# Patient Record
Sex: FEMALE | Race: WHITE | NOT HISPANIC OR LATINO | Employment: FULL TIME | URBAN - METROPOLITAN AREA
[De-identification: names, ages, dates, MRNs, and addresses within clinical notes are randomized per-mention and may not be internally consistent; named-entity substitution may affect disease eponyms.]

---

## 2022-01-12 ENCOUNTER — HOSPITAL ENCOUNTER (INPATIENT)
Facility: CLINIC | Age: 32
LOS: 6 days | Discharge: HOME | End: 2022-01-18
Attending: HOSPITALIST | Admitting: INTERNAL MEDICINE
Payer: MEDICAID

## 2022-01-12 ENCOUNTER — APPOINTMENT (OUTPATIENT)
Dept: NUCLEAR MEDICINE | Facility: CLINIC | Age: 32
End: 2022-01-12
Attending: PHYSICIAN ASSISTANT
Payer: MEDICAID

## 2022-01-12 ENCOUNTER — HOSPITAL ENCOUNTER (INPATIENT)
Facility: CLINIC | Age: 32
LOS: 1 days | Discharge: ANOTHER HOSPITAL | End: 2022-01-12
Attending: EMERGENCY MEDICINE | Admitting: HOSPITALIST
Payer: MEDICAID

## 2022-01-12 ENCOUNTER — APPOINTMENT (OUTPATIENT)
Dept: CT IMAGING | Facility: CLINIC | Age: 32
End: 2022-01-12
Attending: STUDENT IN AN ORGANIZED HEALTH CARE EDUCATION/TRAINING PROGRAM
Payer: MEDICAID

## 2022-01-12 ENCOUNTER — APPOINTMENT (OUTPATIENT)
Dept: ULTRASOUND IMAGING | Facility: CLINIC | Age: 32
End: 2022-01-12
Attending: STUDENT IN AN ORGANIZED HEALTH CARE EDUCATION/TRAINING PROGRAM
Payer: MEDICAID

## 2022-01-12 VITALS
RESPIRATION RATE: 16 BRPM | OXYGEN SATURATION: 98 % | WEIGHT: 240 LBS | DIASTOLIC BLOOD PRESSURE: 64 MMHG | SYSTOLIC BLOOD PRESSURE: 118 MMHG | HEIGHT: 65 IN | TEMPERATURE: 98.4 F | BODY MASS INDEX: 39.99 KG/M2 | HEART RATE: 85 BPM

## 2022-01-12 DIAGNOSIS — M31.19 TTP (THROMBOTIC THROMBOCYTOPENIC PURPURA) (H): ICD-10-CM

## 2022-01-12 DIAGNOSIS — R74.02 ELEVATED SERUM LACTATE DEHYDROGENASE: ICD-10-CM

## 2022-01-12 DIAGNOSIS — R10.84 ABDOMINAL PAIN, GENERALIZED: ICD-10-CM

## 2022-01-12 DIAGNOSIS — R31.9 HEMATURIA, UNSPECIFIED TYPE: ICD-10-CM

## 2022-01-12 DIAGNOSIS — D64.9 NORMOCYTIC ANEMIA: ICD-10-CM

## 2022-01-12 DIAGNOSIS — E80.6 HYPERBILIRUBINEMIA: ICD-10-CM

## 2022-01-12 DIAGNOSIS — K04.7 DENTAL ABSCESS: Primary | ICD-10-CM

## 2022-01-12 DIAGNOSIS — K59.01 SLOW TRANSIT CONSTIPATION: ICD-10-CM

## 2022-01-12 DIAGNOSIS — D69.6 THROMBOCYTOPENIA (H): Primary | ICD-10-CM

## 2022-01-12 PROBLEM — D69.3 ACUTE IDIOPATHIC THROMBOCYTOPENIC PURPURA (H): Status: ACTIVE | Noted: 2022-01-12

## 2022-01-12 LAB
ALBUMIN SERPL-MCNC: 3 G/DL (ref 3.4–5)
ALBUMIN SERPL-MCNC: 3.3 G/DL (ref 3.4–5)
ALBUMIN UR-MCNC: 200 MG/DL
ALP SERPL-CCNC: 76 U/L (ref 40–150)
ALP SERPL-CCNC: 80 U/L (ref 40–150)
ALT SERPL W P-5'-P-CCNC: 41 U/L (ref 0–50)
ALT SERPL W P-5'-P-CCNC: 42 U/L (ref 0–50)
ANION GAP SERPL CALCULATED.3IONS-SCNC: 5 MMOL/L (ref 3–14)
ANION GAP SERPL CALCULATED.3IONS-SCNC: 6 MMOL/L (ref 3–14)
APPEARANCE UR: ABNORMAL
APTT PPP: 34 SECONDS (ref 22–38)
APTT PPP: 34 SECONDS (ref 22–38)
AST SERPL W P-5'-P-CCNC: 36 U/L (ref 0–45)
AST SERPL W P-5'-P-CCNC: 37 U/L (ref 0–45)
BACTERIA #/AREA URNS HPF: ABNORMAL /HPF
BASOPHILS # BLD AUTO: 0 10E3/UL (ref 0–0.2)
BASOPHILS # BLD AUTO: 0.1 10E3/UL (ref 0–0.2)
BASOPHILS NFR BLD AUTO: 0 %
BASOPHILS NFR BLD AUTO: 1 %
BILIRUB DIRECT SERPL-MCNC: 0.3 MG/DL (ref 0–0.2)
BILIRUB SERPL-MCNC: 1.4 MG/DL (ref 0.2–1.3)
BILIRUB SERPL-MCNC: 1.4 MG/DL (ref 0.2–1.3)
BILIRUB UR QL STRIP: NEGATIVE
BLD PROD TYP BPU: NORMAL
BLOOD COMPONENT TYPE: NORMAL
BUN SERPL-MCNC: 18 MG/DL (ref 7–30)
BUN SERPL-MCNC: 29 MG/DL (ref 7–30)
CALCIUM SERPL-MCNC: 8.4 MG/DL (ref 8.5–10.1)
CALCIUM SERPL-MCNC: 8.8 MG/DL (ref 8.5–10.1)
CHLORIDE BLD-SCNC: 106 MMOL/L (ref 94–109)
CHLORIDE BLD-SCNC: 109 MMOL/L (ref 94–109)
CO2 SERPL-SCNC: 24 MMOL/L (ref 20–32)
CO2 SERPL-SCNC: 26 MMOL/L (ref 20–32)
CODING SYSTEM: NORMAL
COLOR UR AUTO: ABNORMAL
CREAT SERPL-MCNC: 0.84 MG/DL (ref 0.52–1.04)
CREAT SERPL-MCNC: 1.09 MG/DL (ref 0.52–1.04)
EOSINOPHIL # BLD AUTO: 0.1 10E3/UL (ref 0–0.7)
EOSINOPHIL # BLD AUTO: 0.2 10E3/UL (ref 0–0.7)
EOSINOPHIL NFR BLD AUTO: 1 %
EOSINOPHIL NFR BLD AUTO: 2 %
ERYTHROCYTE [DISTWIDTH] IN BLOOD BY AUTOMATED COUNT: 13.6 % (ref 10–15)
ERYTHROCYTE [DISTWIDTH] IN BLOOD BY AUTOMATED COUNT: 13.7 % (ref 10–15)
ERYTHROCYTE [DISTWIDTH] IN BLOOD BY AUTOMATED COUNT: 13.8 % (ref 10–15)
ERYTHROCYTE [DISTWIDTH] IN BLOOD BY AUTOMATED COUNT: 13.8 % (ref 10–15)
ERYTHROCYTE [DISTWIDTH] IN BLOOD BY AUTOMATED COUNT: 13.9 % (ref 10–15)
FERRITIN SERPL-MCNC: 590 NG/ML (ref 12–150)
FIBRINOGEN PPP-MCNC: 410 MG/DL (ref 170–490)
FLUAV RNA SPEC QL NAA+PROBE: NEGATIVE
FLUBV RNA RESP QL NAA+PROBE: NEGATIVE
FOLATE SERPL-MCNC: 11.4 NG/ML
FOLATE SERPL-MCNC: 12.7 NG/ML
GFR SERPL CREATININE-BSD FRML MDRD: 69 ML/MIN/1.73M2
GFR SERPL CREATININE-BSD FRML MDRD: >90 ML/MIN/1.73M2
GLUCOSE BLD-MCNC: 117 MG/DL (ref 70–99)
GLUCOSE BLD-MCNC: 80 MG/DL (ref 70–99)
GLUCOSE UR STRIP-MCNC: NEGATIVE MG/DL
HAPTOGLOB SERPL-MCNC: <3 MG/DL (ref 32–197)
HCG SERPL QL: NEGATIVE
HCT VFR BLD AUTO: 27.9 % (ref 35–47)
HCT VFR BLD AUTO: 28.3 % (ref 35–47)
HCT VFR BLD AUTO: 30.5 % (ref 35–47)
HCT VFR BLD AUTO: 32.3 % (ref 35–47)
HCT VFR BLD AUTO: 32.9 % (ref 35–47)
HCV AB SERPL QL IA: NONREACTIVE
HGB BLD-MCNC: 10.1 G/DL (ref 11.7–15.7)
HGB BLD-MCNC: 10.7 G/DL (ref 11.7–15.7)
HGB BLD-MCNC: 10.8 G/DL (ref 11.7–15.7)
HGB BLD-MCNC: 9.4 G/DL (ref 11.7–15.7)
HGB BLD-MCNC: 9.6 G/DL (ref 11.7–15.7)
HGB UR QL STRIP: ABNORMAL
HIV 1+2 AB+HIV1 P24 AG SERPL QL IA: NONREACTIVE
HIV 1+2 AB+HIV1P24 AG SERPLBLD IA.RAPID: NON REACTIVE
HIV 1+2 AB+HIV1P24 AG SERPLBLD IA.RAPID: NON REACTIVE
HIV INTERPRETATION: NORMAL
HOLD SPECIMEN: NORMAL
IMM GRANULOCYTES # BLD: 0 10E3/UL
IMM GRANULOCYTES # BLD: 0.1 10E3/UL
IMM GRANULOCYTES NFR BLD: 0 %
IMM GRANULOCYTES NFR BLD: 0 %
IMM GRANULOCYTES NFR BLD: 1 %
IMM GRANULOCYTES NFR BLD: 1 %
INR PPP: 1.02 (ref 0.85–1.15)
INR PPP: 1.08 (ref 0.85–1.15)
INR PPP: 1.08 (ref 0.85–1.15)
IRON SATN MFR SERPL: 51 % (ref 15–46)
IRON SATN MFR SERPL: 53 % (ref 15–46)
IRON SERPL-MCNC: 125 UG/DL (ref 35–180)
IRON SERPL-MCNC: 151 UG/DL (ref 35–180)
ISSUE DATE AND TIME: NORMAL
KETONES UR STRIP-MCNC: ABNORMAL MG/DL
LACTATE SERPL-SCNC: 0.6 MMOL/L (ref 0.7–2)
LDH SERPL L TO P-CCNC: 591 U/L (ref 81–234)
LDH SERPL L TO P-CCNC: 608 U/L (ref 81–234)
LEUKOCYTE ESTERASE UR QL STRIP: NEGATIVE
LIPASE SERPL-CCNC: 165 U/L (ref 73–393)
LYMPHOCYTES # BLD AUTO: 0.8 10E3/UL (ref 0.8–5.3)
LYMPHOCYTES # BLD AUTO: 1.5 10E3/UL (ref 0.8–5.3)
LYMPHOCYTES # BLD AUTO: 1.8 10E3/UL (ref 0.8–5.3)
LYMPHOCYTES # BLD AUTO: 1.9 10E3/UL (ref 0.8–5.3)
LYMPHOCYTES NFR BLD AUTO: 20 %
LYMPHOCYTES NFR BLD AUTO: 22 %
LYMPHOCYTES NFR BLD AUTO: 22 %
LYMPHOCYTES NFR BLD AUTO: 9 %
MCH RBC QN AUTO: 31.5 PG (ref 26.5–33)
MCH RBC QN AUTO: 31.8 PG (ref 26.5–33)
MCH RBC QN AUTO: 31.8 PG (ref 26.5–33)
MCH RBC QN AUTO: 32 PG (ref 26.5–33)
MCH RBC QN AUTO: 32.3 PG (ref 26.5–33)
MCHC RBC AUTO-ENTMCNC: 32.8 G/DL (ref 31.5–36.5)
MCHC RBC AUTO-ENTMCNC: 33.1 G/DL (ref 31.5–36.5)
MCHC RBC AUTO-ENTMCNC: 33.1 G/DL (ref 31.5–36.5)
MCHC RBC AUTO-ENTMCNC: 33.7 G/DL (ref 31.5–36.5)
MCHC RBC AUTO-ENTMCNC: 33.9 G/DL (ref 31.5–36.5)
MCV RBC AUTO: 95 FL (ref 78–100)
MCV RBC AUTO: 95 FL (ref 78–100)
MCV RBC AUTO: 96 FL (ref 78–100)
MONOCYTES # BLD AUTO: 0.3 10E3/UL (ref 0–1.3)
MONOCYTES # BLD AUTO: 0.6 10E3/UL (ref 0–1.3)
MONOCYTES # BLD AUTO: 0.7 10E3/UL (ref 0–1.3)
MONOCYTES # BLD AUTO: 0.7 10E3/UL (ref 0–1.3)
MONOCYTES NFR BLD AUTO: 3 %
MONOCYTES NFR BLD AUTO: 8 %
MUCOUS THREADS #/AREA URNS LPF: PRESENT /LPF
NEUTROPHILS # BLD AUTO: 4.6 10E3/UL (ref 1.6–8.3)
NEUTROPHILS # BLD AUTO: 5.6 10E3/UL (ref 1.6–8.3)
NEUTROPHILS # BLD AUTO: 6.1 10E3/UL (ref 1.6–8.3)
NEUTROPHILS # BLD AUTO: 7.8 10E3/UL (ref 1.6–8.3)
NEUTROPHILS NFR BLD AUTO: 67 %
NEUTROPHILS NFR BLD AUTO: 67 %
NEUTROPHILS NFR BLD AUTO: 68 %
NEUTROPHILS NFR BLD AUTO: 86 %
NITRATE UR QL: NEGATIVE
NRBC # BLD AUTO: 0 10E3/UL
NRBC BLD AUTO-RTO: 0 /100
PATH REPORT.COMMENTS IMP SPEC: NORMAL
PATH REPORT.FINAL DX SPEC: NORMAL
PATH REPORT.MICROSCOPIC SPEC OTHER STN: NORMAL
PATH REPORT.MICROSCOPIC SPEC OTHER STN: NORMAL
PH UR STRIP: 5.5 [PH] (ref 5–7)
PLAT MORPH BLD: NORMAL
PLATELET # BLD AUTO: 11 10E3/UL (ref 150–450)
POTASSIUM BLD-SCNC: 3.5 MMOL/L (ref 3.4–5.3)
POTASSIUM BLD-SCNC: 3.5 MMOL/L (ref 3.4–5.3)
PROCALCITONIN SERPL-MCNC: <0.05 NG/ML
PROT SERPL-MCNC: 6.4 G/DL (ref 6.8–8.8)
PROT SERPL-MCNC: 6.9 G/DL (ref 6.8–8.8)
RBC # BLD AUTO: 2.94 10E6/UL (ref 3.8–5.2)
RBC # BLD AUTO: 2.97 10E6/UL (ref 3.8–5.2)
RBC # BLD AUTO: 3.18 10E6/UL (ref 3.8–5.2)
RBC # BLD AUTO: 3.37 10E6/UL (ref 3.8–5.2)
RBC # BLD AUTO: 3.43 10E6/UL (ref 3.8–5.2)
RBC MORPH BLD: NORMAL
RBC URINE: 22 /HPF
RETICS # AUTO: 0.13 10E6/UL (ref 0.03–0.1)
RETICS/RBC NFR AUTO: 4.5 % (ref 0.5–2)
SARS-COV-2 RNA RESP QL NAA+PROBE: NEGATIVE
SODIUM SERPL-SCNC: 137 MMOL/L (ref 133–144)
SODIUM SERPL-SCNC: 139 MMOL/L (ref 133–144)
SP GR UR STRIP: 1.03 (ref 1–1.03)
SQUAMOUS EPITHELIAL: 21 /HPF
TIBC SERPL-MCNC: 244 UG/DL (ref 240–430)
TIBC SERPL-MCNC: 285 UG/DL (ref 240–430)
UNIT ABO/RH: NORMAL
UNIT NUMBER: NORMAL
UNIT STATUS: NORMAL
UNIT TYPE ISBT: 6200
UROBILINOGEN UR STRIP-MCNC: NORMAL MG/DL
VIT B12 SERPL-MCNC: 507 PG/ML (ref 193–986)
VIT B12 SERPL-MCNC: 539 PG/ML (ref 193–986)
WBC # BLD AUTO: 6.9 10E3/UL (ref 4–11)
WBC # BLD AUTO: 8.4 10E3/UL (ref 4–11)
WBC # BLD AUTO: 8.8 10E3/UL (ref 4–11)
WBC # BLD AUTO: 9.1 10E3/UL (ref 4–11)
WBC # BLD AUTO: 9.6 10E3/UL (ref 4–11)
WBC URINE: 7 /HPF

## 2022-01-12 PROCEDURE — 85610 PROTHROMBIN TIME: CPT | Performed by: EMERGENCY MEDICINE

## 2022-01-12 PROCEDURE — 84703 CHORIONIC GONADOTROPIN ASSAY: CPT | Performed by: PHYSICIAN ASSISTANT

## 2022-01-12 PROCEDURE — 83550 IRON BINDING TEST: CPT | Performed by: INTERNAL MEDICINE

## 2022-01-12 PROCEDURE — 81001 URINALYSIS AUTO W/SCOPE: CPT | Performed by: EMERGENCY MEDICINE

## 2022-01-12 PROCEDURE — 250N000011 HC RX IP 250 OP 636: Performed by: EMERGENCY MEDICINE

## 2022-01-12 PROCEDURE — 84450 TRANSFERASE (AST) (SGOT): CPT | Performed by: INTERNAL MEDICINE

## 2022-01-12 PROCEDURE — 36415 COLL VENOUS BLD VENIPUNCTURE: CPT | Performed by: PHYSICIAN ASSISTANT

## 2022-01-12 PROCEDURE — 120N000001 HC R&B MED SURG/OB

## 2022-01-12 PROCEDURE — 96361 HYDRATE IV INFUSION ADD-ON: CPT

## 2022-01-12 PROCEDURE — 86803 HEPATITIS C AB TEST: CPT | Performed by: PHYSICIAN ASSISTANT

## 2022-01-12 PROCEDURE — 343N000001 HC RX 343: Performed by: HOSPITALIST

## 2022-01-12 PROCEDURE — 83010 ASSAY OF HAPTOGLOBIN QUANT: CPT | Performed by: INTERNAL MEDICINE

## 2022-01-12 PROCEDURE — 96360 HYDRATION IV INFUSION INIT: CPT | Mod: 59

## 2022-01-12 PROCEDURE — 85730 THROMBOPLASTIN TIME PARTIAL: CPT | Performed by: INTERNAL MEDICINE

## 2022-01-12 PROCEDURE — 85060 BLOOD SMEAR INTERPRETATION: CPT | Performed by: PATHOLOGY

## 2022-01-12 PROCEDURE — 85025 COMPLETE CBC W/AUTO DIFF WBC: CPT | Performed by: EMERGENCY MEDICINE

## 2022-01-12 PROCEDURE — 86706 HEP B SURFACE ANTIBODY: CPT | Performed by: INTERNAL MEDICINE

## 2022-01-12 PROCEDURE — 78226 HEPATOBILIARY SYSTEM IMAGING: CPT

## 2022-01-12 PROCEDURE — 83690 ASSAY OF LIPASE: CPT | Performed by: EMERGENCY MEDICINE

## 2022-01-12 PROCEDURE — 36415 COLL VENOUS BLD VENIPUNCTURE: CPT | Performed by: INTERNAL MEDICINE

## 2022-01-12 PROCEDURE — 87806 HIV AG W/HIV1&2 ANTB W/OPTIC: CPT | Performed by: PHYSICIAN ASSISTANT

## 2022-01-12 PROCEDURE — 85610 PROTHROMBIN TIME: CPT | Performed by: INTERNAL MEDICINE

## 2022-01-12 PROCEDURE — 258N000003 HC RX IP 258 OP 636: Performed by: EMERGENCY MEDICINE

## 2022-01-12 PROCEDURE — 83010 ASSAY OF HAPTOGLOBIN QUANT: CPT | Performed by: EMERGENCY MEDICINE

## 2022-01-12 PROCEDURE — A9537 TC99M MEBROFENIN: HCPCS | Performed by: HOSPITALIST

## 2022-01-12 PROCEDURE — 83550 IRON BINDING TEST: CPT | Performed by: PHYSICIAN ASSISTANT

## 2022-01-12 PROCEDURE — 250N000011 HC RX IP 250 OP 636: Performed by: HOSPITALIST

## 2022-01-12 PROCEDURE — 87086 URINE CULTURE/COLONY COUNT: CPT | Performed by: PHYSICIAN ASSISTANT

## 2022-01-12 PROCEDURE — 87340 HEPATITIS B SURFACE AG IA: CPT | Performed by: INTERNAL MEDICINE

## 2022-01-12 PROCEDURE — 36415 COLL VENOUS BLD VENIPUNCTURE: CPT | Performed by: EMERGENCY MEDICINE

## 2022-01-12 PROCEDURE — 99223 1ST HOSP IP/OBS HIGH 75: CPT | Mod: AI | Performed by: HOSPITALIST

## 2022-01-12 PROCEDURE — 250N000013 HC RX MED GY IP 250 OP 250 PS 637: Performed by: EMERGENCY MEDICINE

## 2022-01-12 PROCEDURE — 99285 EMERGENCY DEPT VISIT HI MDM: CPT | Mod: 25

## 2022-01-12 PROCEDURE — 258N000003 HC RX IP 258 OP 636: Performed by: PHYSICIAN ASSISTANT

## 2022-01-12 PROCEDURE — 85384 FIBRINOGEN ACTIVITY: CPT | Performed by: HOSPITALIST

## 2022-01-12 PROCEDURE — 83615 LACTATE (LD) (LDH) ENZYME: CPT | Performed by: EMERGENCY MEDICINE

## 2022-01-12 PROCEDURE — 87389 HIV-1 AG W/HIV-1&-2 AB AG IA: CPT | Performed by: EMERGENCY MEDICINE

## 2022-01-12 PROCEDURE — 74177 CT ABD & PELVIS W/CONTRAST: CPT

## 2022-01-12 PROCEDURE — 84155 ASSAY OF PROTEIN SERUM: CPT | Performed by: INTERNAL MEDICINE

## 2022-01-12 PROCEDURE — 80053 COMPREHEN METABOLIC PANEL: CPT | Performed by: EMERGENCY MEDICINE

## 2022-01-12 PROCEDURE — C9803 HOPD COVID-19 SPEC COLLECT: HCPCS

## 2022-01-12 PROCEDURE — 82728 ASSAY OF FERRITIN: CPT | Performed by: PHYSICIAN ASSISTANT

## 2022-01-12 PROCEDURE — 85045 AUTOMATED RETICULOCYTE COUNT: CPT | Performed by: EMERGENCY MEDICINE

## 2022-01-12 PROCEDURE — 82248 BILIRUBIN DIRECT: CPT | Performed by: EMERGENCY MEDICINE

## 2022-01-12 PROCEDURE — 85027 COMPLETE CBC AUTOMATED: CPT | Performed by: EMERGENCY MEDICINE

## 2022-01-12 PROCEDURE — 250N000012 HC RX MED GY IP 250 OP 636 PS 637: Performed by: INTERNAL MEDICINE

## 2022-01-12 PROCEDURE — 83605 ASSAY OF LACTIC ACID: CPT | Performed by: EMERGENCY MEDICINE

## 2022-01-12 PROCEDURE — 86803 HEPATITIS C AB TEST: CPT | Performed by: INTERNAL MEDICINE

## 2022-01-12 PROCEDURE — 82746 ASSAY OF FOLIC ACID SERUM: CPT | Performed by: PHYSICIAN ASSISTANT

## 2022-01-12 PROCEDURE — 85730 THROMBOPLASTIN TIME PARTIAL: CPT | Performed by: EMERGENCY MEDICINE

## 2022-01-12 PROCEDURE — 99207 PR APP CREDIT; MD BILLING SHARED VISIT: CPT | Performed by: PHYSICIAN ASSISTANT

## 2022-01-12 PROCEDURE — 87636 SARSCOV2 & INF A&B AMP PRB: CPT | Performed by: EMERGENCY MEDICINE

## 2022-01-12 PROCEDURE — 82746 ASSAY OF FOLIC ACID SERUM: CPT | Performed by: INTERNAL MEDICINE

## 2022-01-12 PROCEDURE — 80048 BASIC METABOLIC PNL TOTAL CA: CPT | Performed by: EMERGENCY MEDICINE

## 2022-01-12 PROCEDURE — 86709 HEPATITIS A IGM ANTIBODY: CPT | Performed by: INTERNAL MEDICINE

## 2022-01-12 PROCEDURE — 76705 ECHO EXAM OF ABDOMEN: CPT

## 2022-01-12 PROCEDURE — 99223 1ST HOSP IP/OBS HIGH 75: CPT | Performed by: INTERNAL MEDICINE

## 2022-01-12 PROCEDURE — 82607 VITAMIN B-12: CPT | Performed by: INTERNAL MEDICINE

## 2022-01-12 PROCEDURE — 83615 LACTATE (LD) (LDH) ENZYME: CPT | Performed by: INTERNAL MEDICINE

## 2022-01-12 PROCEDURE — 85027 COMPLETE CBC AUTOMATED: CPT | Performed by: INTERNAL MEDICINE

## 2022-01-12 PROCEDURE — 86708 HEPATITIS A ANTIBODY: CPT | Performed by: INTERNAL MEDICINE

## 2022-01-12 PROCEDURE — 85397 CLOTTING FUNCT ACTIVITY: CPT | Performed by: INTERNAL MEDICINE

## 2022-01-12 PROCEDURE — 84145 PROCALCITONIN (PCT): CPT | Performed by: PHYSICIAN ASSISTANT

## 2022-01-12 PROCEDURE — 82607 VITAMIN B-12: CPT | Performed by: PHYSICIAN ASSISTANT

## 2022-01-12 RX ORDER — PROCHLORPERAZINE 25 MG
25 SUPPOSITORY, RECTAL RECTAL EVERY 12 HOURS PRN
Status: DISCONTINUED | OUTPATIENT
Start: 2022-01-12 | End: 2022-01-12 | Stop reason: HOSPADM

## 2022-01-12 RX ORDER — PROCHLORPERAZINE MALEATE 5 MG
10 TABLET ORAL EVERY 6 HOURS PRN
Status: DISCONTINUED | OUTPATIENT
Start: 2022-01-12 | End: 2022-01-12 | Stop reason: HOSPADM

## 2022-01-12 RX ORDER — CEFTRIAXONE 1 G/1
1 INJECTION, POWDER, FOR SOLUTION INTRAMUSCULAR; INTRAVENOUS EVERY 24 HOURS
Status: ON HOLD | DISCHARGE
Start: 2022-01-13 | End: 2022-01-12

## 2022-01-12 RX ORDER — SODIUM CHLORIDE, SODIUM LACTATE, POTASSIUM CHLORIDE, CALCIUM CHLORIDE 600; 310; 30; 20 MG/100ML; MG/100ML; MG/100ML; MG/100ML
INJECTION, SOLUTION INTRAVENOUS CONTINUOUS
Status: DISCONTINUED | OUTPATIENT
Start: 2022-01-12 | End: 2022-01-12 | Stop reason: HOSPADM

## 2022-01-12 RX ORDER — IBUPROFEN 200 MG
600 TABLET ORAL EVERY 4 HOURS PRN
Status: ON HOLD | COMMUNITY
End: 2022-01-12

## 2022-01-12 RX ORDER — ACETAMINOPHEN 500 MG
500-1000 TABLET ORAL EVERY 6 HOURS PRN
COMMUNITY

## 2022-01-12 RX ORDER — ACETAMINOPHEN 325 MG/1
650 TABLET ORAL EVERY 6 HOURS PRN
Status: DISCONTINUED | OUTPATIENT
Start: 2022-01-12 | End: 2022-01-18 | Stop reason: HOSPADM

## 2022-01-12 RX ORDER — PANTOPRAZOLE SODIUM 40 MG/1
40 TABLET, DELAYED RELEASE ORAL
Status: DISCONTINUED | OUTPATIENT
Start: 2022-01-13 | End: 2022-01-12 | Stop reason: HOSPADM

## 2022-01-12 RX ORDER — PREDNISONE 50 MG/1
100 TABLET ORAL DAILY
Status: DISCONTINUED | OUTPATIENT
Start: 2022-01-12 | End: 2022-01-12 | Stop reason: HOSPADM

## 2022-01-12 RX ORDER — ONDANSETRON 4 MG/1
4 TABLET, ORALLY DISINTEGRATING ORAL EVERY 6 HOURS PRN
Status: DISCONTINUED | OUTPATIENT
Start: 2022-01-12 | End: 2022-01-18 | Stop reason: HOSPADM

## 2022-01-12 RX ORDER — CALCIUM GLUCONATE 94 MG/ML
1 INJECTION, SOLUTION INTRAVENOUS
Status: DISCONTINUED | OUTPATIENT
Start: 2022-01-13 | End: 2022-01-14

## 2022-01-12 RX ORDER — SODIUM CHLORIDE, SODIUM LACTATE, POTASSIUM CHLORIDE, CALCIUM CHLORIDE 600; 310; 30; 20 MG/100ML; MG/100ML; MG/100ML; MG/100ML
100 INJECTION, SOLUTION INTRAVENOUS CONTINUOUS
Status: ON HOLD | DISCHARGE
Start: 2022-01-12 | End: 2022-01-12

## 2022-01-12 RX ORDER — DIPHENHYDRAMINE HYDROCHLORIDE 50 MG/ML
50 INJECTION INTRAMUSCULAR; INTRAVENOUS ONCE
Status: CANCELLED | OUTPATIENT
Start: 2022-01-13

## 2022-01-12 RX ORDER — SODIUM CHLORIDE 9 MG/ML
INJECTION, SOLUTION INTRAVENOUS CONTINUOUS
Status: DISCONTINUED | OUTPATIENT
Start: 2022-01-12 | End: 2022-01-13

## 2022-01-12 RX ORDER — LIDOCAINE 40 MG/G
CREAM TOPICAL
Status: DISCONTINUED | OUTPATIENT
Start: 2022-01-12 | End: 2022-01-14

## 2022-01-12 RX ORDER — CALCIUM GLUCONATE 94 MG/ML
1 INJECTION, SOLUTION INTRAVENOUS
Status: CANCELLED | OUTPATIENT
Start: 2022-01-13

## 2022-01-12 RX ORDER — ACETAMINOPHEN 325 MG/1
325 TABLET ORAL ONCE
Status: CANCELLED | OUTPATIENT
Start: 2022-01-13

## 2022-01-12 RX ORDER — IBUPROFEN 200 MG
600 TABLET ORAL EVERY 4 HOURS PRN
Status: ON HOLD | COMMUNITY
End: 2022-01-18

## 2022-01-12 RX ORDER — OXYCODONE HYDROCHLORIDE 5 MG/1
5 TABLET ORAL EVERY 4 HOURS PRN
Status: DISCONTINUED | OUTPATIENT
Start: 2022-01-12 | End: 2022-01-12 | Stop reason: HOSPADM

## 2022-01-12 RX ORDER — IOPAMIDOL 755 MG/ML
500 INJECTION, SOLUTION INTRAVASCULAR ONCE
Status: COMPLETED | OUTPATIENT
Start: 2022-01-12 | End: 2022-01-12

## 2022-01-12 RX ORDER — HYDROXYZINE HYDROCHLORIDE 25 MG/1
25 TABLET, FILM COATED ORAL ONCE
Status: COMPLETED | OUTPATIENT
Start: 2022-01-12 | End: 2022-01-12

## 2022-01-12 RX ORDER — PANTOPRAZOLE SODIUM 40 MG/1
40 TABLET, DELAYED RELEASE ORAL
Status: ON HOLD | DISCHARGE
Start: 2022-01-13 | End: 2022-01-18

## 2022-01-12 RX ORDER — CEFTRIAXONE 1 G/1
1 INJECTION, POWDER, FOR SOLUTION INTRAMUSCULAR; INTRAVENOUS EVERY 24 HOURS
Status: DISCONTINUED | OUTPATIENT
Start: 2022-01-12 | End: 2022-01-12 | Stop reason: HOSPADM

## 2022-01-12 RX ORDER — PROCHLORPERAZINE MALEATE 5 MG
10 TABLET ORAL EVERY 6 HOURS PRN
Status: DISCONTINUED | OUTPATIENT
Start: 2022-01-12 | End: 2022-01-18 | Stop reason: HOSPADM

## 2022-01-12 RX ORDER — KIT FOR THE PREPARATION OF TECHNETIUM TC 99M MEBROFENIN 45 MG/10ML
6 INJECTION, POWDER, LYOPHILIZED, FOR SOLUTION INTRAVENOUS ONCE
Status: COMPLETED | OUTPATIENT
Start: 2022-01-12 | End: 2022-01-12

## 2022-01-12 RX ORDER — CEFTRIAXONE 2 G/1
2 INJECTION, POWDER, FOR SOLUTION INTRAMUSCULAR; INTRAVENOUS EVERY 24 HOURS
Status: DISCONTINUED | OUTPATIENT
Start: 2022-01-13 | End: 2022-01-18

## 2022-01-12 RX ORDER — ONDANSETRON 2 MG/ML
4 INJECTION INTRAMUSCULAR; INTRAVENOUS EVERY 6 HOURS PRN
Status: DISCONTINUED | OUTPATIENT
Start: 2022-01-12 | End: 2022-01-12 | Stop reason: HOSPADM

## 2022-01-12 RX ORDER — DIPHENHYDRAMINE HYDROCHLORIDE 50 MG/ML
50 INJECTION INTRAMUSCULAR; INTRAVENOUS
Status: DISCONTINUED | OUTPATIENT
Start: 2022-01-13 | End: 2022-01-14

## 2022-01-12 RX ORDER — ACETAMINOPHEN 325 MG/1
325-650 TABLET ORAL EVERY 6 HOURS PRN
Status: ON HOLD | COMMUNITY
End: 2022-01-12

## 2022-01-12 RX ORDER — DIPHENHYDRAMINE HYDROCHLORIDE 50 MG/ML
50 INJECTION INTRAMUSCULAR; INTRAVENOUS
Status: CANCELLED | OUTPATIENT
Start: 2022-01-13

## 2022-01-12 RX ORDER — ACETAMINOPHEN 650 MG/1
650 SUPPOSITORY RECTAL EVERY 6 HOURS PRN
Status: DISCONTINUED | OUTPATIENT
Start: 2022-01-12 | End: 2022-01-18 | Stop reason: HOSPADM

## 2022-01-12 RX ORDER — LANOLIN ALCOHOL/MO/W.PET/CERES
3 CREAM (GRAM) TOPICAL
Status: DISCONTINUED | OUTPATIENT
Start: 2022-01-12 | End: 2022-01-15

## 2022-01-12 RX ORDER — ONDANSETRON 2 MG/ML
4 INJECTION INTRAMUSCULAR; INTRAVENOUS EVERY 6 HOURS PRN
Status: DISCONTINUED | OUTPATIENT
Start: 2022-01-12 | End: 2022-01-18 | Stop reason: HOSPADM

## 2022-01-12 RX ORDER — IBUPROFEN 600 MG/1
600 TABLET, FILM COATED ORAL ONCE
Status: COMPLETED | OUTPATIENT
Start: 2022-01-12 | End: 2022-01-12

## 2022-01-12 RX ORDER — HYDROMORPHONE HCL IN WATER/PF 6 MG/30 ML
0.2 PATIENT CONTROLLED ANALGESIA SYRINGE INTRAVENOUS
Status: DISCONTINUED | OUTPATIENT
Start: 2022-01-12 | End: 2022-01-12 | Stop reason: HOSPADM

## 2022-01-12 RX ORDER — ONDANSETRON 4 MG/1
4 TABLET, ORALLY DISINTEGRATING ORAL EVERY 6 HOURS PRN
Status: DISCONTINUED | OUTPATIENT
Start: 2022-01-12 | End: 2022-01-12 | Stop reason: HOSPADM

## 2022-01-12 RX ORDER — ACETAMINOPHEN 325 MG/1
325 TABLET ORAL ONCE
Status: COMPLETED | OUTPATIENT
Start: 2022-01-13 | End: 2022-01-13

## 2022-01-12 RX ORDER — AMOXICILLIN 250 MG
2 CAPSULE ORAL 2 TIMES DAILY
Status: DISCONTINUED | OUTPATIENT
Start: 2022-01-12 | End: 2022-01-12 | Stop reason: HOSPADM

## 2022-01-12 RX ORDER — DIPHENHYDRAMINE HYDROCHLORIDE 50 MG/ML
50 INJECTION INTRAMUSCULAR; INTRAVENOUS ONCE
Status: COMPLETED | OUTPATIENT
Start: 2022-01-13 | End: 2022-01-13

## 2022-01-12 RX ORDER — PROCHLORPERAZINE 25 MG
25 SUPPOSITORY, RECTAL RECTAL EVERY 12 HOURS PRN
Status: DISCONTINUED | OUTPATIENT
Start: 2022-01-12 | End: 2022-01-18 | Stop reason: HOSPADM

## 2022-01-12 RX ORDER — ACETAMINOPHEN 500 MG
500-1000 TABLET ORAL EVERY 6 HOURS PRN
Status: ON HOLD | COMMUNITY
End: 2022-01-12

## 2022-01-12 RX ORDER — AMOXICILLIN 250 MG
1 CAPSULE ORAL 2 TIMES DAILY
Status: DISCONTINUED | OUTPATIENT
Start: 2022-01-12 | End: 2022-01-12 | Stop reason: HOSPADM

## 2022-01-12 RX ORDER — PREDNISONE 50 MG/1
100 TABLET ORAL DAILY
Status: ON HOLD | DISCHARGE
Start: 2022-01-13 | End: 2022-01-18

## 2022-01-12 RX ADMIN — HYDROXYZINE HYDROCHLORIDE 25 MG: 25 TABLET, FILM COATED ORAL at 10:39

## 2022-01-12 RX ADMIN — IBUPROFEN 600 MG: 600 TABLET, FILM COATED ORAL at 01:02

## 2022-01-12 RX ADMIN — IOPAMIDOL 87 ML: 755 INJECTION, SOLUTION INTRAVENOUS at 04:33

## 2022-01-12 RX ADMIN — SODIUM CHLORIDE: 9 INJECTION, SOLUTION INTRAVENOUS at 21:10

## 2022-01-12 RX ADMIN — SODIUM CHLORIDE 65 ML: 9 INJECTION, SOLUTION INTRAVENOUS at 04:33

## 2022-01-12 RX ADMIN — MEBROFENIN 6 MCI.: 45 INJECTION, POWDER, LYOPHILIZED, FOR SOLUTION INTRAVENOUS at 11:45

## 2022-01-12 RX ADMIN — SODIUM CHLORIDE, POTASSIUM CHLORIDE, SODIUM LACTATE AND CALCIUM CHLORIDE: 600; 310; 30; 20 INJECTION, SOLUTION INTRAVENOUS at 17:36

## 2022-01-12 RX ADMIN — PREDNISONE 100 MG: 50 TABLET ORAL at 15:16

## 2022-01-12 RX ADMIN — CEFTRIAXONE 1 G: 1 INJECTION, POWDER, FOR SOLUTION INTRAMUSCULAR; INTRAVENOUS at 16:49

## 2022-01-12 RX ADMIN — SODIUM CHLORIDE, POTASSIUM CHLORIDE, SODIUM LACTATE AND CALCIUM CHLORIDE: 600; 310; 30; 20 INJECTION, SOLUTION INTRAVENOUS at 11:03

## 2022-01-12 RX ADMIN — METRONIDAZOLE 500 MG: 500 INJECTION, SOLUTION INTRAVENOUS at 17:35

## 2022-01-12 ASSESSMENT — ACTIVITIES OF DAILY LIVING (ADL)
ADLS_ACUITY_SCORE: 12
ADLS_ACUITY_SCORE: 3
ADLS_ACUITY_SCORE: 12
ADLS_ACUITY_SCORE: 3
ADLS_ACUITY_SCORE: 12
ADLS_ACUITY_SCORE: 3
ADLS_ACUITY_SCORE: 12
ADLS_ACUITY_SCORE: 12

## 2022-01-12 ASSESSMENT — MIFFLIN-ST. JEOR: SCORE: 1804.51

## 2022-01-12 NOTE — PHARMACY-ADMISSION MEDICATION HISTORY
Admission medication history interview status for this patient is complete. See [facility] admission navigator for allergy information, prior to admission medications and immunization status.     Medication history interview done, indicate source(s): Patient  Medication history resources (including written lists, pill bottles, clinic record):None  Pharmacy: N/A        Prior to Admission medications    Medication Sig Last Dose Taking? Auth Provider   acetaminophen (TYLENOL) 500 MG tablet Take 500-1,000 mg by mouth every 6 hours as needed for mild pain 1/11/2022 at Unknown time Yes Unknown, Entered By History   ibuprofen (ADVIL/MOTRIN) 200 MG tablet Take 600 mg by mouth every 4 hours as needed for mild pain 1/11/2022 at Unknown time Yes Unknown, Entered By History

## 2022-01-12 NOTE — PROGRESS NOTES
Arrived to unit @1330 from ED. Alert and oriented x4. Up SBA. Platelets 11, Dr. Alvarez aware. Petechiae noted on bilateral LE. Started on prednisone. Plan to tx to [facility] or the [facility] for pheresis, see Hem/Onc note.

## 2022-01-12 NOTE — H&P
[facility]  Hospitalist Admission Note  Name: Ivon Gamble    MRN: [id number]  YOB: 1990    Age: 31 year old  Date of admission: 1/12/2022  Primary care provider: No Ref-Primary, Physician    Chief Complaint:  Epigastric pain and bruising    Assessment and Plan: Ivon Gamble is a 31 y.o. female with a PMH of Asthma who presents with 6 days of epigastric and right upper abdominal pain, bruising and was discovered to have thrombocytopenia with Plt count of 11.     1. Epigastric Pain: Patient reports 6 days of worsening epigastric and right upper abdominal pain. CT findings have evidence of gallbladder wall thickening though no evidence of stones or biliary dilation. LFTs and Lipase WNL, elevated LDH. Differential includes acute cholecystitis vs. Gastroenteritis. HIDA scan ordered for further evaluation.   - Pain management   - HIDA Scan   - General Surgery consult appreciated   - NPO   - IVF    2.   Thombocytopenia: Labs show thrombocytopenia with Plt count of 11. PTT normal. LDH elevated. LA wnl. No evidence of active bleed at this time. Differential includes ITP  vs. Autoimmune process.   - Repeat BMP, CBC   - Peripheral smear pending   - PTT, Fibrinogen, Haptoglobin pending   - Hematology consult appreciated    DVT Prophylaxis: Pneumatic Compression Devices  Code Status: Full Code  Dispo: Admit to inpatient      History of Present Illness:  Ivon Gamble is a 31 year old female with PMH including asthma who presents with 6 days of epigastric and right sided abdominal pain and bruising. The pain worsens during eating, sitting, standing and while walking and improves when laying flat. She had one similar episode of abdominal pain previously with bloating. She had episodes of constipation, nausea and vomiting, and noticed a small amount of blood-tinged urine. Patient stated vomit looked like bile and her stools have been green in color. Her abdominal pain has since radiated across her  "abdomen. She recently moved to [place] from [place] three months ago and received her COVID-19 and Influenza vaccines. She works as a CNA. In addition to the abdominal pain, she noticed bruising along her legs, chest and shoulders without associated injury or trauma in the past day. She has no personal history of autoimmune disease. Tested for HIV and Hepatitis previously with negative results. In a new relationship with her boyfriend, uses condoms for contraception and reports partner was also tested for HIV and Hepatitis in the last 6 months. Patient had recent negative pregnancy test two days ago. Reports sporadic, \"red dots\" on her shoulders and chest that self resolve. No history of unexplained bruising or prolonged bleeding prior to current episode. She has no known history of low platelets. No recent immunizations, travel or new medications.     Past Medical History:  Asthma    Past Surgical History:  No known surgical history    Social History:  Social History     Tobacco Use     Smoking status: Not on file     Smokeless tobacco: Not on file   Substance Use Topics     Alcohol use: Not on file     Social History     Social History Narrative    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     Family History:  Family History   Problem Relation Age of Onset     Diabetes Mother      Liver Disease Father      Allergies:  No Known Allergies     Medications:  Albuterol inhaler per pt    Review of Systems:  A Comprehensive greater than 10 system review of systems was carried out.  Pertinent positives and negatives are noted above.  Otherwise negative for contributory information.     Physical Exam:  Blood pressure 111/69, pulse 75, temperature 98.4  F (36.9  C), temperature source Oral, resp. rate 18, height 1.651 m (5' 5\"), weight 108.9 kg (240 lb), last menstrual period 12/22/2021, SpO2 99 %.  Wt Readings from Last 1 Encounters:   01/12/22 108.9 kg (240 lb)     Exam:  General: Alert, awake, no " acute distress.  HEENT: NC/AT, eyes anicteric, external occular movements intact, face symmetric.   Cardiac: RRR, S1, S2.  No murmurs appreciated.  Pulmonary: Normal chest rise, normal work of breathing.  Lungs CTA BL  Abdomen: soft, symmetric, pain to palpation in epigastric and right upper quadrant. Equivocal Rm's sign.  Bowel Sounds Present.  No guarding or rebound.  Extremities: no deformities.  Warm, well perfused.  Skin: Warm and Dry. Petechial rash on dorsum of hand. Sporadic ecchymoses on right thigh and left calf.   Neuro: No focal deficits noted.  Speech clear.  Coordination and strength grossly normal.  Psych: Appropriate affect.    Data:  Imaging:  Results for orders placed or performed during the hospital encounter of 01/12/22   CT Abdomen Pelvis w Contrast    Addendum: 1/12/2022    EXAM: CT ABDOMEN AND PELVIS WITH CONTRAST  LOCATION: [facility]  DATE/TIME: 1/12/2022 4:32 AM    INDICATION: Abdominal pain, fever, epigastric pain  COMPARISON: None.  TECHNIQUE: CT scan of the abdomen and pelvis was performed following injection of IV contrast. Multiplanar reformats were obtained. Dose reduction techniques were used.  CONTRAST: 87 mL Isovue-370    FINDINGS:   LOWER CHEST: Normal.    HEPATOBILIARY: Slight stranding adjacent to the gallbladder. Subsequent ultrasound is negative for cholecystitis. Findings on CT may reflect inflammation secondary to possible enteritis.    PANCREAS: Normal.    SPLEEN: Normal.    ADRENAL GLANDS: Normal.    KIDNEYS/BLADDER: Normal.    BOWEL: Mild mural thickening of the duodenum and proximal jejunum, query enteritis, correlate for infectious or inflammatory etiologies. Normal appendix. No obstruction, colitis, or diverticulitis. No free air or fluid.    LYMPH NODES: Normal.    VASCULATURE: Aorta and branch vessels widely patent. Widely patent celiac, superior mesenteric, renal, and inferior mesenteric arteries. Patent hepatic and portal veins.    PELVIC  ORGANS: Normal.    MUSCULOSKELETAL: Normal.    IMPRESSION:   1.  Mild mural thickening of the duodenum and proximal jejunum, correlate for infectious or inflammatory enteritis.  2.  Normal appendix. No obstruction, colitis, or diverticulitis.        Narrative    EXAM: CT ABDOMEN PELVIS W CONTRAST  LOCATION: [facility]  DATE/TIME: 1/12/2022 4:32 AM    INDICATION: Abdominal pain, fever, epigastric pain  COMPARISON: None.  TECHNIQUE: CT scan of the abdomen and pelvis was performed following injection of IV contrast. Multiplanar reformats were obtained. Dose reduction techniques were used.  CONTRAST: 87mL Isovue-370    FINDINGS:   LOWER CHEST: Normal.    HEPATOBILIARY: Slight stranding adjacent to the gallbladder, consider evaluation with ultrasound to exclude early cholecystitis.    PANCREAS: Normal.    SPLEEN: Normal.    ADRENAL GLANDS: Normal.    KIDNEYS/BLADDER: Normal.    BOWEL: Normal appendix. No obstruction, colitis, or diverticulitis. No free air or fluid.    LYMPH NODES: Normal.    VASCULATURE: Unremarkable.    PELVIC ORGANS: Normal.    MUSCULOSKELETAL: Normal.      Impression    IMPRESSION:   1.  Mild inflammatory fat stranding about the gallbladder, recommend correlation with ultrasound to exclude early cholecystitis.  2.  Normal appendix. No obstruction, colitis, or diverticulitis.   Abdomen US, limited (RUQ only)    Narrative    EXAM: US ABDOMEN LIMITED  LOCATION: [facility]  DATE/TIME: 1/12/2022 5:27 AM    INDICATION: w/u cholecystitis.  CT equivocal, fat stranding.  Has hyperbilirubinemia, though may be hematologic with thrombocytopenia and hemolytic anemia.  COMPARISON: 01/12/2022  TECHNIQUE: Limited abdominal ultrasound.    FINDINGS:    GALLBLADDER: No visible cholelithiasis. Mild wall thickening. Sonographic Rm's sign negative.    BILE DUCTS: No biliary dilatation. The common duct measures 4 mm.    LIVER: Grossly within normal limits where  seen.    RIGHT KIDNEY: No hydronephrosis.    PANCREAS: Obscured by bowel gas.    No visible ascites.      Impression    IMPRESSION:  1.  No visible cholelithiasis or biliary dilatation. Gallbladder wall is mildly thickened. Although sonographic Rm's sign is negative, the appearance on CT remains concerning for acute cholecystitis. Consider correlation with HIDA scan.           Labs:  Recent Labs   Lab 01/12/22  0357 01/12/22  0140 01/12/22  0107   WBC 8.4 9.6 8.8   HGB 9.6* 10.7* 10.8*   HCT 28.3* 32.3* 32.9*   MCV 95 96 96   PLT 11* 11* 11*     Recent Labs   Lab 01/12/22  0107      POTASSIUM 3.5   CHLORIDE 106   CO2 26   ANIONGAP 5   *   BUN 29   CR 1.09*   GFRESTIMATED 69   JOSELUIS 8.8   PROTTOTAL 6.9   ALBUMIN 3.3*   BILITOTAL 1.4*   ALKPHOS 80   AST 37   ALT 42     Recent Labs   Lab 01/12/22  0107   INR 1.02     Recent Labs   Lab 01/12/22  0107   LIPASE 165     Recent Labs   Lab 01/12/22  0216   COLOR Orange*   APPEARANCE Slightly Cloudy*   URINEGLC Negative   URINEBILI Negative   URINEKETONE Trace*   SG 1.032   UBLD Large*   URINEPH 5.5   PROTEIN 200 *   NITRITE Negative   LEUKEST Negative   RBCU 22*   WBCU 7*           Mable Story, MS3  [employer]    DEEPA MCKEON KRYSTINA R am acting as scribe for Dr. Jeremy Alvarez    Physician Attestation   IJeremy, was present with the medical/ROBERTO student who participated in the service and in the documentation of the note.  I have verified the history and personally performed the physical exam and medical decision making.  I agree with the assessment and plan of care as documented in the note.      I personally reviewed vital signs, medications, labs and imaging.    31-year-old female with a history of asthma but none of any prescription medications who presents with about 1 week of epigastric and upper abdominal pain.  She has also noticed fine red spots and bruising over the last few days.  She describes the pain as worse  with eating and with movement.  She has some associated bloating.  Did have nausea and nonbloody emesis.  She has had both watery stool and constipation.  She denies any fevers or chills.  No chest pain.  No known sick contacts.      She denies any drug use.  She does note that her sister has low platelet count but does not know the details and was being worked up for autoimmune issues.    Vitals:    01/12/22 0728 01/12/22 0730 01/12/22 1300 01/12/22 1338   BP: 111/69 124/87 106/75 122/75   Pulse: 75 101 82 86   Resp: 18   18   Temp: 98.4  F (36.9  C)   98.2  F (36.8  C)   TempSrc: Oral   Oral   SpO2: 99% 100% 100% 99%   Weight:       Height:         On exam, patient is obese.  Answers appropriately.  Heart is regular without murmur.  Lungs are clear.  Abdomen is obese.  Tenderness diffusely but more in the epigastric region without rebound or guarding.  Bowel sounds are present.  Extremities warm well perfused.  Fine petechial rash noted on extremities.  Scattered bruising.  Neuro exam is nonfocal.  She moves all extremities.  No tremor.    Labs and imaging reviewed.    CBC with normal WBC.  Hemoglobin low at 9.6.  Platelets very low at 11.  Reticulocyte count is elevated.  PTT within normal limits.  INR within normal limits.  CMP with creatinine of 1.09.  T bill mildly elevated at 1.4.  Direct bilirubin 0.3.  Ferritin elevated at 590.  LDH elevated at 608.  Lactic acid within normal limits.  Pro-Dick is negative.    COVID, influenza and HIV negative.  Peripheral smear in process.    CT abdomen pelvis shows mild inflammation of the gallbladder with normal appendix.  Abdominal ultrasound with mild gallbladder wall thickening with negative Rm sign.  HIDA scan is in process.    Assessment/Plan:   31-year-old female with no significant past medical history who comes in with epigastric and right upper quadrant abdominal pain, nausea, vomiting found to have platelet count of 11.    #Epigastric abdominal pain: Ruling  out cholecystitis versus gastroenteritis.  LFTs not very elevated.  Lipase within normal limits.  Abdominal pain seems more diffuse so more consistent with a viral gastroenteritis.  Question whether this could provoke ITP?  She does note watery diarrhea over last couple days mixed with constipation.   -As needed pain meds, n.p.o., IV fluids.  -General surgery consulted.  -HIDA scan in process.  Awaiting results.  -Will check enteric panel.     #Thrombocytopenia: Platelet count at 11.  Principal concern for ITP versus TTP.  She has some petechiae noted in extremities bilaterally.  She has mild elevation in creatinine.  No new medications.  -Await peripheral smear.  Also follow-up on haptoglobin to rule out hemolysis.  -Hematology is consulted.  Noted she has been started on prednisone and PPI concurrently.  Will follow up on formal recs.   -Recheck CBC this afternoon and in the AM.     Jeremy Alvarez MD  Date of Service (when I saw the patient): 01/12/22

## 2022-01-12 NOTE — DISCHARGE INSTRUCTIONS
As we discussed, your platelet count is very low at 11 (for reference, the normal range is 150 - 450)    Please return to the emergency department if you develop any of the following:  confusion  worsening headache  difficulty speaking or finding words  slurred speech  double vision  weakness  clumsiness  numbness of an arm or hand  dizziness or lightheadedness  imbalance  any other changes in your neurologic status  any bleeding (including in your stool, urine, vomit)

## 2022-01-12 NOTE — ED PROVIDER NOTES
History     Chief Complaint:  Abdominal Pain       HPI   Ivon Gamble is a 31 year old female who presents with abdominal pain and bruising associated with abdominal pain.  The patient reports that 6 days ago she first developed epigastric abdominal pressure the pain got worse when she sat upright and better when she laid flat it was fairly constant but would go if she was laying flat.  The patient said she was recently constipated had some nausea and then vomited.  The vomit looked like bile and then her stools loosened and were green as well.  The abdominal pain has since generalized to her entire abdomen except for mainly the right lower quadrant.  It is worse when she stands but also bad when she sits it is a 7 out of 10.  There is been no trauma.  She did notice some bruising on her left shoulder and her legs which is new.  She is also complained some left facial swelling no trauma however has not noted any blood in her vomit or stools.  She did report that she had blood in her urine.  The patient did not know that platelets have been low and her she is never had this issue before.  Due to the abdominal pain she taken to regular strength aspirin yesterday and the day before and a dose of ibuprofen today.  There is been no recent immunizations no travel no new medications    ROS:  Review of Systems  10 point review of systems all negative except HPI    Allergies:  No Known Allergies     Medications:    No current outpatient medications on file.      Past Medical History:    No past medical history on file.  There is no problem list on file for this patient.       Past Surgical History:    No past surgical history on file.     Family History:    family history is not on file.    Social History:     PCP: No Ref-Primary, Physician     Physical Exam     Patient Vitals for the past 24 hrs:   BP Temp Temp src Pulse Resp SpO2 Height Weight   01/12/22 0630 114/78 -- -- 76 -- 100 % -- --   01/12/22 0615 -- -- -- --  "-- 99 % -- --   01/12/22 0600 117/77 -- -- 73 -- 100 % -- --   01/12/22 0415 127/79 -- -- 83 -- 99 % -- --   01/12/22 0345 (!) 129/96 -- -- 84 -- 98 % -- --   01/12/22 0330 (!) 104/91 -- -- 79 -- 99 % -- --   01/12/22 0315 125/87 -- -- 71 -- 100 % -- --   01/12/22 0300 139/87 -- -- 107 -- 99 % -- --   01/12/22 0245 (!) 155/89 -- -- 80 -- 100 % -- --   01/12/22 0058 (!) 140/91 98.8  F (37.1  C) Oral 86 18 100 % 1.651 m (5' 5\") 108.9 kg (240 lb)        Physical Exam  General: The patient is alert, in no respiratory distress.    HENT: Mucous membranes moist.    Cardiovascular: Regular rate and rhythm. Good pulses in all four extremities. Normal capillary refill and skin turgor.     Respiratory: Lungs are clear. No nasal flaring. No retractions. No wheezing, no crackles.    Gastrointestinal: Abdomen soft. No guarding, no rebound.  Mild diffuse abdominal tenderness    Musculoskeletal: No gross deformity.     Skin: Petechiae over the left shoulder and lower extremities.  Mild swelling of the left face    Neurologic: The patient is alert and oriented x3. GCS 15. No testable cranial nerve deficit. Follows commands with clear and appropriate speech. Gives appropriate answers. Good strength in all extremities. No gross neurologic deficit. Gross sensation intact. Pupils are round and reactive. No meningismus.     Lymphatic: No cervical adenopathy. No lower extremity swelling.    Psychiatric: The patient is non-tearful.    Emergency Department Course         Imaging:  Abdomen US, limited (RUQ only)   Final Result   IMPRESSION:   1.  No visible cholelithiasis or biliary dilatation. Gallbladder wall is mildly thickened. Although sonographic Rm's sign is negative, the appearance on CT remains concerning for acute cholecystitis. Consider correlation with HIDA scan.            CT Abdomen Pelvis w Contrast   Final Result   Addendum 1 of 1   EXAM: CT ABDOMEN AND PELVIS WITH CONTRAST   LOCATION: [facility] "   DATE/TIME: 1/12/2022 4:32 AM      INDICATION: Abdominal pain, fever, epigastric pain   COMPARISON: None.   TECHNIQUE: CT scan of the abdomen and pelvis was performed following    injection of IV contrast. Multiplanar reformats were obtained. Dose    reduction techniques were used.   CONTRAST: 87 mL Isovue-370      FINDINGS:    LOWER CHEST: Normal.      HEPATOBILIARY: Slight stranding adjacent to the gallbladder. Subsequent    ultrasound is negative for cholecystitis. Findings on CT may reflect    inflammation secondary to possible enteritis.      PANCREAS: Normal.      SPLEEN: Normal.      ADRENAL GLANDS: Normal.      KIDNEYS/BLADDER: Normal.      BOWEL: Mild mural thickening of the duodenum and proximal jejunum, query    enteritis, correlate for infectious or inflammatory etiologies. Normal    appendix. No obstruction, colitis, or diverticulitis. No free air or    fluid.      LYMPH NODES: Normal.      VASCULATURE: Aorta and branch vessels widely patent. Widely patent celiac,    superior mesenteric, renal, and inferior mesenteric arteries. Patent    hepatic and portal veins.      PELVIC ORGANS: Normal.      MUSCULOSKELETAL: Normal.      IMPRESSION:    1.  Mild mural thickening of the duodenum and proximal jejunum, correlate    for infectious or inflammatory enteritis.   2.  Normal appendix. No obstruction, colitis, or diverticulitis.         Final   IMPRESSION:    1.  Mild inflammatory fat stranding about the gallbladder, recommend correlation with ultrasound to exclude early cholecystitis.   2.  Normal appendix. No obstruction, colitis, or diverticulitis.         Report per radiology    Laboratory:  Labs Ordered and Resulted from Time of ED Arrival to Time of ED Departure   ROUTINE UA WITH MICROSCOPIC REFLEX TO CULTURE - Abnormal       Result Value    Color Urine Orange (*)     Appearance Urine Slightly Cloudy (*)     Glucose Urine Negative      Bilirubin Urine Negative      Ketones Urine Trace (*)     Specific  Gravity Urine 1.032      Blood Urine Large (*)     pH Urine 5.5      Protein Albumin Urine 200  (*)     Urobilinogen Urine Normal      Nitrite Urine Negative      Leukocyte Esterase Urine Negative      Bacteria Urine Few (*)     Mucus Urine Present (*)     RBC Urine 22 (*)     WBC Urine 7 (*)     Squamous Epithelials Urine 21 (*)    BASIC METABOLIC PANEL - Abnormal    Sodium 137      Potassium 3.5      Chloride 106      Carbon Dioxide (CO2) 26      Anion Gap 5      Urea Nitrogen 29      Creatinine 1.09 (*)     Calcium 8.8      Glucose 117 (*)     GFR Estimate 69     CBC WITH PLATELETS AND DIFFERENTIAL - Abnormal    WBC Count 8.8      RBC Count 3.43 (*)     Hemoglobin 10.8 (*)     Hematocrit 32.9 (*)     MCV 96      MCH 31.5      MCHC 32.8      RDW 13.6      Platelet Count 11 (*)     % Neutrophils 68      % Lymphocytes 20      % Monocytes 8      % Eosinophils 2      % Basophils 1      % Immature Granulocytes 1      NRBCs per 100 WBC 0      Absolute Neutrophils 6.1      Absolute Lymphocytes 1.8      Absolute Monocytes 0.7      Absolute Eosinophils 0.1      Absolute Basophils 0.0      Absolute Immature Granulocytes 0.0      Absolute NRBCs 0.0     CBC WITH PLATELETS - Abnormal    WBC Count 9.6      RBC Count 3.37 (*)     Hemoglobin 10.7 (*)     Hematocrit 32.3 (*)     MCV 96      MCH 31.8      MCHC 33.1      RDW 13.7      Platelet Count 11 (*)    HEPATIC FUNCTION PANEL - Abnormal    Bilirubin Total 1.4 (*)     Bilirubin Direct 0.3 (*)     Protein Total 6.9      Albumin 3.3 (*)     Alkaline Phosphatase 80      AST 37      ALT 42     LACTATE DEHYDROGENASE - Abnormal    Lactate Dehydrogenase 608 (*)    CBC WITH PLATELETS AND DIFFERENTIAL - Abnormal    WBC Count 8.4      RBC Count 2.97 (*)     Hemoglobin 9.6 (*)     Hematocrit 28.3 (*)     MCV 95      MCH 32.3      MCHC 33.9      RDW 13.8      Platelet Count 11 (*)     % Neutrophils 67      % Lymphocytes 22      % Monocytes 8      % Eosinophils 2      % Basophils 0      %  Immature Granulocytes 1      NRBCs per 100 WBC 0      Absolute Neutrophils 5.6      Absolute Lymphocytes 1.9      Absolute Monocytes 0.7      Absolute Eosinophils 0.2      Absolute Basophils 0.0      Absolute Immature Granulocytes 0.1      Absolute NRBCs 0.0     RETICULOCYTE COUNT - Abnormal    % Reticulocyte 4.5 (*)     Absolute Reticulocyte 0.134 (*)    LIPASE - Normal    Lipase 165     INR - Normal    INR 1.02     PARTIAL THROMBOPLASTIN TIME - Normal    aPTT 34     RBC AND PLATELET MORPHOLOGY    Platelet Assessment        Value: Automated Count Confirmed. Platelet morphology is normal.    RBC Morphology Confirmed RBC Indices     MORPHOLOGY TRACKING   HAPTOGLOBIN   HIV ANTIGEN ANTIBODY COMBO   INFLUENZA A/B & SARS-COV2 PCR MULTIPLEX   BLOOD MORPHOLOGY PATHOLOGIST REVIEW   LAB BLOOD MORPHOLOGY PATHOLOGIST REVIEW        Procedures       Emergency Department Course:             Reviewed:  I reviewed nursing notes, vitals and past medical history    Assessments:   I obtained history and examined the patient as noted above.    I rechecked the patient and explained findings.   After discussion of the plan to admit the patient was refusing admission and wanted to leave AGAINST MEDICAL ADVICE.  I have again talked to the patient after talking to heme-onc we discussed the likely 5 fatality if this is TTP and the patient did agree to admission.    Consults:   Dr Sims -hematology oncology.  Feels this is likely ITP but cannot exclude TTP.  Discussed the possibility of the abdominal pain as an infection which led to the ITP.    Interventions:  Medications   ibuprofen (ADVIL/MOTRIN) tablet 600 mg (600 mg Oral Given 1/12/22 0102)   0.9% sodium chloride BOLUS (65 mLs Intravenous New Bag 1/12/22 0433)   iopamidol (ISOVUE-370) solution 500 mL (87 mLs Intravenous Given 1/12/22 0433)        Disposition:  The patient was admitted to the hospital under the care of Dr. Eloy Crowe.     Impression & Plan      Covid-19  Ivon Gamble was  evaluated during a global COVID-19 pandemic, which necessitated consideration that the patient might be at risk for infection with the SARS-CoV-2 virus that causes COVID-19.   Applicable protocols for evaluation were followed during the patient's care.   COVID-19 was considered as part of the patient's evaluation. The plan for testing is:  a test was obtained during this visit.    Medical Decision Making:  The patient presented complaining of abdominal pain without signs of peritonitis.  I am concerned with the patient having platelets of 11 especially with the petechiae.  I considered this could be TTP with abdominal pain caused by thrombosis however I think this is less likely and that the pain is worse once with standing.  There is been no blood in the vomit or stool.  The pain does not appear out of proportion to the exam.  There is been no recent vaccinations or medicines that I would expect causing the thrombocytopenia.  There also is no pancytopenia.  More like this is ITP secondary to infection involving the GI tract.  I discussed this with hematology who agrees but we cannot completely exclude TTP at this point.  I recommended labs but ordered a CT scan looking for intra-abdominal causes.  The CT scan was performed to look for clotting or mesenteric ischemia.  There was a question of whether there was cholecystitis so this was therefore followed by an ultrasound.  I discussed the read with the radiologist who felt that there is no cholecystitis but likely inflammation of the bowel.  When I discussed these results and my intention to admit the patient per the recommendations of hematology oncology the patient refused due to the cost.  I discussed the risk to her health safety loss of livelihood and complications.  She still would not agree to be admitted.  I therefore called back heme-onc.  With the labs including the elevated LDH and bilirubin they were more concerned about TTP though ITP is still highly  likely.  I discussed the fatality of TTP with the patient and she did agree to admission.  I think that her abdominal pain is likely secondary to inflammation and not mesenteric ischemia.  A lactic acid level was ordered at the behest of the hospitalist.    Critical Care time:  was 35 minutes for this patient excluding procedures.    Diagnosis:    ICD-10-CM    1. Thrombocytopenia (H)  D69.6    2. Abdominal pain, generalized  R10.84    3. Hematuria, unspecified type  R31.9    4. Normocytic anemia  D64.9    5. Hyperbilirubinemia  E80.6    6. Elevated serum lactate dehydrogenase  R74.02             1/12/2022   Jl Aquino MD Farnan, Christopher M, MD  01/12/22 0749

## 2022-01-12 NOTE — ED NOTES
DATE:  1/12/2022   TIME OF RECEIPT FROM LAB:  0214  LAB TEST:  platelets  LAB VALUE:  11  RESULTS GIVEN WITH READ-BACK TO (PROVIDER):  Kenia  TIME LAB VALUE REPORTED TO PROVIDER:   0220

## 2022-01-12 NOTE — PROGRESS NOTES
Here for consult.  Patient is transferring ASAP to [facility] or [facility] for pheresis for TTP and platelet count of 11,000.  Not a surgical candidate now.  Surgery consult cancelled by myself.  Isamar Horne MD

## 2022-01-12 NOTE — ED NOTES
[facility]  ED Nurse Handoff Report    Ivon Gamble is a 31 year old female   ED Chief complaint: Abdominal Pain  . ED Diagnosis:   Final diagnoses:   Thrombocytopenia (H)   Abdominal pain, generalized   Hematuria, unspecified type   Normocytic anemia   Hyperbilirubinemia   Elevated serum lactate dehydrogenase     Allergies: No Known Allergies    Code Status: Full Code  Activity level - Baseline/Home:  Independent. Activity Level - Current:   Stand by Assist. Lift room needed: No. Bariatric: No   Needed: No   Isolation: No. Infection: Not Applicable.     Vital Signs:   Vitals:    01/12/22 0615 01/12/22 0630 01/12/22 0728 01/12/22 0730   BP:  114/78 111/69 124/87   Pulse:  76 75 101   Resp:   18    Temp:   98.4  F (36.9  C)    TempSrc:   Oral    SpO2: 99% 100% 99% 100%   Weight:       Height:           Cardiac Rhythm:  ,      Pain level:    Patient confused: No. Patient Falls Risk: No.   Elimination Status: Has voided, pt able to ambulate to the bathroom.   Patient Report - Initial Complaint: abdominal pain.   Focused Assessment:  Ivon Gamble is a 31 year old female who presents with abdominal pain and bruising associated with abdominal pain.  The patient reports that 6 days ago she first developed epigastric abdominal pressure the pain got worse when she sat upright and better when she laid flat it was fairly constant but would go if she was laying flat.  The patient said she was recently constipated had some nausea and then vomited.  The vomit looked like bile and then her stools loosened and were green as well.  The abdominal pain has since generalized to her entire abdomen except for mainly the right lower quadrant.  It is worse when she stands but also bad when she sits it is a 7 out of 10.  There is been no trauma.  She did notice some bruising on her left shoulder and her legs which is new.  She is also complained some left facial swelling no trauma however has not noted any blood  in her vomit or stools.  She did report that she had blood in her urine.  The patient did not know that platelets have been low and her she is never had this issue before.  Due to the abdominal pain she taken to regular strength aspirin yesterday and the day before and a dose of ibuprofen today.  There is been no recent immunizations no travel no new medications  Tests Performed: labs, UA, CT, US, NM HepatOBiliary Scan . Abnormal Results:   Abdomen US, limited (RUQ only)   Final Result   IMPRESSION:   1.  No visible cholelithiasis or biliary dilatation. Gallbladder wall is mildly thickened. Although sonographic Rm's sign is negative, the appearance on CT remains concerning for acute cholecystitis. Consider correlation with HIDA scan.            CT Abdomen Pelvis w Contrast   Final Result   Addendum 1 of 1   EXAM: CT ABDOMEN AND PELVIS WITH CONTRAST   LOCATION: [facility]   DATE/TIME: 1/12/2022 4:32 AM      INDICATION: Abdominal pain, fever, epigastric pain   COMPARISON: None.   TECHNIQUE: CT scan of the abdomen and pelvis was performed following    injection of IV contrast. Multiplanar reformats were obtained. Dose    reduction techniques were used.   CONTRAST: 87 mL Isovue-370      FINDINGS:    LOWER CHEST: Normal.      HEPATOBILIARY: Slight stranding adjacent to the gallbladder. Subsequent    ultrasound is negative for cholecystitis. Findings on CT may reflect    inflammation secondary to possible enteritis.      PANCREAS: Normal.      SPLEEN: Normal.      ADRENAL GLANDS: Normal.      KIDNEYS/BLADDER: Normal.      BOWEL: Mild mural thickening of the duodenum and proximal jejunum, query    enteritis, correlate for infectious or inflammatory etiologies. Normal    appendix. No obstruction, colitis, or diverticulitis. No free air or    fluid.      LYMPH NODES: Normal.      VASCULATURE: Aorta and branch vessels widely patent. Widely patent celiac,    superior mesenteric, renal, and inferior  mesenteric arteries. Patent    hepatic and portal veins.      PELVIC ORGANS: Normal.      MUSCULOSKELETAL: Normal.      IMPRESSION:    1.  Mild mural thickening of the duodenum and proximal jejunum, correlate    for infectious or inflammatory enteritis.   2.  Normal appendix. No obstruction, colitis, or diverticulitis.         Final   IMPRESSION:    1.  Mild inflammatory fat stranding about the gallbladder, recommend correlation with ultrasound to exclude early cholecystitis.   2.  Normal appendix. No obstruction, colitis, or diverticulitis.      NM HepatOBiliary Scan    (Results Pending)     Labs Ordered and Resulted from Time of ED Arrival to Time of ED Departure   ROUTINE UA WITH MICROSCOPIC REFLEX TO CULTURE - Abnormal       Result Value    Color Urine Orange (*)     Appearance Urine Slightly Cloudy (*)     Glucose Urine Negative      Bilirubin Urine Negative      Ketones Urine Trace (*)     Specific Gravity Urine 1.032      Blood Urine Large (*)     pH Urine 5.5      Protein Albumin Urine 200  (*)     Urobilinogen Urine Normal      Nitrite Urine Negative      Leukocyte Esterase Urine Negative      Bacteria Urine Few (*)     Mucus Urine Present (*)     RBC Urine 22 (*)     WBC Urine 7 (*)     Squamous Epithelials Urine 21 (*)    BASIC METABOLIC PANEL - Abnormal    Sodium 137      Potassium 3.5      Chloride 106      Carbon Dioxide (CO2) 26      Anion Gap 5      Urea Nitrogen 29      Creatinine 1.09 (*)     Calcium 8.8      Glucose 117 (*)     GFR Estimate 69     CBC WITH PLATELETS AND DIFFERENTIAL - Abnormal    WBC Count 8.8      RBC Count 3.43 (*)     Hemoglobin 10.8 (*)     Hematocrit 32.9 (*)     MCV 96      MCH 31.5      MCHC 32.8      RDW 13.6      Platelet Count 11 (*)     % Neutrophils 68      % Lymphocytes 20      % Monocytes 8      % Eosinophils 2      % Basophils 1      % Immature Granulocytes 1      NRBCs per 100 WBC 0      Absolute Neutrophils 6.1      Absolute Lymphocytes 1.8      Absolute Monocytes  0.7      Absolute Eosinophils 0.1      Absolute Basophils 0.0      Absolute Immature Granulocytes 0.0      Absolute NRBCs 0.0     CBC WITH PLATELETS - Abnormal    WBC Count 9.6      RBC Count 3.37 (*)     Hemoglobin 10.7 (*)     Hematocrit 32.3 (*)     MCV 96      MCH 31.8      MCHC 33.1      RDW 13.7      Platelet Count 11 (*)    HEPATIC FUNCTION PANEL - Abnormal    Bilirubin Total 1.4 (*)     Bilirubin Direct 0.3 (*)     Protein Total 6.9      Albumin 3.3 (*)     Alkaline Phosphatase 80      AST 37      ALT 42     LACTATE DEHYDROGENASE - Abnormal    Lactate Dehydrogenase 608 (*)    CBC WITH PLATELETS AND DIFFERENTIAL - Abnormal    WBC Count 8.4      RBC Count 2.97 (*)     Hemoglobin 9.6 (*)     Hematocrit 28.3 (*)     MCV 95      MCH 32.3      MCHC 33.9      RDW 13.8      Platelet Count 11 (*)     % Neutrophils 67      % Lymphocytes 22      % Monocytes 8      % Eosinophils 2      % Basophils 0      % Immature Granulocytes 1      NRBCs per 100 WBC 0      Absolute Neutrophils 5.6      Absolute Lymphocytes 1.9      Absolute Monocytes 0.7      Absolute Eosinophils 0.2      Absolute Basophils 0.0      Absolute Immature Granulocytes 0.1      Absolute NRBCs 0.0     RETICULOCYTE COUNT - Abnormal    % Reticulocyte 4.5 (*)     Absolute Reticulocyte 0.134 (*)    LACTIC ACID WHOLE BLOOD - Abnormal    Lactic Acid 0.6 (*)    FERRITIN - Abnormal    Ferritin 590 (*)    IRON AND IRON BINDING CAPACITY - Abnormal    Iron 151      Iron Binding Capacity 285      Iron Sat Index 53 (*)    LIPASE - Normal    Lipase 165     HIV ANTIGEN ANTIBODY COMBO - Normal    HIV Antigen Antibody Combo Nonreactive     INR - Normal    INR 1.02     PARTIAL THROMBOPLASTIN TIME - Normal    aPTT 34     INFLUENZA A/B & SARS-COV2 PCR MULTIPLEX - Normal    Influenza A PCR Negative      Influenza B PCR Negative      SARS CoV2 PCR Negative     HCG QUALITATIVE PREGNANCY - Normal    hCG Serum Qualitative Negative     RBC AND PLATELET MORPHOLOGY    Platelet  Assessment        Value: Automated Count Confirmed. Platelet morphology is normal.    RBC Morphology Confirmed RBC Indices     MORPHOLOGY TRACKING   HIV RAPID ANTIBODY SCREEN    HIV-1/HIV-2 Antibody Non Reactive      HIV1 P24 Antigen Non Reactive      HIV Interpretation       HAPTOGLOBIN   HEPATITIS C ANTIBODY   VITAMIN B12   FOLATE   PROCALCITONIN   FIBRINOGEN ACTIVITY   BLOOD MORPHOLOGY PATHOLOGIST REVIEW   URINE CULTURE   LAB BLOOD MORPHOLOGY PATHOLOGIST REVIEW      Treatments provided: medications (see MAR)  Family Comments:   OBS brochure/video discussed/provided to patient:  N/A  ED Medications:   Medications   melatonin tablet 1 mg (has no administration in time range)   lactated ringers infusion ( Intravenous New Bag 1/12/22 1103)   oxyCODONE (ROXICODONE) tablet 5 mg (has no administration in time range)   HYDROmorphone (DILAUDID) injection 0.2 mg (has no administration in time range)   senna-docusate (SENOKOT-S/PERICOLACE) 8.6-50 MG per tablet 1 tablet (has no administration in time range)     Or   senna-docusate (SENOKOT-S/PERICOLACE) 8.6-50 MG per tablet 2 tablet (has no administration in time range)   ondansetron (ZOFRAN-ODT) ODT tab 4 mg (has no administration in time range)     Or   ondansetron (ZOFRAN) injection 4 mg (has no administration in time range)   prochlorperazine (COMPAZINE) injection 10 mg (has no administration in time range)     Or   prochlorperazine (COMPAZINE) tablet 10 mg (has no administration in time range)     Or   prochlorperazine (COMPAZINE) suppository 25 mg (has no administration in time range)   ibuprofen (ADVIL/MOTRIN) tablet 600 mg (600 mg Oral Given 1/12/22 0102)   0.9% sodium chloride BOLUS (0 mLs Intravenous Stopped 1/12/22 0720)   iopamidol (ISOVUE-370) solution 500 mL (87 mLs Intravenous Given 1/12/22 0433)   hydrOXYzine (ATARAX) tablet 25 mg (25 mg Oral Given 1/12/22 1039)   technetium Tc 99m mebrofenin (CHOLETEC) radioisotope injection 6 mCi (6 mCi Intravenous Given  1/12/22 1145)     Drips infusing:  Yes, LR infusing, see MAR  For the majority of the shift, the patient's behavior Green. Interventions performed were n/a.    Sepsis treatment initiated: No     Patient tested for COVID 19 prior to admission: YES    ED Nurse Name/Phone Number: Sarah Casanova RN,   12:57 PM  RECEIVING UNIT ED HANDOFF REVIEW    Above ED Nurse Handoff Report was reviewed: Yes  Reviewed by: Carolyn Colon RN on January 12, 2022 at 1:07 PM

## 2022-01-12 NOTE — CONSULTS
Consult Date: 01/12/2022    We have been asked to see Ivon Gamble for a hematology consultation by Dr. Alvarez.    CHIEF COMPLAINT:  Thrombocytopenia, severe.    HISTORY OF PRESENTING COMPLAINT:  Ivon Gamble is a 31-year-old patient who has a past medical history which is significant for asthma as well as polycystic ovarian syndrome.  She was in her normal state of health until 4 or 5 days ago, when she started developing low-grade fevers and epigastric pain radiating over to the left quadrant.  She has also had episodes of constipation over the last 4 to 5 days, as well as some intermittent diarrhea.  The degree of her pain prompted her to come into the Emergency Room.  Of note, she has no problems with shortness of breath or chest pain.  She has had no bleeding but has noted petechiae on both lower legs, which started about 2 days ago.  When she came in, she had lab work checked, which showed white cell count of 8.4, hemoglobin of 10.7, and platelets were 11,000.  Her most recent check has been at 2:00 p.m. today, which shows white cell count of 6.9, hemoglobin 9.4, and platelet count still 11,000.  She had a peripheral smear done when she came into the hospital, and the peripheral smear shows marked thrombocytopenia, moderate anemia and red blood cell fragments, including schistocytes.  Findings are suspicious for a TTP.  Of note, the patient also has some other lab work which would go along with TTP, to include an elevated LDH level.  She initially had an elevate creatinine, which has now normalized.  Her bilirubin is slightly elevated.  She has normal coagulation studies, fibrinogen of 410.  She has had hepatitis and HIV, which are both negative.  She also has a COVID negative as well as influenza negative.  A CT scan of her chest and abdomen shows inflammation secondary to possible enteritis, which would fit with her physical exam.  The patient also had a urinalysis done, which showed some bacteria as well as  some mild white cells in the urine, and the culture is pending.     I discussed the case with the patient.  Today I have also discussed her case with the hospitalist.  It looks like she has TTP, and because of that, she will most likely at some point potentially need pheresis, in which case she will need to be transferred either to [facility] or the [facility], as we do not do pheresis here at [facility].  The patient's main complaint today is that of abdominal discomfort radiating into the left upper quadrant.     FAMILY HISTORY:  No history of bleeding issues.  The patient has not been aware of any platelet troubles before.    PAST MEDICAL HISTORY:  History of polycystic ovarian syndrome, history of asthma.    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  She is a nonsmoker.    MEDICATIONS AND ALLERGIES:  Outlined in the nursing records.    REVIEW OF SYSTEMS:  A 12-point comprehensive review of systems has been done with her, and overall there are no other pertinent positives or negatives apart from what is outlined above.    PHYSICAL EXAMINATION:  GENERAL:  She is in some mild distress.  She is alert and awake.   VITAL SIGNS:  Stable.  The patient's temperature is 98.4.  SKIN:  She has no jaundice.  HEAD AND NECK:   Normal.  HEART:  Sounds 1 and 2 normal, with no added sounds or murmurs.  CHEST:  Clear to auscultation and percussion bilaterally.  ABDOMEN:  Soft and nontender.  She has got epigastric pain and also pain in the left upper quadrant.  She does not have any clinical evidence of cholecystitis.  EXTREMITIES:  Without tenderness or edema.  She does have petechiae over the legs.  NEUROLOGIC:  Peripheral neurological exam grossly intact.  The patient is not confused.    IMAGING:  CT scan of the abdomen and pelvis showed thickening of the small bowel with a questionable etiology of enteritis.  Her biliary system was not well seen.  She had a HIDA scan done,  and the gallbladder was not well visualized on her HIDA scan.     LABORATORY DATA:  I have reviewed all of her labs and ordered a number of other labs on her.  Most recent labs show creatinine has now normalized, and bilirubin is slightly elevated at 1.4.  LDH is elevated at 591.  Hemoglobin 9.4, platelets 11, reticulocyte count 4.5%.  Coagulation normal, fibrinogen 410.  Peripheral smear is as outlined above.    IMPRESSION AND PLAN:  This is a 31-year-old patient who has a 4-5 day history of worsening epigastric and left upper quadrant pain.  She came in with severe thrombocytopenia and now has anemia.  The etiology of the thrombocytopenia and anemia appears to be a TTP.  She has got petechiae on both legs, no other evidence of active bleeding.  Her peripheral smear shows findings consistent with TTP, and she has schistocytes on her peripheral smear.  I have discussed her case today with Dr. Alvarez.  We have started her on some steroids.  I have not recommended that she have any platelet transfusion.  If she does have active bleeding, then we would give FFP.  She will need to be transferred, because she may need pheresis, and we do not do pheresis at [facility].  She will need to be transferred either to [facility] or to the [facility].     Thank you for allowing me to participate in her care.    COMPLEXITY OF CASE TODAY:  High.    Suzanna Rm MD        D: 2022   T: 2022   MT: JANETMT    Name:     CONOR FELIZ  MRN:      [id number]        Account:      [id number]   :      1990           Consult Date: 2022     Document: [id number]

## 2022-01-12 NOTE — PROGRESS NOTES
Heme       I have reviewed the P smear and this patient has TTP     She will need transferred ASAP to [place] or [facility] for pheresis     Discussed with Dr Alvarez , hospitalist       Suzanna Rm MD

## 2022-01-12 NOTE — DISCHARGE SUMMARY
"[facility]    Discharge Summary  Hospitalist    Date of Admission:  1/12/2022  Date of Discharge:  1/12/2022  Discharging Provider: Jeremy Alvarez MD  Date of Service (when I saw the patient): 01/12/22    Discharge Diagnoses   #Severe thrombocytopenia due to TTP  #Abdominal pain  #Possible cholecystitis vs. Enteritis     Hospital Course   31-year-old female with a history of asthma but none of any prescription medications who presents with about 1 week of epigastric and upper abdominal pain.  She has also noticed fine red spots and bruising over the last few days.  She describes the pain as worse with eating and with movement.  She has some associated bloating.  Did have nausea and nonbloody emesis.  She has had both watery stool and constipation.  She denies any fevers or chills.  No chest pain.  No known sick contacts.       She denies any drug use.  She does note that her sister has low platelet count but does not know the details and was being worked up for autoimmune issues.    #Severe thrombocytopenia, concern for TTP: Pt describes bruising and \"red spots\" over last 2-3 days.  Platelet count found to be very low at 11.  Remained at 11 on subsequent draws.  CBC with normal WBC.  Hemoglobin low at 9.6.  Reticulocyte count is elevated.  PTT within normal limits.  INR within normal limits.  CMP with creatinine of 1.09.  T bill mildly elevated at 1.4.  Direct bilirubin 0.3.  LDH elevated at 608 and haptoglobin undetectable.  Ferritin elevated at 590.   Lactic acid within normal limits.  Pro-Dick is negative.  -She has petechiae on exam on bilateral extremities. She does not have blurry vision or focal neurologic deficits.  She denies any bloody or black stools.    -Peripheral smear was read by hematology and concerning for TTP. Hematology recommending urgent transfer to [facility] for pheresis.  I did talk to patient placement immediately.  She was graciously accepted by Dr. Campbell from hospital " medicine team.  I also talked to Dr. Simental from nephrology who will arrange for pheresis at [facility].  She will need IR guided central line placed for pheresis. I talked to IR at [facility] who did not have staff available to perform this prior to transfer.  He recommended paging IR immediately upon arrival at [facility], placement of line at that point.    -She was started on prednisone therapy and PPI therapy prior to transfer to [facility] by hematology service.    -I offered to update family by phone at 1700, patient declined and stated she was texting them to let them know herself.     #Abdominal pain, possible cholecystitis vs. Enteritis: CT abdomen pelvis shows mild thickening of duodenum and jejunum concerning for inflammatory enteritis.  There was slight stranding to gallbladder.   Abdominal ultrasound with mild gallbladder wall thickening with negative Rm sign.  HIDA scan was equivocal.   -General surgery was consulted.  She would not be surgical candidate.  I did start her on ceftriaxone and flagyl for now.    -Enteric panel ordered.   -IVF, maintained NPO    Jeremy Alvarez MD    Pending Results   Unresulted Labs Ordered in the Past 30 Days of this Admission     Date and Time Order Name Status Description    1/12/2022  1:54 PM Hepatitis A Antibody IgG In process     1/12/2022  1:54 PM Hepatitis A antibody IgM In process     1/12/2022  1:53 PM Hepatitis C antibody In process     1/12/2022  1:53 PM Hepatitis B Surface Antibody In process     1/12/2022  1:52 PM Hepatitis B surface antigen In process     1/12/2022  1:51 PM Vitamin B12 In process     1/12/2022  1:51 PM Folate In process     1/12/2022  1:51 PM [id number] Activity In process     1/12/2022  1:51 PM Haptoglobin In process     1/12/2022  8:38 AM Urine Culture In process     1/12/2022  3:42 AM Haptoglobin In process           Code Status   Full Code       Primary Care Physician   Physician No Ref-Primary    Physical Exam   Temp: 98.4  F (36.9  C) Temp  src: Oral BP: 118/64 Pulse: 85   Resp: 16 SpO2: 98 % O2 Device: None (Room air)    Vitals:    01/12/22 0058   Weight: 108.9 kg (240 lb)     Vital Signs with Ranges  Temp:  [98.2  F (36.8  C)-98.8  F (37.1  C)] 98.4  F (36.9  C)  Pulse:  [] 85  Resp:  [16-18] 16  BP: (102-155)/(60-96) 118/64  Cuff Mean (mmHg):  [79] 79  SpO2:  [98 %-100 %] 98 %  No intake/output data recorded.    On exam, patient is obese.  Answers appropriately.  Heart is regular without murmur.  Lungs are clear.  Abdomen is obese.  Tenderness diffusely but more in the epigastric region without rebound or guarding.  Bowel sounds are present.  Extremities warm well perfused.  Fine petechial rash noted on extremities.  Scattered bruising.  Neuro exam is nonfocal.  She moves all extremities.  No tremor.    Discharge Disposition   Transferred to [facility]  Condition at discharge: Stable    Consultations This Hospital Stay   SURGERY GENERAL IP CONSULT  HEMATOLOGY & ONCOLOGY IP CONSULT    Time Spent on this Encounter   I, Jeremy Alvarez MD, personally saw the patient today and spent greater than 30 minutes discharging this patient.    Discharge Orders   No discharge procedures on file.  Discharge Medications   Current Discharge Medication List      CONTINUE these medications which have NOT CHANGED    Details   acetaminophen (TYLENOL) 500 MG tablet Take 500-1,000 mg by mouth every 6 hours as needed for mild pain      ibuprofen (ADVIL/MOTRIN) 200 MG tablet Take 600 mg by mouth every 4 hours as needed for mild pain           Allergies   No Known Allergies  Data   Most Recent 3 CBC's:Recent Labs   Lab Test 01/12/22  1413 01/12/22  0357 01/12/22  0140   WBC 6.9 8.4 9.6   HGB 9.4* 9.6* 10.7*   MCV 95 95 96   PLT 11* 11* 11*      Most Recent 3 BMP's:  Recent Labs   Lab Test 01/12/22  1413 01/12/22  0107    137   POTASSIUM 3.5 3.5   CHLORIDE 109 106   CO2 24 26   BUN 18 29   CR 0.84 1.09*   ANIONGAP 6 5   JOSELUIS 8.4* 8.8   GLC 80 117*     Most Recent 2  LFT's:  Recent Labs   Lab Test 01/12/22  1413 01/12/22  0107   AST 36 37   ALT 41 42   ALKPHOS 76 80   BILITOTAL 1.4* 1.4*     Most Recent INR's and Anticoagulation Dosing History:  Anticoagulation Dose History     Recent Dosing and Labs Latest Ref Rng & Units 1/12/2022 1/12/2022 1/12/2022    INR 0.85 - 1.15 1.02 1.08 1.08        Most Recent 3 Troponin's:No lab results found.  Most Recent Cholesterol Panel:No lab results found.  Most Recent 6 Bacteria Isolates From Any Culture (See EPIC Reports for Culture Details):No lab results found.  Most Recent TSH, T4 and A1c Labs:No lab results found.

## 2022-01-12 NOTE — ED TRIAGE NOTES
"Epigastric pain started approx 6 days ago. Vomited last 2 days ago. Currently experiencing diarrhea and constipation. Pt states emesis and stool both \"yellow bile\". Lower Left sided facial swelling started approx 5 days ago. Pt states she has a wisdom tooth coming in, has not seen dentist for issue. Pt states feverish at home up to 99.8. Pt took tylenol last at approx 21:00.  "

## 2022-01-12 NOTE — PROGRESS NOTES
Heme       Full consult to follow       Pt examined and seen   Severe thrombocytopenia , petechiae on legs , low grade fever , LDH elevated   Anemia , elevated creat   CTs ? Enteritis    Await HIDA   Tender in epigastric area and LUQ       Looking at everything I suspect TTP     Stat labs ordered   NO plt transfusions   Will need stat read on P smear   Give FFP in times of bleed if necessary   Started steriods     Will most likely need transferred if TTP and if needs pheresis as we don't do here       Cell #   [id number]        Suzanna Rm MD

## 2022-01-13 ENCOUNTER — APPOINTMENT (OUTPATIENT)
Dept: CT IMAGING | Facility: CLINIC | Age: 32
End: 2022-01-13
Attending: INTERNAL MEDICINE
Payer: MEDICAID

## 2022-01-13 ENCOUNTER — APPOINTMENT (OUTPATIENT)
Dept: INTERVENTIONAL RADIOLOGY/VASCULAR | Facility: CLINIC | Age: 32
End: 2022-01-13
Attending: INTERNAL MEDICINE
Payer: MEDICAID

## 2022-01-13 LAB
ABO/RH(D): NORMAL
ALBUMIN SERPL-MCNC: 3.1 G/DL (ref 3.4–5)
ALP SERPL-CCNC: 77 U/L (ref 40–150)
ALT SERPL W P-5'-P-CCNC: 38 U/L (ref 0–50)
ANION GAP SERPL CALCULATED.3IONS-SCNC: 5 MMOL/L (ref 3–14)
ANTIBODY SCREEN: NEGATIVE
AST SERPL W P-5'-P-CCNC: 33 U/L (ref 0–45)
BACTERIA UR CULT: NO GROWTH
BILIRUB SERPL-MCNC: 1.3 MG/DL (ref 0.2–1.3)
BLD PROD TYP BPU: NORMAL
BLOOD COMPONENT TYPE: NORMAL
BUN SERPL-MCNC: 22 MG/DL (ref 7–30)
C COLI+JEJUNI+LARI FUSA STL QL NAA+PROBE: NOT DETECTED
C DIFF TOX B STL QL: NEGATIVE
CALCIUM SERPL-MCNC: 8 MG/DL (ref 8.5–10.1)
CHLORIDE BLD-SCNC: 110 MMOL/L (ref 94–109)
CO2 SERPL-SCNC: 23 MMOL/L (ref 20–32)
CODING SYSTEM: NORMAL
CREAT SERPL-MCNC: 0.82 MG/DL (ref 0.52–1.04)
DAT, ANTI-IGG, C3: NORMAL
EC STX1 GENE STL QL NAA+PROBE: NOT DETECTED
EC STX2 GENE STL QL NAA+PROBE: NOT DETECTED
ERYTHROCYTE [DISTWIDTH] IN BLOOD BY AUTOMATED COUNT: 13.9 % (ref 10–15)
GFR SERPL CREATININE-BSD FRML MDRD: >90 ML/MIN/1.73M2
GLUCOSE BLD-MCNC: 125 MG/DL (ref 70–99)
HAPTOGLOB SERPL-MCNC: <3 MG/DL (ref 32–197)
HAV IGG SER QL IA: NONREACTIVE
HAV IGM SERPL QL IA: NONREACTIVE
HBV CORE AB SERPL QL IA: NONREACTIVE
HBV SURFACE AB SERPL IA-ACNC: 810.24 M[IU]/ML
HBV SURFACE AG SERPL QL IA: NONREACTIVE
HCT VFR BLD AUTO: 26.2 % (ref 35–47)
HCV AB SERPL QL IA: NONREACTIVE
HGB BLD-MCNC: 9 G/DL (ref 11.7–15.7)
ISSUE DATE AND TIME: NORMAL
LDH SERPL L TO P-CCNC: 648 U/L (ref 81–234)
MAGNESIUM SERPL-MCNC: 2.1 MG/DL (ref 1.6–2.3)
MCH RBC QN AUTO: 32 PG (ref 26.5–33)
MCHC RBC AUTO-ENTMCNC: 34.4 G/DL (ref 31.5–36.5)
MCV RBC AUTO: 93 FL (ref 78–100)
NOROV GI+II ORF1-ORF2 JNC STL QL NAA+PR: NOT DETECTED
PHOSPHATE SERPL-MCNC: 2.3 MG/DL (ref 2.5–4.5)
PLATELET # BLD AUTO: 12 10E3/UL (ref 150–450)
POTASSIUM BLD-SCNC: 3.6 MMOL/L (ref 3.4–5.3)
PROT SERPL-MCNC: 6.5 G/DL (ref 6.8–8.8)
RBC # BLD AUTO: 2.81 10E6/UL (ref 3.8–5.2)
RVA NSP5 STL QL NAA+PROBE: NOT DETECTED
SALMONELLA SP RPOD STL QL NAA+PROBE: NOT DETECTED
SHIGELLA SP+EIEC IPAH STL QL NAA+PROBE: NOT DETECTED
SODIUM SERPL-SCNC: 138 MMOL/L (ref 133–144)
SPECIMEN EXPIRATION DATE: NORMAL
SPECIMEN EXPIRATION DATE: NORMAL
UNIT ABO/RH: NORMAL
UNIT NUMBER: NORMAL
UNIT STATUS: NORMAL
UNIT TYPE ISBT: 600
UNIT TYPE ISBT: 6200
UNIT TYPE ISBT: 8400
V CHOL+PARA RFBL+TRKH+TNAA STL QL NAA+PR: NOT DETECTED
WBC # BLD AUTO: 8.9 10E3/UL (ref 4–11)
Y ENTERO RECN STL QL NAA+PROBE: NOT DETECTED

## 2022-01-13 PROCEDURE — 250N000011 HC RX IP 250 OP 636: Performed by: RADIOLOGY

## 2022-01-13 PROCEDURE — 99152 MOD SED SAME PHYS/QHP 5/>YRS: CPT

## 2022-01-13 PROCEDURE — 86880 COOMBS TEST DIRECT: CPT | Performed by: INTERNAL MEDICINE

## 2022-01-13 PROCEDURE — C1752 CATH,HEMODIALYSIS,SHORT-TERM: HCPCS

## 2022-01-13 PROCEDURE — 250N000013 HC RX MED GY IP 250 OP 250 PS 637: Performed by: INTERNAL MEDICINE

## 2022-01-13 PROCEDURE — 87493 C DIFF AMPLIFIED PROBE: CPT | Performed by: NURSE PRACTITIONER

## 2022-01-13 PROCEDURE — 250N000013 HC RX MED GY IP 250 OP 250 PS 637: Performed by: PHYSICIAN ASSISTANT

## 2022-01-13 PROCEDURE — 250N000009 HC RX 250: Performed by: HOSPITALIST

## 2022-01-13 PROCEDURE — 272N000196 HC ACCESSORY CR5

## 2022-01-13 PROCEDURE — 86704 HEP B CORE ANTIBODY TOTAL: CPT | Performed by: INTERNAL MEDICINE

## 2022-01-13 PROCEDURE — C9113 INJ PANTOPRAZOLE SODIUM, VIA: HCPCS | Performed by: INTERNAL MEDICINE

## 2022-01-13 PROCEDURE — 36514 APHERESIS PLASMA: CPT | Performed by: INTERNAL MEDICINE

## 2022-01-13 PROCEDURE — 258N000003 HC RX IP 258 OP 636: Performed by: INTERNAL MEDICINE

## 2022-01-13 PROCEDURE — 84100 ASSAY OF PHOSPHORUS: CPT | Performed by: INTERNAL MEDICINE

## 2022-01-13 PROCEDURE — 250N000013 HC RX MED GY IP 250 OP 250 PS 637: Performed by: DENTIST

## 2022-01-13 PROCEDURE — 250N000012 HC RX MED GY IP 250 OP 636 PS 637: Performed by: INTERNAL MEDICINE

## 2022-01-13 PROCEDURE — 83735 ASSAY OF MAGNESIUM: CPT | Performed by: INTERNAL MEDICINE

## 2022-01-13 PROCEDURE — 86901 BLOOD TYPING SEROLOGIC RH(D): CPT | Performed by: INTERNAL MEDICINE

## 2022-01-13 PROCEDURE — 250N000011 HC RX IP 250 OP 636: Performed by: HOSPITALIST

## 2022-01-13 PROCEDURE — 250N000009 HC RX 250: Performed by: PHYSICIAN ASSISTANT

## 2022-01-13 PROCEDURE — 250N000011 HC RX IP 250 OP 636: Performed by: PHYSICIAN ASSISTANT

## 2022-01-13 PROCEDURE — P9059 PLASMA, FRZ BETWEEN 8-24HOUR: HCPCS | Performed by: INTERNAL MEDICINE

## 2022-01-13 PROCEDURE — P9059 PLASMA, FRZ BETWEEN 8-24HOUR: HCPCS | Performed by: HOSPITALIST

## 2022-01-13 PROCEDURE — 250N000009 HC RX 250: Performed by: INTERNAL MEDICINE

## 2022-01-13 PROCEDURE — 36415 COLL VENOUS BLD VENIPUNCTURE: CPT | Performed by: PHYSICIAN ASSISTANT

## 2022-01-13 PROCEDURE — 250N000011 HC RX IP 250 OP 636: Performed by: INTERNAL MEDICINE

## 2022-01-13 PROCEDURE — C1769 GUIDE WIRE: HCPCS

## 2022-01-13 PROCEDURE — 120N000013 HC R&B IMCU

## 2022-01-13 PROCEDURE — 36556 INSERT NON-TUNNEL CV CATH: CPT

## 2022-01-13 PROCEDURE — 258N000003 HC RX IP 258 OP 636: Performed by: PHYSICIAN ASSISTANT

## 2022-01-13 PROCEDURE — 85027 COMPLETE CBC AUTOMATED: CPT | Performed by: INTERNAL MEDICINE

## 2022-01-13 PROCEDURE — 99255 IP/OBS CONSLTJ NEW/EST HI 80: CPT | Mod: 25 | Performed by: INTERNAL MEDICINE

## 2022-01-13 PROCEDURE — 83615 LACTATE (LD) (LDH) ENZYME: CPT | Performed by: INTERNAL MEDICINE

## 2022-01-13 PROCEDURE — 120N000001 HC R&B MED SURG/OB

## 2022-01-13 PROCEDURE — 02H633Z INSERTION OF INFUSION DEVICE INTO RIGHT ATRIUM, PERCUTANEOUS APPROACH: ICD-10-PCS | Performed by: RADIOLOGY

## 2022-01-13 PROCEDURE — 82040 ASSAY OF SERUM ALBUMIN: CPT | Performed by: PHYSICIAN ASSISTANT

## 2022-01-13 PROCEDURE — 99233 SBSQ HOSP IP/OBS HIGH 50: CPT | Performed by: INTERNAL MEDICINE

## 2022-01-13 PROCEDURE — 70450 CT HEAD/BRAIN W/O DYE: CPT

## 2022-01-13 PROCEDURE — 80053 COMPREHEN METABOLIC PANEL: CPT | Performed by: PHYSICIAN ASSISTANT

## 2022-01-13 PROCEDURE — 87506 IADNA-DNA/RNA PROBE TQ 6-11: CPT | Performed by: INTERNAL MEDICINE

## 2022-01-13 PROCEDURE — 70487 CT MAXILLOFACIAL W/DYE: CPT

## 2022-01-13 RX ORDER — NALOXONE HYDROCHLORIDE 0.4 MG/ML
0.4 INJECTION, SOLUTION INTRAMUSCULAR; INTRAVENOUS; SUBCUTANEOUS
Status: DISCONTINUED | OUTPATIENT
Start: 2022-01-13 | End: 2022-01-18 | Stop reason: HOSPADM

## 2022-01-13 RX ORDER — SUCRALFATE ORAL 1 G/10ML
1 SUSPENSION ORAL 4 TIMES DAILY
Status: DISCONTINUED | OUTPATIENT
Start: 2022-01-13 | End: 2022-01-18

## 2022-01-13 RX ORDER — HEPARIN SODIUM 1000 [USP'U]/ML
1-10 INJECTION, SOLUTION INTRAVENOUS; SUBCUTANEOUS ONCE
Status: COMPLETED | OUTPATIENT
Start: 2022-01-13 | End: 2022-01-13

## 2022-01-13 RX ORDER — SODIUM CHLORIDE 9 MG/ML
INJECTION, SOLUTION INTRAVENOUS CONTINUOUS
Status: DISCONTINUED | OUTPATIENT
Start: 2022-01-13 | End: 2022-01-14

## 2022-01-13 RX ORDER — CHLORHEXIDINE GLUCONATE ORAL RINSE 1.2 MG/ML
15 SOLUTION DENTAL 2 TIMES DAILY
Status: DISCONTINUED | OUTPATIENT
Start: 2022-01-13 | End: 2022-01-18 | Stop reason: HOSPADM

## 2022-01-13 RX ORDER — HEPARIN SODIUM 1000 [USP'U]/ML
2.3 INJECTION, SOLUTION INTRAVENOUS; SUBCUTANEOUS
Status: DISCONTINUED | OUTPATIENT
Start: 2022-01-14 | End: 2022-01-14

## 2022-01-13 RX ORDER — LIDOCAINE 40 MG/G
CREAM TOPICAL
Status: DISCONTINUED | OUTPATIENT
Start: 2022-01-13 | End: 2022-01-18 | Stop reason: HOSPADM

## 2022-01-13 RX ORDER — PREDNISONE 50 MG/1
100 TABLET ORAL DAILY
Status: DISCONTINUED | OUTPATIENT
Start: 2022-01-13 | End: 2022-01-18 | Stop reason: HOSPADM

## 2022-01-13 RX ORDER — DIPHENHYDRAMINE HYDROCHLORIDE 50 MG/ML
50 INJECTION INTRAMUSCULAR; INTRAVENOUS ONCE
Status: COMPLETED | OUTPATIENT
Start: 2022-01-14 | End: 2022-01-14

## 2022-01-13 RX ORDER — NALOXONE HYDROCHLORIDE 0.4 MG/ML
0.2 INJECTION, SOLUTION INTRAMUSCULAR; INTRAVENOUS; SUBCUTANEOUS
Status: DISCONTINUED | OUTPATIENT
Start: 2022-01-13 | End: 2022-01-18 | Stop reason: HOSPADM

## 2022-01-13 RX ORDER — NITROGLYCERIN 0.4 MG/1
0.4 TABLET SUBLINGUAL EVERY 5 MIN PRN
Status: DISCONTINUED | OUTPATIENT
Start: 2022-01-13 | End: 2022-01-18 | Stop reason: HOSPADM

## 2022-01-13 RX ORDER — HYDROMORPHONE HCL IN WATER/PF 6 MG/30 ML
0.2 PATIENT CONTROLLED ANALGESIA SYRINGE INTRAVENOUS EVERY 4 HOURS PRN
Status: DISCONTINUED | OUTPATIENT
Start: 2022-01-13 | End: 2022-01-15

## 2022-01-13 RX ORDER — IOPAMIDOL 755 MG/ML
80 INJECTION, SOLUTION INTRAVASCULAR ONCE
Status: COMPLETED | OUTPATIENT
Start: 2022-01-13 | End: 2022-01-13

## 2022-01-13 RX ORDER — ACETAMINOPHEN 325 MG/1
325 TABLET ORAL ONCE
Status: COMPLETED | OUTPATIENT
Start: 2022-01-14 | End: 2022-01-14

## 2022-01-13 RX ORDER — DIPHENHYDRAMINE HYDROCHLORIDE 50 MG/ML
50 INJECTION INTRAMUSCULAR; INTRAVENOUS
Status: DISCONTINUED | OUTPATIENT
Start: 2022-01-14 | End: 2022-01-14

## 2022-01-13 RX ADMIN — SUCRALFATE ORAL 1 G: 1 SUSPENSION ORAL at 20:03

## 2022-01-13 RX ADMIN — DIPHENHYDRAMINE HYDROCHLORIDE 50 MG: 50 INJECTION INTRAMUSCULAR; INTRAVENOUS at 15:01

## 2022-01-13 RX ADMIN — SODIUM CHLORIDE 60 ML: 900 INJECTION INTRAVENOUS at 10:02

## 2022-01-13 RX ADMIN — SUCRALFATE ORAL 1 G: 1 SUSPENSION ORAL at 11:17

## 2022-01-13 RX ADMIN — METRONIDAZOLE 500 MG: 500 INJECTION, SOLUTION INTRAVENOUS at 09:17

## 2022-01-13 RX ADMIN — CEFTRIAXONE SODIUM 2 G: 2 INJECTION, POWDER, FOR SOLUTION INTRAMUSCULAR; INTRAVENOUS at 20:03

## 2022-01-13 RX ADMIN — PREDNISONE 100 MG: 50 TABLET ORAL at 11:17

## 2022-01-13 RX ADMIN — SODIUM CHLORIDE 1 BAG: 9 INJECTION, SOLUTION INTRAVENOUS at 08:32

## 2022-01-13 RX ADMIN — CALCIUM GLUCONATE 6 G: 98 INJECTION, SOLUTION INTRAVENOUS at 15:00

## 2022-01-13 RX ADMIN — METRONIDAZOLE 500 MG: 500 INJECTION, SOLUTION INTRAVENOUS at 20:56

## 2022-01-13 RX ADMIN — CHLORHEXIDINE GLUCONATE 15 ML: 1.2 SOLUTION ORAL at 23:15

## 2022-01-13 RX ADMIN — HYDROMORPHONE HYDROCHLORIDE 0.2 MG: 0.2 INJECTION, SOLUTION INTRAMUSCULAR; INTRAVENOUS; SUBCUTANEOUS at 14:03

## 2022-01-13 RX ADMIN — ONDANSETRON 4 MG: 2 INJECTION INTRAMUSCULAR; INTRAVENOUS at 20:57

## 2022-01-13 RX ADMIN — SODIUM CHLORIDE: 9 INJECTION, SOLUTION INTRAVENOUS at 20:03

## 2022-01-13 RX ADMIN — SODIUM CHLORIDE: 9 INJECTION, SOLUTION INTRAVENOUS at 06:46

## 2022-01-13 RX ADMIN — METRONIDAZOLE 500 MG: 500 INJECTION, SOLUTION INTRAVENOUS at 01:28

## 2022-01-13 RX ADMIN — ACETAMINOPHEN 650 MG: 325 TABLET, FILM COATED ORAL at 21:13

## 2022-01-13 RX ADMIN — IOPAMIDOL 80 ML: 755 INJECTION, SOLUTION INTRAVENOUS at 10:02

## 2022-01-13 RX ADMIN — LIDOCAINE HYDROCHLORIDE 14 ML: 10 INJECTION, SOLUTION INFILTRATION; PERINEURAL at 08:29

## 2022-01-13 RX ADMIN — HYDROMORPHONE HYDROCHLORIDE 0.2 MG: 0.2 INJECTION, SOLUTION INTRAMUSCULAR; INTRAVENOUS; SUBCUTANEOUS at 09:27

## 2022-01-13 RX ADMIN — ANTICOAGULANT CITRATE DEXTROSE SOLUTION FORMULA A 1000 ML: 12.25; 11; 3.65 SOLUTION INTRAVENOUS at 15:00

## 2022-01-13 RX ADMIN — PANTOPRAZOLE SODIUM 40 MG: 40 INJECTION, POWDER, FOR SOLUTION INTRAVENOUS at 20:57

## 2022-01-13 RX ADMIN — ACETAMINOPHEN 325 MG: 325 TABLET, FILM COATED ORAL at 16:46

## 2022-01-13 RX ADMIN — PANTOPRAZOLE SODIUM 40 MG: 40 INJECTION, POWDER, FOR SOLUTION INTRAVENOUS at 11:17

## 2022-01-13 RX ADMIN — HEPARIN SODIUM 2600 UNITS: 1000 INJECTION INTRAVENOUS; SUBCUTANEOUS at 08:32

## 2022-01-13 ASSESSMENT — ACTIVITIES OF DAILY LIVING (ADL)
ADLS_ACUITY_SCORE: 3

## 2022-01-13 NOTE — PROGRESS NOTES
"[facility]    Hospitalist Progress Note    Assessment & Plan   Date of Admission:  1/12/2022        Assessment & Plan     Ivon Gamble is a 31 year old female with a history of asthma but not on any prescription medications who presents with about 1 week of epigastric and upper abdominal pain.  She was found to be severely thrombocytopenic and with concern for TTP.  Hematology/oncology, nephrology, and IR were consulted, and recommended patient transferred to [facility] for urgent central line placement and initiation of pheresis.     #Severe thrombocytopenia, concern for TTP:   #Anemia  -Pt describes bruising and \"red spots\" over last 2-3 days.   -platelet count found to be very low at 11.  Remained at 11 on subsequent draws.  CBC with normal WBC.  Hemoglobin low at 9.6.  Reticulocyte count is elevated.  PTT within normal limits.  INR within normal limits.  CMP with creatinine of 1.09.  T bilirubin mildly elevated at 1.4.  Direct bilirubin 0.3.  LDH elevated at 608 and haptoglobin undetectable.  Ferritin elevated at 590.   Lactic acid within normal limits.  Pro-Dick is negative.  --She has petechiae on exam on bilateral extremities. She does not have blurry vision or focal neurologic deficits.  She denies any bloody or black stools.    --Peripheral smear was read by  hematology and concerning for TTP. Hematology recommending urgent transfer to [facility] for pheresis.  Previous hospitalist at [facility] spoke with Dr. Simental from nephrology who has arranged for pheresis here at [facility].  She will need IR guided central line placed for pheresis.  The interventional radiologist at [facility] recommended getting urgent IR consult upon arrival at [facility], and placement of line as soon as able.  --Patient hemodynamically stable.  No evidence of current bleeding.  -started on steroids per hematology     Today;. Nl neuro status, no bleeding  Going down now urgently for " catheter placement by IR  Afebrile. Nl vitals.   Nl renal function  Hb stable 9 range, platelet ct stable 12 range. Nl wbc  -viral panel negative.   -CT abd, abd us and hida scan reviewed.     Etiology. ? GI infection, B12 nl. UPT negative. Nl sbp, nl vitals    Plan;   - renal consult, pharesis per renal  - [facility] hematology following  - daily TTP labs  --Frequent monitoring of vital signs,   --Reported that central line will be placed 1/13 per IR  --Nephrology consulted to continue for pheresis  --IR has been consulted for access, and has been contacted by staff  --She was started on prednisone therapy and PPI therapy prior to transfer to [facility] by hematology service.    Will continue.  -- Pain medications available as needed  -- N.p.o. after midnight  -treat possible cholecystitis.         #Abdominal pain, possible cholecystitis vs. Enteritis:   -CT abdomen pelvis shows mild thickening of duodenum and jejunum concerning for inflammatory enteritis.  There was slight stranding to gallbladder.   Abdominal ultrasound with mild gallbladder wall thickening with negative Rm sign.  HIDA scan was equivocal.   --General surgery was consulted at [facility].  She would not be surgical candidate at this time.  --She was started on IV ceftriaxone 1 g every 24 hour and flagyl 500 mg every 8 hours, continue for now.     Today; 2 loose stools since admission. No n/v. No bleeding  Tenderness epigastrium and LLQ    Plan;   --Enteric panel ordered at outside hospital, pending.  --IVF, able to take diet tonight, but n.p.o. after midnight  --Repeat labs in a.m.  --Reconsult surgery when necessary  -[facility] consult regarding enteritis and potential need for cholecystostomy tube      Possible dental abscess  Tenderness on exam this am L jaw line. No swelling. Has had dental pain for several days.   On rocephin and flagyl for possible cholecystitis  Plan;   Reexamine after line placement and pharesis  Dental CT  Cont  current abx. ? Change to zosyn or unasyn.     Diet:  NPO per Anesthesia Guidelines for Procedure/Surgery Except for: Meds, Ice Chips  NPO per Anesthesia Guidelines for Procedure/Surgery Except for: Meds, Ice Chips    -regular diet once done with procedure.    DVT Prophylaxis: Contraindicated due to low platelets.  Encourage ambulation.  Siddiqui Catheter: Not present  Central Lines: None  Code Status: Full Code        Clinically Significant Risk Factors Present on Admission                  # Thrombocytopenia: Plts = 11 10e3/uL (Ref range: 150 - 450 10e3/uL) on admission, will monitor for bleeding   # Obesity: last There is no height or weight on file to calculate BMI.            Disposition Plan     Inpatient status, anticipate at least 2+ days of hospitalization.             Zaki Damon MD  Text Page  (7am to 6pm)  Interval History   2 loose stools  No bleeding  No HA or Neck pain   epigastric abd pain      -Data reviewed today: I reviewed all new labs and imaging results over the last 24 hours. I personally reviewed labs and imaging since admission.     Physical Exam   Temp: 97  F (36.1  C) Temp src: Oral BP: 106/65 Pulse: 74   Resp: 16 SpO2: 100 % O2 Device: None (Room air)    There were no vitals filed for this visit.  Vital Signs with Ranges  Temp:  [97  F (36.1  C)-98.5  F (36.9  C)] 97  F (36.1  C)  Pulse:  [74-89] 74  Resp:  [16-18] 16  BP: (102-164)/(60-85) 106/65  SpO2:  [96 %-100 %] 100 %  No intake/output data recorded.    Constitutional: Up in bed, nad, nontoxic  Heent: nl sclera, tenderness left jaw line. No swelling or redness.   No time to do oral exam as pt going down urgently for catheter placement  Respiratory: CTAB, breathing easily   Cardiovascular: rRR no r/g/m. Not tachy  GI: soft, nt, tenderness epigastrium and LLQ  Skin/Integumen: + petechaie bLE. No edema or other rash  Neuro: cN 2-12 grossly intact, strength 5/5 extrem x 4. Nl speech and mentation,   Psych: nl affect       Medications      sodium chloride 0.9%       sodium chloride 100 mL/hr at 01/13/22 0646       acetaminophen  325 mg Oral Once    Or     acetaminophen  325 mg Rectal Once     anticoagulant citrate  500-2,000 mL Apheresis Once     calcium gluconate intermittent infusion with additives - doses under 5g  6 g Intravenous Once     cefTRIAXone  2 g Intravenous Q24H     - MEDICATION INSTRUCTIONS -   Does not apply See Admin Instructions     diphenhydrAMINE  50 mg Intravenous Once     hydrocortisone sodium succinate PF  100 mg Intravenous Once     metroNIDAZOLE  500 mg Intravenous Q8H     pantoprazole (PROTONIX) IV  40 mg Intravenous Daily with breakfast     sodium chloride (PF)  3 mL Intracatheter Q8H       Data   Recent Labs   Lab 01/13/22  0702 01/12/22  1801 01/12/22  1413 01/12/22  0140 01/12/22  0107   WBC 8.9 9.1 6.9   < > 8.8   HGB 9.0* 10.1* 9.4*   < > 10.8*   MCV 93 96 95   < > 96   PLT 12* 11* 11*   < > 11*   INR  --   --  1.08  1.08  --  1.02     --  139  --  137   POTASSIUM 3.6  --  3.5  --  3.5   CHLORIDE 110*  --  109  --  106   CO2 23  --  24  --  26   BUN 22  --  18  --  29   CR 0.82  --  0.84  --  1.09*   ANIONGAP 5  --  6  --  5   JOSELUIS 8.0*  --  8.4*  --  8.8   *  --  80  --  117*   ALBUMIN 3.1*  --  3.0*  --  3.3*   PROTTOTAL 6.5*  --  6.4*  --  6.9   BILITOTAL 1.3  --  1.4*  --  1.4*   ALKPHOS 77  --  76  --  80   ALT 38  --  41  --  42   AST 33  --  36  --  37   LIPASE  --   --   --   --  165    < > = values in this interval not displayed.       Imaging:   Recent Results (from the past 24 hour(s))   NM HepatOBiliary Scan    [facility]    NUCLEAR MEDICINE HEPATOBILIARY SCAN  January 12, 2022 2:59 PM.    INDICATION: Abdominal pain, upper chronic, assess gallbladder  mobility.     COMPARISON: Abdominal ultrasound on CT on same day earlier.    TECHNIQUE: The patient received 6 mCi of Tc-99m mebrofenin  intravenously. Images were obtained out through 60 minutes. Two hours  delayed images were  obtained.    FINDINGS: There is prompt clearance of the radionuclide from the blood  pool into the liver. By 10 minutes there is clear visualization of the  intrahepatic ducts as well as the upper common bile duct. By 15  minutes there is visualization of the extrahepatic bile duct. At 35-40  minutes there is emptying from the common bile duct into the small  bowel. After one hour gallbladder is not well seen. Two hours delayed  images were obtained, and the gallbladder is not well seen/obscured by  radiotracer within the proximal small bowel.    Enterogastric reflux was not present.      Impression    IMPRESSION:  1. The gallbladder is not seen on the initial 60 minute scan, a  finding which can be seen with acute cholecystitis. Therefore, two  hour delayed images were obtained. In the 2 hour delayed images  significant amount of radiotracer within the small bowel in the right  upper quadrant, obscures the gallbladder/gallbladder location, which  was not definitely visualized.    NUVIA GREEN MD         SYSTEM ID:  [id number]

## 2022-01-13 NOTE — CONSULTS
Consult Date: 01/13/2022    RENAL CONSULTATION    REQUESTING PHYSICIAN:  Dr. Lora    CONSULTANT:  Kuldip Charles MD    REASON FOR CONSULTATION:  Plasma exchange for TTP diagnosis.    HISTORY OF PRESENT ILLNESS:  This is a 31-year-old female admitted to [facility] yesterday.  She had a 5-day history of epigastric and abdominal pain associated with nausea, vomiting and some diarrhea.  She also noted petechiae and bruising.  She was noted to have a decreased platelet count that was 11,000.  A peripheral smear showed decreased platelets, red blood cell fragments and rare schistocytes.  She was seen by Hematology/Oncology, who diagnosed her as TTP.  They put her on high-dose prednisone and transferred her to [facility] for plasma exchange.  A CT scan of the abdomen showed thickening of the duodenum and jejunum, consistent with inflammatory disease.  There was a question of cholecystitis, but this was not confirmed by ultrasound or Nuclear Medicine scanning.  She was placed on Rocephin and Flagyl.  This morning, a right central venous catheter was placed, a Rakan non-tunneled catheter.  She will be going to her first plasma exchange treatment this morning.    PAST MEDICAL HISTORY:  Asthma.    PAST SURGICAL HISTORY:  None.    CURRENT MEDICATIONS:  Currently prednisone 100 mg per day, Rocephin, Flagyl, normal saline at 100 mL per hour.    SOCIAL HISTORY:  She is a smoker.  She has an occasional drink.    FAMILY HISTORY:  The father had liver disease.  Mother had diabetes.    PHYSICAL EXAMINATION:    GENERAL:  She is alert.  She is sitting up in a chair, getting ready to transport to CT scan.  She is alert and oriented.  She has a Rakan catheter in the right internal jugular vein.  There is no redness, oozing or hematoma.  LUNGS:  Clear breath sounds and no apparent distress.  HEART:  Regular rhythm.  No murmur, rub, or gallop.  Normal S1, S2.  ABDOMEN:  Soft.  There is some tenderness in the  epigastrium.  SKIN:  Bilateral lower extremity petechiae.  There is no edema or other rash.  NEUROLOGIC:  She is alert and oriented.  Symmetric cranial nerves.    LABORATORY DATA:  Urinalysis done yesterday shows 200 of protein, large blood, 7 white cells, 22 red cells, but there were many squamous epithelial cells and it looks like a contaminated specimen.  Today, her creatinine is 0.82.  Her electrolytes are unremarkable.  Calcium is 8.0, magnesium 2.1, albumin 3.1, phosphorus 2.3.  She has had an elevated bilirubin, but otherwise normal liver function tests.  Her haptoglobin was less than 3.  Her LDH is 648, which is elevated.  Her hemoglobin is 9.0 and platelet count 12.  There is an increased reticulocyte count.  Her white count is 8.9.  In terms of her anemia, she has normal iron, elevated ferritin, normal folate, normal B12.    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  She has a petechial rash and abdominal pain.  Enteric pathogen panel has been sent apparently at an outside hospital.  Her smear is consistent with TTP and the Hematology Service has asked us to do plasma exchange.  Interventional Radiology placed a right internal jugular Rakan type catheter.  We will do plasma exchange this morning.  We will use ACD anticoagulant and replace her with fresh frozen plasma and 6 g of calcium gluconate.  She will get IV Benadryl and Solu-Cortef before the run.  We will do 1.5 plasma volume exchange.  We will plan to run her daily for several days and then scale back to every other day and then intermittently.  Her other treatment of TTP will be managed by the Hematology/Oncology Service.    Kuldip Charles MD        D: 2022   T: 2022   MT: [id number]    Name:     CONOR FELIZ  MRN:      [id number]        Account:      [id number]   :      1990           Consult Date: 2022     Document: [id number]

## 2022-01-13 NOTE — PROGRESS NOTES
Planning non tunneled apheresis line in IR in AM followed by plasmapheresis with FFP replacement for TTP.    NPO p MN    Negrito Perales MD

## 2022-01-13 NOTE — PROGRESS NOTES
MD Notification    Notified Person: MD    Notified Person Name: Devin    Notification Date/Time: 1/12/2022 @ 2042    Notification Interaction:  Amcom    Purpose of Notification:     Pt recently admitted to unit. Has not eaten all day - procedure for am. Can we switch NPO order starting at midnight instead of 2045? Please advise, thanks.     Orders Received: Pt changed to regular diet, NPO @ midnight    Comments:

## 2022-01-13 NOTE — PLAN OF CARE
[id number]  Admit from [facility]. A/Ox4. VSS on RA. Pt initially c/o abd pain - resolved after she had a meal. NPO @ 0000, otherwise regular diet. Ind. R PIV infusing NS @ 100ml/hr w/ int flagyl. Petechiae scattered across BLE and body. Nephrology, Hem/Onc, IR following. Plan for plasmapheresis w/ FFP replacement in am. Discharge pending.

## 2022-01-13 NOTE — PROVIDER NOTIFICATION
MD Notification    Notified Person: MD    Notified Person Name: Dr Charles    Notification Date/Time: 1-13-22 1208    Notification Interaction: Spoke over phone    Purpose of Notification: Phos 2.3.    Orders Received: No replacement today, plan to recheck labs (renal panel already ordered for tomorrow AM)    Comments:

## 2022-01-13 NOTE — IR NOTE
Interventional Radiology Intra-procedural Nursing Note    Patient Name: Ivon Gamble  Medical Record Number: [id number]  Today's Date: January 13, 2022    Start Time: 0837  End of procedure time: 0838  Procedure: Non-tunneled dual lumen CVC placement with local anesthesia ONLY   Report given to RN    Other Notes: Pt into IR suite 1 via cart. Pt awake and alert. To table in supine position prepped and drapped with 2%. Dr. Martinez in room. Time out and procedure started. Pt tolerated procedure well. Debrief with Dr. Martinez. CVC placed. Dressing CDI. No complications. Pt transferred back to station 88. Report given over the phone.    Medications:   Lidocaine 1% 14ml    Orly Hedrick RN

## 2022-01-13 NOTE — H&P
"[facility]    History and Physical - Hospitalist Service       Date of Admission:  1/12/2022    Assessment & Plan      Ivon Gamble is a 31 year old female with a history of asthma but not on any prescription medications who presents with about 1 week of epigastric and upper abdominal pain.  She was found to be severely thrombocytopenic and with concern for TTP.  Hematology/oncology, nephrology, and IR were consulted, and recommended patient transferred to [facility] for urgent central line placement and initiation of pheresis.     #Severe thrombocytopenia, concern for TTP:   #Anemia  Pt describes bruising and \"red spots\" over last 2-3 days.  Platelet count found to be very low at 11.  Remained at 11 on subsequent draws.  CBC with normal WBC.  Hemoglobin low at 9.6.  Reticulocyte count is elevated.  PTT within normal limits.  INR within normal limits.  CMP with creatinine of 1.09.  T bilirubin mildly elevated at 1.4.  Direct bilirubin 0.3.  LDH elevated at 608 and haptoglobin undetectable.  Ferritin elevated at 590.   Lactic acid within normal limits.  Pro-Dick is negative.  --She has petechiae on exam on bilateral extremities. She does not have blurry vision or focal neurologic deficits.  She denies any bloody or black stools.    --Peripheral smear was read by hematology and concerning for TTP. Hematology recommending urgent transfer to [facility] for pheresis.  Previous hospitalist at [facility] spoke with Dr. Simental from nephrology who has arranged for pheresis here at [facility].  She will need IR guided central line placed for pheresis.  The interventional radiologist at [facility] recommended getting urgent IR consult upon arrival at [facility], and placement of line as soon as able.  --Patient hemodynamically stable.  No evidence of current bleeding.  --Frequent monitoring of vital signs, CBC in AM.  --Reported that central line will be placed 1/13 per " IR  --Nephrology consulted to continue for pheresis  --IR has been consulted for access, and has been contacted by staff  --She was started on prednisone therapy and PPI therapy prior to transfer to [facility] by hematology service.    Will continue.  --Hematology/oncology consulted at [facility]  -- Pain medications available as needed  -- N.p.o. after midnight       #Abdominal pain, possible cholecystitis vs. Enteritis: CT abdomen pelvis shows mild thickening of duodenum and jejunum concerning for inflammatory enteritis.  There was slight stranding to gallbladder.   Abdominal ultrasound with mild gallbladder wall thickening with negative Rm sign.  HIDA scan was equivocal.   --General surgery was consulted at [facility].  She would not be surgical candidate at this time.  --She was started on IV ceftriaxone 1 g every 24 hour and flagyl 500 mg every 8 hours, continue for now.   --Enteric panel ordered at outside hospital, pending.  --IVF, able to take diet tonight, but n.p.o. after midnight  --Repeat labs in a.m.  --Reconsult surgery when necessary     Diet: Regular Diet Adult  NPO per Anesthesia Guidelines for Procedure/Surgery Except for: Meds, Ice Chips  NPO per Anesthesia Guidelines for Procedure/Surgery Except for: Meds, Ice Chips    DVT Prophylaxis: Contraindicated due to low platelets.  Encourage ambulation.  Siddiqui Catheter: Not present  Central Lines: None  Code Status: Full Code    Clinically Significant Risk Factors Present on Admission              # Thrombocytopenia: Plts = 11 10e3/uL (Ref range: 150 - 450 10e3/uL) on admission, will monitor for bleeding   # Obesity: last There is no height or weight on file to calculate BMI.      Disposition Plan   Inpatient status, anticipate at least 2+ days of hospitalization.       The patient's care was discussed with the patient, bedside nurse, and attending physician Dr. Tejada.    Lexx Lora, PA  [facility]  Hospital  Securely message with the [employer] Web Console (learn more here)  Text page via [facility] Paging/Directory        ______________________________________________________________________    Chief Complaint   Abdominal pain and bruising    History is obtained from the patient and previous outside hospital discharge summary.    History of Present Illness   Ivon Gamble is a pleasant 31 year old female with a history of asthma, otherwise healthy, and not on any prescription medications who presents with approximately 1 week of epigastric and upper abdominal pain.  She has also noticed fine red spots and bruising over the last few days.  She describes the pain as worse with eating and with movement.  She has some associated bloating.  She did have nausea and episodes of nonbloody emesis.  She has had both watery stool and constipation, but denies any blood in her stool.  She denies any fevers or chills. No known sick contacts.  She denies lightheadedness, focal weakness, syncope, chest pain, or shortness of breath.  She denies any drug use.  She does note that her sister has low platelet count but does not know the details and was being worked up for autoimmune issues.    Patient was admitted to [facility] and was evaluated by multiple specialties including hematology/oncology, general surgery, and nephrology.  Platelet count found to be very low at 11.  Remained at 11 on subsequent draws.  CBC with normal WBC.  Hemoglobin low at 9.6.  Reticulocyte count is elevated.  PTT within normal limits.  INR within normal limits.  CMP with creatinine of 1.09.  T bill mildly elevated at 1.4.  Direct bilirubin 0.3.  LDH elevated at 608 and haptoglobin undetectable.  Ferritin elevated at 590.   Lactic acid within normal limits.  Pro-Dick is negative.  She has petechiae on exam on bilateral extremities. She does not have blurry vision or focal neurologic deficits.  She denies any bloody or black stools.       Regarding her abdominal pain, CT abdomen pelvis shows mild thickening of duodenum and jejunum concerning for inflammatory enteritis.  There was slight stranding to gallbladder.   Abdominal ultrasound with mild gallbladder wall thickening with negative Rm sign.  HIDA scan was equivocal. General surgery was consulted.  She was deemed to not be a surgical candidate at this time.  She was started on empiric IV ceftriaxone and Flagyl.    Her peripheral smear was read by hematology and was concerning for TTP.  Hematology [facility].  Nephrology was contacted who is coordinating with IR to get a central line placed here to begin pheresis.  [facility]/2022.  IR has been contacted.  Pheresis orders have been entered per nephrology, and formal consult has been placed to both heme and nephro.  Patient currently reports pain in her abdomen as previous, no changes since transfer to this facility.        Review of Systems    The 10 point Review of Systems is negative other than noted in the HPI.    Past Medical History    I have reviewed this patient's medical history and updated it with pertinent information if needed.   --Asthma    Past Surgical History   I have reviewed this patient's surgical history and updated it with pertinent information if needed.  --No past surgery     Social History   I have reviewed this patient's social history and updated it with pertinent information if needed.  Patient reports smoking half pack of cigarettes per day.  She has smoked for approximately 5+ years.  She drinks alcohol occasionally.  No illicit drug use reported.    Family History   I have reviewed this patient's family history and updated it with pertinent information if needed.  Family History   Problem Relation Age of Onset     Diabetes Mother      Liver Disease Father        Prior to Admission Medications   Prior to Admission Medications   Prescriptions Last  Dose Informant Patient Reported? Taking?   cefTRIAXone (ROCEPHIN) 1 GM vial   No No   Sig: Inject 1 g into the vein every 24 hours   lactated ringers infusion   No No   Sig: Inject 100 mL/hr into the vein continuous   metroNIDAZOLE (FLAGYL) 5 mg/mL infusion   No No   Sig: Inject 100 mLs (500 mg) into the vein every 8 hours   pantoprazole (PROTONIX) 40 MG EC tablet   No No   Sig: Take 1 tablet (40 mg) by mouth every morning (before breakfast)   predniSONE (DELTASONE) 50 MG tablet   No No   Sig: Take 2 tablets (100 mg) by mouth daily      Facility-Administered Medications: None     Allergies   No Known Allergies    Physical Exam   Vital Signs: Temp: 98.5  F (36.9  C) Temp src: Oral BP: 116/75 Pulse: 89   Resp: 18 SpO2: 96 % O2 Device: None (Room air)    Weight: 0 lbs 0 oz    Physical Exam   Temp: 98.5  F (36.9  C) Temp src: Oral BP: 116/75 Pulse: 89   Resp: 18 SpO2: 96 % O2 Device: None (Room air)    Vital Signs with Ranges  Temp:  [98.2  F (36.8  C)-98.8  F (37.1  C)] 98.5  F (36.9  C)  Pulse:  [] 89  Resp:  [16-18] 18  BP: (102-155)/(60-96) 116/75  Cuff Mean (mmHg):  [79] 79  SpO2:  [96 %-100 %] 96 %  0 lbs 0 oz    Constitutional: Awake, alert, cooperative, no apparent distress.  Sitting up in bed.  ENT: Normocephalic, without obvious abnormality, atraumatic, oropharynx with moist mucus membranes.  Eyes pupils are equal, round and reactive to light; extra occular movements intact.  Normal sclera.    Neck: Supple, symmetrical, trachea midline.  Pulmonary: No increased work of breathing, good air exchange, clear to auscultation bilaterally, no crackles or wheezing.  Cardiovascular: Regular rate and rhythm, normal S1 and S2, and no murmur noted.  GI: Abdomen is obese, diffusely tender, mainly in epigastric region.  No rebound or guarding.  Bowel sounds present.  Skin/Integumen: Fine petechial rash noted on extremities.  Scattered bruising.  Neuro: CN II-XII grossly intact.  Moves all 4 extremities equally.   Speech fluent and normal.  Nonfocal neuro exam.  Psych:  Alert and oriented to self, place, date, situation. Normal affect.  Extremities: No lower extremity edema noted, and calves are non-tender to palpation bilaterally. Dorsal pedal pulses and posterior tibial pulses palpable.        Data   Data reviewed today: I reviewed all medications, new labs and imaging results over the last 24 hours.    Recent Labs   Lab 01/12/22  1801 01/12/22  1413 01/12/22  0357 01/12/22  0140 01/12/22  0107   WBC 9.1 6.9 8.4   < > 8.8   HGB 10.1* 9.4* 9.6*   < > 10.8*   MCV 96 95 95   < > 96   PLT 11* 11* 11*   < > 11*   INR  --  1.08  1.08  --   --  1.02   NA  --  139  --   --  137   POTASSIUM  --  3.5  --   --  3.5   CHLORIDE  --  109  --   --  106   CO2  --  24  --   --  26   BUN  --  18  --   --  29   CR  --  0.84  --   --  1.09*   ANIONGAP  --  6  --   --  5   JOSELUIS  --  8.4*  --   --  8.8   GLC  --  80  --   --  117*   ALBUMIN  --  3.0*  --   --  3.3*   PROTTOTAL  --  6.4*  --   --  6.9   BILITOTAL  --  1.4*  --   --  1.4*   ALKPHOS  --  76  --   --  80   ALT  --  41  --   --  42   AST  --  36  --   --  37   LIPASE  --   --   --   --  165    < > = values in this interval not displayed.     Recent Results (from the past 24 hour(s))   CT Abdomen Pelvis w Contrast    Addendum: 1/12/2022    EXAM: CT ABDOMEN AND PELVIS WITH CONTRAST  LOCATION: [facility]  DATE/TIME: 1/12/2022 4:32 AM    INDICATION: Abdominal pain, fever, epigastric pain  COMPARISON: None.  TECHNIQUE: CT scan of the abdomen and pelvis was performed following injection of IV contrast. Multiplanar reformats were obtained. Dose reduction techniques were used.  CONTRAST: 87 mL Isovue-370    FINDINGS:   LOWER CHEST: Normal.    HEPATOBILIARY: Slight stranding adjacent to the gallbladder. Subsequent ultrasound is negative for cholecystitis. Findings on CT may reflect inflammation secondary to possible enteritis.    PANCREAS: Normal.    SPLEEN:  Normal.    ADRENAL GLANDS: Normal.    KIDNEYS/BLADDER: Normal.    BOWEL: Mild mural thickening of the duodenum and proximal jejunum, query enteritis, correlate for infectious or inflammatory etiologies. Normal appendix. No obstruction, colitis, or diverticulitis. No free air or fluid.    LYMPH NODES: Normal.    VASCULATURE: Aorta and branch vessels widely patent. Widely patent celiac, superior mesenteric, renal, and inferior mesenteric arteries. Patent hepatic and portal veins.    PELVIC ORGANS: Normal.    MUSCULOSKELETAL: Normal.    IMPRESSION:   1.  Mild mural thickening of the duodenum and proximal jejunum, correlate for infectious or inflammatory enteritis.  2.  Normal appendix. No obstruction, colitis, or diverticulitis.        Narrative    EXAM: CT ABDOMEN PELVIS W CONTRAST  LOCATION: [facility]  DATE/TIME: 1/12/2022 4:32 AM    INDICATION: Abdominal pain, fever, epigastric pain  COMPARISON: None.  TECHNIQUE: CT scan of the abdomen and pelvis was performed following injection of IV contrast. Multiplanar reformats were obtained. Dose reduction techniques were used.  CONTRAST: 87mL Isovue-370    FINDINGS:   LOWER CHEST: Normal.    HEPATOBILIARY: Slight stranding adjacent to the gallbladder, consider evaluation with ultrasound to exclude early cholecystitis.    PANCREAS: Normal.    SPLEEN: Normal.    ADRENAL GLANDS: Normal.    KIDNEYS/BLADDER: Normal.    BOWEL: Normal appendix. No obstruction, colitis, or diverticulitis. No free air or fluid.    LYMPH NODES: Normal.    VASCULATURE: Unremarkable.    PELVIC ORGANS: Normal.    MUSCULOSKELETAL: Normal.      Impression    IMPRESSION:   1.  Mild inflammatory fat stranding about the gallbladder, recommend correlation with ultrasound to exclude early cholecystitis.  2.  Normal appendix. No obstruction, colitis, or diverticulitis.   Abdomen US, limited (RUQ only)    Narrative    EXAM: US ABDOMEN LIMITED  LOCATION: [facility]  DATE/TIME: 1/12/2022 5:27 AM    INDICATION: w/u cholecystitis.  CT equivocal, fat stranding.  Has hyperbilirubinemia, though may be hematologic with thrombocytopenia and hemolytic anemia.  COMPARISON: 01/12/2022  TECHNIQUE: Limited abdominal ultrasound.    FINDINGS:    GALLBLADDER: No visible cholelithiasis. Mild wall thickening. Sonographic Rm's sign negative.    BILE DUCTS: No biliary dilatation. The common duct measures 4 mm.    LIVER: Grossly within normal limits where seen.    RIGHT KIDNEY: No hydronephrosis.    PANCREAS: Obscured by bowel gas.    No visible ascites.      Impression    IMPRESSION:  1.  No visible cholelithiasis or biliary dilatation. Gallbladder wall is mildly thickened. Although sonographic Rm's sign is negative, the appearance on CT remains concerning for acute cholecystitis. Consider correlation with HIDA scan.       NM HepatOBiliary Scan    Narrative    [facility] HEPATOBILIARY SCAN  January 12, 2022 2:59 PM.    INDICATION: Abdominal pain, upper chronic, assess gallbladder  mobility.     COMPARISON: Abdominal ultrasound on CT on same day earlier.    TECHNIQUE: The patient received 6 mCi of Tc-99m mebrofenin  intravenously. Images were obtained out through 60 minutes. Two hours  delayed images were obtained.    FINDINGS: There is prompt clearance of the radionuclide from the blood  pool into the liver. By 10 minutes there is clear visualization of the  intrahepatic ducts as well as the upper common bile duct. By 15  minutes there is visualization of the extrahepatic bile duct. At 35-40  minutes there is emptying from the common bile duct into the small  bowel. After one hour gallbladder is not well seen. Two hours delayed  images were obtained, and the gallbladder is not well seen/obscured by  radiotracer within the proximal small bowel.    Enterogastric reflux was not present.      Impression    IMPRESSION:  1. The gallbladder is not seen on the initial 60 minute  scan, a  finding which can be seen with acute cholecystitis. Therefore, two  hour delayed images were obtained. In the 2 hour delayed images  significant amount of radiotracer within the small bowel in the right  upper quadrant, obscures the gallbladder/gallbladder location, which  was not definitely visualized.    NUVIA GREEN MD         SYSTEM ID:  [id number]

## 2022-01-13 NOTE — PLAN OF CARE
VSS. CVC line placed this AM for apheresis. CVC site with scant blood, no new bleeding throughout shift. Complained of generalized pain and headache upon returning from CVC placement. Head CT and facial CT done, oral surgery consult placed. Neuro checks WDL. Voiding adequately. Stool sample sent; enteric precautions while awaiting results. No specific complaints of nausea or abdominal pain but reports 2 very small stools today (no blood). Petechiae to BLE. Up independently in room. Down having apheresis now. GI and surgery following. Plan for abdominal xray in AM. Placed on full liquids this afternoon by GI, has not trialed yet.

## 2022-01-13 NOTE — PRE-PROCEDURE
GENERAL PRE-PROCEDURE:   Procedure:  Non-tunneled dual lumen CVC placement with local anesthesia ONLY   Date/Time:  1/13/2022 7:31 AM    Written consent obtained?: Yes    Risks and benefits: Risks, benefits and alternatives were discussed    Consent given by:  Patient  Patient states understanding of procedure being performed: Yes    Patient's understanding of procedure matches consent: Yes    Procedure consent matches procedure scheduled: Yes    Expected level of sedation:  Moderate  Appropriately NPO:  Yes (Does not need to be NPO)  ASA Class:  3  Mallampati  :  Grade 2- soft palate, base of uvula, tonsillar pillars, and portion of posterior pharyngeal wall visible  Lungs:  Lungs clear with good breath sounds bilaterally  Heart:  Normal heart sounds and rate  History & Physical reviewed:  History and physical reviewed and no updates needed  Statement of review:  I have reviewed the lab findings, diagnostic data, medications, and the plan for sedation

## 2022-01-13 NOTE — CONSULTS
GASTROENTEROLOGY CONSULTATION    Ivon Gamble  [address]  [facility] [address]  31 year old female    Admission Date/Time: 1/12/2022  Primary Care Provider: No Ref-Primary, Physician    We were asked to see the patient in consultation by Dr. Damon for evaluation of TTP, cholecystitis vs enteritis, not surgical candidate currently.       HPI: Ivon Gamble is a 31 year old female with PMH of asthma who presented to [facility] 1/12/22 with a 5-day history of bloating and diarrhea and severe epigastric and abdominal pain for 2 days.  She also noted petechiae and bruising on her legs.     Labs at [facility]: platelets 11, Hgb 10.8, WBC 8.8, total bili 1.4 with normal LFTs, Creat 1.09, lipase 165.   Peripheral smear showed marked thrombocytopenia, moderate anemia and red blood cell fragments, including schistocytes,  concerning for TTP. She was transferred to [facility] for pheresis. She was started on prednisone, PPI, ceftriaxone and Flagyl for possible cholangitis vs enteritis.     Multiple imaging studies have been obtained:   CT Abdomen/pelvis with contrast showed mild mural thickening of duodenum and proximal jejunum, normal bowel without evidence of diverticulitis, obstruction, colitis. Normal appendix. Slight stranding adjacent to gallbladder.     Abdominal ultrasound showed no cholelithiasis or biliary dilation, gallbladder wall mildly thickened, sonographic Rm's sign negative.     HIDA: Gallbladder not seen in initial 60 minutes of scan. In 2 hour delayed images, significant amount of radiotracer seen in small bowel in RUQ obscuring gallbladder location, which was not definitely visualized.     CT facial bones showed periapical abscess of third left mandibular molar with associated subperiosteal abscess and left masseter muscle myositis and overlying left facial cellulitis. CT head (headache) without acute findings.     She had discomfort and bloating for about 5 days PTA, which progressed into severe pain  2 days PTA. She had nausea/vomiting initially. She describes 1/10 pain in the epigastric area to the left lower quadrant, which increases to a 7/10 on palpation. She also has RUQ tenderness on palpation, but not at rest. She has had two soft, loose yellow stools since midnight.      No history of biliary colic before. Was on doxycycline the end of November for wisdom tooth removal and cough. Ate sushi in November, no uncooked meats or any unusual foods since. No travel or ill contacts. 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     No family history of any GI related illness.  Sister was recently diagnosed with thrombocytopenia.     ROS: A comprehensive ten point review of systems was negative aside from those in mentioned in the HPI.      MEDICATIONS: [COMPLETED] technetium Tc 99m mebrofenin (CHOLETEC) radioisotope injection 6 mCi    acetaminophen (TYLENOL) 500 MG tablet, Take 500-1,000 mg by mouth every 6 hours as needed for mild pain  ibuprofen (ADVIL/MOTRIN) 200 MG tablet, Take 600 mg by mouth every 4 hours as needed for mild pain  pantoprazole (PROTONIX) 40 MG EC tablet, Take 1 tablet (40 mg) by mouth every morning (before breakfast)  predniSONE (DELTASONE) 50 MG tablet, Take 2 tablets (100 mg) by mouth daily        ALLERGIES: No Known Allergies    No past medical history on file.    Past Surgical History:   Procedure Laterality Date     IR CVC NON TUNNEL PLACEMENT  1/13/2022       SOCIAL HISTORY:  Social History     Tobacco Use     Smoking status: Not on file     Smokeless tobacco: Not on file   Substance Use Topics     Alcohol use: Not on file     Drug use: Not on file       FAMILY HISTORY:  Family History   Problem Relation Age of Onset     Diabetes Mother      Liver Disease Father        PHYSICAL EXAM:   /75 (BP Location: Left arm)   Pulse 99   Temp 97.3  F (36.3  C) (Oral)   Resp 18   LMP 12/22/2021   SpO2 99%    Constitutional: alert, no acute distress  Cardiovascular: regular rate and  rhythm  Respiratory: clear to auscultation bilaterally  Head: atraumatic, normocephalic  Neck: supple, no thyromegaly  ENT: mucous membranes are moist, no oral lesions are noted, no scleral icterus   Abdomen: soft, tender RUQ with neg Rm's sign, also tender in epigastric area and LLQ, normally active bowel sounds. No rebound tenderness or guarding  Neuro: Neurologically intact grossl  Skin: petechiae noted on lower extremities       LABORATORY WORK  Recent Labs   Lab Test 01/13/22  0702 01/12/22  1801 01/12/22  1413 01/12/22  0140 01/12/22  0107   WBC 8.9 9.1 6.9   < > 8.8   HGB 9.0* 10.1* 9.4*   < > 10.8*   MCV 93 96 95   < > 96   PLT 12* 11* 11*   < > 11*   INR  --   --  1.08  1.08  --  1.02    < > = values in this interval not displayed.     Recent Labs   Lab Test 01/13/22  0702 01/12/22  1413 01/12/22  0107    139 137   POTASSIUM 3.6 3.5 3.5   CHLORIDE 110* 109 106   CO2 23 24 26   BUN 22 18 29   CR 0.82 0.84 1.09*   ANIONGAP 5 6 5   JOSELUIS 8.0* 8.4* 8.8     Recent Labs   Lab Test 01/13/22  0702 01/12/22  1413 01/12/22  0216 01/12/22  0107   ALBUMIN 3.1* 3.0*  --  3.3*   BILITOTAL 1.3 1.4*  --  1.4*   DBIL  --   --   --  0.3*   ALT 38 41  --  42   AST 33 36  --  37   ALKPHOS 77 76  --  80   PROTEIN  --   --  200 *  --    LIPASE  --   --   --  165       IMAGING   EXAM: CT ABDOMEN AND PELVIS WITH CONTRAST  LOCATION: [facility]  DATE/TIME: 1/12/2022 4:32 AM  IMPRESSION:   1.  Mild mural thickening of the duodenum and proximal jejunum, correlate for infectious or inflammatory enteritis.  2.  Normal appendix. No obstruction, colitis, or diverticulitis.    EXAM: US ABDOMEN LIMITED  LOCATION: [facility]  DATE/TIME: 1/12/2022 5:27 AM   IMPRESSION:  1.  No visible cholelithiasis or biliary dilatation. Gallbladder wall is mildly thickened. Although sonographic Rm's sign is negative, the appearance on CT remains concerning for acute cholecystitis. Consider  correlation with HIDA scan.    NUCLEAR MEDICINE HEPATOBILIARY SCAN  January 12, 2022 2:59 PM.IMPRESSION:  1. The gallbladder is not seen on the initial 60 minute scan, a  finding which can be seen with acute cholecystitis. Therefore, two  hour delayed images were obtained. In the 2 hour delayed images  significant amount of radiotracer within the small bowel in the right  upper quadrant, obscures the gallbladder/gallbladder location, which  was not definitely visualized.    CONSULTATION ASSESSMENT AND PLAN  31 year old female with PMH of asthma who presented to [facility] with a 5-day history of bloating and diarrhea and severe epigastric and abdominal pain for 2 days.  She also noted petechiae and bruising on her legs. She was found to have severe thrombocytopenia and concern for TTP, to start pheresis. Imaging concerning for enteritis with mild thickening of the duodenum and proximal jejunum vs cholecystitis with mild stranding seen around the gallbladder. Infectious vs inflammatory enteritis possible. Gallbladder was not seen in initial 60 minutes on HIDA. Imaging studies reviewed with Dr. Arellano, low suspicion for gallbladder pathology, would not recommend cholecystectomy tube at this time.     Plan  -PPI, Carafate, GI cocktail PRN  -Stool studies to rule out infection  -Monitor LFTs  -OK for diet from GI perspective, would start with fulls and ADAT     I will discuss the patient plan with Dr. Arellano. Thank you for asking us to participate in the care of this patient.    Ca Sharma CNP  [facility]  Cell: [phone]   Office: [phone]      Staff addendum: 31 yof admitted with TTP. Plans for pheresis today. Having upper abd pain for the past few days with bloating, constipation. CT and ultrasound findings as above.     /61 (BP Location: Left arm)   Pulse 83   Temp 97.5  F (36.4  C) (Oral)   Resp 16   LMP 12/22/2021   SpO2 100%   Gen: awake alert NAD  Abd; S obese tender  diffusely    A/P: 31 yof with TTP epigastric pain with signs of enteritis. Only mild GB wall thickening with mild stranding around the GB so doubt significant cholecystitis. Not a good candidate for any type of endoscopic procedure with low platelets    -BID PP, QID carafate, PRN GI cocktail  -Stool studies  -Consider repeat HIDA if becomes more suspicious for cholecystitis  -PRN laxatives  -AXR tomorrow am to evaluate stool martinez Arellano MD  [facility]  Phone [phone] until 5 pm  Office [phone] for after hours on call provider

## 2022-01-13 NOTE — PROGRESS NOTES
[facility]    Hematology/Oncology Follow-up Note      Today's Date: 01/13/22  Date of Admission:  1/12/2022  Reason for Consultation: Thrombocytopenia, MAHA/TTP in need of plasma exchange      ASSESSMENT/PLAN:  Ivon Gamble is a 31 year old female with minimal medical history who is transferred to [facility] for severe thrombocytopenia and clinical presentation concerning for acute MAHA/TTP. She is in need of plasma exchange.    1) Acute thrombocytopenia, concerning for TTP/MAHA (PLASMIC score 6)  -Appreciate Medicine and Nephrology coordination.  -Iron studies, B12/folate WNL.  -s/p catheter placement 1/13/22 AM.  -HIV and Hepatitis studies negative.  -Awaiting [id number].   -Plan on once daily plasmapheresis for a minimum of the next 5-7 days. Will monitor for platelet recovery and improvement of LDH. Will plan on tapering pheresis schedule based on course. Discussed with Dr. Simental of Nephrology.  -Continue prednisone 1 mg/kg/day dosing. If no improvement with steroids and plasma exchange, then will change to IV methylprednisolone 1000 mg daily x 3 days. Continue protonix daily.  -Hold on rituximab for now until [id number] quant returns and if no improvement with steroids and plasma exchange.   -I discussed with Dr. Damon and requested stool studies earlier: C. Diff, enteric, shiga- pending.  -Please check daily CBC, CMP, LDH.  -Please check daily PT/PTT/INR and fibrinogen following pheresis from peripheral source (not catheter). Transfuse cryoprecipitate for fibrinogen <100.   -Transfuse PRBC for Hb<7.  -Do NOT transfuse platelets.   -Transfuse FFP if evidence of bleed.    2) Acute third left mandibular molar/subperiosteal abscess, left masseter muscle myositis, and facial cellulitis  -Patient currently on rocephin/flagyl.   -Discussed with Dr. Damon- ENT following and agree with rocephin/flagyl. Patient not currently a surgical candidate. ENT to evaluate patient this PM.     3) Acute  thickening of the duodenum and proximal jejunum, possible inflammatory enteritis  -See above for antibiotics.  -GI following.  -Stool studies pending as stated above in #1.    4) Acute headache, resolved at the time of my evaluation  -CT brain negative for acute bleed/pathology.    Discussed with Dr. Damon who will move the patient to [facility] [place] monitoring. Discussed with bedside RN.    Thank you for the consultation. We will continue to follow. Please contact me with any further questions or concerns.     Ashley Nguyen,   Hematology/Oncology  [facility]        INTERIM HISTORY:  Patient evaluated in care suites. She is to begin pheresis. Patient does not have headache or pain. She is able to move all extremities. She has depressed affect, but speech is coherent. She has been afebrile and HD stable. Piccman catheter in place without gross evidence of bleed. She has petechiae of the hands and B/L LE.      MEDICATIONS:  Current Facility-Administered Medications   Medication     0.9% sodium chloride BOLUS     acetaminophen (TYLENOL) tablet 325 mg    Or     acetaminophen (TYLENOL) Suppository 325 mg     acetaminophen (TYLENOL) tablet 650 mg    Or     acetaminophen (TYLENOL) Suppository 650 mg     Anticoagulant Citrate Dextrose Formula A      anticoagulant citrate flush 3 mL     calcium gluconate 10 % injection 1 g     calcium gluconate 10 % injection 1 g     calcium gluconate 6 g in sodium chloride 0.9 % 50 mL     cefTRIAXone (ROCEPHIN) 2 g vial to attach to  ml bag for ADULTS or NS 50 ml bag for PEDS     Contraindications to both pharmacological and mechanical prophylaxis (must document contraindications for both in this order)     diphenhydrAMINE (BENADRYL) injection 50 mg     diphenhydrAMINE (BENADRYL) injection 50 mg     hydrocortisone sodium succinate PF (solu-CORTEF) injection 100 mg     lidocaine (LMX4) cream     lidocaine 1 % 0.1-1 mL     lidocaine 1 % 1-30 mL     melatonin  "tablet 3 mg     metroNIDAZOLE (FLAGYL) infusion 500 mg     ondansetron (ZOFRAN-ODT) ODT tab 4 mg    Or     ondansetron (ZOFRAN) injection 4 mg     pantoprazole (PROTONIX) IV push injection 40 mg     predniSONE (DELTASONE) tablet 100 mg     prochlorperazine (COMPAZINE) injection 10 mg    Or     prochlorperazine (COMPAZINE) tablet 10 mg    Or     prochlorperazine (COMPAZINE) suppository 25 mg     sodium chloride (PF) 0.9% PF flush 3 mL     sodium chloride (PF) 0.9% PF flush 3 mL     sodium chloride 0.9% 1000 mL TABLE SOLN     sodium chloride 0.9% infusion           ALLERGIES:  No Known Allergies      PHYSICAL EXAM:  Vital signs:  Temp: 97  F (36.1  C) Temp src: Oral BP: 106/65 Pulse: 74   Resp: 16 SpO2: 100 % O2 Device: None (Room air)        Estimated body mass index is 39.94 kg/m  as calculated from the following:    Height as of an earlier encounter on 1/12/22: 1.651 m (5' 5\").    Weight as of an earlier encounter on 1/12/22: 108.9 kg (240 lb).    GENERAL/CONSTITUTIONAL: Fatigued, depressed affect. Speech is slow, but coherent and fluid.  EYES: Pupils are equal, round, and react to light and accommodation. Extraocular movements intact.  No scleral icterus.  ENT/MOUTH: Neck supple. Oropharynx clear, no mucositis.  LYMPH: No anterior cervical, posterior cervical, supraclavicular, axillary or inguinal adenopathy.   RESPIRATORY: Clear to auscultation bilaterally. No crackles or wheezing.   CARDIOVASCULAR: Regular rate and rhythm without murmurs, gallops, or rubs.  GASTROINTESTINAL: No hepatosplenomegaly, masses, or tenderness. The patient has normal bowel sounds. No guarding.  No distention.  MUSCULOSKELETAL: Warm and well-perfused, no cyanosis, clubbing, or edema.  NEUROLOGIC: Cranial nerves II-XII are intact. Alert, oriented, answers questions appropriately.  INTEGUMENTARY: Patient has petechiae of the B/L hands and lower extremities.      LABS:  CBC RESULTS:   Recent Labs   Lab Test 01/13/22  0702   WBC 8.9   RBC " 2.81*   HGB 9.0*   HCT 26.2*   MCV 93   MCH 32.0   MCHC 34.4   RDW 13.9   PLT 12*       Recent Labs   Lab Test 01/13/22  0702 01/12/22  1413    139   POTASSIUM 3.6 3.5   CHLORIDE 110* 109   CO2 23 24   ANIONGAP 5 6   * 80   BUN 22 18   CR 0.82 0.84   JOSELUIS 8.0* 8.4*      Ref. Range 1/13/2022 07:02   Anion Gap Latest Ref Range: 3 - 14 mmol/L 5   Magnesium Latest Ref Range: 1.6 - 2.3 mg/dL 2.1   Phosphorus Latest Ref Range: 2.5 - 4.5 mg/dL 2.3 (L)   Albumin Latest Ref Range: 3.4 - 5.0 g/dL 3.1 (L)   Protein Total Latest Ref Range: 6.8 - 8.8 g/dL 6.5 (L)   Bilirubin Total Latest Ref Range: 0.2 - 1.3 mg/dL 1.3   Alkaline Phosphatase Latest Ref Range: 40 - 150 U/L 77   ALT Latest Ref Range: 0 - 50 U/L 38   AST Latest Ref Range: 0 - 45 U/L 33   Lactate Dehydrogenase Latest Ref Range: 81 - 234 U/L 648 (H)      Ref. Range 1/12/2022 01:07   Bilirubin Direct Latest Ref Range: 0.0 - 0.2 mg/dL 0.3 (H)      Ref. Range 1/12/2022 14:13   INR Latest Ref Range: 0.85 - 1.15  1.08   PTT Latest Ref Range: 22 - 38 Seconds 34   Fibrinogen Latest Ref Range: 170 - 490 mg/dL 410      Ref. Range 1/12/2022 01:07   Haptoglobin Latest Ref Range: 32 - 197 mg/dL <3 (L)      Ref. Range 1/12/2022 01:07 1/12/2022 01:40   Hepatitis C Antibody Latest Ref Range: Nonreactive  Nonreactive    HIV Antigen Antibody Combo Latest Ref Range: Nonreactive   Nonreactive   HIV-1/HIV-2 Antibody Latest Ref Range: Non Reactive   Non Reactive        Ref. Range 1/12/2022 01:07   Ferritin Latest Ref Range: 12 - 150 ng/mL 590 (H)      Ref. Range 1/12/2022 14:13   Folate Latest Ref Range: >=5.4 ng/mL 12.7   Iron Latest Ref Range: 35 - 180 ug/dL 125   Iron Binding Cap Latest Ref Range: 240 - 430 ug/dL 244   Iron Saturation Index Latest Ref Range: 15 - 46 % 51 (H)   Lactate Dehydrogenase Latest Ref Range: 81 - 234 U/L 591 (H)   Vitamin B12 Latest Ref Range: 193 - 986 pg/mL 539       PATHOLOGY:  Final Diagnosis 1/13/21:   Peripheral blood, morphology:  - Marked  thrombocytopenia.  - Moderate anemia, normocytic and normochromic, with RBC fragments (including rare schistocytes).  - WBC subsets quantitatively within normal limits.         IMAGING:  EXAM: CT ABDOMEN AND PELVIS WITH CONTRAST  LOCATION: [facility]  DATE/TIME: 1/12/2022 4:32 AM     INDICATION: Abdominal pain, fever, epigastric pain  COMPARISON: None.  TECHNIQUE: CT scan of the abdomen and pelvis was performed following injection of IV contrast. Multiplanar reformats were obtained. Dose reduction techniques were used.  CONTRAST: 87 mL Isovue-370     FINDINGS:   LOWER CHEST: Normal.     HEPATOBILIARY: Slight stranding adjacent to the gallbladder. Subsequent ultrasound is negative for cholecystitis. Findings on CT may reflect inflammation secondary to possible enteritis.     PANCREAS: Normal.     SPLEEN: Normal.     ADRENAL GLANDS: Normal.     KIDNEYS/BLADDER: Normal.     BOWEL: Mild mural thickening of the duodenum and proximal jejunum, query enteritis, correlate for infectious or inflammatory etiologies. Normal appendix. No obstruction, colitis, or diverticulitis. No free air or fluid.     LYMPH NODES: Normal.     VASCULATURE: Aorta and branch vessels widely patent. Widely patent celiac, superior mesenteric, renal, and inferior mesenteric arteries. Patent hepatic and portal veins.     PELVIC ORGANS: Normal.     MUSCULOSKELETAL: Normal.     IMPRESSION:   1.  Mild mural thickening of the duodenum and proximal jejunum, correlate for infectious or inflammatory enteritis.  2.  Normal appendix. No obstruction, colitis, or diverticulitis.    CT SCAN OF THE FACE WITH CONTRAST  1/13/2022 10:15 AM      HISTORY: Left eye pain. Rule out fracture or abscess.     TECHNIQUE:  Axial scans of the face following intravenous contrast  with sagittal and coronal reformations. Radiation dose for this scan  was reduced using automated exposure control, adjustment of the mA  and/or kV according to patient size, or  iterative reconstruction  technique. 80 mL Isovue-370     COMPARISON: None.     FINDINGS: No fracture. There is a periapical lucency involving the  third left mandibular molar consistent with a periapical abscess.  There is associated erosion of the overlying lingual and buccal  aspects of the mandibular alveolus (series 2 image 36). There appears  to be a small subperiosteal fluid collection along the buccal aspect  of the root of that tooth where there has been erosion through the  mandibular alveolus with that fluid collection measuring approximately  12 mm craniocaudal x 5-6 mm transverse x 8-9 mm AP. This is concerning  for a small subperiosteal abscess. This also appears to partially  extend into or displace the mesial aspect of the left masseter muscle,  and there our mild inflammatory changes concerning for reactive  myositis involving the left masseter muscle. There is mild asymmetric  left-sided facial subcutaneous fat stranding, consistent with  cellulitis.     There is impaction of the right third maxillary molar. The left third  maxillary molar is also partially impacted. The bilateral orbits  appear normal. The paranasal sinuses are free of significant disease.  Visualized aspects of the mucosal spaces of the oral cavity, pharynx,  and supraglottic larynx otherwise appear unremarkable. Small calcified  tonsillolith in the left palatine tonsil is noted. Visualized  intracranial contents are unremarkable.                                                                      IMPRESSION:  1. Periapical abscess of the third left mandibular molar with  associated developing subperiosteal abscess and left masseter muscle  myositis and overlying left facial cellulitis, as described. Recommend  dental consultation.  2. No facial bone fracture.    CT SCAN OF THE HEAD WITHOUT CONTRAST   1/13/2022 10:56 AM      HISTORY: Headache, intracranial hemorrhage suspected     TECHNIQUE:  Axial images of the head and coronal  reformations without  IV contrast material. Dual-energy technique was utilized. Radiation  dose for this scan was reduced using automated exposure control,  adjustment of the mA and/or kV according to patient size, or iterative  reconstruction technique.     COMPARISON: None.     FINDINGS: There is no evidence of intracranial hemorrhage, mass, acute  infarct or anomaly. The ventricles are normal in size, shape and  configuration. The brain parenchyma and subarachnoid spaces are  normal.      The visualized portions of the sinuses and mastoids appear normal. The  bony calvarium and bones of the skull base appear intact.                                                                       IMPRESSION: No CT findings of acute intracranial process.        Ashley Nguyen DO  Hematology/Oncology  [facility]

## 2022-01-13 NOTE — PLAN OF CARE
Pt transferring to [facility] via EMS per MD recommendation. VS stable. Report given to nurse taking over at [facility]. All belongings sent with patient.

## 2022-01-13 NOTE — PHARMACY-ADMISSION MEDICATION HISTORY
Admission medication history completed at [facility]. Please see Pharmacy - Admission Medication History note from 1/12/2021.    Protonix and Prednisone were ordered as discharge meds on transfer to [facility] - These were not medications that pt was taking prior to admission

## 2022-01-14 ENCOUNTER — APPOINTMENT (OUTPATIENT)
Dept: GENERAL RADIOLOGY | Facility: CLINIC | Age: 32
End: 2022-01-14
Attending: INTERNAL MEDICINE
Payer: MEDICAID

## 2022-01-14 LAB
ALBUMIN SERPL-MCNC: 2.9 G/DL (ref 3.4–5)
ALP SERPL-CCNC: 55 U/L (ref 40–150)
ALT SERPL W P-5'-P-CCNC: 26 U/L (ref 0–50)
ANION GAP SERPL CALCULATED.3IONS-SCNC: 4 MMOL/L (ref 3–14)
APTT PPP: 26 SECONDS (ref 22–38)
APTT PPP: 26 SECONDS (ref 22–38)
AST SERPL W P-5'-P-CCNC: 18 U/L (ref 0–45)
BASOPHILS # BLD AUTO: 0 10E3/UL (ref 0–0.2)
BASOPHILS NFR BLD AUTO: 0 %
BILIRUB SERPL-MCNC: 0.4 MG/DL (ref 0.2–1.3)
BLD PROD TYP BPU: NORMAL
BLOOD COMPONENT TYPE: NORMAL
BUN SERPL-MCNC: 18 MG/DL (ref 7–30)
CALCIUM SERPL-MCNC: 8 MG/DL (ref 8.5–10.1)
CHLORIDE BLD-SCNC: 109 MMOL/L (ref 94–109)
CO2 SERPL-SCNC: 28 MMOL/L (ref 20–32)
CODING SYSTEM: NORMAL
CREAT SERPL-MCNC: 0.8 MG/DL (ref 0.52–1.04)
EOSINOPHIL # BLD AUTO: 0 10E3/UL (ref 0–0.7)
EOSINOPHIL NFR BLD AUTO: 0 %
ERYTHROCYTE [DISTWIDTH] IN BLOOD BY AUTOMATED COUNT: 14.6 % (ref 10–15)
FIBRINOGEN PPP-MCNC: 257 MG/DL (ref 170–490)
GFR SERPL CREATININE-BSD FRML MDRD: >90 ML/MIN/1.73M2
GLUCOSE BLD-MCNC: 99 MG/DL (ref 70–99)
HCT VFR BLD AUTO: 24.7 % (ref 35–47)
HGB BLD-MCNC: 8.6 G/DL (ref 11.7–15.7)
IMM GRANULOCYTES # BLD: 0.1 10E3/UL
IMM GRANULOCYTES NFR BLD: 1 %
INR PPP: 1.06 (ref 0.85–1.15)
INR PPP: 1.1 (ref 0.85–1.15)
ISSUE DATE AND TIME: NORMAL
LDH SERPL L TO P-CCNC: 213 U/L (ref 81–234)
LYMPHOCYTES # BLD AUTO: 1.9 10E3/UL (ref 0.8–5.3)
LYMPHOCYTES NFR BLD AUTO: 18 %
MAGNESIUM SERPL-MCNC: 2 MG/DL (ref 1.6–2.3)
MCH RBC QN AUTO: 32 PG (ref 26.5–33)
MCHC RBC AUTO-ENTMCNC: 34.8 G/DL (ref 31.5–36.5)
MCV RBC AUTO: 92 FL (ref 78–100)
MONOCYTES # BLD AUTO: 0.8 10E3/UL (ref 0–1.3)
MONOCYTES NFR BLD AUTO: 8 %
NEUTROPHILS # BLD AUTO: 7.9 10E3/UL (ref 1.6–8.3)
NEUTROPHILS NFR BLD AUTO: 73 %
NRBC # BLD AUTO: 0 10E3/UL
NRBC BLD AUTO-RTO: 0 /100
PHOSPHATE SERPL-MCNC: 2.4 MG/DL (ref 2.5–4.5)
PLATELET # BLD AUTO: 39 10E3/UL (ref 150–450)
POTASSIUM BLD-SCNC: 3.5 MMOL/L (ref 3.4–5.3)
PROT SERPL-MCNC: 5.5 G/DL (ref 6.8–8.8)
RBC # BLD AUTO: 2.69 10E6/UL (ref 3.8–5.2)
SODIUM SERPL-SCNC: 141 MMOL/L (ref 133–144)
UNIT ABO/RH: NORMAL
UNIT NUMBER: NORMAL
UNIT STATUS: NORMAL
UNIT TYPE ISBT: 6200
WBC # BLD AUTO: 10.8 10E3/UL (ref 4–11)

## 2022-01-14 PROCEDURE — 85610 PROTHROMBIN TIME: CPT | Performed by: INTERNAL MEDICINE

## 2022-01-14 PROCEDURE — 250N000013 HC RX MED GY IP 250 OP 250 PS 637: Performed by: INTERNAL MEDICINE

## 2022-01-14 PROCEDURE — 250N000012 HC RX MED GY IP 250 OP 636 PS 637: Performed by: INTERNAL MEDICINE

## 2022-01-14 PROCEDURE — 250N000013 HC RX MED GY IP 250 OP 250 PS 637: Performed by: DENTIST

## 2022-01-14 PROCEDURE — 36514 APHERESIS PLASMA: CPT

## 2022-01-14 PROCEDURE — 85730 THROMBOPLASTIN TIME PARTIAL: CPT | Performed by: INTERNAL MEDICINE

## 2022-01-14 PROCEDURE — 120N000013 HC R&B IMCU

## 2022-01-14 PROCEDURE — 99233 SBSQ HOSP IP/OBS HIGH 50: CPT | Performed by: INTERNAL MEDICINE

## 2022-01-14 PROCEDURE — 250N000013 HC RX MED GY IP 250 OP 250 PS 637: Performed by: PHYSICIAN ASSISTANT

## 2022-01-14 PROCEDURE — 84100 ASSAY OF PHOSPHORUS: CPT | Performed by: INTERNAL MEDICINE

## 2022-01-14 PROCEDURE — P9059 PLASMA, FRZ BETWEEN 8-24HOUR: HCPCS | Performed by: INTERNAL MEDICINE

## 2022-01-14 PROCEDURE — 85384 FIBRINOGEN ACTIVITY: CPT | Performed by: INTERNAL MEDICINE

## 2022-01-14 PROCEDURE — 250N000011 HC RX IP 250 OP 636: Performed by: PHYSICIAN ASSISTANT

## 2022-01-14 PROCEDURE — 85004 AUTOMATED DIFF WBC COUNT: CPT | Performed by: INTERNAL MEDICINE

## 2022-01-14 PROCEDURE — 82040 ASSAY OF SERUM ALBUMIN: CPT | Performed by: INTERNAL MEDICINE

## 2022-01-14 PROCEDURE — 250N000011 HC RX IP 250 OP 636: Performed by: INTERNAL MEDICINE

## 2022-01-14 PROCEDURE — 74018 RADEX ABDOMEN 1 VIEW: CPT

## 2022-01-14 PROCEDURE — 83735 ASSAY OF MAGNESIUM: CPT | Performed by: INTERNAL MEDICINE

## 2022-01-14 PROCEDURE — 83615 LACTATE (LD) (LDH) ENZYME: CPT | Performed by: INTERNAL MEDICINE

## 2022-01-14 PROCEDURE — 80053 COMPREHEN METABOLIC PANEL: CPT | Performed by: INTERNAL MEDICINE

## 2022-01-14 PROCEDURE — C9113 INJ PANTOPRAZOLE SODIUM, VIA: HCPCS | Performed by: INTERNAL MEDICINE

## 2022-01-14 PROCEDURE — 258N000003 HC RX IP 258 OP 636: Performed by: INTERNAL MEDICINE

## 2022-01-14 PROCEDURE — 250N000009 HC RX 250: Performed by: INTERNAL MEDICINE

## 2022-01-14 PROCEDURE — 36415 COLL VENOUS BLD VENIPUNCTURE: CPT | Performed by: INTERNAL MEDICINE

## 2022-01-14 RX ORDER — HEPARIN SODIUM 1000 [USP'U]/ML
1.3 INJECTION, SOLUTION INTRAVENOUS; SUBCUTANEOUS
Status: COMPLETED | OUTPATIENT
Start: 2022-01-15 | End: 2022-01-16

## 2022-01-14 RX ORDER — CALCIUM GLUCONATE 94 MG/ML
1 INJECTION, SOLUTION INTRAVENOUS
Status: DISCONTINUED | OUTPATIENT
Start: 2022-01-15 | End: 2022-01-16

## 2022-01-14 RX ORDER — DIPHENHYDRAMINE HYDROCHLORIDE 50 MG/ML
50 INJECTION INTRAMUSCULAR; INTRAVENOUS ONCE
Status: COMPLETED | OUTPATIENT
Start: 2022-01-15 | End: 2022-01-15

## 2022-01-14 RX ORDER — ACETAMINOPHEN 325 MG/1
325 TABLET ORAL ONCE
Status: COMPLETED | OUTPATIENT
Start: 2022-01-15 | End: 2022-01-15

## 2022-01-14 RX ORDER — CALCIUM GLUCONATE 94 MG/ML
1 INJECTION, SOLUTION INTRAVENOUS
Status: DISCONTINUED | OUTPATIENT
Start: 2022-01-14 | End: 2022-01-14

## 2022-01-14 RX ORDER — DIPHENHYDRAMINE HYDROCHLORIDE 50 MG/ML
50 INJECTION INTRAMUSCULAR; INTRAVENOUS
Status: COMPLETED | OUTPATIENT
Start: 2022-01-15 | End: 2022-01-15

## 2022-01-14 RX ADMIN — CEFTRIAXONE SODIUM 2 G: 2 INJECTION, POWDER, FOR SOLUTION INTRAMUSCULAR; INTRAVENOUS at 16:40

## 2022-01-14 RX ADMIN — CALCIUM GLUCONATE 4 G: 98 INJECTION, SOLUTION INTRAVENOUS at 11:20

## 2022-01-14 RX ADMIN — SUCRALFATE ORAL 1 G: 1 SUSPENSION ORAL at 21:22

## 2022-01-14 RX ADMIN — HYDROMORPHONE HYDROCHLORIDE 0.2 MG: 0.2 INJECTION, SOLUTION INTRAMUSCULAR; INTRAVENOUS; SUBCUTANEOUS at 01:45

## 2022-01-14 RX ADMIN — SUCRALFATE ORAL 1 G: 1 SUSPENSION ORAL at 13:52

## 2022-01-14 RX ADMIN — METRONIDAZOLE 500 MG: 500 INJECTION, SOLUTION INTRAVENOUS at 04:08

## 2022-01-14 RX ADMIN — ACETAMINOPHEN 650 MG: 325 TABLET, FILM COATED ORAL at 08:10

## 2022-01-14 RX ADMIN — METRONIDAZOLE 500 MG: 500 INJECTION, SOLUTION INTRAVENOUS at 20:15

## 2022-01-14 RX ADMIN — HYDROCORTISONE SODIUM SUCCINATE 100 MG: 100 INJECTION, POWDER, FOR SOLUTION INTRAMUSCULAR; INTRAVENOUS at 11:19

## 2022-01-14 RX ADMIN — PREDNISONE 100 MG: 50 TABLET ORAL at 08:10

## 2022-01-14 RX ADMIN — HYDROMORPHONE HYDROCHLORIDE 0.2 MG: 0.2 INJECTION, SOLUTION INTRAMUSCULAR; INTRAVENOUS; SUBCUTANEOUS at 23:07

## 2022-01-14 RX ADMIN — SUCRALFATE ORAL 1 G: 1 SUSPENSION ORAL at 16:40

## 2022-01-14 RX ADMIN — METRONIDAZOLE 500 MG: 500 INJECTION, SOLUTION INTRAVENOUS at 13:52

## 2022-01-14 RX ADMIN — DIPHENHYDRAMINE HYDROCHLORIDE 50 MG: 50 INJECTION, SOLUTION INTRAMUSCULAR; INTRAVENOUS at 11:18

## 2022-01-14 RX ADMIN — SUCRALFATE ORAL 1 G: 1 SUSPENSION ORAL at 08:23

## 2022-01-14 RX ADMIN — ACETAMINOPHEN 325 MG: 325 TABLET, FILM COATED ORAL at 11:20

## 2022-01-14 RX ADMIN — CHLORHEXIDINE GLUCONATE 15 ML: 1.2 SOLUTION ORAL at 20:15

## 2022-01-14 RX ADMIN — ANTICOAGULANT CITRATE DEXTROSE SOLUTION FORMULA A 1000 ML: 12.25; 11; 3.65 SOLUTION INTRAVENOUS at 11:20

## 2022-01-14 RX ADMIN — CHLORHEXIDINE GLUCONATE 15 ML: 1.2 SOLUTION ORAL at 08:10

## 2022-01-14 RX ADMIN — HYDROMORPHONE HYDROCHLORIDE 0.2 MG: 0.2 INJECTION, SOLUTION INTRAMUSCULAR; INTRAVENOUS; SUBCUTANEOUS at 09:19

## 2022-01-14 RX ADMIN — ACETAMINOPHEN 650 MG: 325 TABLET, FILM COATED ORAL at 20:13

## 2022-01-14 RX ADMIN — PANTOPRAZOLE SODIUM 40 MG: 40 INJECTION, POWDER, FOR SOLUTION INTRAVENOUS at 08:23

## 2022-01-14 RX ADMIN — PANTOPRAZOLE SODIUM 40 MG: 40 INJECTION, POWDER, FOR SOLUTION INTRAVENOUS at 20:15

## 2022-01-14 ASSESSMENT — ACTIVITIES OF DAILY LIVING (ADL)
ADLS_ACUITY_SCORE: 3

## 2022-01-14 ASSESSMENT — MIFFLIN-ST. JEOR: SCORE: 1804.88

## 2022-01-14 NOTE — PROGRESS NOTES
[facility]    Nephrology Progress Note     Assessment & Plan     1) TTP:  MAHA on smear, thrombocytopenia, anemia, elevated LD. Preserved renal function.  PLEX #1 yesterday.  Platelets 12 to 39.  Excellent response.  PLEX #2 today. [id number] in process.       2) Dental Abscess: L mandible.  Tooth #17.  This will need I & D and tooth extraction once her thrombocytopenia is improved.    3) Enteritis ?  Relation to TTP ?  Enteric stool panel all neg.      Plan:    Daily PLEX with FFP replacement for now.  Eventual taper based on her course.      Negrito Perales MD  [facility] - Nephrology  [phone]    Interval History     Feels bloated.  Still L jaw pain.  Wonders how long she needs to stay in hospital.      Physical Exam   Temp: 98.3  F (36.8  C) Temp src: Oral BP: 102/57 Pulse: 95   Resp: 16 SpO2: 98 % O2 Device: None (Room air)    Vitals:    01/14/22 0810   Weight: 108.9 kg (240 lb 1.3 oz)     Vital Signs with Ranges  Temp:  [96.7  F (35.9  C)-99.4  F (37.4  C)] 98.3  F (36.8  C)  Pulse:  [56-98] 95  Resp:  [14-19] 16  BP: ()/(57-78) 102/57  SpO2:  [96 %-100 %] 98 %  I/O last 3 completed shifts:  In: 2105 [I.V.:2105]  Out: 275 [Urine:275]    GENERAL APPEARANCE: pleasant, NAD, a & o  HEENT:  L  Cheek  swollen   RESP: lungs cta b c good efforts, no crackles, rhonchi or wheezes  CV: RRR, nl S1/S2, no m/r/g   ABDOMEN: o/s/nt/nd, bs present  EXTREMITIES/SKIN: BLE petichiae; no edema    Medications     sodium chloride 100 mL/hr at 01/14/22 0819       acetaminophen  325 mg Oral Once    Or     acetaminophen  325 mg Rectal Once     anticoagulant citrate  500-2,000 mL Apheresis Once     calcium gluconate intermittent infusion with additives - doses under 5g  4 g Intravenous Once     cefTRIAXone  2 g Intravenous Q24H     chlorhexidine  15 mL Swish & Spit BID     - MEDICATION INSTRUCTIONS -   Does not apply See Admin Instructions     diphenhydrAMINE  50 mg Intravenous Once      hydrocortisone sodium succinate PF  100 mg Intravenous Once     metroNIDAZOLE  500 mg Intravenous Q8H     pantoprazole (PROTONIX) IV  40 mg Intravenous BID     predniSONE  100 mg Oral Daily     sodium chloride (PF)  3 mL Intracatheter Q8H     sucralfate  1 g Oral 4x Daily       Data   BMP  Recent Labs   Lab 01/14/22  0613 01/13/22  0702 01/12/22  1413 01/12/22  0107    138 139 137   POTASSIUM 3.5 3.6 3.5 3.5   CHLORIDE 109 110* 109 106   JOSELUIS 8.0* 8.0* 8.4* 8.8   CO2 28 23 24 26   BUN 18 22 18 29   CR 0.80 0.82 0.84 1.09*   GLC 99 125* 80 117*     [email or web address](ph[facility]  CBC)  Recent Labs   Lab 01/14/22  0613 01/13/22  0702 01/12/22  1801 01/12/22  1413   WBC 10.8 8.9 9.1 6.9   HGB 8.6* 9.0* 10.1* 9.4*   HCT 24.7* 26.2* 30.5* 27.9*   MCV 92 93 96 95   PLT 39* 12* 11* 11*     Recent Labs   Lab 01/14/22 0613   AST 18   ALT 26   ALKPHOS 55   BILITOTAL 0.4     Recent Labs   Lab 01/14/22 0613   INR 1.10     Recent Labs   Lab 01/14/22  0613 01/12/22  1801 01/12/22  1413 01/12/22  0950 01/12/22  0357 01/12/22  0140 01/12/22  0107   HGB 8.6*   < > 9.4*  --  9.6*   < > 10.8*   HCT 24.7*   < > 27.9*  --  28.3*   < > 32.9*   MCV 92   < > 95  --  95   < > 96   IRON  --   --  125  --   --   --  151   IRONSAT  --   --  51*  --   --   --  53*   RETICABSCT  --   --   --   --  0.134*  --   --    RETP  --   --   --   --  4.5*  --   --    FEB  --   --  244  --   --   --  285   TRIPP  --   --   --   --   --   --  590*   B12  --   --  539   < >  --   --   --    FOLIC  --   --  12.7   < >  --   --   --     < > = values in this interval not displayed.     Attestation:   I have reviewed today's relevant vital signs, notes, medications, labs and imaging.

## 2022-01-14 NOTE — CONSULTS
ORAL & MAXILLOFACIAL SURGERY CONSULTATION  Name: Ivon Gamble  MRN: [id number]  : 1990  Date of Service: 2022    ASSESSMENT / PLAN:  31 year old female with a history of asthma who presents with thrombocytopenia as well as dental abscess of the left mandible. No acute airway concern.     - Continue medical management for thrombocytopenia, intervention with I&D of abscess and extraction of tooth #17 indicated once medically stabilized, may consider inpatient or outpatient management pending progress.  Platelets >50K required for any surgical intervention from OMS perspective.   - Pain control PRN for odontogenic pain  - Continue IV antibiotics, coverage adequate with ceftriaxone / flagyl from OMS perspective  - Peridex rinse BID (ordered)  - Steroids OK from OMS perspective  - If worsening of abscess despite medical management, may require airway stabilization (I.e. intubation); however, no acute airway concern as inferior mandibular border palpable, mandibular opening is within normal limits (no trismus), and patient tolerating oral secretions.     OMS will continue to follow peripherally, please reach out when medically stable for further evaluation of timing / setting of surgical intervention. Please call with any questions or concerns. Thank you for the opportunity to assist in the care of this pleasant patient.     Sreedhar Burden DDS  [facility]  [address], [address]  [place] [address]  Clinic/On-call Phone: [phone]  Clinic Fax: [phone]      CHIEF COMPLAINT:    Left mandibular pain    HISTORY OF PRESENT ILLNESS:      31 year old female with history of asthma who presented to [facility] with nausea and epigastric pain. She was found to have severe thrombocytopenia and transferred to [facility] for apheresis. On exam this AM, patient guarding and tender on left mandible. Patient has reported 3-5 days of left mandibular pain. She does not  have a regular dentist here in town as she recently moved from [place] to assist with family. She denies limited opening, dysphagia or odynophagia. No prior dental restorations or extractions. Facial swelling improved over last 12 hours per patient with steroid and IV antibiotics.        PAST MEDICAL HISTORY:  Asthma    PAST SURGICAL HISTORY:  Past Surgical History:   Procedure Laterality Date     IR CVC NON TUNNEL PLACEMENT  1/13/2022       PTA MEDICATIONS:  Current Facility-Administered Medications   Medication     [START ON 1/14/2022] 0.9% sodium chloride BOLUS     0.9% sodium chloride BOLUS     [START ON 1/14/2022] acetaminophen (TYLENOL) tablet 325 mg    Or     [START ON 1/14/2022] acetaminophen (TYLENOL) Suppository 325 mg     acetaminophen (TYLENOL) tablet 650 mg    Or     acetaminophen (TYLENOL) Suppository 650 mg     [START ON 1/14/2022] Anticoagulant Citrate Dextrose Formula A      anticoagulant citrate flush 3 mL     calcium gluconate 10 % injection 1 g     calcium gluconate 10 % injection 1 g     [START ON 1/14/2022] calcium gluconate 4 g in sodium chloride 0.9 % 50 mL     cefTRIAXone (ROCEPHIN) 2 g vial to attach to  ml bag for ADULTS or NS 50 ml bag for PEDS     Contraindications to both pharmacological and mechanical prophylaxis (must document contraindications for both in this order)     [START ON 1/14/2022] diphenhydrAMINE (BENADRYL) injection 50 mg     [START ON 1/14/2022] diphenhydrAMINE (BENADRYL) injection 50 mg     diphenhydrAMINE (BENADRYL) injection 50 mg     [START ON 1/14/2022] heparin Lock (1000 units/mL High concentration) 2,300 Units     [START ON 1/14/2022] hydrocortisone sodium succinate PF (solu-CORTEF) injection 100 mg     hydrocortisone sodium succinate PF (solu-CORTEF) injection 100 mg     HYDROmorphone (DILAUDID) injection 0.2 mg     lidocaine (LMX4) cream     lidocaine (LMX4) cream     lidocaine (viscous) (XYLOCAINE) 2 % 15 mL, alum & mag hydroxide-simethicone (MAALOX) 15 mL GI  Cocktail     lidocaine 1 % 0.1-1 mL     lidocaine 1 % 0.1-1 mL     magnesium hydroxide (MILK OF MAGNESIA) suspension 30 mL     melatonin tablet 3 mg     metroNIDAZOLE (FLAGYL) infusion 500 mg     naloxone (NARCAN) injection 0.2 mg    Or     naloxone (NARCAN) injection 0.4 mg    Or     naloxone (NARCAN) injection 0.2 mg    Or     naloxone (NARCAN) injection 0.4 mg     nitroGLYcerin (NITROSTAT) sublingual tablet 0.4 mg     ondansetron (ZOFRAN-ODT) ODT tab 4 mg    Or     ondansetron (ZOFRAN) injection 4 mg     pantoprazole (PROTONIX) IV push injection 40 mg     predniSONE (DELTASONE) tablet 100 mg     prochlorperazine (COMPAZINE) injection 10 mg    Or     prochlorperazine (COMPAZINE) tablet 10 mg    Or     prochlorperazine (COMPAZINE) suppository 25 mg     sodium chloride (PF) 0.9% PF flush 3 mL     sodium chloride (PF) 0.9% PF flush 3 mL     sodium chloride (PF) 0.9% PF flush 3 mL     sodium chloride (PF) 0.9% PF flush 3 mL     sodium chloride 0.9% infusion     sucralfate (CARAFATE) suspension 1 g                  ALLERGIES:   No Known Allergies     FAMILY HISTORY:  Family History   Problem Relation Age of Onset     Diabetes Mother      Liver Disease Father        SOCIAL HISTORY    Social History     Socioeconomic History     Marital status: Single     Spouse name: Not on file     Number of children: Not on file     Years of education: Not on file     Highest education level: Not on file   Occupational History     Not on file   Tobacco Use     Smoking status: Not on file     Smokeless tobacco: Not on file   Substance and Sexual Activity     Alcohol use: Not on file     Drug use: Not on file     Sexual activity: Not on file   Other Topics Concern     Not on file   Social History Narrative    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     Social Determinants of Health     Financial Resource Strain: Not on file   Food Insecurity: Not on file   Transportation Needs: Not on file   Physical Activity: Not on  file   Stress: Not on file   Social Connections: Not on file   Intimate Partner Violence: Not on file   Housing Stability: Not on file       REVIEW OF SYSTEMS:  As noted in the HPI      OBJECTIVE/PHYSICAL EXAMINATION:  Vitals: Blood pressure 113/66, pulse 73, temperature 98.4  F (36.9  C), temperature source Oral, resp. rate 18, last menstrual period 12/22/2021, SpO2 96 %.  Constitutional: Lying in bed, on apheresis, no distress  HEENT: Left buccal swelling. Left mandibular inferior border palpable. No submandibular swelling. No neck swelling. No lymphadenopathy. Left mandibular vestibular erythema and tenderness adjacent to #17. No drainage. No intraoral bleeding. No lateral pharyngeal swelling. Floor of mouth soft, non-tender, non-distended. Mandibular opening ~40mm, slight pain limitation.  Neck: Soft, supple, no lymphadenopathy.   Cardiovascular: Regular rate  Pulmonary: Non-labored on room air  Neurologic: AOx3, no V3 paresthesia  Skin: No overlying erythema of the mandible.     LABORATORY, PATHOLOGY, AND RADIOLOGY DATA:  Lab results:   CBC RESULTS:   Recent Labs   Lab Test 01/13/22  0702   WBC 8.9   RBC 2.81*   HGB 9.0*   HCT 26.2*   MCV 93   MCH 32.0   MCHC 34.4   RDW 13.9   PLT 12*       Last Basic Metabolic Panel:  Lab Results   Component Value Date     01/13/2022      Lab Results   Component Value Date    POTASSIUM 3.6 01/13/2022     Lab Results   Component Value Date    CHLORIDE 110 01/13/2022     Lab Results   Component Value Date    JOSELUIS 8.0 01/13/2022     Lab Results   Component Value Date    CO2 23 01/13/2022     Lab Results   Component Value Date    BUN 22 01/13/2022     Lab Results   Component Value Date    CR 0.82 01/13/2022     Lab Results   Component Value Date     01/13/2022            Imaging:  CT Facial bones reviewed. Apical abscess tooth #17 (left mandibular third molar), abscess and fat stranding noted.     IMPRESSION:  1. Periapical abscess of the third left mandibular molar  with  associated developing subperiosteal abscess and left masseter muscle  myositis and overlying left facial cellulitis, as described. Recommend  dental consultation.  2. No facial bone fracture.        SIGNATURE:  Sreedhar Burden DDS  [facility]  [address], [address]  [place] [address]  Clinic/On-call Phone: [phone]  Clinic Fax: [phone]

## 2022-01-14 NOTE — PROGRESS NOTES
"[facility]  [facility]  Gastroenterology Progress note    Interval History:      Small, loose stool overnight.  Pain, nausea managed with meds.  Tolerating full liquid diet.  Apheresis planned for today.  AXR shows small to moderate amount of stool.  Nonobstructed bowel gas pattern.        Vital Signs:      /57 (BP Location: Left arm)   Pulse 95   Temp 98.3  F (36.8  C) (Oral)   Resp 16   Ht 1.651 m (5' 5\")   Wt 108.9 kg (240 lb 1.3 oz)   LMP 2021   SpO2 98%   BMI 39.95 kg/m    Temp (24hrs), Av.3  F (36.8  C), Min:96.7  F (35.9  C), Max:99.4  F (37.4  C)    Patient Vitals for the past 72 hrs:   Weight   22 0810 108.9 kg (240 lb 1.3 oz)       Intake/Output Summary (Last 24 hours) at 2022 1028  Last data filed at 2022 0819  Gross per 24 hour   Intake 2345 ml   Output 275 ml   Net 2070 ml       Additional Comments:  ROS, FH, SH: See initial GI consult for details.    Laboratory Data:  Recent Labs   Lab Test 22  0622  0123 22  0702 22  1801 22  1413   WBC 10.8  --  8.9 9.1 6.9   HGB 8.6*  --  9.0* 10.1* 9.4*   MCV 92  --  93 96 95   PLT 39*  --  12* 11* 11*   INR 1.10 1.06  --   --  1.08  1.08     Recent Labs   Lab Test 22  0613 22  0702 22  1413    138 139   POTASSIUM 3.5 3.6 3.5   CHLORIDE 109 110* 109   CO2 28 23 24   BUN 18 22 18   CR 0.80 0.82 0.84   ANIONGAP 4 5 6   JOSELUIS 8.0* 8.0* 8.4*     Recent Labs   Lab Test 22  0613 22  0702 22  1413 22  0216 22  0107   ALBUMIN 2.9* 3.1* 3.0*  --  3.3*   BILITOTAL 0.4 1.3 1.4*  --  1.4*   DBIL  --   --   --   --  0.3*   ALT 26 38 41  --  42   AST 18 33 36  --  37   ALKPHOS 55 77 76  --  80   PROTEIN  --   --   --  200 *  --    LIPASE  --   --   --   --  165         Assessment:  30 yo female admitted with TTP.  Plans for apheresis today.  Upper abdominal pain with bloating, constipation x several days.  CT with " mild mural thickening of the duodenum and proximal jejunum.  US with no visible cholelithiasis or biliary dilatation.  Gallbladder wall mildly thickened.  Concerning for acute cholecystitis.  HIDA showed no gallbladder on initial 60 minute scan, possible cholecystitis.  Patient not a good candidate for procedures given low platelet count. Symptoms most consistent with enteritis. Stool studies negative.    AXR today to assess for stool burden shows only mild to moderate stool.   Plan:  -  PPI BID, Carafate QID, GI cocktail prn.  -  Monitor LFTs.  -  Consider repeat HIDA if concerned for cholecystitis.  -  Diet as tolerated.    DOYLE Mejia  [facility]  Office:  [phone] call if needed after 5PM  Cell:  [phone], not available after 5PM at this number

## 2022-01-14 NOTE — PROGRESS NOTES
"Apheresis Treatment- Plasma exchange    Height: 5'5\"  Weight : 240lbs  Hematocrit: 26%    Inlet Speed: automated by apheresis machine- 45ml/min.  ACDA ratio : 10:1  Fluid Balance: 100%  Access: Right temporary CVC.    Replacement Product: 5500ml FFP for a 1.5 volume exchange.  Electrolyte replacement: 4g calcium gluconate in 50 ml NS infused over duration of treatment.  Premedication: 325 PO tylenol, 50mg IVP benadryl (pre-meds given by another nurse, this RN resumed care for the last hour of the treatment.)  Treatment Notes: Pt tolerated treatment well, no S/S of transfusion reaction.  Treatment completed as ordered, VSS, see Epic flowsheet for run detail.   Next Treatment: Friday, January 14th for a 1 volume exchange.         "

## 2022-01-14 NOTE — PROGRESS NOTES
Treatment in room CS-OF using [employer]ia apheresis machine. Consent verified.     Height: 5 ft 5in  Weight: 108.9kg  HCT: 25%  Inlet speed: 44.5  ACDA ratio: 10:1  Fluid balance: 100  Access: RIJ     Replacement product: 3813ml FFP  Electrolyte replacement: Ca Gluconate 4 grams in NS - total 90 mls, piggybacked into return lines, infused over time of treatment.    Premedications: Benadry 50mg IVP, Solu-Cortef 100mg IVP, 325mg Tylenol PO     Treatment Notes: tolerated treatment well. No complaints.      Treatment completed as ordered, VSS, see EPIC flowsheet for run data.     Next treatment: Saturday 1/15/2022    Lalitha Narvaez RN

## 2022-01-14 NOTE — PROGRESS NOTES
Consult not completed.  SW sent email to [facility] financial counselors to see if they would speak to pt about applying for MA.  Pt told bedside nurse that financial counselor indicated she made too much money and would not qualify.  Pt told nurse that she was very concerned about the costs of hospitalization.  SW will request that SW/CC speak to pt this weekend about applying for medical insurance through [facility].

## 2022-01-14 NOTE — PROGRESS NOTES
"[facility]    Hematology/Oncology Follow-up Note      Today's Date: 01/14/22  Date of Admission:  1/12/2022  Reason for Consult: TTP      ASSESSMENT/PLAN:  Ivon Gamble is a 31 year old female with elevated LDH, thrombocytopenia and anemia Platelets increased to 39 K from 12 K today    1 ) TTP: continue pheresis. Despite not clear randomized data for steroids, would recommend continuing prednisone 1 mg /kg for now, await QUIROZ TS13 testing. Tolerating steroids fine   LDH normalized. H/h stable. coags normal. Check daily LDH and plts/hgb   2) dental abcess: per oral maxofacial surgery   3) on PPI for GI protection and since she will be long term taper with steroids , will add bactrim MWF    4 )  consult for issues with no insurance and medical care, patient very stressed and will not follow up at [facility] , may be a good candidate for [facility]     Addy Austin MD          INTERIM HISTORY:  Patient on high doses of steroids. Feels bloated . Has a dental abcess . She is angry that she has to pay all this money in medical bills as she has no insurance, she states' I just want to get done with this so I can bail\" she will not follow up     MEDICATIONS:  Current Facility-Administered Medications   Medication     0.9% sodium chloride BOLUS     acetaminophen (TYLENOL) tablet 650 mg    Or     acetaminophen (TYLENOL) Suppository 650 mg     anticoagulant citrate flush 3 mL     calcium gluconate 10 % injection 1 g     calcium gluconate 10 % injection 1 g     cefTRIAXone (ROCEPHIN) 2 g vial to attach to  ml bag for ADULTS or NS 50 ml bag for PEDS     chlorhexidine (PERIDEX) 0.12 % solution 15 mL     Contraindications to both pharmacological and mechanical prophylaxis (must document contraindications for both in this order)     diphenhydrAMINE (BENADRYL) injection 50 mg     heparin Lock (1000 units/mL High concentration) 2,300 Units     HYDROmorphone (DILAUDID) injection 0.2 mg     " "lidocaine (LMX4) cream     lidocaine (viscous) (XYLOCAINE) 2 % 15 mL, alum & mag hydroxide-simethicone (MAALOX) 15 mL GI Cocktail     lidocaine 1 % 0.1-1 mL     magnesium hydroxide (MILK OF MAGNESIA) suspension 30 mL     melatonin tablet 3 mg     metroNIDAZOLE (FLAGYL) infusion 500 mg     naloxone (NARCAN) injection 0.2 mg    Or     naloxone (NARCAN) injection 0.4 mg    Or     naloxone (NARCAN) injection 0.2 mg    Or     naloxone (NARCAN) injection 0.4 mg     nitroGLYcerin (NITROSTAT) sublingual tablet 0.4 mg     ondansetron (ZOFRAN-ODT) ODT tab 4 mg    Or     ondansetron (ZOFRAN) injection 4 mg     pantoprazole (PROTONIX) IV push injection 40 mg     predniSONE (DELTASONE) tablet 100 mg     prochlorperazine (COMPAZINE) injection 10 mg    Or     prochlorperazine (COMPAZINE) tablet 10 mg    Or     prochlorperazine (COMPAZINE) suppository 25 mg     sodium chloride (PF) 0.9% PF flush 3 mL     sodium chloride (PF) 0.9% PF flush 3 mL     sodium chloride 0.9% infusion     sucralfate (CARAFATE) suspension 1 g           ALLERGIES:  No Known Allergies      PHYSICAL EXAM:  Vital signs:  Temp: 98.2  F (36.8  C) Temp src: Axillary BP: 114/70 Pulse: 68   Resp: 16 SpO2: 98 % O2 Device: None (Room air)   Height: 165.1 cm (5' 5\") Weight: 108.9 kg (240 lb 1.3 oz)  Estimated body mass index is 39.95 kg/m  as calculated from the following:    Height as of this encounter: 1.651 m (5' 5\").    Weight as of this encounter: 108.9 kg (240 lb 1.3 oz).    ECO morbidly obese   GENERAL/CONSTITUTIONAL: No acute distress.  EYES: Pupils are equal, round, and react to light and accommodation. Extraocular movements intact.  No scleral icterus.  ENT/MOUTH: Neck supple. Oropharynx clear, no mucositis.  LYMPH: No anterior cervical, posterior cervical, supraclavicular, axillary or inguinal adenopathy.   RESPIRATORY: Clear to auscultation bilaterally. No crackles or wheezing.   CARDIOVASCULAR: Regular rate and rhythm without murmurs, gallops, or " rubs.  GASTROINTESTINAL: No hepatosplenomegaly, masses, or tenderness. The patient has normal bowel sounds. No guarding.  No distention.  MUSCULOSKELETAL: Warm and well-perfused, no cyanosis, clubbing, or edema.  NEUROLOGIC: Cranial nerves II-XII are intact. Alert, oriented, answers questions appropriately.  INTEGUMENTARY: No rashes or jaundice.        LABS:  CBC RESULTS:   Recent Labs   Lab Test 01/14/22  0613   WBC 10.8   RBC 2.69*   HGB 8.6*   HCT 24.7*   MCV 92   MCH 32.0   MCHC 34.8   RDW 14.6   PLT 39*       Recent Labs   Lab Test 01/14/22  0613 01/13/22  0702    138   POTASSIUM 3.5 3.6   CHLORIDE 109 110*   CO2 28 23   ANIONGAP 4 5   GLC 99 125*   BUN 18 22   CR 0.80 0.82   JOSELUIS 8.0* 8.0*         PATHOLOGY:    na  IMAGING:      nina Fernández MD  Hematology/Oncology  [facility]

## 2022-01-14 NOTE — PROGRESS NOTES
"[facility]    Hospitalist Progress Note    Assessment & Plan   Date of Admission:  1/12/2022        Assessment & Plan     Ivon Gamble is a 31 year old female with a history of asthma but not on any prescription medications who presents with about 1 week of epigastric and upper abdominal pain.  She was found to be severely thrombocytopenic and with concern for TTP.  Hematology/oncology, nephrology, and IR were consulted, and recommended patient transferred to formerly [facility] for urgent central line placement and initiation of pheresis.     #Severe thrombocytopenia, concern for TTP:   #Anemia  -Pt describes bruising and \"red spots\" over last 2-3 days.   -platelet count found to be very low at 11.  Remained at 11 on subsequent draws.  CBC with normal WBC.  Hemoglobin low at 9.6.  Reticulocyte count is elevated.  PTT within normal limits.  INR within normal limits.  CMP with creatinine of 1.09.  T bilirubin mildly elevated at 1.4.  Direct bilirubin 0.3.  LDH elevated at 608 and haptoglobin undetectable.  Ferritin elevated at 590.   Lactic acid within normal limits.  Pro-Dick is negative.  --She has petechiae on exam on bilateral extremities. She does not have blurry vision or focal neurologic deficits.  She denies any bloody or black stools.    --Peripheral smear was read by  hematology and concerning for TTP. Hematology recommended urgent transfer to [facility] for pheresis.  IR and nephrology formerly [facility] contacted, pt transferred or Ir line placement and pharesis.  --Patient hemodynamically stable.  No evidence of current bleeding.  -started on steroids per hematology   -1/13:  Nl neuro status, no bleeding  Urgent catheter placement by IR 1/13  HA, negative CT head. Resolved HA  Afebrile. Nl vitals.   Nl renal function  Hb stable 9 range, platelet ct stable 12 range. Nl wbc  -viral panel negative.   -CT abd, abd us and hida scan reviewed.   -started on prednisone 100mg every day.   -nephrology and hematology " following  -plan for pharesis daily for about at least 7 days then reeval frequency  -Etiology. ? GI infection, B12 nl. UPT negative. Nl sbp, nl vitals    Today; pharesis yesterday, nl vitals. Nl neuro status. Cr nl. Nl bmp, LFTs, LDH normalized, procal negative.   Hb down to 8 range. Platelet count up to 39 from 12.   No bleeding  Needed prn iv dilaudid overnight.   Negative stool panel. Neg shiga toxins  Feeling better. Less HA/abd pain and L tooth abscess pain  Still needing some prn dilaudid.         Plan;   - renal consult, pharesis per renal, daily pheresis for now per hematology  - [facility] hematology following  -Please check daily CBC, CMP, LDH.  -Please check daily PT/PTT/INR and fibrinogen following pheresis from peripheral source (not catheter). Transfuse cryoprecipitate for fibrinogen <100.   -Transfuse PRBC for Hb<7.  -Do NOT transfuse platelets.   -Transfuse FFP if evidence of bleed.  - daily TTP labs with renal panel, cbc, ldh  Daily post pharesis labs; INR, PTT, and fibrinogen to determine if need cryoprecipitate  --Frequent monitoring of vital signs, IMC for now.   -prednisone 100mg every day per hematology  - PPI and carafate  -- Pain medications available as needed  -treat possible cholecystitis.   -tx dental abscess  -prn iv dilaudid        #Abdominal pain, possible cholecystitis vs. Enteritis:   -CT abdomen pelvis shows mild thickening of duodenum and jejunum concerning for inflammatory enteritis.  There was slight stranding to gallbladder.   Abdominal ultrasound with mild gallbladder wall thickening with negative Rm sign.  HIDA scan was equivocal.   --General surgery was consulted at [facility].  She would not be surgical candidate at this time.  --She was started on IV ceftriaxone 1 g every 24 hour and flagyl 500 mg every 8 hours, continue for now.     -2 loose stools since admission. No n/v. No bleeding  Tenderness epigastrium and LLQ  -stool panel negative. Neg shiga species, neg  Cdiff  -abd pain common TTP.   - [facility] following  - on emperic Rocephin and Flagyl for possible cholecystitis  -feeling better today. abd epigastric tenderness much improved but did have dilaudid earlier.   2 stools last night  - stool panel and cdiff negative.     Plan;   -diet per GI.   -PPI  -Carafate.   --IVF, able to take diet tonight, but n.p.o. after midnight  --Repeat labs in a.m.  --Reconsult surgery when necessary  -[facility] consult regarding enteritis and potential need for cholecystostomy tube     Dental abscess  Tenderness on exam this am L jaw line. No swelling. Has had dental pain for several days.   On rocephin and flagyl for possible cholecystitis  -CT facial bones 1/13:   1. Periapical abscess of the third left mandibular molar with  associated developing subperiosteal abscess and left masseter muscle  myositis and overlying left facial cellulitis, as described. Recommend  dental consultation.  2. No facial bone fracture    -seen by oral surgeon 1/13. Agree with rocephin and flagyl  -no longer tender along L jaw line today. Had am dilaudid    Plan;   -follow exam closely  - cont Rocephin and Flagyl, surgery agree with coverage for dental abscessess  -oral surgery will be following along peripherally for now see note 1/13.   -per surgery,  intervention with I&D of abscess and extraction of tooth #17 indicated once medically stabilized, may consider inpatient or outpatient management pending progress.  Platelets >50K required for any surgical intervention from [facility] perspective.   - Peridex rinse BID (ordered)  - Steroids OK from [facility] perspective  - per oral surgery, If worsening of abscess despite medical management, may require airway stabilization (I.e. intubation); however, no acute airway concern as inferior mandibular border palpable, mandibular opening is within normal limits (no trismus), and patient tolerating oral secretions.      -[facility] will continue to follow peripherally, please reach out when  medically stable for further evaluation of timing / setting of surgical intervention. Please call with any questions or concerns. Thank you for the opportunity to assist in the care of this pleasant patient.     Diet:  Full liquid diet for now per GI. ADAT per GI    DVT Prophylaxis: Contraindicated due to low platelets.  Encourage ambulation.  Siddiqui Catheter: Not present  Central Lines: None  Code Status: Full Code        Clinically Significant Risk Factors Present on Admission                  # Thrombocytopenia: Plts = 11 10e3/uL (Ref range: 150 - 450 10e3/uL) on admission, will monitor for bleeding   # Obesity: last There is no height or weight on file to calculate BMI.            Disposition Plan     Inpatient status, anticipate at least 2+ days of hospitalization.           Discussed care plan with gi, renal, pt, rn    Zaki Damon MD  Text Page  (7am to 6pm)  Interval History   Stable night.   pharesis began yestderday, currently on plasmapharesis   Stool studies negative.   Platelet count up.   abd pain, HA and L dental pain improved      Prior to admission had upper abd pain, bloating, and several nonbloody loose stools daily for several days.   HA is better  abd pain is better today.   No n/v/diarrhea.       -Data reviewed today: I reviewed all new labs and imaging results over the last 24 hours. I personally reviewed labs and imaging since admission.     Physical Exam   Temp: 98.3  F (36.8  C) Temp src: Oral BP: 102/57 Pulse: 95   Resp: 16 SpO2: 98 % O2 Device: None (Room air)    Vitals:    01/14/22 0810   Weight: 108.9 kg (240 lb 1.3 oz)     Vital Signs with Ranges  Temp:  [96.7  F (35.9  C)-99.4  F (37.4  C)] 98.3  F (36.8  C)  Pulse:  [56-98] 95  Resp:  [14-19] 16  BP: ()/(57-78) 102/57  SpO2:  [96 %-100 %] 98 %  I/O last 3 completed shifts:  In: 2105 [I.V.:2105]  Out: 275 [Urine:275]    Constitutional: in bed receiving plasmapharesis, nad, nontoxic  Heent: nl sclera, much tenderness left  jaw line today. No swelling or redness. Nl op  Respiratory: CTAB, breathing easily   Cardiovascular: RRR no r/g/m. Not tachy  GI: soft, nt, only mild tenderness epigastrium today. Much improved.   Skin/Integumen: + petechaie bLE. No edema or other rash  Neuro: cN 2-12 grossly intact, strength 5/5 extrem x 4. Nl speech and mentation,   Psych: nl affect       Medications     sodium chloride 100 mL/hr at 01/14/22 0819       acetaminophen  325 mg Oral Once    Or     acetaminophen  325 mg Rectal Once     anticoagulant citrate  500-2,000 mL Apheresis Once     calcium gluconate intermittent infusion with additives - doses under 5g  4 g Intravenous Once     cefTRIAXone  2 g Intravenous Q24H     chlorhexidine  15 mL Swish & Spit BID     - MEDICATION INSTRUCTIONS -   Does not apply See Admin Instructions     diphenhydrAMINE  50 mg Intravenous Once     hydrocortisone sodium succinate PF  100 mg Intravenous Once     metroNIDAZOLE  500 mg Intravenous Q8H     pantoprazole (PROTONIX) IV  40 mg Intravenous BID     predniSONE  100 mg Oral Daily     sodium chloride (PF)  3 mL Intracatheter Q8H     sucralfate  1 g Oral 4x Daily       Data   Recent Labs   Lab 01/14/22  0613 01/14/22  0123 01/13/22  0702 01/12/22  1801 01/12/22  1413 01/12/22  0140 01/12/22  0107   WBC 10.8  --  8.9 9.1 6.9   < > 8.8   HGB 8.6*  --  9.0* 10.1* 9.4*   < > 10.8*   MCV 92  --  93 96 95   < > 96   PLT 39*  --  12* 11* 11*   < > 11*   INR 1.10 1.06  --   --  1.08  1.08  --  1.02     --  138  --  139  --  137   POTASSIUM 3.5  --  3.6  --  3.5  --  3.5   CHLORIDE 109  --  110*  --  109  --  106   CO2 28  --  23  --  24  --  26   BUN 18  --  22  --  18  --  29   CR 0.80  --  0.82  --  0.84  --  1.09*   ANIONGAP 4  --  5  --  6  --  5   JOSELUIS 8.0*  --  8.0*  --  8.4*  --  8.8   GLC 99  --  125*  --  80  --  117*   ALBUMIN 2.9*  --  3.1*  --  3.0*  --  3.3*   PROTTOTAL 5.5*  --  6.5*  --  6.4*  --  6.9   BILITOTAL 0.4  --  1.3  --  1.4*  --  1.4*   ALKPHOS  55  --  77  --  76  --  80   ALT 26  --  38  --  41  --  42   AST 18  --  33  --  36  --  37   LIPASE  --   --   --   --   --   --  165    < > = values in this interval not displayed.       Imaging:   Recent Results (from the past 24 hour(s))   CT Head w/o Contrast    Narrative    CT SCAN OF THE HEAD WITHOUT CONTRAST   1/13/2022 10:56 AM     HISTORY: Headache, intracranial hemorrhage suspected    TECHNIQUE:  Axial images of the head and coronal reformations without  IV contrast material. Dual-energy technique was utilized. Radiation  dose for this scan was reduced using automated exposure control,  adjustment of the mA and/or kV according to patient size, or iterative  reconstruction technique.    COMPARISON: None.    FINDINGS: There is no evidence of intracranial hemorrhage, mass, acute  infarct or anomaly. The ventricles are normal in size, shape and  configuration. The brain parenchyma and subarachnoid spaces are  normal.     The visualized portions of the sinuses and mastoids appear normal. The  bony calvarium and bones of the skull base appear intact.       Impression    IMPRESSION: No CT findings of acute intracranial process.    YAYO MORALES MD         SYSTEM ID:  [id number]   XR Abdomen 1 View    Narrative    ABDOMEN ONE VIEW  January 14, 2022 8:48 AM     HISTORY: Evaluate for constipation.    COMPARISON: None.       Impression    IMPRESSION: Small to moderate amount of stool. Nonobstructed bowel gas  pattern.    YAYO NAVA MD         SYSTEM ID:  [id number]

## 2022-01-14 NOTE — PLAN OF CARE
Pt arrived on the unit from care suites after 1st run of apheresis; afebrile, stable vitals on arrival. Complaining on stomach bloating, otherwise denies pain.

## 2022-01-15 LAB
ALBUMIN SERPL-MCNC: 2.9 G/DL (ref 3.4–5)
ALP SERPL-CCNC: 57 U/L (ref 40–150)
ALT SERPL W P-5'-P-CCNC: 26 U/L (ref 0–50)
ANION GAP SERPL CALCULATED.3IONS-SCNC: 4 MMOL/L (ref 3–14)
APTT PPP: 27 SECONDS (ref 22–38)
AST SERPL W P-5'-P-CCNC: 14 U/L (ref 0–45)
BASOPHILS # BLD AUTO: 0 10E3/UL (ref 0–0.2)
BASOPHILS NFR BLD AUTO: 0 %
BILIRUB SERPL-MCNC: 0.4 MG/DL (ref 0.2–1.3)
BLD PROD TYP BPU: NORMAL
BLOOD COMPONENT TYPE: NORMAL
BUN SERPL-MCNC: 14 MG/DL (ref 7–30)
CALCIUM SERPL-MCNC: 8.1 MG/DL (ref 8.5–10.1)
CHLORIDE BLD-SCNC: 106 MMOL/L (ref 94–109)
CO2 SERPL-SCNC: 30 MMOL/L (ref 20–32)
CODING SYSTEM: NORMAL
CREAT SERPL-MCNC: 0.89 MG/DL (ref 0.52–1.04)
EOSINOPHIL # BLD AUTO: 0 10E3/UL (ref 0–0.7)
EOSINOPHIL NFR BLD AUTO: 0 %
ERYTHROCYTE [DISTWIDTH] IN BLOOD BY AUTOMATED COUNT: 15 % (ref 10–15)
FIBRINOGEN PPP-MCNC: 266 MG/DL (ref 170–490)
GFR SERPL CREATININE-BSD FRML MDRD: 88 ML/MIN/1.73M2
GLUCOSE BLD-MCNC: 96 MG/DL (ref 70–99)
HCT VFR BLD AUTO: 26 % (ref 35–47)
HGB BLD-MCNC: 8.8 G/DL (ref 11.7–15.7)
IMM GRANULOCYTES # BLD: 0.2 10E3/UL
IMM GRANULOCYTES NFR BLD: 1 %
INR PPP: 1.1 (ref 0.85–1.15)
ISSUE DATE AND TIME: NORMAL
LDH SERPL L TO P-CCNC: 166 U/L (ref 81–234)
LYMPHOCYTES # BLD AUTO: 2.9 10E3/UL (ref 0.8–5.3)
LYMPHOCYTES NFR BLD AUTO: 26 %
MAGNESIUM SERPL-MCNC: 1.8 MG/DL (ref 1.6–2.3)
MCH RBC QN AUTO: 31.8 PG (ref 26.5–33)
MCHC RBC AUTO-ENTMCNC: 33.8 G/DL (ref 31.5–36.5)
MCV RBC AUTO: 94 FL (ref 78–100)
MONOCYTES # BLD AUTO: 0.7 10E3/UL (ref 0–1.3)
MONOCYTES NFR BLD AUTO: 7 %
NEUTROPHILS # BLD AUTO: 7.3 10E3/UL (ref 1.6–8.3)
NEUTROPHILS NFR BLD AUTO: 66 %
NRBC # BLD AUTO: 0 10E3/UL
NRBC BLD AUTO-RTO: 0 /100
PHOSPHATE SERPL-MCNC: 2.5 MG/DL (ref 2.5–4.5)
PLATELET # BLD AUTO: 85 10E3/UL (ref 150–450)
POTASSIUM BLD-SCNC: 3.2 MMOL/L (ref 3.4–5.3)
POTASSIUM BLD-SCNC: 3.7 MMOL/L (ref 3.4–5.3)
PROT SERPL-MCNC: 5.6 G/DL (ref 6.8–8.8)
RBC # BLD AUTO: 2.77 10E6/UL (ref 3.8–5.2)
SODIUM SERPL-SCNC: 140 MMOL/L (ref 133–144)
UNIT ABO/RH: NORMAL
UNIT NUMBER: NORMAL
UNIT STATUS: NORMAL
UNIT TYPE ISBT: 600
UNIT TYPE ISBT: 6200
WBC # BLD AUTO: 11.1 10E3/UL (ref 4–11)

## 2022-01-15 PROCEDURE — 80053 COMPREHEN METABOLIC PANEL: CPT | Performed by: INTERNAL MEDICINE

## 2022-01-15 PROCEDURE — 36514 APHERESIS PLASMA: CPT | Performed by: INTERNAL MEDICINE

## 2022-01-15 PROCEDURE — 99232 SBSQ HOSP IP/OBS MODERATE 35: CPT | Performed by: INTERNAL MEDICINE

## 2022-01-15 PROCEDURE — 36415 COLL VENOUS BLD VENIPUNCTURE: CPT | Performed by: INTERNAL MEDICINE

## 2022-01-15 PROCEDURE — 258N000003 HC RX IP 258 OP 636: Performed by: INTERNAL MEDICINE

## 2022-01-15 PROCEDURE — 85730 THROMBOPLASTIN TIME PARTIAL: CPT | Performed by: INTERNAL MEDICINE

## 2022-01-15 PROCEDURE — 99233 SBSQ HOSP IP/OBS HIGH 50: CPT | Performed by: INTERNAL MEDICINE

## 2022-01-15 PROCEDURE — 84100 ASSAY OF PHOSPHORUS: CPT | Performed by: INTERNAL MEDICINE

## 2022-01-15 PROCEDURE — 250N000011 HC RX IP 250 OP 636: Performed by: INTERNAL MEDICINE

## 2022-01-15 PROCEDURE — 84132 ASSAY OF SERUM POTASSIUM: CPT | Performed by: INTERNAL MEDICINE

## 2022-01-15 PROCEDURE — 250N000013 HC RX MED GY IP 250 OP 250 PS 637: Performed by: INTERNAL MEDICINE

## 2022-01-15 PROCEDURE — 83615 LACTATE (LD) (LDH) ENZYME: CPT | Performed by: INTERNAL MEDICINE

## 2022-01-15 PROCEDURE — 120N000013 HC R&B IMCU

## 2022-01-15 PROCEDURE — 250N000009 HC RX 250: Performed by: INTERNAL MEDICINE

## 2022-01-15 PROCEDURE — 85384 FIBRINOGEN ACTIVITY: CPT | Performed by: INTERNAL MEDICINE

## 2022-01-15 PROCEDURE — 99207 PR CDG-MDM COMPONENT: MEETS MODERATE - DOWN CODED: CPT | Performed by: INTERNAL MEDICINE

## 2022-01-15 PROCEDURE — 36514 APHERESIS PLASMA: CPT

## 2022-01-15 PROCEDURE — 250N000012 HC RX MED GY IP 250 OP 636 PS 637: Performed by: INTERNAL MEDICINE

## 2022-01-15 PROCEDURE — 85610 PROTHROMBIN TIME: CPT | Performed by: INTERNAL MEDICINE

## 2022-01-15 PROCEDURE — 250N000013 HC RX MED GY IP 250 OP 250 PS 637: Performed by: PHYSICIAN ASSISTANT

## 2022-01-15 PROCEDURE — P9059 PLASMA, FRZ BETWEEN 8-24HOUR: HCPCS | Performed by: INTERNAL MEDICINE

## 2022-01-15 PROCEDURE — C9113 INJ PANTOPRAZOLE SODIUM, VIA: HCPCS | Performed by: INTERNAL MEDICINE

## 2022-01-15 PROCEDURE — 85025 COMPLETE CBC W/AUTO DIFF WBC: CPT | Performed by: INTERNAL MEDICINE

## 2022-01-15 PROCEDURE — 250N000013 HC RX MED GY IP 250 OP 250 PS 637: Performed by: DENTIST

## 2022-01-15 PROCEDURE — 250N000011 HC RX IP 250 OP 636: Performed by: PHYSICIAN ASSISTANT

## 2022-01-15 PROCEDURE — 83735 ASSAY OF MAGNESIUM: CPT | Performed by: INTERNAL MEDICINE

## 2022-01-15 RX ORDER — POTASSIUM CHLORIDE 1500 MG/1
40 TABLET, EXTENDED RELEASE ORAL ONCE
Status: COMPLETED | OUTPATIENT
Start: 2022-01-15 | End: 2022-01-15

## 2022-01-15 RX ORDER — TRAMADOL HYDROCHLORIDE 50 MG/1
50 TABLET ORAL EVERY 6 HOURS PRN
Status: DISCONTINUED | OUTPATIENT
Start: 2022-01-15 | End: 2022-01-18 | Stop reason: HOSPADM

## 2022-01-15 RX ADMIN — MAGNESIUM HYDROXIDE 30 ML: 400 SUSPENSION ORAL at 17:33

## 2022-01-15 RX ADMIN — ANTICOAGULANT CITRATE DEXTROSE SOLUTION FORMULA A 650 ML: 12.25; 11; 3.65 SOLUTION INTRAVENOUS at 09:18

## 2022-01-15 RX ADMIN — ACETAMINOPHEN 650 MG: 325 TABLET, FILM COATED ORAL at 04:34

## 2022-01-15 RX ADMIN — MELATONIN TAB 3 MG 5 MG: 3 TAB at 20:54

## 2022-01-15 RX ADMIN — CEFTRIAXONE SODIUM 2 G: 2 INJECTION, POWDER, FOR SOLUTION INTRAMUSCULAR; INTRAVENOUS at 17:27

## 2022-01-15 RX ADMIN — SUCRALFATE ORAL 1 G: 1 SUSPENSION ORAL at 12:28

## 2022-01-15 RX ADMIN — CHLORHEXIDINE GLUCONATE 15 ML: 1.2 SOLUTION ORAL at 08:10

## 2022-01-15 RX ADMIN — SUCRALFATE ORAL 1 G: 1 SUSPENSION ORAL at 17:26

## 2022-01-15 RX ADMIN — CALCIUM GLUCONATE 4 G: 98 INJECTION, SOLUTION INTRAVENOUS at 09:19

## 2022-01-15 RX ADMIN — METRONIDAZOLE 500 MG: 500 INJECTION, SOLUTION INTRAVENOUS at 12:28

## 2022-01-15 RX ADMIN — SUCRALFATE ORAL 1 G: 1 SUSPENSION ORAL at 20:57

## 2022-01-15 RX ADMIN — DIPHENHYDRAMINE HYDROCHLORIDE 50 MG: 50 INJECTION, SOLUTION INTRAMUSCULAR; INTRAVENOUS at 11:59

## 2022-01-15 RX ADMIN — METRONIDAZOLE 500 MG: 500 INJECTION, SOLUTION INTRAVENOUS at 04:28

## 2022-01-15 RX ADMIN — HYDROMORPHONE HYDROCHLORIDE 0.2 MG: 0.2 INJECTION, SOLUTION INTRAMUSCULAR; INTRAVENOUS; SUBCUTANEOUS at 06:25

## 2022-01-15 RX ADMIN — PANTOPRAZOLE SODIUM 40 MG: 40 INJECTION, POWDER, FOR SOLUTION INTRAVENOUS at 08:11

## 2022-01-15 RX ADMIN — PANTOPRAZOLE SODIUM 40 MG: 40 INJECTION, POWDER, FOR SOLUTION INTRAVENOUS at 20:38

## 2022-01-15 RX ADMIN — SUCRALFATE ORAL 1 G: 1 SUSPENSION ORAL at 08:11

## 2022-01-15 RX ADMIN — METRONIDAZOLE 500 MG: 500 INJECTION, SOLUTION INTRAVENOUS at 20:38

## 2022-01-15 RX ADMIN — DIPHENHYDRAMINE HYDROCHLORIDE 50 MG: 50 INJECTION INTRAMUSCULAR; INTRAVENOUS at 08:55

## 2022-01-15 RX ADMIN — ACETAMINOPHEN 325 MG: 325 TABLET, FILM COATED ORAL at 08:47

## 2022-01-15 RX ADMIN — POTASSIUM CHLORIDE 40 MEQ: 1500 TABLET, EXTENDED RELEASE ORAL at 08:11

## 2022-01-15 RX ADMIN — CHLORHEXIDINE GLUCONATE 15 ML: 1.2 SOLUTION ORAL at 20:38

## 2022-01-15 RX ADMIN — PREDNISONE 100 MG: 50 TABLET ORAL at 08:11

## 2022-01-15 ASSESSMENT — ACTIVITIES OF DAILY LIVING (ADL)
ADLS_ACUITY_SCORE: 3

## 2022-01-15 ASSESSMENT — MIFFLIN-ST. JEOR: SCORE: 1804.88

## 2022-01-15 NOTE — PROGRESS NOTES
"         Assessment and Plan:   Plasma exchange: undergoing daily plasma exchange with MIGUEL ANGEL Bledsoe. Replacing with FFP. Replacement product: 3813ml FFP, Electrolyte replacement: Ca Gluconate 4 grams in NS - total 90 mls    Labs show nl Cr, Na 140, K low at 3.2 and HCO3 30. Alb 2.9 and Ca 8.1.   LDH now 166, nl.   Hgb 8.8, plt now 85. WBC 11.1. Enteric pathogens neg.     Daily pharesis per Heme. On prednisone.                 Interval History:   TTP: MAHA on smear, thrombocytopenia, anemia, elevated LD. Preserved renal function.  PLEX # 3 today. [id number] in process. Heme following.     Dental abscess: on rocephin, flagyl.   ? Enteritis:                    Review of Systems:   Feels improved with more energy. Eager to go home.           Medications:       acetaminophen  325 mg Oral Once    Or     acetaminophen  325 mg Rectal Once     anticoagulant citrate  500-2,000 mL Apheresis Once     calcium gluconate intermittent infusion with additives - doses under 5g  4 g Intravenous Once     cefTRIAXone  2 g Intravenous Q24H     chlorhexidine  15 mL Swish & Spit BID     - MEDICATION INSTRUCTIONS -   Does not apply See Admin Instructions     diphenhydrAMINE  50 mg Intravenous Once     metroNIDAZOLE  500 mg Intravenous Q8H     pantoprazole (PROTONIX) IV  40 mg Intravenous BID     predniSONE  100 mg Oral Daily     sodium chloride (PF)  3 mL Intracatheter Q8H     sucralfate  1 g Oral 4x Daily         Current active medications and PTA medications reviewed, see medication list for details.            Physical Exam:   Vitals were reviewed  Patient Vitals for the past 24 hrs:   BP Temp Temp src Pulse Resp SpO2 Height Weight   01/15/22 0835 110/67 98.2  F (36.8  C) Oral 69 16 -- -- --   01/15/22 0800 -- -- -- -- -- -- 1.651 m (5' 5\") 108.9 kg (240 lb 1.3 oz)   01/15/22 0737 -- 98.1  F (36.7  C) Oral -- -- -- -- --   01/15/22 0600 123/71 -- -- 92 16 98 % -- --   01/15/22 0426 -- 97.9  F (36.6  C) -- -- -- -- -- --   01/15/22 0400 " 115/65 -- -- 61 9 97 % -- --   01/15/22 0200 108/57 -- -- 70 17 98 % -- --   01/15/22 0018 -- 98.1  F (36.7  C) -- -- -- -- -- --   01/15/22 0000 103/67 -- -- 76 15 100 % -- --   22 2200 92/60 -- -- 66 19 98 % -- --   22 2000 106/63 -- -- 78 14 99 % -- --   22 1937 -- 98.1  F (36.7  C) Oral -- -- -- -- --   22 1600 119/75 -- -- 86 23 98 % -- --   22 1515 -- 98.3  F (36.8  C) Oral -- -- -- -- --   22 1400 112/68 -- -- 87 16 99 % -- --   22 1334 114/70 98.2  F (36.8  C) Axillary 68 16 98 % -- --   22 1325 116/76 98.3  F (36.8  C) Axillary 70 14 -- -- --   22 1312 115/78 98.2  F (36.8  C) Axillary 73 16 -- -- --   22 1300 113/76 98.8  F (37.1  C) Axillary 73 17 -- -- --   22 1248 109/78 98.6  F (37  C) Axillary 81 12 -- -- --   22 1236 103/71 98.5  F (36.9  C) Axillary 68 18 -- -- --   22 1225 111/67 98.6  F (37  C) Axillary 72 16 -- -- --   22 1212 113/70 98.1  F (36.7  C) Axillary 70 16 -- -- --   22 1202 116/74 98.7  F (37.1  C) Axillary 67 18 -- -- --   22 1148 113/70 98.8  F (37.1  C) Axillary 71 12 -- -- --   22 1135 110/73 98.3  F (36.8  C) Axillary 72 17 -- -- --   22 1122 114/75 98.7  F (37.1  C) Axillary 71 16 -- -- --   22 1110 110/73 98.4  F (36.9  C) Axillary 66 17 -- -- --   22 1056 115/65 98.8  F (37.1  C) Axillary 58 16 -- -- --   22 1040 113/69 98.7  F (37.1  C) Axillary 64 16 -- -- --   22 0919 -- -- -- -- 16 -- -- --       Temp:  [97.9  F (36.6  C)-98.8  F (37.1  C)] 98.2  F (36.8  C)  Pulse:  [58-92] 69  Resp:  [9-23] 16  BP: ()/(57-78) 110/67  SpO2:  [97 %-100 %] 98 %    Temperatures:  Current - Temp: 98.2  F (36.8  C); Max - Temp  Av.4  F (36.9  C)  Min: 97.9  F (36.6  C)  Max: 98.8  F (37.1  C)  Respiration range: Resp  Avg: 15.9  Min: 9  Max: 23  Pulse range: Pulse  Av  Min: 58  Max: 92  Blood pressure range: Systolic (24hrs), Av , Min:92 ,  Max:123   ; Diastolic (24hrs), Av, Min:57, Max:78    Pulse oximetry range: SpO2  Av.3 %  Min: 97 %  Max: 100 %    I/O last 3 completed shifts:  In: 1059 [P.O.:840; I.V.:219]  Out: 0       Intake/Output Summary (Last 24 hours) at 1/15/2022 0847  Last data filed at 1/15/2022 0816  Gross per 24 hour   Intake 1302 ml   Output 0 ml   Net 1302 ml       Alert and responsive  Skin faint petechiae both LE  Lungs with clear ant BS  Cor RRR nl S1 S2 no M  R CVC [place] with no redness or tenderness       Wt Readings from Last 4 Encounters:   01/15/22 108.9 kg (240 lb 1.3 oz)   22 108.9 kg (240 lb)          Data:          Lab Results   Component Value Date     01/15/2022     2022     2022    Lab Results   Component Value Date    CHLORIDE 106 01/15/2022    CHLORIDE 109 2022    CHLORIDE 110 2022    Lab Results   Component Value Date    BUN 14 01/15/2022    BUN 18 2022    BUN 22 2022      Lab Results   Component Value Date    POTASSIUM 3.2 01/15/2022    POTASSIUM 3.5 2022    POTASSIUM 3.6 2022    Lab Results   Component Value Date    CO2 30 01/15/2022    CO2 28 2022    CO2 23 2022    Lab Results   Component Value Date    CR 0.89 01/15/2022    CR 0.80 2022    CR 0.82 2022        Recent Labs   Lab Test 01/15/22  0607 22  0613 22  0702   WBC 11.1* 10.8 8.9   HGB 8.8* 8.6* 9.0*   HCT 26.0* 24.7* 26.2*   MCV 94 92 93   PLT 85* 39* 12*     Recent Labs   Lab Test 01/15/22  0607 22  0613 22  0702   AST 14 18 33   ALT 26 26 38   ALKPHOS 57 55 77   BILITOTAL 0.4 0.4 1.3       Recent Labs   Lab Test 01/15/22  0607 22  0613 22  0702   MAG 1.8 2.0 2.1     Recent Labs   Lab Test 01/15/22  0607 22  0613 22  0702   PHOS 2.5 2.4* 2.3*     Recent Labs   Lab Test 01/15/22  0607 22  0613 22  0702   JOSELUIS 8.1* 8.0* 8.0*       Lab Results   Component Value Date    JOSELUIS 8.1 (L) 01/15/2022      Lab Results   Component Value Date    WBC 11.1 (H) 01/15/2022    HGB 8.8 (L) 01/15/2022    HCT 26.0 (L) 01/15/2022    MCV 94 01/15/2022    PLT 85 (L) 01/15/2022     Lab Results   Component Value Date     01/15/2022    POTASSIUM 3.2 (L) 01/15/2022    CHLORIDE 106 01/15/2022    CO2 30 01/15/2022    GLC 96 01/15/2022     Lab Results   Component Value Date    BUN 14 01/15/2022    CR 0.89 01/15/2022     Lab Results   Component Value Date    MAG 1.8 01/15/2022     Lab Results   Component Value Date    PHOS 2.5 01/15/2022       Creatinine   Date Value Ref Range Status   01/15/2022 0.89 0.52 - 1.04 mg/dL Final   01/14/2022 0.80 0.52 - 1.04 mg/dL Final   01/13/2022 0.82 0.52 - 1.04 mg/dL Final   01/12/2022 0.84 0.52 - 1.04 mg/dL Final   01/12/2022 1.09 (H) 0.52 - 1.04 mg/dL Final       Attestation:  I have reviewed today's vital signs, notes, medications, labs and imaging.  Seen during pharesis.      Kuldip Charles MD

## 2022-01-15 NOTE — PROGRESS NOTES
Apheresis Treatment    Treatment in room 377 using Spectra Optia [id number] apheresis machine. Consent verified.  Treatment number: 3    Height: 165cm  Weight: 109kg  HCT: 26%  Inlet speed: 43.5ml/min  ACDA ratio: 10:1  Fluid balance: 100%  Access: tunneled RIJ  Post treatment line dwell: Citrate 1.4 ml red/blue limb  Replacement product: 3800ml FFP  Electrolyte replacement: Calcium gluconate 4 grams in NS - total 90mls, piggybacked into return lines, infused over time of treatment.  Premedications: bneadryl 50mg IV, 325 mg PO tylenol    Treatment Notes: unremarkable during tx. Pt noted to have coughing and c/o tightness in throat after rinse back had been completed. Additional dose of 50mg IV benadryl given. MD notified and at bedside, no further orders given. VSS at this time, no fever with 3 separate checks. Handoff given to floor RN about situation.     Treatment completed as ordered, VSS, see EPIC flowsheet for run data.    Next treatment: Sunday 1/16/2021

## 2022-01-15 NOTE — PLAN OF CARE
DATE & TIME: 1/15/2022 [id number]   Cognitive Concerns/ Orientation : A&O x4, neuro intact.   BEHAVIOR & AGGRESSION TOOL COLOR: green   CIWA SCORE: na    ABNL VS/O2: vss, on RA, lungs clear. Wheezy/RICHARDSON after plasma pheresis. PRN benadryl IV given by HD RN. Pt felt better after coming back to room and up walking.   MOBILITY: IND  PAIN MANAGMENT: denied pain.   DIET: low fat, tolerating well.   BOWEL/BLADDER: abd distended, +BS, + flatus. Prune juice given. Pt felt constipated. PRN MOM available if no result from prune juice.   ABNL LAB/BG: plt 85, K+ 3.2, replaced.   DRAIN/DEVICES: PIV on right arm, SL.   TELEMETRY RHYTHM: NSR/SD.   SKIN: pale, petechiae.   TESTS/PROCEDURES: plasma pheresis.   D/C DATE: possible on Monday.   Discharge Barriers: low plt, needs to be >150 K.   OTHER IMPORTANT INFO: continue pheresis till platelets >150K for 2 days per hematology.  following for financial concerns.   MD/RN ROUNDING SIGNED OFF D/E SHIFT:   COMMIT TO SIT DONE AND SIGNED OFF   IS THE PATIENT ON REMDESIVIR? DATE OF LAST SCHEDULED DOSE

## 2022-01-15 NOTE — PROGRESS NOTES
"[facility]    Hospitalist Progress Note    Assessment & Plan   Date of Admission:  1/12/2022     Assessment & Plan  Ivon Gamble is a 31 year old female with a history of asthma but not on any prescription medications who presents with about 1 week of epigastric and upper abdominal pain.  She was found to be severely thrombocytopenic and with concern for TTP.  Hematology/oncology, nephrology, and IR were consulted, and recommended patient transferred to [facility] for urgent central line placement and initiation of pheresis.     #Severe thrombocytopenia, concern for TTP:   #Anemia  -Pt describes bruising and \"red spots\" over last 2-3 days. Platelet count found to be very low at 11.  Remained at 11 on subsequent draws.  CBC with normal WBC.  Hemoglobin low at 9.6.  Reticulocyte count is elevated. T bilirubin mildly elevated at 1.4.  Direct bilirubin 0.3.  LDH elevated at 608 and haptoglobin undetectable.  Ferritin elevated at 590.  Lactic acid within normal limits. Peripheral smear was read by  hematology and concerning for TTP. Hematology recommended urgent transfer to [facility] for pheresis. IR and nephrology [facility] contacted, pt transferred or Ir line placement and pharesis.  -started on steroids per hematology (prednisone 100mg daily)  -1/13:  Nl neuro status, no bleeding  Urgent catheter placement by IR 1/13. HA, negative CT head. Resolved HA. Viral panel negative.   -plan for pharesis daily for about at least 7 days then reeval frequency  - hematology following  -Please check daily CBC, CMP, LDH.  -Please check daily PT/PTT/INR and fibrinogen following pheresis from peripheral source (not catheter). Transfuse cryoprecipitate for fibrinogen <100.   -Transfuse PRBC for Hb<7.  -Do NOT transfuse platelets.   -Transfuse FFP if evidence of bleed.  --Frequent monitoring of vital signs, IMC for now.   - PPI and carafate  -- Pain medications available as needed  -treat possible cholecystitis.   -tx " dental abscess  -prn iv dilaudid    #Abdominal pain, possible cholecystitis vs. Enteritis:   -CT abdomen pelvis shows mild thickening of duodenum and jejunum concerning for inflammatory enteritis.  There was slight stranding to gallbladder.   Abdominal ultrasound with mild gallbladder wall thickening with negative Rm sign.  HIDA scan was equivocal.   --General surgery was consulted at [facility]. She would not be surgical candidate at this time.  --She was started on IV ceftriaxone 1 g every 24 hour and flagyl 500 mg every 8 hours, continue for now.  -stool panel negative. Neg shiga species, neg Cdiff  -abd pain common TTP.   - [facility] following  -diet per GI.   -PPI  -Carafate.     Dental abscess  Tenderness on exam. CT facial bones 1/13:  Periapical abscess of the third left mandibular molar with associated developing subperiosteal abscess and left masseter muscle  myositis and overlying left facial cellulitis, as described. Seen by oral surgeon 1/13. Agree with rocephin and flagyl  -follow exam closely  -cont Rocephin and Flagyl, surgery agree with coverage for dental abscesses  -oral surgery will be following along peripherally for now see note 1/13.   -per surgery,  intervention with I&D of abscess and extraction of tooth #17 indicated once medically stabilized, may consider inpatient or outpatient management pending progress.  Platelets >50K required for any surgical intervention from OMS perspective.   - Peridex rinse BID (ordered)  - Steroids OK from OMS perspective  - per oral surgery, If worsening of abscess despite medical management, may require airway stabilization (I.e. intubation); however, no acute airway concern as inferior mandibular border palpable, mandibular opening is within normal limits (no trismus), and patient tolerating oral secretions.     Diet:  Low fat. ADAT per GI  DVT Prophylaxis: Contraindicated due to low platelets.  Encourage ambulation.  Siddiqui Catheter: Not  present  Central Lines: None  Code Status: Full Code     Clinically Significant Risk Factors Present on Admission          # Thrombocytopenia: Plts = 11 10e3/uL (Ref range: 150 - 450 10e3/uL) on admission, will monitor for bleeding   # Obesity: last There is no height or weight on file to calculate BMI.         Disposition Plan Inpatient status, anticipate at least 2+ days of hospitalization.     Discussed care plan with patient    Jan Arora MD  Text Page  (7am to 6pm)  Interval History   - I assumed care this AM  - Tolerated pharesis this AM, had cough during it  - Denies CP, blurry vision, mouth pain, no cough at present    -Data reviewed today: I reviewed all new labs and imaging results over the last 24 hours. I personally reviewed labs and imaging since admission.     Physical Exam   Temp: 98.3  F (36.8  C) Temp src: Oral BP: 103/68 Pulse: 81   Resp: 16 SpO2: 100 % O2 Device: None (Room air)    Vitals:    01/14/22 0810 01/15/22 0800   Weight: 108.9 kg (240 lb 1.3 oz) 108.9 kg (240 lb 1.3 oz)     Vital Signs with Ranges  Temp:  [97.9  F (36.6  C)-98.8  F (37.1  C)] 98.3  F (36.8  C)  Pulse:  [61-92] 81  Resp:  [9-23] 16  BP: ()/(57-78) 103/68  SpO2:  [97 %-100 %] 100 %  I/O last 3 completed shifts:  In: 1059 [P.O.:840; I.V.:219]  Out: 0     GENERAL: alert and in no distress.  EYES: conjunctivae/corneas clear. EOMs grossly intact  HENT: Facies symmetric.  RESP: CTAB, no w/r/r.  CV: RRR, no m/r/g.  GI: NT, ND, without rebound tenderness or guarding  MSK: No edema. Moves all four extremities freely.  SKIN: Petechiae present on BLE  NEURO: Alert. Oriented.     Medications       cefTRIAXone  2 g Intravenous Q24H     chlorhexidine  15 mL Swish & Spit BID     - MEDICATION INSTRUCTIONS -   Does not apply See Admin Instructions     melatonin  5 mg Oral At Bedtime     metroNIDAZOLE  500 mg Intravenous Q8H     pantoprazole (PROTONIX) IV  40 mg Intravenous BID     predniSONE  100 mg Oral Daily     sodium chloride  (PF)  3 mL Intracatheter Q8H     sucralfate  1 g Oral 4x Daily       Data   Recent Labs   Lab 01/15/22  0607 01/14/22  0613 01/14/22  0123 01/13/22  0702 01/12/22  1801 01/12/22  1413 01/12/22  0140 01/12/22  0107   WBC 11.1* 10.8  --  8.9   < > 6.9   < > 8.8   HGB 8.8* 8.6*  --  9.0*   < > 9.4*   < > 10.8*   MCV 94 92  --  93   < > 95   < > 96   PLT 85* 39*  --  12*   < > 11*   < > 11*   INR  --  1.10 1.06  --   --  1.08  1.08  --  1.02    141  --  138  --  139  --  137   POTASSIUM 3.2* 3.5  --  3.6  --  3.5  --  3.5   CHLORIDE 106 109  --  110*  --  109  --  106   CO2 30 28  --  23  --  24  --  26   BUN 14 18  --  22  --  18  --  29   CR 0.89 0.80  --  0.82  --  0.84  --  1.09*   ANIONGAP 4 4  --  5  --  6  --  5   JOSELUIS 8.1* 8.0*  --  8.0*  --  8.4*  --  8.8   GLC 96 99  --  125*  --  80  --  117*   ALBUMIN 2.9* 2.9*  --  3.1*  --  3.0*  --  3.3*   PROTTOTAL 5.6* 5.5*  --  6.5*  --  6.4*  --  6.9   BILITOTAL 0.4 0.4  --  1.3  --  1.4*  --  1.4*   ALKPHOS 57 55  --  77  --  76  --  80   ALT 26 26  --  38  --  41  --  42   AST 14 18  --  33  --  36  --  37   LIPASE  --   --   --   --   --   --   --  165    < > = values in this interval not displayed.       Imaging:   No results found for this or any previous visit (from the past 24 hour(s)).

## 2022-01-15 NOTE — PROGRESS NOTES
"GASTROENTEROLOGY PROGRESS NOTE     SUBJECTIVE:  She is doing well, denies abdominal pain.  Tolerated diet this morning, feels bloated now.  No BM today, passing flatus.    GI ROS: Denies nausea, vomiting, abdominal pain, diarrhea, melena, or rectal bleeding.     OBJECTIVE:  /68   Pulse 81   Temp 98.3  F (36.8  C) (Oral)   Resp 16   Ht 1.651 m (5' 5\")   Wt 108.9 kg (240 lb 1.3 oz)   LMP 2021   SpO2 100%   BMI 39.95 kg/m    Temp (24hrs), Av.2  F (36.8  C), Min:97.9  F (36.6  C), Max:98.6  F (37  C)    Patient Vitals for the past 72 hrs:   Weight   01/15/22 0800 108.9 kg (240 lb 1.3 oz)   22 0810 108.9 kg (240 lb 1.3 oz)       Intake/Output Summary (Last 24 hours) at 1/15/2022 1421  Last data filed at 1/15/2022 1151  Gross per 24 hour   Intake 1067 ml   Output 0 ml   Net 1067 ml        General Appearance: alert, oriented x3, no acute distress.  Eyes: PERRL, sclera anicteric.  GI: Soft, NABS, NT/ND.      Labs:     Recent Labs   Lab Test 01/15/22  0607 22  0613 22  0123 22  0702 22  1801 22  1413   WBC 11.1* 10.8  --  8.9   < > 6.9   HGB 8.8* 8.6*  --  9.0*   < > 9.4*   MCV 94 92  --  93   < > 95   PLT 85* 39*  --  12*   < > 11*   INR  --  1.10 1.06  --   --  1.08  1.08    < > = values in this interval not displayed.     Recent Labs   Lab Test 01/15/22  0607 22  0613 22  0702   POTASSIUM 3.2* 3.5 3.6   CHLORIDE 106 109 110*   CO2 30 28 23   BUN 14 18 22   ANIONGAP 4 4 5     Recent Labs   Lab Test 01/15/22  0607 22  0613 22  0702 22  1413 22  0216 22  0107   ALBUMIN 2.9* 2.9* 3.1*   < >  --  3.3*   BILITOTAL 0.4 0.4 1.3   < >  --  1.4*   ALT 26 26 38   < >  --  42   AST 14 18 33   < >  --  37   PROTEIN  --   --   --   --  200 *  --    LIPASE  --   --   --   --   --  165    < > = values in this interval not displayed.           Assessment and Plan: 31 year old female admitted with TTP, abdominal pain, constipation.  " Abdominal pain may be due to TTP, enteritis, or cholecystitis.  Improved today, tolerating low fat diet.    Continue PPI, carafate, low fat diet.  Consider suppository if constipation continues.    Kyle Negron MD

## 2022-01-15 NOTE — PROGRESS NOTES
"IV team paged: \"Pt has R CVC, is c/o discomfort/tightness in neck. Can you come look at it and adjust it to feel less tight?\"    Spoke with Dr. Perales and ok for IV team to look/adjust  R CVC to make less tight feeling for pt.   "

## 2022-01-15 NOTE — PROGRESS NOTES
[facility]    Hematology/Oncology Follow-up Note      Today's Date: 01/15/22  Date of Admission:  1/12/2022  Reason for Consult: TTP  Primary Hematologist: Ashley Nguyen DO      ASSESSMENT/PLAN:  Ivon Gamble is a 31 year old female with elevated LDH, thrombocytopenia and anemia Platelets increased to  85 K from 39K     1 ) TTP: continue pheresis. Despite not clear randomized data for steroids, would recommend continuing prednisone 1 mg /kg for now, await QUIROZ TS13 testing. Tolerating steroids fine . Will continue steroids at the same dose for now no taper till stable platelets for 2 weeks .   LDH normalized. H/h stable. coags normal. Check daily LDH and plts/hgb .. continue pheresis till platelets >150K for 2 days.     2) dental abcess: per oral maxofacial surgery . Her platelets are improved.     3) on PPI for GI protection and since she will be long term taper with steroids , will add bactrim MWF      4 ) SW consult for issues with no insurance and medical care, patient very stressed and will not follow up at [facility] . SUSANNA is working with her in getting MA  , noted their note,  Told her likely they will stop over Monday    5. Insomnia: chronic. Restart melatonin 5 mg bid.     Addy Austin MD          INTERIM HISTORY:  Platelets improving saw her in dialysis suite. , she feels well. Ate yesterday. States SW has still not stopped over, and she is stressed about that. She uses melatonin at home and is wondering if she can get that to help her sleep     MEDICATIONS:  Current Facility-Administered Medications   Medication     0.9% sodium chloride BOLUS     acetaminophen (TYLENOL) tablet 325 mg    Or     acetaminophen (TYLENOL) Suppository 325 mg     acetaminophen (TYLENOL) tablet 650 mg    Or     acetaminophen (TYLENOL) Suppository 650 mg     Anticoagulant Citrate Dextrose Formula A      anticoagulant citrate flush 3 mL     calcium gluconate 10 % injection 1 g     calcium gluconate 4 g in  "sodium chloride 0.9 % 50 mL     cefTRIAXone (ROCEPHIN) 2 g vial to attach to  ml bag for ADULTS or NS 50 ml bag for PEDS     chlorhexidine (PERIDEX) 0.12 % solution 15 mL     Contraindications to both pharmacological and mechanical prophylaxis (must document contraindications for both in this order)     diphenhydrAMINE (BENADRYL) injection 50 mg     diphenhydrAMINE (BENADRYL) injection 50 mg     heparin Lock (1000 units/mL High concentration) 1,300 Units     HYDROmorphone (DILAUDID) injection 0.2 mg     lidocaine (LMX4) cream     lidocaine (viscous) (XYLOCAINE) 2 % 15 mL, alum & mag hydroxide-simethicone (MAALOX) 15 mL GI Cocktail     lidocaine 1 % 0.1-1 mL     magnesium hydroxide (MILK OF MAGNESIA) suspension 30 mL     melatonin tablet 3 mg     metroNIDAZOLE (FLAGYL) infusion 500 mg     naloxone (NARCAN) injection 0.2 mg    Or     naloxone (NARCAN) injection 0.4 mg    Or     naloxone (NARCAN) injection 0.2 mg    Or     naloxone (NARCAN) injection 0.4 mg     nitroGLYcerin (NITROSTAT) sublingual tablet 0.4 mg     ondansetron (ZOFRAN-ODT) ODT tab 4 mg    Or     ondansetron (ZOFRAN) injection 4 mg     pantoprazole (PROTONIX) IV push injection 40 mg     potassium chloride ER (KLOR-CON M) CR tablet 40 mEq     predniSONE (DELTASONE) tablet 100 mg     prochlorperazine (COMPAZINE) injection 10 mg    Or     prochlorperazine (COMPAZINE) tablet 10 mg    Or     prochlorperazine (COMPAZINE) suppository 25 mg     sodium chloride (PF) 0.9% PF flush 3 mL     sodium chloride (PF) 0.9% PF flush 3 mL     sucralfate (CARAFATE) suspension 1 g           ALLERGIES:  No Known Allergies      PHYSICAL EXAM:  Vital signs:  Temp: 98.1  F (36.7  C) Temp src: Oral BP: 123/71 Pulse: 92   Resp: 16 SpO2: 98 % O2 Device: None (Room air)   Height: 165.1 cm (5' 5\") Weight: 108.9 kg (240 lb 1.3 oz)  Estimated body mass index is 39.95 kg/m  as calculated from the following:    Height as of this encounter: 1.651 m (5' 5\").    Weight as of this " encounter: 108.9 kg (240 lb 1.3 oz).    ECO morbidly obese   GENERAL/CONSTITUTIONAL: No acute distress.  EYES: Pupils are equal, round, and react to light and accommodation. Extraocular movements intact.  No scleral icterus.  ENT/MOUTH: Neck supple. Oropharynx clear, no mucositis.  LYMPH: No anterior cervical, posterior cervical, supraclavicular, axillary or inguinal adenopathy.   RESPIRATORY: Clear to auscultation bilaterally. No crackles or wheezing.   CARDIOVASCULAR: Regular rate and rhythm without murmurs, gallops, or rubs.  GASTROINTESTINAL: No hepatosplenomegaly, masses, or tenderness. The patient has normal bowel sounds. No guarding.  No distention.  MUSCULOSKELETAL: Warm and well-perfused, no cyanosis, clubbing, or edema.  NEUROLOGIC: Cranial nerves II-XII are intact. Alert, oriented, answers questions appropriately.  INTEGUMENTARY: No rashes or jaundice.        LABS:  CBC RESULTS:   Recent Labs   Lab Test 22  0613   WBC 10.8   RBC 2.69*   HGB 8.6*   HCT 24.7*   MCV 92   MCH 32.0   MCHC 34.8   RDW 14.6   PLT 39*       Recent Labs   Lab Test 22  0613 22  0702    138   POTASSIUM 3.5 3.6   CHLORIDE 109 110*   CO2 28 23   ANIONGAP 4 5   GLC 99 125*   BUN 18 22   CR 0.80 0.82   JOSELUIS 8.0* 8.0*         PATHOLOGY:    na  IMAGING:      na

## 2022-01-16 LAB
ALBUMIN SERPL-MCNC: 2.9 G/DL (ref 3.4–5)
ALP SERPL-CCNC: 47 U/L (ref 40–150)
ALT SERPL W P-5'-P-CCNC: 39 U/L (ref 0–50)
ANION GAP SERPL CALCULATED.3IONS-SCNC: 2 MMOL/L (ref 3–14)
APTT PPP: 26 SECONDS (ref 22–38)
AST SERPL W P-5'-P-CCNC: 27 U/L (ref 0–45)
BASOPHILS # BLD AUTO: 0 10E3/UL (ref 0–0.2)
BASOPHILS NFR BLD AUTO: 0 %
BILIRUB SERPL-MCNC: 0.3 MG/DL (ref 0.2–1.3)
BUN SERPL-MCNC: 15 MG/DL (ref 7–30)
CALCIUM SERPL-MCNC: 8.1 MG/DL (ref 8.5–10.1)
CHLORIDE BLD-SCNC: 106 MMOL/L (ref 94–109)
CO2 SERPL-SCNC: 30 MMOL/L (ref 20–32)
CREAT SERPL-MCNC: 0.88 MG/DL (ref 0.52–1.04)
EOSINOPHIL # BLD AUTO: 0 10E3/UL (ref 0–0.7)
EOSINOPHIL NFR BLD AUTO: 0 %
ERYTHROCYTE [DISTWIDTH] IN BLOOD BY AUTOMATED COUNT: 15.2 % (ref 10–15)
GFR SERPL CREATININE-BSD FRML MDRD: 90 ML/MIN/1.73M2
GLUCOSE BLD-MCNC: 98 MG/DL (ref 70–99)
HCT VFR BLD AUTO: 25.1 % (ref 35–47)
HGB BLD-MCNC: 8.4 G/DL (ref 11.7–15.7)
IMM GRANULOCYTES # BLD: 0.2 10E3/UL
IMM GRANULOCYTES NFR BLD: 2 %
INR PPP: 1.1 (ref 0.85–1.15)
LDH SERPL L TO P-CCNC: 156 U/L (ref 81–234)
LYMPHOCYTES # BLD AUTO: 2.2 10E3/UL (ref 0.8–5.3)
LYMPHOCYTES NFR BLD AUTO: 22 %
MAGNESIUM SERPL-MCNC: 2 MG/DL (ref 1.6–2.3)
MCH RBC QN AUTO: 31.9 PG (ref 26.5–33)
MCHC RBC AUTO-ENTMCNC: 33.5 G/DL (ref 31.5–36.5)
MCV RBC AUTO: 95 FL (ref 78–100)
MONOCYTES # BLD AUTO: 0.7 10E3/UL (ref 0–1.3)
MONOCYTES NFR BLD AUTO: 7 %
NEUTROPHILS # BLD AUTO: 7.2 10E3/UL (ref 1.6–8.3)
NEUTROPHILS NFR BLD AUTO: 69 %
NRBC # BLD AUTO: 0 10E3/UL
NRBC BLD AUTO-RTO: 0 /100
PHOSPHATE SERPL-MCNC: 2.9 MG/DL (ref 2.5–4.5)
PLATELET # BLD AUTO: 131 10E3/UL (ref 150–450)
POTASSIUM BLD-SCNC: 3.5 MMOL/L (ref 3.4–5.3)
PROT SERPL-MCNC: 5.4 G/DL (ref 6.8–8.8)
RBC # BLD AUTO: 2.63 10E6/UL (ref 3.8–5.2)
SODIUM SERPL-SCNC: 138 MMOL/L (ref 133–144)
WBC # BLD AUTO: 10.4 10E3/UL (ref 4–11)

## 2022-01-16 PROCEDURE — 85610 PROTHROMBIN TIME: CPT | Performed by: INTERNAL MEDICINE

## 2022-01-16 PROCEDURE — 80053 COMPREHEN METABOLIC PANEL: CPT | Performed by: INTERNAL MEDICINE

## 2022-01-16 PROCEDURE — 250N000011 HC RX IP 250 OP 636: Performed by: INTERNAL MEDICINE

## 2022-01-16 PROCEDURE — P9059 PLASMA, FRZ BETWEEN 8-24HOUR: HCPCS | Performed by: HOSPITALIST

## 2022-01-16 PROCEDURE — 36514 APHERESIS PLASMA: CPT

## 2022-01-16 PROCEDURE — 85730 THROMBOPLASTIN TIME PARTIAL: CPT | Performed by: INTERNAL MEDICINE

## 2022-01-16 PROCEDURE — 99233 SBSQ HOSP IP/OBS HIGH 50: CPT | Performed by: INTERNAL MEDICINE

## 2022-01-16 PROCEDURE — 250N000013 HC RX MED GY IP 250 OP 250 PS 637: Performed by: PHYSICIAN ASSISTANT

## 2022-01-16 PROCEDURE — 84100 ASSAY OF PHOSPHORUS: CPT | Performed by: INTERNAL MEDICINE

## 2022-01-16 PROCEDURE — 83615 LACTATE (LD) (LDH) ENZYME: CPT | Performed by: INTERNAL MEDICINE

## 2022-01-16 PROCEDURE — 120N000013 HC R&B IMCU

## 2022-01-16 PROCEDURE — 36415 COLL VENOUS BLD VENIPUNCTURE: CPT | Performed by: INTERNAL MEDICINE

## 2022-01-16 PROCEDURE — 250N000013 HC RX MED GY IP 250 OP 250 PS 637: Performed by: INTERNAL MEDICINE

## 2022-01-16 PROCEDURE — 83735 ASSAY OF MAGNESIUM: CPT | Performed by: INTERNAL MEDICINE

## 2022-01-16 PROCEDURE — C9113 INJ PANTOPRAZOLE SODIUM, VIA: HCPCS | Performed by: INTERNAL MEDICINE

## 2022-01-16 PROCEDURE — 36514 APHERESIS PLASMA: CPT | Performed by: INTERNAL MEDICINE

## 2022-01-16 PROCEDURE — 99233 SBSQ HOSP IP/OBS HIGH 50: CPT | Performed by: HOSPITALIST

## 2022-01-16 PROCEDURE — 258N000003 HC RX IP 258 OP 636: Performed by: INTERNAL MEDICINE

## 2022-01-16 PROCEDURE — 250N000013 HC RX MED GY IP 250 OP 250 PS 637: Performed by: HOSPITALIST

## 2022-01-16 PROCEDURE — 85004 AUTOMATED DIFF WBC COUNT: CPT | Performed by: INTERNAL MEDICINE

## 2022-01-16 PROCEDURE — 250N000012 HC RX MED GY IP 250 OP 636 PS 637: Performed by: INTERNAL MEDICINE

## 2022-01-16 PROCEDURE — 250N000011 HC RX IP 250 OP 636: Performed by: PHYSICIAN ASSISTANT

## 2022-01-16 PROCEDURE — 250N000013 HC RX MED GY IP 250 OP 250 PS 637: Performed by: DENTIST

## 2022-01-16 RX ORDER — BISACODYL 10 MG
10 SUPPOSITORY, RECTAL RECTAL DAILY PRN
Status: DISCONTINUED | OUTPATIENT
Start: 2022-01-16 | End: 2022-01-18 | Stop reason: HOSPADM

## 2022-01-16 RX ORDER — CALCIUM GLUCONATE 94 MG/ML
1 INJECTION, SOLUTION INTRAVENOUS
Status: DISCONTINUED | OUTPATIENT
Start: 2022-01-16 | End: 2022-01-18 | Stop reason: HOSPADM

## 2022-01-16 RX ORDER — DIPHENHYDRAMINE HYDROCHLORIDE 50 MG/ML
50 INJECTION INTRAMUSCULAR; INTRAVENOUS
Status: DISCONTINUED | OUTPATIENT
Start: 2022-01-16 | End: 2022-01-18 | Stop reason: HOSPADM

## 2022-01-16 RX ORDER — ACETAMINOPHEN 325 MG/1
325 TABLET ORAL ONCE
Status: COMPLETED | OUTPATIENT
Start: 2022-01-16 | End: 2022-01-16

## 2022-01-16 RX ORDER — DIPHENHYDRAMINE HYDROCHLORIDE 50 MG/ML
50 INJECTION INTRAMUSCULAR; INTRAVENOUS ONCE
Status: COMPLETED | OUTPATIENT
Start: 2022-01-16 | End: 2022-01-16

## 2022-01-16 RX ORDER — POLYETHYLENE GLYCOL 3350 17 G/17G
17 POWDER, FOR SOLUTION ORAL 2 TIMES DAILY PRN
Status: DISCONTINUED | OUTPATIENT
Start: 2022-01-16 | End: 2022-01-18 | Stop reason: HOSPADM

## 2022-01-16 RX ORDER — AMOXICILLIN 250 MG
2 CAPSULE ORAL 2 TIMES DAILY
Status: DISCONTINUED | OUTPATIENT
Start: 2022-01-16 | End: 2022-01-18 | Stop reason: HOSPADM

## 2022-01-16 RX ADMIN — POLYETHYLENE GLYCOL 3350 17 G: 17 POWDER, FOR SOLUTION ORAL at 17:37

## 2022-01-16 RX ADMIN — MAGNESIUM HYDROXIDE 30 ML: 400 SUSPENSION ORAL at 11:48

## 2022-01-16 RX ADMIN — CHLORHEXIDINE GLUCONATE 15 ML: 1.2 SOLUTION ORAL at 21:53

## 2022-01-16 RX ADMIN — HEPARIN SODIUM 1300 UNITS: 1000 INJECTION INTRAVENOUS; SUBCUTANEOUS at 11:14

## 2022-01-16 RX ADMIN — CEFTRIAXONE SODIUM 2 G: 2 INJECTION, POWDER, FOR SOLUTION INTRAMUSCULAR; INTRAVENOUS at 16:53

## 2022-01-16 RX ADMIN — MELATONIN TAB 3 MG 5 MG: 3 TAB at 21:53

## 2022-01-16 RX ADMIN — SUCRALFATE ORAL 1 G: 1 SUSPENSION ORAL at 17:33

## 2022-01-16 RX ADMIN — PANTOPRAZOLE SODIUM 40 MG: 40 INJECTION, POWDER, FOR SOLUTION INTRAVENOUS at 21:53

## 2022-01-16 RX ADMIN — METRONIDAZOLE 500 MG: 500 INJECTION, SOLUTION INTRAVENOUS at 21:53

## 2022-01-16 RX ADMIN — DIPHENHYDRAMINE HYDROCHLORIDE 50 MG: 50 INJECTION, SOLUTION INTRAMUSCULAR; INTRAVENOUS at 08:31

## 2022-01-16 RX ADMIN — ACETAMINOPHEN 650 MG: 325 TABLET, FILM COATED ORAL at 08:26

## 2022-01-16 RX ADMIN — CALCIUM GLUCONATE 4 G: 98 INJECTION, SOLUTION INTRAVENOUS at 08:32

## 2022-01-16 RX ADMIN — SENNOSIDES AND DOCUSATE SODIUM 2 TABLET: 50; 8.6 TABLET ORAL at 11:48

## 2022-01-16 RX ADMIN — METRONIDAZOLE 500 MG: 500 INJECTION, SOLUTION INTRAVENOUS at 13:39

## 2022-01-16 RX ADMIN — METRONIDAZOLE 500 MG: 500 INJECTION, SOLUTION INTRAVENOUS at 04:40

## 2022-01-16 RX ADMIN — PREDNISONE 100 MG: 50 TABLET ORAL at 08:07

## 2022-01-16 RX ADMIN — PANTOPRAZOLE SODIUM 40 MG: 40 INJECTION, POWDER, FOR SOLUTION INTRAVENOUS at 08:06

## 2022-01-16 RX ADMIN — ACETAMINOPHEN 325 MG: 325 TABLET, FILM COATED ORAL at 08:07

## 2022-01-16 RX ADMIN — CHLORHEXIDINE GLUCONATE 15 ML: 1.2 SOLUTION ORAL at 08:07

## 2022-01-16 RX ADMIN — SUCRALFATE ORAL 1 G: 1 SUSPENSION ORAL at 21:53

## 2022-01-16 RX ADMIN — SUCRALFATE ORAL 1 G: 1 SUSPENSION ORAL at 08:07

## 2022-01-16 RX ADMIN — SUCRALFATE ORAL 1 G: 1 SUSPENSION ORAL at 13:39

## 2022-01-16 RX ADMIN — BISACODYL 10 MG: 10 SUPPOSITORY RECTAL at 19:45

## 2022-01-16 ASSESSMENT — MIFFLIN-ST. JEOR
SCORE: 1895.26
SCORE: 1896.88

## 2022-01-16 ASSESSMENT — ACTIVITIES OF DAILY LIVING (ADL)
ADLS_ACUITY_SCORE: 3

## 2022-01-16 NOTE — PROGRESS NOTES
"[facility]  Hospitalist Progress Note   01/16/2022          Assessment and Plan:       Ivon Gamble is a 31 year old female with a history of asthma admitted on 1/12/2020 with abdominal pain.     #Severe thrombocytopenia, TTP: on pheresis  #Anemia  -Pt describes bruising and \"red spots\" over 2-3 days PTA.   Platelet count 11, hemoglobin 9.6.  Bilirubin 1.4.  , haptoglobin undetectable.  Reticulocyte count elevated.  Ferritin 590.  Lactic acid normal limits.  Peripheral smear concerning for TTP.  --Hematology recommended urgent transfer to [facility] for pheresis.  Nephrology, hematology following.  Urgent catheter placement by IR 1/13, continue pheresis till platelets >150K for 2 days.   Appreciate input.   -Continue steroids 1 mg/kg prednisone per hematology.  -Follow CBC, CMP, LDH.  -Transfuse PRBC for Hb<7. Transfuse cryoprecipitate for fibrinogen <100. Do NOT transfuse platelets. Transfuse FFP if evidence of bleed    #Abdominal pain, possible cholecystitis vs. Enteritis: Improved.  -CT abdomen pelvis showed mild thickening of duodenum and jejunum concerning for inflammatory enteritis.  There was slight stranding to gallbladder.     -Abdominal ultrasound with mild gallbladder wall thickening with negative Rm sign.   - HIDA scan was equivocal.   -Stool negative for enteric pathogens, C. difficile, norovirus  --General surgery was consulted at [facility], impression not be surgical candidate at this time.  -Continue IV ceftriaxone, IV Flagyl.  -[facility] 1/13 recommended oral PPI, Carafate, GI cocktail as needed.    Dental abscess  CT facial bones 1/13:  Periapical abscess of the third left mandibular molar with associated developing subperiosteal abscess and left masseter muscle myositis and overlying left facial cellulitis, as described.  Followed by oral maxillofacial surgery 1/13, intervention with I&D of abscess and extraction of tooth #17 indicated " once medically stabilized, may consider inpatient or outpatient management pending progress.  Platelets >50K required for any surgical intervention from OMS perspective.   -Continue IV Rocephin, IV Flagyl. (1/12)  - Peridex rinse BID (ordered)  - Steroids OK from OMS perspective  -- Trend WBC count, fever curve.  Follow CRP.    Constipation.  Last bowel movement 4 days prior to admission.  X-ray 1/14 with Small to moderate amount of stool.   Scheduled, as needed bowel meds ordered    Obesity with a BMI of 43.34.  Consider lifestyle modification with diet and exercise.  Consider sleep studies as outpatient.    Headache improved.  Complaint of headache, CT head 1/13 negative.   Monitor.    Chronic insomnia.  Continue melatonin 5 mg twice daily.    Social issues.  Patient currently with no insurance, no plans to follow-up at [facility] at this time.  Appreciate social work assistance with transition.  Primary care as outpatient, needs age-appropriate health maintenance.     Diet: Low fat. ADAT per GI  DVT Prophylaxis: Contraindicated due to low platelets.  Encourage ambulation.  Code Status: Full Code   Disposition: Expected discharge in 1-2 days pending clinical improvement.  Discussed with patient, bedside RN  Total time greater than 35 minutes.  More than 65% of time spent in direct patient care, care coordination, patient counseling, and formalizing plan of care.     Domonique Watts MD        Interval History:      .  Denies any chest pain or palpitations at this time.  No headache or dizziness.  No complaints of abdominal pain.  Tolerating oral diet.  Constipated for the last 4 days.  Undergoing pheresis daily   No bleeding at this time.    Afebrile.        Physical Exam:        Physical Exam   Temp:  [97.2  F (36.2  C)-98.8  F (37.1  C)] 97.2  F (36.2  C)  Pulse:  [62-86] 70  Resp:  [1-18] 1  BP: ()/(49-77) 115/70  SpO2:  [94 %-99 %] 98 %    Intake/Output Summary (Last 24 hours) at 1/16/2022 1003  Last  data filed at 1/15/2022 1730  Gross per 24 hour   Intake 444 ml   Output 0 ml   Net 444 ml       Admission Weight: 108.9 kg (240 lb 1.3 oz)  Current Weight: 118.1 kg (260 lb 5.8 oz)    PHYSICAL EXAM  GENERAL: Patient is in no distress. Alert and oriented.  HEENT no mandibular swelling noted  LUNGS: Respirations unlabored  ABDOMEN: Soft, no abdominal tenderness, bowel sounds heard   NEURO: Moving all extremities.  EXTREMITIES: No pedal edema.   SKIN: Warm, dry. No rash   PSYCHIATRY Cooperative       Medications:          cefTRIAXone  2 g Intravenous Q24H     chlorhexidine  15 mL Swish & Spit BID     - MEDICATION INSTRUCTIONS -   Does not apply See Admin Instructions     melatonin  5 mg Oral At Bedtime     metroNIDAZOLE  500 mg Intravenous Q8H     pantoprazole (PROTONIX) IV  40 mg Intravenous BID     predniSONE  100 mg Oral Daily     sodium chloride (PF)  3 mL Intracatheter Q8H     sucralfate  1 g Oral 4x Daily     sodium chloride 0.9%, sodium chloride 0.9%, acetaminophen **OR** acetaminophen, anticoagulant citrate, anticoagulant citrate, calcium gluconate, calcium gluconate, diphenhydrAMINE, heparin Lock (1000 units/mL High concentration), lidocaine 4%, lidocaine viscous 2% 15 mL and maalox/mylanta w/ simethicone 15 mL (GI COCKTAIL), lidocaine (buffered or not buffered), magnesium hydroxide, naloxone **OR** naloxone **OR** naloxone **OR** naloxone, nitroGLYcerin, ondansetron **OR** ondansetron, prochlorperazine **OR** prochlorperazine **OR** prochlorperazine, sodium chloride (PF), traMADol         Data:      All new lab and imaging data was reviewed.

## 2022-01-16 NOTE — DISCHARGE INSTRUCTIONS
Health Care:   [facility] [phone]  [email or web address]  Free non-emergency health care for uninsured, appt. required    [facility] [phone]  [email or web address]    Pharmacy   Some pharmacies participate in drug discount programs and  may offer generic drugs for a low fee. Contact your pharmacy  to find out.      Dental Services:   [facility] [phone]  www.[email or web address] (search  Dental Care )  Fees based on family size and income    [facility] [phone]  [email or web address]/    [facility] [phone]  [facility] [phone]  [email or web address]    [facility] [facility] [phone]   [email or web address]  Search option for Find-a-Dentist by area    [facility] [phone]  [email or web address]    [facility] [phone]  www.[facility].[employer]    [facility] [phone]  [email or web address]

## 2022-01-16 NOTE — PROGRESS NOTES
A&O x4. VSS. Mild abdominal discomfort, medications declined. IV flagyl administered as ordered. LS: clear. BS: audible, + flatus. +void, -bm. Up ind.

## 2022-01-16 NOTE — CONSULTS
"Care Management Initial Consult    General Information  Assessment completed with: Patient, Ivon  Type of CM/SW Visit: Initial Assessment    Primary Care Provider verified and updated as needed: Yes (no PCP, needs insurance first or sliding scale clinics )   Readmission within the last 30 days: no previous admission in last 30 days      Reason for Consult: insurance concerns,financial concerns  Advance Care Planning:    no ACP documents on file in EPIC   General Information Comments: [employer] information sent to pt's phone and email; pt able to successfully create a [employer]     Communication Assessment  Patient's communication style: spoken language (English or Bilingual)    Hearing Difficulty or Deaf: no   Wear Glasses or Blind: no    Cognitive  Cognitive/Neuro/Behavioral: WDL                      Living Environment:   People in home: alone     Current living Arrangements: apartment      Able to return to prior arrangements: yes  Living Arrangement Comments: Sister (who pt moved here to help) was recently diagnosed with thrombocytopenia.     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    Functional Status:  Prior to admission patient needed assistance: none     Mental Health Status:  Mental Health Status: No Current Concerns       Chemical Dependency Status:  Chemical Dependency Status: No Current Concerns           Values/Beliefs:  Spiritual, Cultural Beliefs, Gnosticism Practices, Values that affect care: no         Additional Information:  Patient has no insurance.  Anticipated exceptionally high hospital bills.      Per Account notes, \"DOS 1/12/21: Spoke to pt regarding ins. Pt stated no ins. Screened pt for [facility] and pt is over income for all programs including CC. \"  (DOS is date of service; [facility] is [place] [employer], CC is [employer])    Pt's financial counselor in the [facility] Financial Office is:     Daniel Delgado, Direct Number [phone]  Main # [phone] #3, Hours: 7 AM-3:30 PM     Will provide patient with Financial Counselor information for payment plans, etc.  Also will ask Financial Counselor if pt would meet income requirements if we take into consideration the amount of hospital bills and a payment plan.  (Email sent, pt informed.)     If still does not qualify for insurance, providers should anticipate pt will need to pay for all in- and out- patient care out of pocket.  Typically clinic appointments cost >$500 per visit, will collect information provide patient with community clinic information that have sliding scale fees.  Dental programs may be available at lower cost from student programs.   CM-RN reviewed this with pt and provided low-income/sliding scale medical and dental clinic information from [facility] and [facility].      CM team to continue to follow.  May also benefit from a referral to [facility] if pt does not qualify for any medical bill help; [facility] programs may be able to help with food and housing if income requirements are different for these.     Alida Lucio RN, BSN, PHN  [facility]  Inpatient " Care Management - FLOAT  Mobile: [phone] 01/16/22 until 4pm  (after today's date, please call the patient's unit)

## 2022-01-16 NOTE — PROGRESS NOTES
Treatment in room CS-OF using Optia apheresis machine. Consent verified.     Height: 165cm  Weight: 109kg  HCT: 25%  Inlet speed: 45  ACDA ratio: 10:1  Fluid balance: 100  Access: R IJ     Replacement product: [id number]  Electrolyte replacement: Ca Gluconate 4grams in NS - total 90mls, piggybacked into return lines, infused over time of treatment.    Premedications: benadryl, tylenol     Treatment Notes: uneventful     Treatment completed as ordered, VSS, see EPIC flowsheet for run data.     Next treatment: Monday 1/17/22    Timmy Siddiqui RN

## 2022-01-16 NOTE — PROGRESS NOTES
[facility]    Hematology/Oncology Follow-up Note      Today's Date: 01/16/22  Date of Admission:  1/12/2022  Reason for Consult: TTP  Primary Hematologist: Ashley Nguyen DO      ASSESSMENT/PLAN:  Ivon Gamble is a 31 year old female with elevated LDH, thrombocytopenia and anemia Platelets increased to 131 K from   85 K from 39K from 12K    1 ) TTP: continue pheresis. Despite not clear randomized data for steroids, would recommend continuing prednisone 1 mg /kg for now, await QUIROZ TS13 testing. Tolerating steroids fine . Will continue steroids at the same dose for now no taper till stable platelets for 2 weeks .   LDH normalized. H/h stable. coags normal. Check daily LDH and plts/hgb .. continue pheresis till platelets >150K for 2 days.     Anemia: h/h stable . Iron studies nl, b12 folate normal,  ldh has normalized,        2) dental abcess: per oral maxofacial surgery . Her platelets are improved.     3) on PPI for GI protection and since she will be long term taper with steroids , will add bactrim MWF      4 ) HARPAL consult for issues with no insurance and medical care, patient very stressed and will not follow up at [facility] .HARPAL consult pending   Told her likely they will stop over Monday    5. Insomnia: chronic. Restared melatonin 5 mg bid.     She can be set up for outpatient pheresis and seeing Dr Nguyen in the outpatient setting, I will arrange her heme follow up, from our standpoint ok to discharge on prednsione 1 mg/kg (current dose)  And a PPI. She has problems with insurance I do think HARPAL needs to stop and have a plan for her for follow up before she is discharged ,she also needs a plan for Oromaxofacial surgery follow up . Hopefully anticipate Discharge on Monday once harpal arranges a plan for her. I have sent a message to her hematologist Dr Nguyen to set up outpatient follow up from her end.       Addy Austin MD  [phone]        INTERIM HISTORY:  Platelets improving . She feels fine. H/h  "stable over the last few days     MEDICATIONS:  Current Facility-Administered Medications   Medication     0.9% sodium chloride BOLUS     acetaminophen (TYLENOL) tablet 650 mg    Or     acetaminophen (TYLENOL) Suppository 650 mg     anticoagulant citrate flush 3 mL     calcium gluconate 10 % injection 1 g     cefTRIAXone (ROCEPHIN) 2 g vial to attach to  ml bag for ADULTS or NS 50 ml bag for PEDS     chlorhexidine (PERIDEX) 0.12 % solution 15 mL     Contraindications to both pharmacological and mechanical prophylaxis (must document contraindications for both in this order)     heparin Lock (1000 units/mL High concentration) 1,300 Units     lidocaine (LMX4) cream     lidocaine (viscous) (XYLOCAINE) 2 % 15 mL, alum & mag hydroxide-simethicone (MAALOX) 15 mL GI Cocktail     lidocaine 1 % 0.1-1 mL     magnesium hydroxide (MILK OF MAGNESIA) suspension 30 mL     melatonin tablet 5 mg     metroNIDAZOLE (FLAGYL) infusion 500 mg     naloxone (NARCAN) injection 0.2 mg    Or     naloxone (NARCAN) injection 0.4 mg    Or     naloxone (NARCAN) injection 0.2 mg    Or     naloxone (NARCAN) injection 0.4 mg     nitroGLYcerin (NITROSTAT) sublingual tablet 0.4 mg     ondansetron (ZOFRAN-ODT) ODT tab 4 mg    Or     ondansetron (ZOFRAN) injection 4 mg     pantoprazole (PROTONIX) IV push injection 40 mg     predniSONE (DELTASONE) tablet 100 mg     prochlorperazine (COMPAZINE) injection 10 mg    Or     prochlorperazine (COMPAZINE) tablet 10 mg    Or     prochlorperazine (COMPAZINE) suppository 25 mg     sodium chloride (PF) 0.9% PF flush 3 mL     sodium chloride (PF) 0.9% PF flush 3 mL     sucralfate (CARAFATE) suspension 1 g     traMADol (ULTRAM) tablet 50 mg           ALLERGIES:  No Known Allergies      PHYSICAL EXAM:  Vital signs:  Temp: 98.8  F (37.1  C) Temp src: Oral BP: 98/49 Pulse: 79   Resp: 17 SpO2: 99 % O2 Device: None (Room air)   Height: 165.1 cm (5' 5\") Weight: 108.9 kg (240 lb 1.3 oz)  Estimated body mass index is " "39.95 kg/m  as calculated from the following:    Height as of this encounter: 1.651 m (5' 5\").    Weight as of this encounter: 108.9 kg (240 lb 1.3 oz).    ECO morbidly obese   GENERAL/CONSTITUTIONAL: No acute distress.  EYES: Pupils are equal, round, and react to light and accommodation. Extraocular movements intact.  No scleral icterus.  ENT/MOUTH: Neck supple. Oropharynx clear, no mucositis.  LYMPH: No anterior cervical, posterior cervical, supraclavicular, axillary or inguinal adenopathy.   RESPIRATORY: Clear to auscultation bilaterally. No crackles or wheezing.   CARDIOVASCULAR: Regular rate and rhythm without murmurs, gallops, or rubs.  GASTROINTESTINAL: No hepatosplenomegaly, masses, or tenderness. The patient has normal bowel sounds. No guarding.  No distention.  MUSCULOSKELETAL: Warm and well-perfused, no cyanosis, clubbing, or edema.  NEUROLOGIC: Cranial nerves II-XII are intact. Alert, oriented, answers questions appropriately.  INTEGUMENTARY: No rashes or jaundice.        LABS:  CBC RESULTS:   Recent Labs   Lab Test 22  0613   WBC 10.8   RBC 2.69*   HGB 8.6*   HCT 24.7*   MCV 92   MCH 32.0   MCHC 34.8   RDW 14.6   PLT 39*       Recent Labs   Lab Test 22  0613 22  0702    138   POTASSIUM 3.5 3.6   CHLORIDE 109 110*   CO2 28 23   ANIONGAP 4 5   GLC 99 125*   BUN 18 22   CR 0.80 0.82   JOSELUIS 8.0* 8.0*         PATHOLOGY:    na  IMAGING:      na           "

## 2022-01-16 NOTE — PROGRESS NOTES
GI chart check    She is doing well, abdominal pain improved, plan for discharge tomorrow.    Will sign off, please call with questions.    Kyle Negron MD

## 2022-01-16 NOTE — PLAN OF CARE
1/16 [id number] Pt is A&Ox4, VSS, soft BP, on RA. CVC cath to R neck heparin locked. LS clear, BS+ active, flatus+. Abd rounded/distended, constipated, PRN Milk of Mag, Senna given - had small loose BM. IND. Petechiae to feet and hands. Low fat diet. PRN tylenol for pain management. Plt 131 today. Planned to have another pheresis infusion until Plt >150, potential to discharge tomorrow.

## 2022-01-16 NOTE — PROGRESS NOTES
Assessment and Plan:   PLEX: treatment # 4. Has been running daily. Using RIJV Rakan. Replacing with FFP, CaGluc. Tolerating well.   Lytes nominal, Cr 0.88, Ca 8.1, alb 2.9.   Platelet count now 131, Check with Heme re: schedule going forward. Suggest discharge and outpt pharesis.             Interval History:   TTP: Heme following. Undergoing PLEX daily. Plt count now 131, . On high dose prednisone.   ? Enteritis, ?cholecystitis. GI following.       Dental abscess    Anemia: Hgb 8.4. Per Heme.            Review of Systems:   Mild abd discomfort. C/O bloating. No N, V diarrhea.           Medications:       anticoagulant citrate  500-2,000 mL Apheresis Once     calcium gluconate intermittent infusion with additives - doses under 5g  4 g Intravenous Once     cefTRIAXone  2 g Intravenous Q24H     chlorhexidine  15 mL Swish & Spit BID     - MEDICATION INSTRUCTIONS -   Does not apply See Admin Instructions     melatonin  5 mg Oral At Bedtime     metroNIDAZOLE  500 mg Intravenous Q8H     pantoprazole (PROTONIX) IV  40 mg Intravenous BID     predniSONE  100 mg Oral Daily     sodium chloride (PF)  3 mL Intracatheter Q8H     sucralfate  1 g Oral 4x Daily         Current active medications and PTA medications reviewed, see medication list for details.            Physical Exam:   Vitals were reviewed  Patient Vitals for the past 24 hrs:   BP Temp Temp src Pulse Resp SpO2 Weight   01/16/22 0914 110/73 97.5  F (36.4  C) Axillary 69 -- -- --   01/16/22 0900 111/71 97.7  F (36.5  C) Axillary 69 -- -- --   01/16/22 0853 -- 97.9  F (36.6  C) Axillary -- -- -- --   01/16/22 0845 119/76 -- -- 68 -- -- --   01/16/22 0832 117/77 -- -- 71 -- -- --   01/16/22 0830 112/69 97.9  F (36.6  C) Axillary 68 18 -- --   01/16/22 0747 106/69 98.1  F (36.7  C) Oral 67 18 98 % --   01/16/22 0700 -- -- -- -- -- -- 118.1 kg (260 lb 5.8 oz)   01/16/22 0437 97/57 98.1  F (36.7  C) Oral 63 18 98 % --   01/15/22 2338 104/53 98.2  F (36.8   C) Oral 62 18 95 % --   01/15/22 1947 98/49 98.8  F (37.1  C) Oral 79 -- 99 % --   01/15/22 1537 -- 98.2  F (36.8  C) Oral -- -- -- --   01/15/22 1500 102/71 -- -- 85 17 99 % --   01/15/22 1400 103/62 -- -- 70 16 94 % --   01/15/22 1151 103/68 98.3  F (36.8  C) Oral 81 16 -- --   01/15/22 1132 105/63 -- -- 73 -- -- --   01/15/22 1124 108/74 -- -- 86 -- -- --   01/15/22 1115 106/69 -- -- 79 -- -- --   01/15/22 1103 105/67 -- -- 80 -- -- --   01/15/22 1054 105/64 -- -- 85 -- -- --   01/15/22 1043 110/68 -- -- 79 -- -- --   01/15/22 1030 102/68 -- -- 76 -- -- --   01/15/22 1018 108/70 -- -- 75 -- -- --   01/15/22 1007 92/71 98.6  F (37  C) Axillary 78 -- -- --   01/15/22 0954 99/69 -- -- 78 -- -- --   01/15/22 0942 105/67 -- -- 79 -- -- --   01/15/22 0929 101/68 -- -- 79 -- -- --       Temp:  [97.5  F (36.4  C)-98.8  F (37.1  C)] 97.5  F (36.4  C)  Pulse:  [62-86] 69  Resp:  [16-18] 18  BP: ()/(49-77) 110/73  SpO2:  [94 %-99 %] 98 %    Temperatures:  Current - Temp: 97.5  F (36.4  C); Max - Temp  Av.1  F (36.7  C)  Min: 97.5  F (36.4  C)  Max: 98.8  F (37.1  C)  Respiration range: Resp  Av.3  Min: 16  Max: 18  Pulse range: Pulse  Av  Min: 62  Max: 86  Blood pressure range: Systolic (24hrs), Av , Min:92 , Max:119   ; Diastolic (24hrs), Av, Min:49, Max:77    Pulse oximetry range: SpO2  Av.2 %  Min: 94 %  Max: 99 %    I/O last 3 completed shifts:  In: 927 [P.O.:720; I.V.:207]  Out: 0       Intake/Output Summary (Last 24 hours) at 2022 0921  Last data filed at 1/15/2022 1730  Gross per 24 hour   Intake 444 ml   Output 0 ml   Net 444 ml       Resting in bed.  R IJV Rakan with no redness or tenderness       Wt Readings from Last 4 Encounters:   22 118.1 kg (260 lb 5.8 oz)   22 108.9 kg (240 lb)          Data:          Lab Results   Component Value Date     2022     01/15/2022     2022    Lab Results   Component Value Date    CHLORIDE 106  01/16/2022    CHLORIDE 106 01/15/2022    CHLORIDE 109 01/14/2022    Lab Results   Component Value Date    BUN 15 01/16/2022    BUN 14 01/15/2022    BUN 18 01/14/2022      Lab Results   Component Value Date    POTASSIUM 3.5 01/16/2022    POTASSIUM 3.7 01/15/2022    POTASSIUM 3.2 01/15/2022    Lab Results   Component Value Date    CO2 30 01/16/2022    CO2 30 01/15/2022    CO2 28 01/14/2022    Lab Results   Component Value Date    CR 0.88 01/16/2022    CR 0.89 01/15/2022    CR 0.80 01/14/2022        Recent Labs   Lab Test 01/16/22  0611 01/15/22  0607 01/14/22  0613   WBC 10.4 11.1* 10.8   HGB 8.4* 8.8* 8.6*   HCT 25.1* 26.0* 24.7*   MCV 95 94 92   * 85* 39*     Recent Labs   Lab Test 01/16/22  0611 01/15/22  0607 01/14/22  0613   AST 27 14 18   ALT 39 26 26   ALKPHOS 47 57 55   BILITOTAL 0.3 0.4 0.4       Recent Labs   Lab Test 01/16/22  0611 01/15/22  0607 01/14/22  0613   MAG 2.0 1.8 2.0     Recent Labs   Lab Test 01/16/22  0611 01/15/22  0607 01/14/22  0613   PHOS 2.9 2.5 2.4*     Recent Labs   Lab Test 01/16/22  0611 01/15/22  0607 01/14/22  0613   JOSELUIS 8.1* 8.1* 8.0*       Lab Results   Component Value Date    JOSELUIS 8.1 (L) 01/16/2022     Lab Results   Component Value Date    WBC 10.4 01/16/2022    HGB 8.4 (L) 01/16/2022    HCT 25.1 (L) 01/16/2022    MCV 95 01/16/2022     (L) 01/16/2022     Lab Results   Component Value Date     01/16/2022    POTASSIUM 3.5 01/16/2022    CHLORIDE 106 01/16/2022    CO2 30 01/16/2022    GLC 98 01/16/2022     Lab Results   Component Value Date    BUN 15 01/16/2022    CR 0.88 01/16/2022     Lab Results   Component Value Date    MAG 2.0 01/16/2022     Lab Results   Component Value Date    PHOS 2.9 01/16/2022       Creatinine   Date Value Ref Range Status   01/16/2022 0.88 0.52 - 1.04 mg/dL Final   01/15/2022 0.89 0.52 - 1.04 mg/dL Final   01/14/2022 0.80 0.52 - 1.04 mg/dL Final   01/13/2022 0.82 0.52 - 1.04 mg/dL Final   01/12/2022 0.84 0.52 - 1.04 mg/dL Final    01/12/2022 1.09 (H) 0.52 - 1.04 mg/dL Final       Attestation:  I have reviewed today's vital signs, notes, medications, labs and imaging.  Seen during PLEX.      Kuldip Charles MD

## 2022-01-17 LAB
ALBUMIN SERPL-MCNC: 2.9 G/DL (ref 3.4–5)
ALP SERPL-CCNC: 47 U/L (ref 40–150)
ALT SERPL W P-5'-P-CCNC: 68 U/L (ref 0–50)
ANION GAP SERPL CALCULATED.3IONS-SCNC: 5 MMOL/L (ref 3–14)
AST SERPL W P-5'-P-CCNC: 54 U/L (ref 0–45)
BASOPHILS # BLD AUTO: 0 10E3/UL (ref 0–0.2)
BASOPHILS NFR BLD AUTO: 0 %
BILIRUB SERPL-MCNC: 0.3 MG/DL (ref 0.2–1.3)
BLD PROD TYP BPU: NORMAL
BLOOD COMPONENT TYPE: NORMAL
BUN SERPL-MCNC: 14 MG/DL (ref 7–30)
CALCIUM SERPL-MCNC: 8.1 MG/DL (ref 8.5–10.1)
CHLORIDE BLD-SCNC: 103 MMOL/L (ref 94–109)
CK SERPL-CCNC: 34 U/L (ref 30–225)
CO2 SERPL-SCNC: 30 MMOL/L (ref 20–32)
CODING SYSTEM: NORMAL
CREAT SERPL-MCNC: 0.9 MG/DL (ref 0.52–1.04)
CRP SERPL-MCNC: <2.9 MG/L (ref 0–8)
EOSINOPHIL # BLD AUTO: 0.1 10E3/UL (ref 0–0.7)
EOSINOPHIL NFR BLD AUTO: 1 %
ERYTHROCYTE [DISTWIDTH] IN BLOOD BY AUTOMATED COUNT: 15.7 % (ref 10–15)
GFR SERPL CREATININE-BSD FRML MDRD: 87 ML/MIN/1.73M2
GLUCOSE BLD-MCNC: 96 MG/DL (ref 70–99)
HCT VFR BLD AUTO: 25.8 % (ref 35–47)
HGB BLD-MCNC: 8.6 G/DL (ref 11.7–15.7)
IMM GRANULOCYTES # BLD: 0.2 10E3/UL
IMM GRANULOCYTES NFR BLD: 1 %
ISSUE DATE AND TIME: NORMAL
LDH SERPL L TO P-CCNC: 171 U/L (ref 81–234)
LYMPHOCYTES # BLD AUTO: 3.2 10E3/UL (ref 0.8–5.3)
LYMPHOCYTES NFR BLD AUTO: 30 %
MAGNESIUM SERPL-MCNC: 2 MG/DL (ref 1.6–2.3)
MCH RBC QN AUTO: 32.5 PG (ref 26.5–33)
MCHC RBC AUTO-ENTMCNC: 33.3 G/DL (ref 31.5–36.5)
MCV RBC AUTO: 97 FL (ref 78–100)
MONOCYTES # BLD AUTO: 0.7 10E3/UL (ref 0–1.3)
MONOCYTES NFR BLD AUTO: 7 %
NEUTROPHILS # BLD AUTO: 6.5 10E3/UL (ref 1.6–8.3)
NEUTROPHILS NFR BLD AUTO: 61 %
NRBC # BLD AUTO: 0.1 10E3/UL
NRBC BLD AUTO-RTO: 1 /100
PHOSPHATE SERPL-MCNC: 3.4 MG/DL (ref 2.5–4.5)
PLATELET # BLD AUTO: 154 10E3/UL (ref 150–450)
POTASSIUM BLD-SCNC: 3.3 MMOL/L (ref 3.4–5.3)
POTASSIUM BLD-SCNC: 3.7 MMOL/L (ref 3.4–5.3)
PROT SERPL-MCNC: 5.4 G/DL (ref 6.8–8.8)
RBC # BLD AUTO: 2.65 10E6/UL (ref 3.8–5.2)
SODIUM SERPL-SCNC: 138 MMOL/L (ref 133–144)
UNIT ABO/RH: NORMAL
UNIT NUMBER: NORMAL
UNIT STATUS: NORMAL
UNIT TYPE ISBT: 6200
WBC # BLD AUTO: 10.7 10E3/UL (ref 4–11)

## 2022-01-17 PROCEDURE — 82550 ASSAY OF CK (CPK): CPT | Performed by: HOSPITALIST

## 2022-01-17 PROCEDURE — 36514 APHERESIS PLASMA: CPT | Performed by: INTERNAL MEDICINE

## 2022-01-17 PROCEDURE — 120N000013 HC R&B IMCU

## 2022-01-17 PROCEDURE — 250N000013 HC RX MED GY IP 250 OP 250 PS 637: Performed by: INTERNAL MEDICINE

## 2022-01-17 PROCEDURE — 250N000013 HC RX MED GY IP 250 OP 250 PS 637: Performed by: PHYSICIAN ASSISTANT

## 2022-01-17 PROCEDURE — 250N000013 HC RX MED GY IP 250 OP 250 PS 637: Performed by: DENTIST

## 2022-01-17 PROCEDURE — 258N000003 HC RX IP 258 OP 636: Performed by: INTERNAL MEDICINE

## 2022-01-17 PROCEDURE — 250N000011 HC RX IP 250 OP 636: Performed by: INTERNAL MEDICINE

## 2022-01-17 PROCEDURE — 99222 1ST HOSP IP/OBS MODERATE 55: CPT | Performed by: INTERNAL MEDICINE

## 2022-01-17 PROCEDURE — 250N000012 HC RX MED GY IP 250 OP 636 PS 637: Performed by: INTERNAL MEDICINE

## 2022-01-17 PROCEDURE — 82435 ASSAY OF BLOOD CHLORIDE: CPT | Performed by: INTERNAL MEDICINE

## 2022-01-17 PROCEDURE — 83615 LACTATE (LD) (LDH) ENZYME: CPT | Performed by: INTERNAL MEDICINE

## 2022-01-17 PROCEDURE — 36514 APHERESIS PLASMA: CPT

## 2022-01-17 PROCEDURE — 99233 SBSQ HOSP IP/OBS HIGH 50: CPT | Performed by: HOSPITALIST

## 2022-01-17 PROCEDURE — 250N000011 HC RX IP 250 OP 636: Performed by: PHYSICIAN ASSISTANT

## 2022-01-17 PROCEDURE — 84132 ASSAY OF SERUM POTASSIUM: CPT | Performed by: HOSPITALIST

## 2022-01-17 PROCEDURE — 85025 COMPLETE CBC W/AUTO DIFF WBC: CPT | Performed by: INTERNAL MEDICINE

## 2022-01-17 PROCEDURE — 36415 COLL VENOUS BLD VENIPUNCTURE: CPT | Performed by: HOSPITALIST

## 2022-01-17 PROCEDURE — 86140 C-REACTIVE PROTEIN: CPT | Performed by: HOSPITALIST

## 2022-01-17 PROCEDURE — 84100 ASSAY OF PHOSPHORUS: CPT | Performed by: INTERNAL MEDICINE

## 2022-01-17 PROCEDURE — 250N000009 HC RX 250: Performed by: INTERNAL MEDICINE

## 2022-01-17 PROCEDURE — 83735 ASSAY OF MAGNESIUM: CPT | Performed by: HOSPITALIST

## 2022-01-17 PROCEDURE — C9113 INJ PANTOPRAZOLE SODIUM, VIA: HCPCS | Performed by: INTERNAL MEDICINE

## 2022-01-17 PROCEDURE — P9059 PLASMA, FRZ BETWEEN 8-24HOUR: HCPCS | Performed by: HOSPITALIST

## 2022-01-17 PROCEDURE — 250N000013 HC RX MED GY IP 250 OP 250 PS 637: Performed by: HOSPITALIST

## 2022-01-17 RX ORDER — POTASSIUM CHLORIDE 1500 MG/1
40 TABLET, EXTENDED RELEASE ORAL ONCE
Status: COMPLETED | OUTPATIENT
Start: 2022-01-17 | End: 2022-01-17

## 2022-01-17 RX ORDER — HEPARIN SODIUM 1000 [USP'U]/ML
1300 INJECTION, SOLUTION INTRAVENOUS; SUBCUTANEOUS
Status: DISCONTINUED | OUTPATIENT
Start: 2022-01-17 | End: 2022-01-18 | Stop reason: HOSPADM

## 2022-01-17 RX ORDER — ACETAMINOPHEN 325 MG/1
650 TABLET ORAL ONCE
Status: COMPLETED | OUTPATIENT
Start: 2022-01-17 | End: 2022-01-17

## 2022-01-17 RX ORDER — DIPHENHYDRAMINE HYDROCHLORIDE 50 MG/ML
50 INJECTION INTRAMUSCULAR; INTRAVENOUS ONCE
Status: COMPLETED | OUTPATIENT
Start: 2022-01-17 | End: 2022-01-17

## 2022-01-17 RX ORDER — DIPHENHYDRAMINE HYDROCHLORIDE 50 MG/ML
50 INJECTION INTRAMUSCULAR; INTRAVENOUS
Status: DISCONTINUED | OUTPATIENT
Start: 2022-01-17 | End: 2022-01-18 | Stop reason: HOSPADM

## 2022-01-17 RX ADMIN — PREDNISONE 100 MG: 50 TABLET ORAL at 08:36

## 2022-01-17 RX ADMIN — CEFTRIAXONE SODIUM 2 G: 2 INJECTION, POWDER, FOR SOLUTION INTRAMUSCULAR; INTRAVENOUS at 17:59

## 2022-01-17 RX ADMIN — CALCIUM GLUCONATE 4 G: 98 INJECTION, SOLUTION INTRAVENOUS at 12:52

## 2022-01-17 RX ADMIN — SUCRALFATE ORAL 1 G: 1 SUSPENSION ORAL at 21:27

## 2022-01-17 RX ADMIN — METRONIDAZOLE 500 MG: 500 INJECTION, SOLUTION INTRAVENOUS at 13:57

## 2022-01-17 RX ADMIN — SUCRALFATE ORAL 1 G: 1 SUSPENSION ORAL at 08:36

## 2022-01-17 RX ADMIN — POTASSIUM CHLORIDE 40 MEQ: 1500 TABLET, EXTENDED RELEASE ORAL at 06:57

## 2022-01-17 RX ADMIN — MELATONIN TAB 3 MG 5 MG: 3 TAB at 21:27

## 2022-01-17 RX ADMIN — METRONIDAZOLE 500 MG: 500 INJECTION, SOLUTION INTRAVENOUS at 04:31

## 2022-01-17 RX ADMIN — ACETAMINOPHEN 650 MG: 325 TABLET, FILM COATED ORAL at 12:43

## 2022-01-17 RX ADMIN — SENNOSIDES AND DOCUSATE SODIUM 2 TABLET: 50; 8.6 TABLET ORAL at 20:17

## 2022-01-17 RX ADMIN — PANTOPRAZOLE SODIUM 40 MG: 40 INJECTION, POWDER, FOR SOLUTION INTRAVENOUS at 20:17

## 2022-01-17 RX ADMIN — PANTOPRAZOLE SODIUM 40 MG: 40 INJECTION, POWDER, FOR SOLUTION INTRAVENOUS at 08:36

## 2022-01-17 RX ADMIN — CHLORHEXIDINE GLUCONATE 15 ML: 1.2 SOLUTION ORAL at 20:17

## 2022-01-17 RX ADMIN — CHLORHEXIDINE GLUCONATE 15 ML: 1.2 SOLUTION ORAL at 08:36

## 2022-01-17 RX ADMIN — METRONIDAZOLE 500 MG: 500 INJECTION, SOLUTION INTRAVENOUS at 20:24

## 2022-01-17 RX ADMIN — ACETAMINOPHEN 650 MG: 325 TABLET, FILM COATED ORAL at 08:47

## 2022-01-17 RX ADMIN — ANTICOAGULANT CITRATE DEXTROSE SOLUTION FORMULA A 750 ML: 12.25; 11; 3.65 SOLUTION INTRAVENOUS at 12:53

## 2022-01-17 RX ADMIN — SUCRALFATE ORAL 1 G: 1 SUSPENSION ORAL at 17:59

## 2022-01-17 RX ADMIN — SUCRALFATE ORAL 1 G: 1 SUSPENSION ORAL at 14:00

## 2022-01-17 RX ADMIN — DIPHENHYDRAMINE HYDROCHLORIDE 50 MG: 50 INJECTION INTRAMUSCULAR; INTRAVENOUS at 12:44

## 2022-01-17 ASSESSMENT — ACTIVITIES OF DAILY LIVING (ADL)
ADLS_ACUITY_SCORE: 3

## 2022-01-17 ASSESSMENT — MIFFLIN-ST. JEOR
SCORE: 1898.26
SCORE: 1898.26

## 2022-01-17 NOTE — PROGRESS NOTES
A&O x4. VSS.  Abdominal fullness/discomfort, suppository given with large bowel movement results. IV flagyl infused as ordered. LS: clear. BS: audible/normoactive. +void. +flatus. Up IND. K+ 3.3 this morning, replaced per protocol.

## 2022-01-17 NOTE — PROVIDER NOTIFICATION
"Text page to Dr. Watts via [facility]:    \"RE: IJ- Per Dr. Milligan's nurse they need to wait for labs in the morning before removal of I J\"    -call taken from Dr. Srini Dixon's nurse who was returning call from charge nurse as requested by Dr. Watts:    Need CBC tomorrow and depending on results if not needing pheresis again I J access would be removed and no tunneled cath would be needed but need to await results.    This appears to be in agreement with nephrology per Dr. Raymond's note today.  "

## 2022-01-17 NOTE — PROGRESS NOTES
Renal Medicine Chart Check      PLEX note:    Indication: TTP    Treatment #5  1 volume  3800 ml  FFP replacement    Premeds: acetaminophen/diphenhydramine       Patients has right IJ Rakan in place  This will need to be replaced with tunneled line   Prior to discharge if additional PLEX is anticipated    I recommend discussion with hematology           Recent Labs   Lab 01/17/22  0552      POTASSIUM 3.3*   CHLORIDE 103   CO2 30   ANIONGAP 5   GLC 96   BUN 14   CR 0.90   GFRESTIMATED 87   JOSELUIS 8.1*     Recent Labs   Lab Test 01/17/22  0552 01/16/22  0611 01/15/22  0607 01/14/22  0613 01/13/22  0702 01/12/22  1413 01/12/22  0107   CR 0.90 0.88 0.89 0.80 0.82 0.84 1.09*     Recent Labs   Lab 01/17/22  0552 01/16/22  0611 01/15/22  0607   WBC 10.7 10.4 11.1*   HGB 8.6* 8.4* 8.8*   HCT 25.8* 25.1* 26.0*   MCV 97 95 94    131* 85*           MICHAEL Raymond    [facility]  [phone]

## 2022-01-17 NOTE — PROGRESS NOTES
Apheresis Treatment    Treatment in room 326 using Spectra Optia [id number] apheresis machine. Consent verified.  Treatment number: 5    Height: 165cm  Weight: 118kg  HCT: 26%  Inlet speed: 48ml/min  ACDA ratio: 10:1  Fluid balance: 100%  Access: non-tunneled RIJ  Post treatment line dwell: Citrate 1.5ml blue/red lumen  Replacement product: 3750ml FFP  Electrolyte replacement: Calcium gluconate 4 grams in NS - total 90mls, piggybacked into return lines, infused over time of treatment.  Premedications: benadryl 50mg IV, tylenol 650mg PO    Treatment Notes: unremarkable    Treatment completed as ordered, VSS, see EPIC flowsheet for run data.    Next treatment: TBD

## 2022-01-17 NOTE — PROGRESS NOTES
"[facility]  Hospitalist Progress Note   01/17/2022          Assessment and Plan:       Ivon Gamble is a 31 year old female with a history of asthma admitted on 1/12/2020 with abdominal pain.     #Severe thrombocytopenia, TTP: on pheresis  #Anemia  Presented with bruising and \"red spots\" over 2-3 days PTA.   Platelet count 11, hemoglobin 9.6.  Bilirubin 1.4.  , haptoglobin undetectable.  Reticulocyte count elevated.  Ferritin 590.  Lactic acid normal limits.  Peripheral smear concerning for TTP.  --Hematology recommended urgent transfer to [facility] for pheresis.  Nephrology following, underwent urgent catheter placement by IR on 1/13, has been receiving daily pheresis, due for pheresis today.  Hematology following, continue pheresis till platelets greater than 150 K for 2 days.   Appreciate input.   -Continue steroids 1 mg/kg prednisone per hematology till platelets are stable for 2 weeks.    -Follow CBC, CMP, LDH.  -Transfuse PRBC for Hb<7. Transfuse cryoprecipitate for fibrinogen <100. Do NOT transfuse platelets. Transfuse FFP if evidence of bleed  -.  Plan for discharge  tomorrow.  I have discussed with nephrology today, right IJ Rakan catheter to be removed prior to discharge.  Awaiting hematology input on need for possible tunnel catheter if needed prior to discharge.  ( Paged)     #Abdominal pain, possible cholecystitis vs. Enteritis: Improved.  -CT abdomen pelvis showed mild thickening of duodenum and jejunum concerning for inflammatory enteritis.  There was slight stranding to gallbladder.     -Abdominal ultrasound with mild gallbladder wall thickening with negative Rm sign.   - HIDA scan was equivocal.   -Stool negative for enteric pathogens, C. difficile, norovirus  -General surgery at [facility], impression not be surgical candidate at this time.  -[facility] 1/13 recommended oral PPI, Carafate, GI cocktail as needed.  Continue oral PPI while " on steroids on discharge.  Consider follow-up with general surgery, GI as outpatient if symptoms ongoing.    -Has been on IV ceftriaxone, IV Flagyl [1/12] for dental abscesses below.    Dental abscess  CT facial bones 1/13:  Periapical abscess of the third left mandibular molar with associated developing subperiosteal abscess and left masseter muscle myositis and overlying left facial cellulitis, as described.  Followed by oral maxillofacial surgery 1/13, intervention with I&D of abscess and extraction of tooth #17 indicated once medically stabilized, may consider inpatient or outpatient management pending progress.  Platelets >50K required for any surgical intervention from [facility] perspective.   -Continue IV Rocephin, IV Flagyl. (1/12), switch to oral antibiotics on discharge.  -Have discussed with OMFS   today, unable to have extraction while inpatient.  Recommend that patient be followed in dental clinic on Wednesday or Thursday and would be able to plan for the procedure.  [Patient would like procedure to be done inpatient].  Care coordinator consult requested with setting up for the appointment  - Peridex rinse BID     Acute anemia acute illness, dilutional, possible chronic component to  Presented with hemoglobin of 10.8 dropped to 8.6.  Chart reviewed, no prior hemoglobin available [moved from [place] per discussion with patient.]    Iron studies, vitamin B12 within normal limits.  Monitor CBC in 1 week of discharge.  No blood loss  Needs to establish primary care, age-appropriate health maintenance outpatient    Constipation.  X-ray 1/14 with Small to moderate amount of stool.   Scheduled, as needed bowel meds ordered    Obesity with a BMI of 43.34.  Consider lifestyle modification with diet and exercise.  Consider sleep studies as outpatient.    Headache improved.  Complaint of headache, CT head 1/13 negative.   Monitor.    Chronic insomnia.  Continue melatonin 5 mg twice daily.    Social  issues.  Patient currently with no insurance, requested social work assistance with transition  Primary care as outpatient, needs age-appropriate health maintenance.     Diet: Low fat.   DVT Prophylaxis: SCDs, ambulated.  Code Status: Full Code   Disposition: Expected discharge likely tomorrow pending safe discharge plan in place    Discussed with patient, bedside RN, nephrologist, oromaxillofacial surgeon.  Total time greater than 35 minutes.  More than 65% of time spent in direct patient care, care coordination, patient counseling, and formalizing plan of care.     Domonique Watts MD        Interval History:        Denies any chest pain or palpitations at this time.  No headache or dizziness.  No complaints of abdominal pain.  Complaining of pain in her mouth, would like her teeth to be extracted prior to discharge  Tolerating oral diet.  Undergoing pheresis daily, due for pheresis today.  No bleeding at this time.    Afebrile.        Physical Exam:        Physical Exam   Temp:  [98  F (36.7  C)-99.1  F (37.3  C)] 98.2  F (36.8  C)  Pulse:  [63-87] 82  Resp:  [16-20] 16  BP: ()/(57-71) 117/69  SpO2:  [97 %-99 %] 99 %    Intake/Output Summary (Last 24 hours) at 1/16/2022 1003  Last data filed at 1/15/2022 1730  Gross per 24 hour   Intake 444 ml   Output 0 ml   Net 444 ml       Admission Weight: 108.9 kg (240 lb 1.3 oz)  Current Weight: 118.1 kg (260 lb 5.8 oz)    PHYSICAL EXAM  GENERAL: Patient is in no distress. Alert and oriented.  HEENT no mandibular swelling noted.  Caries.  No erythema in oral cavity.  LUNGS: Respirations unlabored  ABDOMEN: Soft, no abdominal tenderness, bowel sounds heard   NEURO: Moving all extremities.  EXTREMITIES: No pedal edema.   SKIN: Warm, dry. No rash   PSYCHIATRY Cooperative       Medications:          acetaminophen  650 mg Oral Once     anticoagulant citrate  500-2,000 mL Apheresis Once     calcium gluconate intermittent infusion with additives - doses under 5g  4 g  Intravenous Once     cefTRIAXone  2 g Intravenous Q24H     chlorhexidine  15 mL Swish & Spit BID     - MEDICATION INSTRUCTIONS -   Does not apply See Admin Instructions     diphenhydrAMINE  50 mg Intravenous Once     melatonin  5 mg Oral At Bedtime     metroNIDAZOLE  500 mg Intravenous Q8H     pantoprazole (PROTONIX) IV  40 mg Intravenous BID     predniSONE  100 mg Oral Daily     senna-docusate  2 tablet Oral BID     sodium chloride (PF)  3 mL Intracatheter Q8H     sucralfate  1 g Oral 4x Daily     sodium chloride 0.9%, sodium chloride 0.9%, sodium chloride 0.9%, acetaminophen **OR** acetaminophen, anticoagulant citrate, anticoagulant citrate, bisacodyl, calcium gluconate, calcium gluconate, diphenhydrAMINE, diphenhydrAMINE, heparin Lock (1000 units/mL High concentration), lidocaine 4%, lidocaine viscous 2% 15 mL and maalox/mylanta w/ simethicone 15 mL (GI COCKTAIL), lidocaine (buffered or not buffered), magnesium hydroxide, naloxone **OR** naloxone **OR** naloxone **OR** naloxone, nitroGLYcerin, ondansetron **OR** ondansetron, polyethylene glycol, prochlorperazine **OR** prochlorperazine **OR** prochlorperazine, sodium chloride (PF), traMADol         Data:      All new lab and imaging data was reviewed.

## 2022-01-18 VITALS
HEART RATE: 75 BPM | BODY MASS INDEX: 43.45 KG/M2 | RESPIRATION RATE: 16 BRPM | OXYGEN SATURATION: 98 % | DIASTOLIC BLOOD PRESSURE: 69 MMHG | TEMPERATURE: 98.5 F | HEIGHT: 65 IN | SYSTOLIC BLOOD PRESSURE: 108 MMHG | WEIGHT: 260.8 LBS

## 2022-01-18 LAB
ALBUMIN SERPL-MCNC: 3 G/DL (ref 3.4–5)
ALP SERPL-CCNC: 50 U/L (ref 40–150)
ALT SERPL W P-5'-P-CCNC: 59 U/L (ref 0–50)
ANION GAP SERPL CALCULATED.3IONS-SCNC: 3 MMOL/L (ref 3–14)
AST SERPL W P-5'-P-CCNC: 30 U/L (ref 0–45)
BASOPHILS # BLD AUTO: 0 10E3/UL (ref 0–0.2)
BASOPHILS NFR BLD AUTO: 0 %
BILIRUB SERPL-MCNC: 0.5 MG/DL (ref 0.2–1.3)
BUN SERPL-MCNC: 15 MG/DL (ref 7–30)
CALCIUM SERPL-MCNC: 8.3 MG/DL (ref 8.5–10.1)
CHLORIDE BLD-SCNC: 103 MMOL/L (ref 94–109)
CO2 SERPL-SCNC: 32 MMOL/L (ref 20–32)
CREAT SERPL-MCNC: 0.85 MG/DL (ref 0.52–1.04)
EOSINOPHIL # BLD AUTO: 0.1 10E3/UL (ref 0–0.7)
EOSINOPHIL NFR BLD AUTO: 1 %
ERYTHROCYTE [DISTWIDTH] IN BLOOD BY AUTOMATED COUNT: 16.1 % (ref 10–15)
GFR SERPL CREATININE-BSD FRML MDRD: >90 ML/MIN/1.73M2
GLUCOSE BLD-MCNC: 97 MG/DL (ref 70–99)
HCT VFR BLD AUTO: 28.3 % (ref 35–47)
HGB BLD-MCNC: 9.2 G/DL (ref 11.7–15.7)
IMM GRANULOCYTES # BLD: 0.2 10E3/UL
IMM GRANULOCYTES NFR BLD: 2 %
LDH SERPL L TO P-CCNC: 177 U/L (ref 81–234)
LYMPHOCYTES # BLD AUTO: 2.7 10E3/UL (ref 0.8–5.3)
LYMPHOCYTES NFR BLD AUTO: 22 %
MAGNESIUM SERPL-MCNC: 2.2 MG/DL (ref 1.6–2.3)
MCH RBC QN AUTO: 32.7 PG (ref 26.5–33)
MCHC RBC AUTO-ENTMCNC: 32.5 G/DL (ref 31.5–36.5)
MCV RBC AUTO: 101 FL (ref 78–100)
MONOCYTES # BLD AUTO: 0.7 10E3/UL (ref 0–1.3)
MONOCYTES NFR BLD AUTO: 6 %
NEUTROPHILS # BLD AUTO: 8.5 10E3/UL (ref 1.6–8.3)
NEUTROPHILS NFR BLD AUTO: 69 %
NRBC # BLD AUTO: 0 10E3/UL
NRBC BLD AUTO-RTO: 0 /100
PATH REPORT.COMMENTS IMP SPEC: NORMAL
PATH REPORT.FINAL DX SPEC: NORMAL
PATH REPORT.MICROSCOPIC SPEC OTHER STN: NORMAL
PATH REPORT.MICROSCOPIC SPEC OTHER STN: NORMAL
PATH REPORT.RELEVANT HX SPEC: NORMAL
PHOSPHATE SERPL-MCNC: 3.7 MG/DL (ref 2.5–4.5)
PLATELET # BLD AUTO: 208 10E3/UL (ref 150–450)
POTASSIUM BLD-SCNC: 3.7 MMOL/L (ref 3.4–5.3)
PROT SERPL-MCNC: 5.6 G/DL (ref 6.8–8.8)
RBC # BLD AUTO: 2.81 10E6/UL (ref 3.8–5.2)
RETICS # AUTO: 0.21 10E6/UL (ref 0.03–0.1)
RETICS/RBC NFR AUTO: 7.3 % (ref 0.5–2)
SCANNED LAB RESULT: ABNORMAL
SODIUM SERPL-SCNC: 138 MMOL/L (ref 133–144)
WBC # BLD AUTO: 12.2 10E3/UL (ref 4–11)

## 2022-01-18 PROCEDURE — 83735 ASSAY OF MAGNESIUM: CPT | Performed by: HOSPITALIST

## 2022-01-18 PROCEDURE — 250N000011 HC RX IP 250 OP 636: Performed by: PHYSICIAN ASSISTANT

## 2022-01-18 PROCEDURE — 84100 ASSAY OF PHOSPHORUS: CPT | Performed by: INTERNAL MEDICINE

## 2022-01-18 PROCEDURE — 99231 SBSQ HOSP IP/OBS SF/LOW 25: CPT | Performed by: INTERNAL MEDICINE

## 2022-01-18 PROCEDURE — 85045 AUTOMATED RETICULOCYTE COUNT: CPT | Performed by: INTERNAL MEDICINE

## 2022-01-18 PROCEDURE — 85060 BLOOD SMEAR INTERPRETATION: CPT | Performed by: PATHOLOGY

## 2022-01-18 PROCEDURE — 99239 HOSP IP/OBS DSCHRG MGMT >30: CPT | Performed by: INTERNAL MEDICINE

## 2022-01-18 PROCEDURE — 80053 COMPREHEN METABOLIC PANEL: CPT | Performed by: INTERNAL MEDICINE

## 2022-01-18 PROCEDURE — 250N000012 HC RX MED GY IP 250 OP 636 PS 637: Performed by: INTERNAL MEDICINE

## 2022-01-18 PROCEDURE — 36415 COLL VENOUS BLD VENIPUNCTURE: CPT | Performed by: INTERNAL MEDICINE

## 2022-01-18 PROCEDURE — 85025 COMPLETE CBC W/AUTO DIFF WBC: CPT | Performed by: INTERNAL MEDICINE

## 2022-01-18 PROCEDURE — 83615 LACTATE (LD) (LDH) ENZYME: CPT | Performed by: INTERNAL MEDICINE

## 2022-01-18 PROCEDURE — 250N000013 HC RX MED GY IP 250 OP 250 PS 637: Performed by: INTERNAL MEDICINE

## 2022-01-18 PROCEDURE — 250N000013 HC RX MED GY IP 250 OP 250 PS 637: Performed by: HOSPITALIST

## 2022-01-18 PROCEDURE — 250N000013 HC RX MED GY IP 250 OP 250 PS 637: Performed by: DENTIST

## 2022-01-18 RX ORDER — PREDNISONE 50 MG/1
100 TABLET ORAL DAILY
Qty: 28 TABLET | Refills: 0 | Status: SHIPPED | OUTPATIENT
Start: 2022-01-18 | End: 2022-01-25

## 2022-01-18 RX ORDER — SENNOSIDES 8.6 MG
1-2 TABLET ORAL 2 TIMES DAILY PRN
Qty: 60 TABLET | Refills: 0 | Status: SHIPPED | OUTPATIENT
Start: 2022-01-18 | End: 2023-08-03

## 2022-01-18 RX ORDER — CHLORHEXIDINE GLUCONATE ORAL RINSE 1.2 MG/ML
15 SOLUTION DENTAL 2 TIMES DAILY
Qty: 473 ML | Refills: 0 | Status: SHIPPED | OUTPATIENT
Start: 2022-01-18 | End: 2022-02-10

## 2022-01-18 RX ORDER — POLYETHYLENE GLYCOL 3350 17 G/17G
17 POWDER, FOR SOLUTION ORAL 2 TIMES DAILY PRN
Qty: 510 G | Refills: 0 | Status: SHIPPED | OUTPATIENT
Start: 2022-01-18 | End: 2023-08-03

## 2022-01-18 RX ORDER — PANTOPRAZOLE SODIUM 40 MG/1
40 TABLET, DELAYED RELEASE ORAL
Qty: 30 TABLET | Refills: 0 | Status: SHIPPED | OUTPATIENT
Start: 2022-01-18 | End: 2022-01-25

## 2022-01-18 RX ORDER — PANTOPRAZOLE SODIUM 40 MG/1
40 TABLET, DELAYED RELEASE ORAL DAILY
Status: DISCONTINUED | OUTPATIENT
Start: 2022-01-19 | End: 2022-01-18 | Stop reason: HOSPADM

## 2022-01-18 RX ORDER — PANTOPRAZOLE SODIUM 40 MG/1
40 TABLET, DELAYED RELEASE ORAL
Status: DISCONTINUED | OUTPATIENT
Start: 2022-01-18 | End: 2022-01-18

## 2022-01-18 RX ADMIN — PANTOPRAZOLE SODIUM 40 MG: 40 TABLET, DELAYED RELEASE ORAL at 10:47

## 2022-01-18 RX ADMIN — SENNOSIDES AND DOCUSATE SODIUM 2 TABLET: 50; 8.6 TABLET ORAL at 10:47

## 2022-01-18 RX ADMIN — SUCRALFATE ORAL 1 G: 1 SUSPENSION ORAL at 10:47

## 2022-01-18 RX ADMIN — PREDNISONE 100 MG: 50 TABLET ORAL at 10:47

## 2022-01-18 RX ADMIN — CHLORHEXIDINE GLUCONATE 15 ML: 1.2 SOLUTION ORAL at 10:47

## 2022-01-18 RX ADMIN — METRONIDAZOLE 500 MG: 500 INJECTION, SOLUTION INTRAVENOUS at 05:14

## 2022-01-18 ASSESSMENT — ACTIVITIES OF DAILY LIVING (ADL)
ADLS_ACUITY_SCORE: 3

## 2022-01-18 NOTE — PROVIDER NOTIFICATION
"Text page to Dr. Myers    \"Pt says she never picked up the previous prescription for prednisone. Can you send script for it please? Thank you\"  "

## 2022-01-18 NOTE — PROGRESS NOTES
Renal Medicine       #5 PLEX yesterday    Plts > 200 today     No further PLEX at this time    Right IJ Rakan pulled without issue  No bleeding at site     Follow-up with hematology  Call if additional PLEX necessary        Recent Labs   Lab 01/18/22  0539      POTASSIUM 3.7   CHLORIDE 103   CO2 32   ANIONGAP 3   GLC 97   BUN 15   CR 0.85   GFRESTIMATED >90   JOSELUIS 8.3*     Recent Labs   Lab Test 01/18/22  0539 01/17/22  0552 01/16/22  0611 01/15/22  0607 01/14/22  0613 01/13/22  0702 01/12/22  1413 01/12/22  0107   CR 0.85 0.90 0.88 0.89 0.80 0.82 0.84 1.09*     Recent Labs   Lab 01/18/22  0539 01/17/22  0552 01/16/22  0611   WBC 12.2* 10.7 10.4   HGB 9.2* 8.6* 8.4*   HCT 28.3* 25.8* 25.1*   * 97 95    154 131*           MICHAEL Raymond    [facility]  [phone]

## 2022-01-18 NOTE — PLAN OF CARE
A&O x4, VSS on RA. Up ad doug. No bowel movt. +flatus. Denies N/V/pain. Voiced no concern overnight & no change in tooth abscess. Plans for oral surgery consult.

## 2022-01-18 NOTE — PROGRESS NOTES
Care Management Discharge Note    Discharge Date: 01/18/2022       Discharge Disposition: Home    Discharge Services:      Discharge DME: None    Discharge Transportation:      Private pay costs discussed: referreda again to financial counselors as she states she lost her job    PAS Confirmation Code:    Patient/family educated on Medicare website which has current facility and service quality ratings: no    Education Provided on the Discharge Plan:    Persons Notified of Discharge Plans: MD, patient, RN  Patient/Family in Agreement with the Plan: yes    Handoff Referral Completed: No    Additional Information:  Patient states her employer notified her today that she no longer has a job. Sent financial counselors an update to follow up with patient if there are any new steps to take. Called Oral surgeon's office Dr. Burden at [phone]. I spoke to Elisha, reviewed notes, reviwed the financial situation. Per Elisha Burden not in the office today, but messaged for follow up and Elisha will speak to the  regading financial situation. Updated patient to call the office tomorrow. Updated provider here unable to scheduel follow up at this time.    Charlee Castañeda RN   [facility]   Phone [phone]

## 2022-01-18 NOTE — PLAN OF CARE
Shift [id number]  Pt A&Ox4. VSS on Ra. Denies pain. Up Ind. Tolerating low fat diet. Internal jugular cath WDL. Pt had plasmapheresis done today. Abd fullness felt by pt. PIV SL with intermittent abx. L tooth abcess noted and seen by Oral surgery today. Hem Onc/Neph/Hospt following. Discharge pending morning labs. Continue to monitor.

## 2022-01-18 NOTE — PROGRESS NOTES
Service Date: 2022    SUBJECTIVE:  Ms. Feliz is a 31-year-old female with TTP ([id number] pending).  The patient has been getting plasmapheresis.  Her platelets have been normal yesterday and today.  She is also on prednisone.    The patient feels good.  She wants to go home.  The patient says that she has been hyper because of the prednisone.  Her appetite has increased.  Her sleep has decreased.  No headache.  No dizziness.  No chest pain or shortness of breath.  No nausea or vomiting.  No abnormal bleeding.    All other review of systems negative.    PHYSICAL EXAMINATION:    She is alert, oriented x 3.  Not in any distress.  Rest of systems not examined.    LABORATORY:  CBC and CMP reviewed.    ASSESSMENT:    1.  A 31-year-old female with TTP (thrombotic thrombocytopenic purpura).  2.  Dental abscess.    PLAN:     1.  CBC was reviewed with her.  I explained to her that her platelets are normal.  Her anemia is also better.  She was happy to know that.  WBC is mildly elevated because of the prednisone.    As her platelets have been normal for the last 2 days, she does not need any plasmapheresis.  She will continue on prednisone.    SPKTAQ28 is pending.  She will follow up in Hematology Clinic this Friday.  At that time, decision will be made regarding tapering down prednisone.  If LWJUAS44 is low, she may be also given rituximab.  Further decision to be made in the clinic.    2.  The patient advised to see a physician if she has fever, chills, infection, worsening headache or any other concerns.    3.  She had few questions, which were all answered.  Oncology will sign off.    Ness Milligan MD        D: 2022   T: 2022   MT: [id number]    Name:     CONOR FELIZ  MRN:      [id number]        Account:      [id number]   :      1990           Service Date: 2022       Document: [id number]

## 2022-01-18 NOTE — PROGRESS NOTES
Service Date: 2022    SUBJECTIVE:  Ms. Feliz is a 31-year-old female with TTP.  The patient is currently on plasmapheresis.  She is also on prednisone.  The patient is improving.  Her platelet count is normal today at 154.    She has some fatigue.  Intermittent headache.  Intermittent dizziness.  No chest pain. No shortness of breath.  No visual problem.  No neurological symptoms.  No abdominal pain.  Appetite is good.  No urinary or bowel complaints.  No bleeding from any site.    All other review of systems is negative.    PHYSICAL EXAMINATION:    She is alert, oriented x3.  Rest of systems not examined.    LABS:  Reviewed.    ASSESSMENT:    1.  A 31-year-old female with thrombotic thrombocytopenic purpura, which is responding to plasmapheresis and prednisone.  2.  Dental abscess.    PLAN:    1.  The patient is responding to plasmapheresis.  Her platelet is normal today.  Her anemia is remaining stable.    For TTP, she will be continued on plasmapheresis and prednisone.  If her platelet remains normal tomorrow, plasmapheresis can be stopped.  After that, her prednisone will be tapered off.  She is agreeable for this plan.    2.  AXOBOY20 is pending.  I explained to the patient that in TTP, JSOCRY74 level is low.  If her BGOCXM63 comes back normal, it will go against TTP.  Hopefully, result is available in the next few days.    3.  She has a dental abscess.  She has been evaluated by [facility].  She is on antibiotics, which will be continued.    4.  The patient had few questions, which were all answered.  Hematology/Oncology will continue to follow.    TOTAL TIME SPENT:  Twenty-five minutes.  Time spent in today's visit, review of chart/investigations today, and documentation.      Ness Milligan MD        D: 2022   T: 2022   MT: [id number]    Name:     CONOR FELIZ  MRN:      [id number]        Account:      [id number]   :      1990           Service Date: 2022       Document:  [id number]

## 2022-01-18 NOTE — DISCHARGE SUMMARY
"[facility]  Hospitalist Discharge Summary      Date of Admission:  1/12/2022  Date of Discharge:  1/18/2022  Discharging Provider: Zina Myers MD      Discharge Diagnoses   Severe thrombocytopenia, suspect TTP  S/p pheresis  Dental abscess  Abdominal pain, RESOLVED  Acute anemia acute illness, dilutional, possible chronic component to  Constipation.  Obesity with a BMI of 43.34.  Headache, RESOLVED  Chronic insomnia  Uninsured     Follow-ups Needed After Discharge   Follow-up Appointments     Follow-up and recommended labs and tests       Follow up with Dr. Milligan ([phone]), hematologist, later this week as   he recommended to discuss lab results and determine ultimate treatment.   CBC should be obtained prior to that appointment for Dr. Milligan to review.   Per Dr. Toth ([phone]) Oral Maxillofacial Surgery, you should   follow up at dental office on Wed or Thursday for definitive treatment of   your dental abscess. We will give you a weeks worth of antibiotics and   rinse to use. Take as directed by dentist at follow up appointment.  Establish care with primary doctor as soon as you are able.             Unresulted Labs Ordered in the Past 30 Days of this Admission     Date and Time Order Name Status Description    1/18/2022  5:37 AM Bld morphology pathology review In process     1/12/2022  1:51 PM [id number] Activity In process       These results will be followed up by Hematology    Discharge Disposition   Discharged to home  Condition at discharge: Good      Hospital Course    Ivon Gamble is a 31 year old female with a history of asthma admitted on 1/12/2020 with abdominal pain.     Severe thrombocytopenia, suspect TTP  S/p pheresis  Anemia  Presented with bruising and \"red spots\" over 2-3 days PTA.   Platelet count 11, hemoglobin 9.6.  Bilirubin 1.4.  , haptoglobin undetectable.  Reticulocyte count elevated.  Ferritin 590.  Lactic acid normal limits.  Peripheral " smear concerning for TTP.  Tansferred to [facility] for pheresis.   Managed by Hematology and Nephrology  Continue pheresis until platelets greater than 150 K x 2 days.  Catheter removed on 01/18/22    - Continue steroids at current dose per hematology. Dr. Milligan will follow up with her later this week to direct ultimate course of steroids. [id number] still pending.      Recent Labs   Lab 01/18/22  0539 01/17/22  0552 01/16/22  0611 01/15/22  0607 01/14/22  0613 01/13/22  0702   WBC 12.2* 10.7 10.4 11.1* 10.8 8.9   HGB 9.2* 8.6* 8.4* 8.8* 8.6* 9.0*    154 131* 85* 39* 12*      Acute anemia acute illness, dilutional, possible chronic component to  Presented with hemoglobin of 10.8 dropped to 8.6.  Chart reviewed, no prior hemoglobin available [moved from [place] per discussion with patient.]    Iron studies, vitamin B12 within normal limits.  Monitor CBC in 1 week of discharge via Dr. Milligan  Needs to establish primary care once she has insurance, age-appropriate health maintenance outpatient.      Dental abscess  * CT facial bones 1/13:  Periapical abscess of the third left mandibular molar with associated developing subperiosteal abscess and left masseter muscle myositis and overlying left facial cellulitis, as described.  * Followed by oral maxillofacial surgery 1/13, intervention with I&D of abscess and extraction of tooth #17 indicated. This was discussed by my colleague with with OMFS   on 1/17/22 when platelets were > 150: Per Dr. Toth, She can not have extraction while inpatient. He recommended that patient be followed in dental clinic on Wednesday or Thursday and would be able to plan for the procedure.      - I discussed above with Care coordinator on 01/18/22 to assist patient with setting up appointment.   - Received IV Rocephin, IV Flagyl. (1/12), switched to Augmentin BID, 7 day supply given at discharge.   - Continue peridex rinse BID   - On 01/18/22, patient denies any dental pain.      Abdominal pain, RESOLVED  *CT abdomen pelvis showed mild thickening of duodenum and jejunum concerning for inflammatory enteritis.  There was slight stranding to gallbladder.     * Abdominal ultrasound with mild gallbladder wall thickening with negative Rm sign.   * HIDA scan was equivocal.   * Stool negative for enteric pathogens, C. difficile, norovirus    - [facility] saw 1/13 and started on oral PPI, Carafate, GI cocktail as needed.  - Continuing on oral PPI while on steroids on discharge.      Constipation.  X-ray 1/14 with Small to moderate amount of stool.   On day of discharge, she has not yet had a good BM. No significant abdominal discomfort.   Discussed senna and miralax up to BID until having BMs.     Obesity with a BMI of 43.34.  Consider lifestyle modification with diet and exercise.  Consider sleep studies as outpatient.     Headache, RESOLVED  Complaint of headache, CT head 1/13 negative. .     Chronic insomnia.  Melatonin 5 mg given during her stay. I did not order this as patient as no insurance for medication coverage and this can be obtained over the counter.      Social issues  Patient currently with no insurance, requested social work assistance with transition  Primary care as outpatient, needs age-appropriate health maintenance.  Discussed with Care Coordinator on 01/18/22    Communication: Discussed with Nephrology, Hematology, RN, Care Coordinator and patient on 01/18/22      Consultations This Hospital Stay   NEPHROLOGY IP CONSULT  HEMATOLOGY & ONCOLOGY IP CONSULT  GASTROENTEROLOGY IP CONSULT  ORAL SURGERY IP CONSULT  CARE MANAGEMENT / SOCIAL WORK IP CONSULT  ORAL SURGERY IP CONSULT  CARE MANAGEMENT / SOCIAL WORK IP CONSULT  ORAL SURGERY IP CONSULT    Code Status   Full Code    Time Spent on this Encounter   I, Zina Myers MD, personally saw the patient today and spent greater than 30 minutes discharging this patient.       Zina Myers MD  [facility]  [facility] CARE  [address] [place] [address]  Phone: [phone]  ______________________________________________________________________    Physical Exam   Vital Signs: Temp: 97.4  F (36.3  C) Temp src: Oral BP: 115/71 Pulse: 91   Resp: 16 SpO2: 100 % O2 Device: None (Room air)    Weight: 260 lbs 12.87 oz  Constitutional:  NAD,   Neuropsyche:  alert and oriented, answers questions appropriately. Speech normal, face symmetric and moving all 4 extremities without gross focal neurological deficit.   Respiratory:  Breathing comfortably, good air exchange, no wheezes, no crackles.   Cardiovascular:  Regular rate and rhythm, no edema. Dressing over line site is clean and dry no surrounding swelling.   GI:  soft, NT/ND, BS normal  Skin/Integumen:  No acute rash or sign of bleeding.          Primary Care Physician   Physician No Ref-Primary    Discharge Orders      Reason for your hospital stay    Low platelets due to suspected TTP and a dental abscess.     Follow-up and recommended labs and tests     Follow up with Dr. Milligan ([phone]), hematologist, later this week as he recommended to discuss lab results and determine ultimate treatment.  Per Dr. Toth ([phone]) Oral Maxillofacial Surgery, you should follow up at dental office on Wed or Thursday for definitive treatment of your dental abscess. We will give you a weeks worth of antibiotics and rinse to use. Take as directed by dentist at follow up appointment.     Activity    Your activity upon discharge: activity as tolerated     Diet    Follow this diet upon discharge: Regular, dental soft.       Significant Results and Procedures   Most Recent 3 BMP's:Recent Labs   Lab Test 01/18/22  0539 01/17/22  1137 01/17/22  0552 01/16/22  0611     --  138 138   POTASSIUM 3.7 3.7 3.3* 3.5   CHLORIDE 103  --  103 106   CO2 32  --  30 30   BUN 15  --  14 15   CR 0.85  --  0.90 0.88   ANIONGAP 3  --  5 2*   JOSELUIS 8.3*  --  8.1* 8.1*   GLC  97  --  96 98     Most Recent 2 LFT's:Recent Labs   Lab Test 01/18/22  0539 01/17/22  0552   AST 30 54*   ALT 59* 68*   ALKPHOS 50 47   BILITOTAL 0.5 0.3     Most Recent ESR & CRP:Recent Labs   Lab Test 01/17/22  0552   CRP <2.9     Most Recent Anemia Panel:Recent Labs   Lab Test 01/18/22  0539 01/12/22  1801 01/12/22  1413 01/12/22  0140 01/12/22  0107   WBC 12.2*   < > 6.9   < > 8.8   HGB 9.2*   < > 9.4*   < > 10.8*   HCT 28.3*   < > 27.9*   < > 32.9*   *   < > 95   < > 96      < > 11*   < > 11*   IRON  --   --  125  --  151   IRONSAT  --   --  51*  --  53*   RETICABSCT 0.206*  --   --    < >  --    RETP 7.3*  --   --    < >  --    FEB  --   --  244  --  285   TRIPP  --   --   --   --  590*   B12  --   --  539   < >  --    FOLIC  --   --  12.7   < >  --     < > = values in this interval not displayed.   ,   Results for orders placed or performed during the hospital encounter of 01/12/22   IR CVC Non Tunnel Placement    Narrative    [facility]  LOCATION: [facility]  DATE: 1/13/2022    PROCEDURE: NON-TUNNELED DIALYSIS CATHETER PLACEMENT    INTERVENTIONAL RADIOLOGIST: Alex Martinez MD.    INDICATION: 31-year-old female with thrombotic thrombocytopenic  purpura in need of plasmapheresis.    CONSENT: The risks, benefits and alternatives of non-tunneled dialysis  catheter placement were discussed with the patient  in detail. All  questions were answered. Informed consent was given to proceed with  the procedure.    MODERATE SEDATION: None.    CONTRAST: None.  ANTIBIOTICS: None.  ADDITIONAL MEDICATIONS: None.    FLUOROSCOPIC TIME: 0.1 minutes.  RADIATION DOSE: Air Kerma: 2 mGy.    COMPLICATIONS: No immediate complications.    STERILE BARRIER TECHNIQUE: Maximum sterile barrier technique was used.  Cutaneous antisepsis was performed at the operative site with  application of 2% chlorhexidine and large sterile drape. Prior to the  procedure, the  and assistant performed hand hygiene and  wore  hat, mask, sterile gown, and sterile gloves during the entire  procedure.    PROCEDURE:    Using local anesthesia and real-time ultrasound guidance the right  internal jugular vein was accessed. Using this access, a 15 cm  Mahurkar dialysis catheter was advanced using fluoroscopic guidance  until the tip was in the proximal right atrium.    FINDINGS:  Ultrasound shows an anechoic and compressible right internal jugular  vein.  A permanent image was stored.      At the completion of the study, the non-tunneled dialysis catheter tip  lies in the proximal right atrium.      Impression    IMPRESSION:    1.  Non-tunneled dialysis catheter placement, as discussed above.  2.  The catheter position was verified fluoroscopically and this is  ready for use.    DEVANTE BAEZ MD         SYSTEM ID:  [id number]   CT Facial Bones with Contrast    Narrative    CT SCAN OF THE FACE WITH CONTRAST  1/13/2022 10:15 AM     HISTORY: Left eye pain. Rule out fracture or abscess.    TECHNIQUE:  Axial scans of the face following intravenous contrast  with sagittal and coronal reformations. Radiation dose for this scan  was reduced using automated exposure control, adjustment of the mA  and/or kV according to patient size, or iterative reconstruction  technique. 80 mL Isovue-370    COMPARISON: None.    FINDINGS: No fracture. There is a periapical lucency involving the  third left mandibular molar consistent with a periapical abscess.  There is associated erosion of the overlying lingual and buccal  aspects of the mandibular alveolus (series 2 image 36). There appears  to be a small subperiosteal fluid collection along the buccal aspect  of the root of that tooth where there has been erosion through the  mandibular alveolus with that fluid collection measuring approximately  12 mm craniocaudal x 5-6 mm transverse x 8-9 mm AP. This is concerning  for a small subperiosteal abscess. This also appears to partially  extend into or displace the  mesial aspect of the left masseter muscle,  and there our mild inflammatory changes concerning for reactive  myositis involving the left masseter muscle. There is mild asymmetric  left-sided facial subcutaneous fat stranding, consistent with  cellulitis.    There is impaction of the right third maxillary molar. The left third  maxillary molar is also partially impacted. The bilateral orbits  appear normal. The paranasal sinuses are free of significant disease.  Visualized aspects of the mucosal spaces of the oral cavity, pharynx,  and supraglottic larynx otherwise appear unremarkable. Small calcified  tonsillolith in the left palatine tonsil is noted. Visualized  intracranial contents are unremarkable.      Impression    IMPRESSION:  1. Periapical abscess of the third left mandibular molar with  associated developing subperiosteal abscess and left masseter muscle  myositis and overlying left facial cellulitis, as described. Recommend  dental consultation.  2. No facial bone fracture.    YAYO MORALES MD         SYSTEM ID:  [id number]   CT Head w/o Contrast    Narrative    CT SCAN OF THE HEAD WITHOUT CONTRAST   1/13/2022 10:56 AM     HISTORY: Headache, intracranial hemorrhage suspected    TECHNIQUE:  Axial images of the head and coronal reformations without  IV contrast material. Dual-energy technique was utilized. Radiation  dose for this scan was reduced using automated exposure control,  adjustment of the mA and/or kV according to patient size, or iterative  reconstruction technique.    COMPARISON: None.    FINDINGS: There is no evidence of intracranial hemorrhage, mass, acute  infarct or anomaly. The ventricles are normal in size, shape and  configuration. The brain parenchyma and subarachnoid spaces are  normal.     The visualized portions of the sinuses and mastoids appear normal. The  bony calvarium and bones of the skull base appear intact.       Impression    IMPRESSION: No CT findings of acute intracranial  process.    YAYO MORALES MD         SYSTEM ID:  [id number]   XR Abdomen 1 View    Narrative    ABDOMEN ONE VIEW  January 14, 2022 8:48 AM     HISTORY: Evaluate for constipation.    COMPARISON: None.       Impression    IMPRESSION: Small to moderate amount of stool. Nonobstructed bowel gas  pattern.    YAYO NAVA MD         SYSTEM ID:  [id number]       Discharge Medications   Current Discharge Medication List      START taking these medications    Details   amoxicillin-clavulanate (AUGMENTIN) 875-125 MG tablet Take 1 tablet by mouth every 12 hours  Qty: 14 tablet, Refills: 0    Associated Diagnoses: Dental abscess      chlorhexidine (PERIDEX) 0.12 % solution Swish and spit 15 mLs in mouth 2 times daily  Qty: 473 mL, Refills: 0    Comments: Please just give her the rest of the bottle if there is more than 210 ml left, rather than a new Rx. (Patient does not have insurance.)  Associated Diagnoses: Dental abscess      polyethylene glycol (MIRALAX) 17 GM/Dose powder Take 17 g by mouth 2 times daily as needed for constipation  Qty: 510 g, Refills: 0    Associated Diagnoses: Slow transit constipation      sennosides (SENOKOT) 8.6 MG tablet Take 1-2 tablets by mouth 2 times daily as needed for constipation  Qty: 60 tablet, Refills: 0    Associated Diagnoses: Slow transit constipation         CONTINUE these medications which have CHANGED    Details   pantoprazole (PROTONIX) 40 MG EC tablet Take 1 tablet (40 mg) by mouth every morning (before breakfast) While on high dose steroids.  Qty: 30 tablet, Refills: 0    Associated Diagnoses: TTP (thrombotic thrombocytopenic purpura)         CONTINUE these medications which have NOT CHANGED    Details   acetaminophen (TYLENOL) 500 MG tablet Take 500-1,000 mg by mouth every 6 hours as needed for mild pain      predniSONE (DELTASONE) 50 MG tablet Take 2 tablets (100 mg) by mouth daily    Associated Diagnoses: TTP (thrombotic thrombocytopenic purpura)         STOP taking these  medications       ibuprofen (ADVIL/MOTRIN) 200 MG tablet Comments:   Reason for Stopping:             Allergies   No Known Allergies

## 2022-01-18 NOTE — PLAN OF CARE
"A&O x4. VSS on RA. Up ind. LS clear. BS+. BM last night after suppository, feels better now. Voiding.  Minimal bruising and petechiae- pt reports this is much improved.  Denies pain.   Feels \"bloated\" and generalized edema after plasma pheresis  Abscess tooth- appears tissue is growing over the tooth, no black area noted, no facial edema, no pain    Awaiting lab results for tomorrow for disposition     "

## 2022-01-19 ENCOUNTER — PATIENT OUTREACH (OUTPATIENT)
Dept: CARE COORDINATION | Facility: CLINIC | Age: 32
End: 2022-01-19
Payer: COMMERCIAL

## 2022-01-19 DIAGNOSIS — Z71.89 OTHER SPECIFIED COUNSELING: ICD-10-CM

## 2022-01-19 NOTE — PROGRESS NOTES
RECORDS STATUS - ALL OTHER DIAGNOSIS      RECORDS RECEIVED FROM: [facility]   DATE RECEIVED: 1/21/2022   NOTES STATUS DETAILS   OFFICE NOTE from referring provider     OFFICE NOTE from medical oncologist N/A    DISCHARGE SUMMARY from hospital Complete 1/22/2021   Severe thrombocytopenia, suspect TTP   DISCHARGE REPORT from the ER     OPERATIVE REPORT N/A    MEDICATION LIST Complete [facility]   CLINICAL TRIAL TREATMENTS TO DATE     LABS     PATHOLOGY REPORTS     ANYTHING RELATED TO DIAGNOSIS Complete Labs last updated on 1/18/2021   GENONOMIC TESTING     TYPE:     IMAGING (NEED IMAGES & REPORT)     CT SCANS Complete CT Head 1/13/2022    CT Facial 1/13/2022    CT Abdomen Pelvis 1/12/2022   Xray Abdomen  Complete 1/14/2022   MRI     MAMMO     ULTRASOUND Complete US Abdomen Limited 1/12/2022   PET

## 2022-01-19 NOTE — PROGRESS NOTES
"Clinic Care Coordination Contact  [facility]: Post-Discharge Note  SITUATION                                                      Admission:    Admission Date: 01/12/22   Reason for Admission: Low platelets due to suspected TTP and a dental abscess.  Discharge:   Discharge Date: 01/18/22  Discharge Diagnosis: Low platelets due to suspected TTP and a dental abscess.    BACKGROUND                                                      Ivon Gamble is a pleasant 31 year old female with a history of asthma, otherwise healthy, and not on any prescription medications who presents with approximately 1 week of epigastric and upper abdominal pain.  She has also noticed fine red spots and bruising over the last few days.  She describes the pain as worse with eating and with movement.  She has some associated bloating.  She did have nausea and episodes of nonbloody emesis.  She has had both watery stool and constipation, but denies any blood in her stool.  She denies any fevers or chills. No known sick contacts.  She denies lightheadedness, focal weakness, syncope, chest pain, or shortness of breath.  She denies any drug use.  She does note that her sister has low platelet count but does not know the details and was being worked up for autoimmune issues.       ASSESSMENT      Enrollment  Primary Care Care Coordination Status: Not a Candidate    Discharge Assessment  How are you doing now that you are home?: \"I'm ok, just wondering if I have limitations at work\"  How are your symptoms? (Red Flag symptoms escalate to triage hotline per guidelines): Unchanged  Do you feel your condition is stable enough to be safe at home until your provider visit?: Yes  Does the patient have their discharge instructions? : Yes  Does the patient have questions regarding their discharge instructions? : Yes (see comment) (Wondered about work limitations)  Were you started on any new medications or were there changes to any of your previous " medications? : Yes  Does the patient have all of their medications?: Yes  Do you have questions regarding any of your medications? : No  Do you have all of your needed medical supplies or equipment (DME)?  (i.e. oxygen tank, CPAP, cane, etc.): Yes  Discharge follow-up appointment scheduled within 14 calendar days? : Yes  Discharge Follow Up Appointment Date: 01/21/22  Discharge Follow Up Appointment Scheduled with?: Specialty Care Provider                  PLAN                                                      Outpatient Plan:  Follow up with Dr. Milligan ([phone]), hematologist, later this week as   he recommended to discuss lab results and determine ultimate treatment.   CBC should be obtained prior to that appointment for Dr. Milligan to review.   Per Dr. Toth ([phone]) Oral Maxillofacial Surgery, you should   follow up at dental office on Wed or Thursday for definitive treatment of   your dental abscess. We will give you a weeks worth of antibiotics and   rinse to use. Take as directed by dentist at follow up appointment.  Establish care with primary doctor as soon as you are able.       Future Appointments   Date Time Provider Department Center   1/21/2022  9:00 AM Ashley Nguyen, DO [facility]         For any urgent concerns, please contact our 24 hour nurse triage line: [phone] ([phone])         Jodie Fay  Community Health Worker  Connected Care Resource [facility]  [phone]

## 2022-01-19 NOTE — PLAN OF CARE
"A&O, able to communicate needs. VSS.    Internal jugular access was removed by nephrologist, dressing CDI.    AVS and discharge meds given to pt- pt declined reviewing these with writer. \"I will read it myself\"   Pt was anxious to go.     Pt to follow up with hematology and oral surgery.  "

## 2022-01-21 ENCOUNTER — ONCOLOGY VISIT (OUTPATIENT)
Dept: ONCOLOGY | Facility: CLINIC | Age: 32
End: 2022-01-21
Attending: INTERNAL MEDICINE
Payer: MEDICAID

## 2022-01-21 ENCOUNTER — LAB (OUTPATIENT)
Dept: INFUSION THERAPY | Facility: CLINIC | Age: 32
End: 2022-01-21
Attending: INTERNAL MEDICINE
Payer: MEDICAID

## 2022-01-21 ENCOUNTER — PRE VISIT (OUTPATIENT)
Dept: ONCOLOGY | Facility: CLINIC | Age: 32
End: 2022-01-21

## 2022-01-21 VITALS
DIASTOLIC BLOOD PRESSURE: 75 MMHG | TEMPERATURE: 98.3 F | OXYGEN SATURATION: 99 % | HEART RATE: 93 BPM | HEIGHT: 65 IN | SYSTOLIC BLOOD PRESSURE: 116 MMHG | WEIGHT: 256.9 LBS | RESPIRATION RATE: 16 BRPM | BODY MASS INDEX: 42.8 KG/M2

## 2022-01-21 DIAGNOSIS — D69.3 ACUTE IDIOPATHIC THROMBOCYTOPENIC PURPURA (H): Primary | ICD-10-CM

## 2022-01-21 DIAGNOSIS — M31.19 TTP (THROMBOTIC THROMBOCYTOPENIC PURPURA) (H): Primary | ICD-10-CM

## 2022-01-21 DIAGNOSIS — M31.19 TTP (THROMBOTIC THROMBOCYTOPENIC PURPURA) (H): ICD-10-CM

## 2022-01-21 LAB
ALBUMIN SERPL-MCNC: 3 G/DL (ref 3.4–5)
ALP SERPL-CCNC: 59 U/L (ref 40–150)
ALT SERPL W P-5'-P-CCNC: 70 U/L (ref 0–50)
ANION GAP SERPL CALCULATED.3IONS-SCNC: 4 MMOL/L (ref 3–14)
APTT PPP: 27 SECONDS (ref 22–38)
AST SERPL W P-5'-P-CCNC: 25 U/L (ref 0–45)
BASOPHILS # BLD AUTO: 0.1 10E3/UL (ref 0–0.2)
BASOPHILS NFR BLD AUTO: 1 %
BILIRUB SERPL-MCNC: 0.5 MG/DL (ref 0.2–1.3)
BUN SERPL-MCNC: 11 MG/DL (ref 7–30)
CALCIUM SERPL-MCNC: 8.4 MG/DL (ref 8.5–10.1)
CHLORIDE BLD-SCNC: 107 MMOL/L (ref 94–109)
CO2 SERPL-SCNC: 28 MMOL/L (ref 20–32)
CREAT SERPL-MCNC: 0.78 MG/DL (ref 0.52–1.04)
EOSINOPHIL # BLD AUTO: 0.2 10E3/UL (ref 0–0.7)
EOSINOPHIL NFR BLD AUTO: 3 %
ERYTHROCYTE [DISTWIDTH] IN BLOOD BY AUTOMATED COUNT: 16.2 % (ref 10–15)
FIBRINOGEN PPP-MCNC: 306 MG/DL (ref 170–490)
GFR SERPL CREATININE-BSD FRML MDRD: >90 ML/MIN/1.73M2
GLUCOSE BLD-MCNC: 99 MG/DL (ref 70–99)
HCT VFR BLD AUTO: 31.9 % (ref 35–47)
HGB BLD-MCNC: 10.3 G/DL (ref 11.7–15.7)
IMM GRANULOCYTES # BLD: 0.1 10E3/UL
IMM GRANULOCYTES NFR BLD: 1 %
INR PPP: 0.95 (ref 0.85–1.15)
LDH SERPL L TO P-CCNC: 211 U/L (ref 81–234)
LYMPHOCYTES # BLD AUTO: 1.5 10E3/UL (ref 0.8–5.3)
LYMPHOCYTES NFR BLD AUTO: 18 %
Lab: NORMAL
MCH RBC QN AUTO: 33.4 PG (ref 26.5–33)
MCHC RBC AUTO-ENTMCNC: 32.3 G/DL (ref 31.5–36.5)
MCV RBC AUTO: 104 FL (ref 78–100)
MONOCYTES # BLD AUTO: 0.6 10E3/UL (ref 0–1.3)
MONOCYTES NFR BLD AUTO: 7 %
NEUTROPHILS # BLD AUTO: 5.9 10E3/UL (ref 1.6–8.3)
NEUTROPHILS NFR BLD AUTO: 70 %
NRBC # BLD AUTO: 0 10E3/UL
NRBC BLD AUTO-RTO: 0 /100
PATH REPORT.COMMENTS IMP SPEC: NORMAL
PATH REPORT.FINAL DX SPEC: NORMAL
PATH REPORT.MICROSCOPIC SPEC OTHER STN: NORMAL
PATH REPORT.MICROSCOPIC SPEC OTHER STN: NORMAL
PERFORMING LABORATORY: NORMAL
PLATELET # BLD AUTO: 181 10E3/UL (ref 150–450)
POTASSIUM BLD-SCNC: 3.8 MMOL/L (ref 3.4–5.3)
PROT SERPL-MCNC: 6.2 G/DL (ref 6.8–8.8)
RBC # BLD AUTO: 3.08 10E6/UL (ref 3.8–5.2)
RETICS # AUTO: 0.25 10E6/UL (ref 0.03–0.1)
RETICS/RBC NFR AUTO: 8.2 % (ref 0.5–2)
SODIUM SERPL-SCNC: 139 MMOL/L (ref 133–144)
SPECIMEN STATUS: NORMAL
TEST NAME: NORMAL
WBC # BLD AUTO: 8.3 10E3/UL (ref 4–11)

## 2022-01-21 PROCEDURE — 84999 UNLISTED CHEMISTRY PROCEDURE: CPT | Performed by: INTERNAL MEDICINE

## 2022-01-21 PROCEDURE — G0463 HOSPITAL OUTPT CLINIC VISIT: HCPCS

## 2022-01-21 PROCEDURE — 85397 CLOTTING FUNCT ACTIVITY: CPT | Performed by: INTERNAL MEDICINE

## 2022-01-21 PROCEDURE — 99205 OFFICE O/P NEW HI 60 MIN: CPT | Performed by: INTERNAL MEDICINE

## 2022-01-21 PROCEDURE — 85060 BLOOD SMEAR INTERPRETATION: CPT | Mod: 26 | Performed by: PATHOLOGY

## 2022-01-21 PROCEDURE — 85045 AUTOMATED RETICULOCYTE COUNT: CPT | Performed by: INTERNAL MEDICINE

## 2022-01-21 PROCEDURE — 85025 COMPLETE CBC W/AUTO DIFF WBC: CPT | Performed by: INTERNAL MEDICINE

## 2022-01-21 PROCEDURE — 83615 LACTATE (LD) (LDH) ENZYME: CPT | Performed by: INTERNAL MEDICINE

## 2022-01-21 PROCEDURE — 36415 COLL VENOUS BLD VENIPUNCTURE: CPT | Performed by: INTERNAL MEDICINE

## 2022-01-21 PROCEDURE — 85610 PROTHROMBIN TIME: CPT | Performed by: INTERNAL MEDICINE

## 2022-01-21 PROCEDURE — 85384 FIBRINOGEN ACTIVITY: CPT | Performed by: INTERNAL MEDICINE

## 2022-01-21 PROCEDURE — 85730 THROMBOPLASTIN TIME PARTIAL: CPT | Performed by: INTERNAL MEDICINE

## 2022-01-21 PROCEDURE — 83010 ASSAY OF HAPTOGLOBIN QUANT: CPT | Performed by: INTERNAL MEDICINE

## 2022-01-21 PROCEDURE — 82374 ASSAY BLOOD CARBON DIOXIDE: CPT | Performed by: INTERNAL MEDICINE

## 2022-01-21 PROCEDURE — 82947 ASSAY GLUCOSE BLOOD QUANT: CPT | Performed by: INTERNAL MEDICINE

## 2022-01-21 RX ORDER — DIPHENHYDRAMINE HCL 25 MG
50 CAPSULE ORAL ONCE
Status: CANCELLED
Start: 2022-02-28

## 2022-01-21 RX ORDER — LORAZEPAM 2 MG/ML
0.5 INJECTION INTRAMUSCULAR EVERY 4 HOURS PRN
Status: CANCELLED | OUTPATIENT
Start: 2022-02-21

## 2022-01-21 RX ORDER — ALBUTEROL SULFATE 90 UG/1
1-2 AEROSOL, METERED RESPIRATORY (INHALATION)
Status: CANCELLED
Start: 2022-02-08

## 2022-01-21 RX ORDER — ALBUTEROL SULFATE 90 UG/1
1-2 AEROSOL, METERED RESPIRATORY (INHALATION)
Status: CANCELLED
Start: 2022-02-28

## 2022-01-21 RX ORDER — METHYLPREDNISOLONE SODIUM SUCCINATE 125 MG/2ML
125 INJECTION, POWDER, LYOPHILIZED, FOR SOLUTION INTRAMUSCULAR; INTRAVENOUS
Status: CANCELLED
Start: 2022-02-08

## 2022-01-21 RX ORDER — NALOXONE HYDROCHLORIDE 0.4 MG/ML
0.2 INJECTION, SOLUTION INTRAMUSCULAR; INTRAVENOUS; SUBCUTANEOUS
Status: CANCELLED | OUTPATIENT
Start: 2022-02-28

## 2022-01-21 RX ORDER — LORAZEPAM 2 MG/ML
0.5 INJECTION INTRAMUSCULAR EVERY 4 HOURS PRN
Status: CANCELLED | OUTPATIENT
Start: 2022-02-28

## 2022-01-21 RX ORDER — ACETAMINOPHEN 325 MG/1
650 TABLET ORAL ONCE
Status: CANCELLED
Start: 2022-02-21

## 2022-01-21 RX ORDER — ALBUTEROL SULFATE 90 UG/1
1-2 AEROSOL, METERED RESPIRATORY (INHALATION)
Status: CANCELLED
Start: 2022-02-17

## 2022-01-21 RX ORDER — LORAZEPAM 2 MG/ML
0.5 INJECTION INTRAMUSCULAR EVERY 4 HOURS PRN
Status: CANCELLED | OUTPATIENT
Start: 2022-02-17

## 2022-01-21 RX ORDER — ALBUTEROL SULFATE 0.83 MG/ML
2.5 SOLUTION RESPIRATORY (INHALATION)
Status: CANCELLED | OUTPATIENT
Start: 2022-02-17

## 2022-01-21 RX ORDER — MEPERIDINE HYDROCHLORIDE 25 MG/ML
25 INJECTION INTRAMUSCULAR; INTRAVENOUS; SUBCUTANEOUS EVERY 30 MIN PRN
Status: CANCELLED | OUTPATIENT
Start: 2022-02-17

## 2022-01-21 RX ORDER — METHYLPREDNISOLONE SODIUM SUCCINATE 125 MG/2ML
125 INJECTION, POWDER, LYOPHILIZED, FOR SOLUTION INTRAMUSCULAR; INTRAVENOUS
Status: CANCELLED
Start: 2022-02-21

## 2022-01-21 RX ORDER — DIPHENHYDRAMINE HYDROCHLORIDE 50 MG/ML
50 INJECTION INTRAMUSCULAR; INTRAVENOUS
Status: CANCELLED
Start: 2022-02-21

## 2022-01-21 RX ORDER — ACETAMINOPHEN 325 MG/1
650 TABLET ORAL ONCE
Status: CANCELLED
Start: 2022-02-28

## 2022-01-21 RX ORDER — DIPHENHYDRAMINE HYDROCHLORIDE 50 MG/ML
50 INJECTION INTRAMUSCULAR; INTRAVENOUS
Status: CANCELLED
Start: 2022-02-17

## 2022-01-21 RX ORDER — DIPHENHYDRAMINE HYDROCHLORIDE 50 MG/ML
50 INJECTION INTRAMUSCULAR; INTRAVENOUS
Status: CANCELLED
Start: 2022-02-28

## 2022-01-21 RX ORDER — MEPERIDINE HYDROCHLORIDE 25 MG/ML
25 INJECTION INTRAMUSCULAR; INTRAVENOUS; SUBCUTANEOUS
Status: CANCELLED
Start: 2022-02-08

## 2022-01-21 RX ORDER — ALBUTEROL SULFATE 0.83 MG/ML
2.5 SOLUTION RESPIRATORY (INHALATION)
Status: CANCELLED | OUTPATIENT
Start: 2022-02-28

## 2022-01-21 RX ORDER — HEPARIN SODIUM (PORCINE) LOCK FLUSH IV SOLN 100 UNIT/ML 100 UNIT/ML
5 SOLUTION INTRAVENOUS
Status: CANCELLED | OUTPATIENT
Start: 2022-02-17

## 2022-01-21 RX ORDER — ACETAMINOPHEN 325 MG/1
650 TABLET ORAL ONCE
Status: CANCELLED
Start: 2022-02-08

## 2022-01-21 RX ORDER — NALOXONE HYDROCHLORIDE 0.4 MG/ML
0.2 INJECTION, SOLUTION INTRAMUSCULAR; INTRAVENOUS; SUBCUTANEOUS
Status: CANCELLED | OUTPATIENT
Start: 2022-02-21

## 2022-01-21 RX ORDER — HEPARIN SODIUM (PORCINE) LOCK FLUSH IV SOLN 100 UNIT/ML 100 UNIT/ML
5 SOLUTION INTRAVENOUS
Status: CANCELLED | OUTPATIENT
Start: 2022-02-08

## 2022-01-21 RX ORDER — MEPERIDINE HYDROCHLORIDE 25 MG/ML
25 INJECTION INTRAMUSCULAR; INTRAVENOUS; SUBCUTANEOUS EVERY 30 MIN PRN
Status: CANCELLED | OUTPATIENT
Start: 2022-02-28

## 2022-01-21 RX ORDER — HEPARIN SODIUM (PORCINE) LOCK FLUSH IV SOLN 100 UNIT/ML 100 UNIT/ML
5 SOLUTION INTRAVENOUS
Status: CANCELLED | OUTPATIENT
Start: 2022-02-28

## 2022-01-21 RX ORDER — DIPHENHYDRAMINE HCL 25 MG
50 CAPSULE ORAL ONCE
Status: CANCELLED
Start: 2022-02-21

## 2022-01-21 RX ORDER — METHYLPREDNISOLONE SODIUM SUCCINATE 125 MG/2ML
125 INJECTION, POWDER, LYOPHILIZED, FOR SOLUTION INTRAMUSCULAR; INTRAVENOUS
Status: CANCELLED
Start: 2022-02-28

## 2022-01-21 RX ORDER — NALOXONE HYDROCHLORIDE 0.4 MG/ML
0.2 INJECTION, SOLUTION INTRAMUSCULAR; INTRAVENOUS; SUBCUTANEOUS
Status: CANCELLED | OUTPATIENT
Start: 2022-02-17

## 2022-01-21 RX ORDER — HEPARIN SODIUM,PORCINE 10 UNIT/ML
5 VIAL (ML) INTRAVENOUS
Status: CANCELLED | OUTPATIENT
Start: 2022-02-08

## 2022-01-21 RX ORDER — METHYLPREDNISOLONE SODIUM SUCCINATE 125 MG/2ML
125 INJECTION, POWDER, LYOPHILIZED, FOR SOLUTION INTRAMUSCULAR; INTRAVENOUS
Status: CANCELLED
Start: 2022-02-17

## 2022-01-21 RX ORDER — ALBUTEROL SULFATE 0.83 MG/ML
2.5 SOLUTION RESPIRATORY (INHALATION)
Status: CANCELLED | OUTPATIENT
Start: 2022-02-08

## 2022-01-21 RX ORDER — EPINEPHRINE 1 MG/ML
0.3 INJECTION, SOLUTION INTRAMUSCULAR; SUBCUTANEOUS EVERY 5 MIN PRN
Status: CANCELLED | OUTPATIENT
Start: 2022-02-21

## 2022-01-21 RX ORDER — MEPERIDINE HYDROCHLORIDE 25 MG/ML
25 INJECTION INTRAMUSCULAR; INTRAVENOUS; SUBCUTANEOUS EVERY 30 MIN PRN
Status: CANCELLED | OUTPATIENT
Start: 2022-02-08

## 2022-01-21 RX ORDER — MEPERIDINE HYDROCHLORIDE 25 MG/ML
25 INJECTION INTRAMUSCULAR; INTRAVENOUS; SUBCUTANEOUS
Status: CANCELLED
Start: 2022-02-28

## 2022-01-21 RX ORDER — HEPARIN SODIUM,PORCINE 10 UNIT/ML
5 VIAL (ML) INTRAVENOUS
Status: CANCELLED | OUTPATIENT
Start: 2022-02-21

## 2022-01-21 RX ORDER — DIPHENHYDRAMINE HYDROCHLORIDE 50 MG/ML
50 INJECTION INTRAMUSCULAR; INTRAVENOUS
Status: CANCELLED
Start: 2022-02-08

## 2022-01-21 RX ORDER — ALBUTEROL SULFATE 0.83 MG/ML
2.5 SOLUTION RESPIRATORY (INHALATION)
Status: CANCELLED | OUTPATIENT
Start: 2022-02-21

## 2022-01-21 RX ORDER — HEPARIN SODIUM,PORCINE 10 UNIT/ML
5 VIAL (ML) INTRAVENOUS
Status: CANCELLED | OUTPATIENT
Start: 2022-02-28

## 2022-01-21 RX ORDER — EPINEPHRINE 1 MG/ML
0.3 INJECTION, SOLUTION INTRAMUSCULAR; SUBCUTANEOUS EVERY 5 MIN PRN
Status: CANCELLED | OUTPATIENT
Start: 2022-02-17

## 2022-01-21 RX ORDER — ACETAMINOPHEN 325 MG/1
650 TABLET ORAL ONCE
Status: CANCELLED
Start: 2022-02-17

## 2022-01-21 RX ORDER — HEPARIN SODIUM (PORCINE) LOCK FLUSH IV SOLN 100 UNIT/ML 100 UNIT/ML
5 SOLUTION INTRAVENOUS
Status: CANCELLED | OUTPATIENT
Start: 2022-02-21

## 2022-01-21 RX ORDER — MEPERIDINE HYDROCHLORIDE 25 MG/ML
25 INJECTION INTRAMUSCULAR; INTRAVENOUS; SUBCUTANEOUS EVERY 30 MIN PRN
Status: CANCELLED | OUTPATIENT
Start: 2022-02-21

## 2022-01-21 RX ORDER — DIPHENHYDRAMINE HCL 25 MG
50 CAPSULE ORAL ONCE
Status: CANCELLED
Start: 2022-02-17

## 2022-01-21 RX ORDER — MEPERIDINE HYDROCHLORIDE 25 MG/ML
25 INJECTION INTRAMUSCULAR; INTRAVENOUS; SUBCUTANEOUS
Status: CANCELLED
Start: 2022-02-21

## 2022-01-21 RX ORDER — MEPERIDINE HYDROCHLORIDE 25 MG/ML
25 INJECTION INTRAMUSCULAR; INTRAVENOUS; SUBCUTANEOUS
Status: CANCELLED
Start: 2022-02-17

## 2022-01-21 RX ORDER — DIPHENHYDRAMINE HCL 25 MG
50 CAPSULE ORAL ONCE
Status: CANCELLED
Start: 2022-02-08

## 2022-01-21 RX ORDER — NALOXONE HYDROCHLORIDE 0.4 MG/ML
0.2 INJECTION, SOLUTION INTRAMUSCULAR; INTRAVENOUS; SUBCUTANEOUS
Status: CANCELLED | OUTPATIENT
Start: 2022-02-08

## 2022-01-21 RX ORDER — HEPARIN SODIUM,PORCINE 10 UNIT/ML
5 VIAL (ML) INTRAVENOUS
Status: CANCELLED | OUTPATIENT
Start: 2022-02-17

## 2022-01-21 RX ORDER — EPINEPHRINE 1 MG/ML
0.3 INJECTION, SOLUTION INTRAMUSCULAR; SUBCUTANEOUS EVERY 5 MIN PRN
Status: CANCELLED | OUTPATIENT
Start: 2022-02-08

## 2022-01-21 RX ORDER — ALBUTEROL SULFATE 90 UG/1
1-2 AEROSOL, METERED RESPIRATORY (INHALATION)
Status: CANCELLED
Start: 2022-02-21

## 2022-01-21 RX ORDER — EPINEPHRINE 1 MG/ML
0.3 INJECTION, SOLUTION INTRAMUSCULAR; SUBCUTANEOUS EVERY 5 MIN PRN
Status: CANCELLED | OUTPATIENT
Start: 2022-02-28

## 2022-01-21 RX ORDER — LORAZEPAM 2 MG/ML
0.5 INJECTION INTRAMUSCULAR EVERY 4 HOURS PRN
Status: CANCELLED | OUTPATIENT
Start: 2022-02-08

## 2022-01-21 ASSESSMENT — PAIN SCALES - GENERAL: PAINLEVEL: NO PAIN (0)

## 2022-01-21 ASSESSMENT — MIFFLIN-ST. JEOR: SCORE: 1877.2

## 2022-01-21 NOTE — PROGRESS NOTES
Writer discussed Rituximab education with Ivon. Writer discussed what it's used for, how it's given, side effects, when to contact your doctor, precautions, labs, and self care tips.    Writer discussed need to have a  due to premedications, bringing food, and dressing comfortably.     Ivon was teary during education and expressed concerns about missing work due to side effects. Writer offered the support of social work, but Ivon declined.     Shira Brown RN on 1/21/2022 at 2:51 PM

## 2022-01-21 NOTE — NURSING NOTE
"Oncology Rooming Note    January 21, 2022 8:50 AM   Ivon Gamble is a 31 year old female who presents for:    Chief Complaint   Patient presents with     Oncology Clinic Visit     New Patient     Initial Vitals: /75   Pulse 93   Temp 98.3  F (36.8  C) (Tympanic)   Resp 16   Ht 1.645 m (5' 4.75\")   Wt 116.5 kg (256 lb 14.4 oz)   LMP 12/22/2021   SpO2 99%   BMI 43.08 kg/m   Estimated body mass index is 43.08 kg/m  as calculated from the following:    Height as of this encounter: 1.645 m (5' 4.75\").    Weight as of this encounter: 116.5 kg (256 lb 14.4 oz). Body surface area is 2.31 meters squared.  No Pain (0) Comment: Data Unavailable   Patient's last menstrual period was 12/22/2021.  Allergies reviewed: Yes  Medications reviewed: Yes    Medications: Medication refills not needed today.  Pharmacy name entered into EPIC: [id number] - [place], [place] - [address]  [address]ARAH [address]A    Clinical concerns: New Patient        Iva Palacio CMA              "

## 2022-01-21 NOTE — PROGRESS NOTES
Infusion Nursing Note:  Ivon JORDIN Tye presents today for peripheral labs.     present during visit today: Not Applicable.    Note: N/A.    Intravenous Access:  Lab draw site L AC, Needle type butterfly, Gauge 23.  Labs drawn without difficulty.    Treatment Conditions:  NA    Post Lab Assessment:  Patient tolerated blood collection       Discharge Plan:   Patient and/or family verbalized understanding of  instructions and all questions answered.  Patient  to lobby in stable condition accompanied by: self.  Patient to see provider today: Yes:  Saw Rivera today  Departure Mode: Ambulatory.  Meenakshi Domingo, RN, RN

## 2022-01-21 NOTE — LETTER
1/21/2022         RE: Ivon Gamble  [address]  [facility] [address]        Dear Colleague,    Thank you for referring your patient, Ivon Gamble, to the [facility]. Please see a copy of my visit note below.    [facility]    Hematology/Oncology New Patient Note    Treatment Summary:      Today's Date: 1/21/22    Reason for Consultation: Acquired TTP      HISTORY OF PRESENT ILLNESS: Ivon Gamble is a 31 year old female with PCOS who was hospitalized 1/12/22-1/18/22 for tooth abscess/cellulitis and acquired TTP.     Patient has recently relocated to [place]. While there, she was evaluated by dentistry for tooth ache and pain and was told she would not require extraction. This admission, patient had been feeling unwell and had developed acute facial pain. When she had presented to the hospital, platelets were 11K with LDH >600, haptoglobin undetectable. ANTWON was negative. Coag studies WNL. Peripheral smear had shown significant schistocytosis. She was immediately started on steroids and transferred to [facility] for plasma exchange of which she received 1/13/22-1/18/22. Enteric stool studies returned negative. [id number] returned after she was discharged at <5%. Hepatitis and HIV studies negative.    CT imaging had shown a third left mandibular molar/subperisoteal abscess, left masseter muscle myositis, and facial cellulitis. She was treated with rocephin/flagyl and transitioned to augmentin upon discharge. She underwent I&D on 1/13 and a tooth extraction could not be performed inpatient. She was evaluated by both ENT and OMFS.    On date of discharge, Hb was 9.2 and . LDH had normalized. She has completed augmentin and prednisone therapy. She feels significantly improved, but is still in need of tooth extraction.       REVIEW OF SYSTEMS:   A 14 point ROS was reviewed with pertinent positives and negatives in the HPI.       HOME  MEDICATIONS:  Current Outpatient Medications   Medication Sig Dispense Refill     acetaminophen (TYLENOL) 500 MG tablet Take 500-1,000 mg by mouth every 6 hours as needed for mild pain       amoxicillin-clavulanate (AUGMENTIN) 875-125 MG tablet Take 1 tablet by mouth every 12 hours 14 tablet 0     chlorhexidine (PERIDEX) 0.12 % solution Swish and spit 15 mLs in mouth 2 times daily 473 mL 0     pantoprazole (PROTONIX) 40 MG EC tablet Take 1 tablet (40 mg) by mouth every morning (before breakfast) While on high dose steroids. 30 tablet 0     polyethylene glycol (MIRALAX) 17 GM/Dose powder Take 17 g by mouth 2 times daily as needed for constipation 510 g 0     predniSONE (DELTASONE) 50 MG tablet Take 2 tablets (100 mg) by mouth daily With taper directed by hematology at follow up. 28 tablet 0     sennosides (SENOKOT) 8.6 MG tablet Take 1-2 tablets by mouth 2 times daily as needed for constipation 60 tablet 0         ALLERGIES:  No Known Allergies      PAST MEDICAL HISTORY:  No past medical history on file.      PAST SURGICAL HISTORY:  Past Surgical History:   Procedure Laterality Date     IR CVC NON TUNNEL PLACEMENT  1/13/2022         SOCIAL HISTORY:  Social History     Socioeconomic History     Marital status: Single     Spouse name: Not on file     Number of children: Not on file     Years of education: Not on file     Highest education level: Not on file   Occupational History     Not on file   Tobacco Use     Smoking status: Not on file     Smokeless tobacco: Not on file   Substance and Sexual Activity     Alcohol use: Not on file     Drug use: Not on file     Sexual activity: Not on file   Other Topics Concern     Not on file   Social History Narrative    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     Social Determinants of Health     Financial Resource Strain: Not on file   Food Insecurity: Not on file   Transportation Needs: Not on file   Physical Activity: Not on file   Stress: Not on file  "  Social Connections: Not on file   Intimate Partner Violence: Not on file   Housing Stability: Not on file         FAMILY HISTORY:  Family History   Problem Relation Age of Onset     Diabetes Mother      Liver Disease Father          PHYSICAL EXAM:  Vital signs:  /75   Pulse 93   Temp 98.3  F (36.8  C) (Tympanic)   Resp 16   Ht 1.645 m (5' 4.75\")   Wt 116.5 kg (256 lb 14.4 oz)   LMP 2021   SpO2 99%   BMI 43.08 kg/m     ECO  GENERAL/CONSTITUTIONAL: No acute distress.  EYES: Pupils are equal, round, and react to light and accommodation. Extraocular movements intact.  No scleral icterus.  ENT/MOUTH: Neck supple. Oropharynx clear, no mucositis. Patient has cracked lower left molar; no erythema or visible abscess.  LYMPH: No anterior cervical, posterior cervical, supraclavicular, axillary or inguinal adenopathy.   RESPIRATORY: Clear to auscultation bilaterally. No crackles or wheezing.   CARDIOVASCULAR: Regular rate and rhythm without murmurs, gallops, or rubs.  GASTROINTESTINAL: No hepatosplenomegaly, masses, or tenderness. The patient has normal bowel sounds. No guarding.  No distention.  MUSCULOSKELETAL: Warm and well-perfused, no cyanosis, clubbing, or edema.  NEUROLOGIC: Cranial nerves II-XII are intact. Alert, oriented, answers questions appropriately.  INTEGUMENTARY: No rashes or jaundice.  GAIT: Steady, does not use assistive device      LABS:  CBC RESULTS:   Recent Labs   Lab Test 22  0539   WBC 12.2*   RBC 2.81*   HGB 9.2*   HCT 28.3*   *   MCH 32.7   MCHC 32.5   RDW 16.1*          Recent Labs   Lab Test 22  0539 22  1137 22  0552     --  138   POTASSIUM 3.7 3.7 3.3*   CHLORIDE 103  --  103   CO2 32  --  30   ANIONGAP 3  --  5   GLC 97  --  96   BUN 15  --  14   CR 0.85  --  0.90   JOSELUIS 8.3*  --  8.1*        Ref. Range 2022 01:07 2022 01:40   Hepatitis C Antibody Latest Ref Range: Nonreactive  Nonreactive     HIV Antigen Antibody " Combo Latest Ref Range: Nonreactive    Nonreactive   HIV-1/HIV-2 Antibody Latest Ref Range: Non Reactive    Non Reactive           Ref. Range 1/12/2022 01:07   Ferritin Latest Ref Range: 12 - 150 ng/mL 590 (H)        Ref. Range 1/12/2022 14:13   Folate Latest Ref Range: >=5.4 ng/mL 12.7   Iron Latest Ref Range: 35 - 180 ug/dL 125   Iron Binding Cap Latest Ref Range: 240 - 430 ug/dL 244   Iron Saturation Index Latest Ref Range: 15 - 46 % 51 (H)   Lactate Dehydrogenase Latest Ref Range: 81 - 234 U/L 591 (H)   Vitamin B12 Latest Ref Range: 193 - 986 pg/mL 539       Ref. Range 1/12/2022 14:13 1/13/2022 07:02   Hep B Surface Agn Latest Ref Range: Nonreactive  Nonreactive    Hepatitis A Antibody IgG Latest Ref Range: Nonreactive  Nonreactive    Hepatitis A IgM Kori Latest Ref Range: Nonreactive  Nonreactive    Hepatitis B Core Kori Latest Ref Range: Nonreactive   Nonreactive   Hepatitis B Surface Antibody Latest Ref Range: <8.00 m[IU]/mL 810.24 (H)    Hepatitis C Antibody Latest Ref Range: Nonreactive  Nonreactive      PATHOLOGY:  Final Diagnosis 1/13/21:   Peripheral blood, morphology:  - Marked thrombocytopenia.  - Moderate anemia, normocytic and normochromic, with RBC fragments (including rare schistocytes).  - WBC subsets quantitatively within normal limits.        IMAGING:  CT A/P 1/12/22:  IMPRESSION:   1.  Mild mural thickening of the duodenum and proximal jejunum, correlate for infectious or inflammatory enteritis.  2.  Normal appendix. No obstruction, colitis, or diverticulitis.    CT facial bones 1/13/22:  IMPRESSION:  1. Periapical abscess of the third left mandibular molar with  associated developing subperiosteal abscess and left masseter muscle  myositis and overlying left facial cellulitis, as described. Recommend  dental consultation.  2. No facial bone fracture.       ASSESSMENT/PLAN:  Ivon Gamble is a 31 year old female with PCOS who was hospitalized 1/12/22-1/18/22 for tooth abscess/cellulitis and  acquired TTP.      1) Acute thrombocytopenia, concerning for TTP/MAHA (PLASMIC score 6; AVQCSM41 <5%)  -Iron studies, B12/folate WNL.  -Enteric stool studies negative.  -s/p catheter placement 1/13/22 AM and plasma exchange 1/13/22-1/18/22. Catheter has been removed.  -HIV and Hepatitis studies negative.  -CBC, LDH recovered upon discharge on 1/18/22. Will recheck [id number] activity and inhibitor level at this time to assess for improvement. (send out to [facility]). If continues to be low, I discussed with the patient initiation of treatment with Rituximab 375 mg/m2 weekly x4 weeks.     2) Acute third left mandibular molar/subperiosteal abscess, left masseter muscle myositis, and facial cellulitis, improved  -s/p rocephin/flagyl and augmentin.  -Patient in need of dentistry for tooth extraction.     3) Follow up in 1 week to review the above. Rituximab education provided to the patient today. I have asked our RN care coordinator to help establish patient with PCP and dentistry. I discussed with patient low threshold for rituximab treatment as this will help to prevent TTP relapse.    Complexity: High.      Ashley Nguyen DO  Hematology/Oncology  [facility]          Again, thank you for allowing me to participate in the care of your patient.        Sincerely,        Ashley Nguyen DO

## 2022-01-21 NOTE — PROGRESS NOTES
[facility]    Hematology/Oncology New Patient Note    Treatment Summary:      Today's Date: 1/21/22    Reason for Consultation: Acquired TTP      HISTORY OF PRESENT ILLNESS: Ivon Gamble is a 31 year old female with PCOS who was hospitalized 1/12/22-1/18/22 for tooth abscess/cellulitis and acquired TTP.     Patient has recently relocated to [place]. While there, she was evaluated by dentistry for tooth ache and pain and was told she would not require extraction. This admission, patient had been feeling unwell and had developed acute facial pain. When she had presented to the hospital, platelets were 11K with LDH >600, haptoglobin undetectable. ANTWON was negative. Coag studies WNL. Peripheral smear had shown significant schistocytosis. She was immediately started on steroids and transferred to [facility] for plasma exchange of which she received 1/13/22-1/18/22. Enteric stool studies returned negative. [id number] returned after she was discharged at <5%. Hepatitis and HIV studies negative.    CT imaging had shown a third left mandibular molar/subperisoteal abscess, left masseter muscle myositis, and facial cellulitis. She was treated with rocephin/flagyl and transitioned to augmentin upon discharge. She underwent I&D on 1/13 and a tooth extraction could not be performed inpatient. She was evaluated by both ENT and OMFS.    On date of discharge, Hb was 9.2 and . LDH had normalized. She has completed augmentin and prednisone therapy. She feels significantly improved, but is still in need of tooth extraction.       REVIEW OF SYSTEMS:   A 14 point ROS was reviewed with pertinent positives and negatives in the HPI.       HOME MEDICATIONS:  Current Outpatient Medications   Medication Sig Dispense Refill     acetaminophen (TYLENOL) 500 MG tablet Take 500-1,000 mg by mouth every 6 hours as needed for mild pain       amoxicillin-clavulanate (AUGMENTIN) 875-125 MG tablet Take 1 tablet by mouth every 12  hours 14 tablet 0     chlorhexidine (PERIDEX) 0.12 % solution Swish and spit 15 mLs in mouth 2 times daily 473 mL 0     pantoprazole (PROTONIX) 40 MG EC tablet Take 1 tablet (40 mg) by mouth every morning (before breakfast) While on high dose steroids. 30 tablet 0     polyethylene glycol (MIRALAX) 17 GM/Dose powder Take 17 g by mouth 2 times daily as needed for constipation 510 g 0     predniSONE (DELTASONE) 50 MG tablet Take 2 tablets (100 mg) by mouth daily With taper directed by hematology at follow up. 28 tablet 0     sennosides (SENOKOT) 8.6 MG tablet Take 1-2 tablets by mouth 2 times daily as needed for constipation 60 tablet 0         ALLERGIES:  No Known Allergies      PAST MEDICAL HISTORY:  No past medical history on file.      PAST SURGICAL HISTORY:  Past Surgical History:   Procedure Laterality Date     IR CVC NON TUNNEL PLACEMENT  1/13/2022         SOCIAL HISTORY:  Social History     Socioeconomic History     Marital status: Single     Spouse name: Not on file     Number of children: Not on file     Years of education: Not on file     Highest education level: Not on file   Occupational History     Not on file   Tobacco Use     Smoking status: Not on file     Smokeless tobacco: Not on file   Substance and Sexual Activity     Alcohol use: Not on file     Drug use: Not on file     Sexual activity: Not on file   Other Topics Concern     Not on file   Social History Narrative    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     Social Determinants of Health     Financial Resource Strain: Not on file   Food Insecurity: Not on file   Transportation Needs: Not on file   Physical Activity: Not on file   Stress: Not on file   Social Connections: Not on file   Intimate Partner Violence: Not on file   Housing Stability: Not on file         FAMILY HISTORY:  Family History   Problem Relation Age of Onset     Diabetes Mother      Liver Disease Father          PHYSICAL EXAM:  Vital signs:  /75    "Pulse 93   Temp 98.3  F (36.8  C) (Tympanic)   Resp 16   Ht 1.645 m (5' 4.75\")   Wt 116.5 kg (256 lb 14.4 oz)   LMP 2021   SpO2 99%   BMI 43.08 kg/m     ECO  GENERAL/CONSTITUTIONAL: No acute distress.  EYES: Pupils are equal, round, and react to light and accommodation. Extraocular movements intact.  No scleral icterus.  ENT/MOUTH: Neck supple. Oropharynx clear, no mucositis. Patient has cracked lower left molar; no erythema or visible abscess.  LYMPH: No anterior cervical, posterior cervical, supraclavicular, axillary or inguinal adenopathy.   RESPIRATORY: Clear to auscultation bilaterally. No crackles or wheezing.   CARDIOVASCULAR: Regular rate and rhythm without murmurs, gallops, or rubs.  GASTROINTESTINAL: No hepatosplenomegaly, masses, or tenderness. The patient has normal bowel sounds. No guarding.  No distention.  MUSCULOSKELETAL: Warm and well-perfused, no cyanosis, clubbing, or edema.  NEUROLOGIC: Cranial nerves II-XII are intact. Alert, oriented, answers questions appropriately.  INTEGUMENTARY: No rashes or jaundice.  GAIT: Steady, does not use assistive device      LABS:  CBC RESULTS:   Recent Labs   Lab Test 22  0539   WBC 12.2*   RBC 2.81*   HGB 9.2*   HCT 28.3*   *   MCH 32.7   MCHC 32.5   RDW 16.1*          Recent Labs   Lab Test 22  0539 22  1137 22  0552     --  138   POTASSIUM 3.7 3.7 3.3*   CHLORIDE 103  --  103   CO2 32  --  30   ANIONGAP 3  --  5   GLC 97  --  96   BUN 15  --  14   CR 0.85  --  0.90   JOSELUIS 8.3*  --  8.1*        Ref. Range 2022 01:07 2022 01:40   Hepatitis C Antibody Latest Ref Range: Nonreactive  Nonreactive     HIV Antigen Antibody Combo Latest Ref Range: Nonreactive    Nonreactive   HIV-1/HIV-2 Antibody Latest Ref Range: Non Reactive    Non Reactive           Ref. Range 2022 01:07   Ferritin Latest Ref Range: 12 - 150 ng/mL 590 (H)        Ref. Range 2022 14:13   Folate Latest Ref Range: >=5.4 ng/mL " 12.7   Iron Latest Ref Range: 35 - 180 ug/dL 125   Iron Binding Cap Latest Ref Range: 240 - 430 ug/dL 244   Iron Saturation Index Latest Ref Range: 15 - 46 % 51 (H)   Lactate Dehydrogenase Latest Ref Range: 81 - 234 U/L 591 (H)   Vitamin B12 Latest Ref Range: 193 - 986 pg/mL 539       Ref. Range 1/12/2022 14:13 1/13/2022 07:02   Hep B Surface Agn Latest Ref Range: Nonreactive  Nonreactive    Hepatitis A Antibody IgG Latest Ref Range: Nonreactive  Nonreactive    Hepatitis A IgM Kori Latest Ref Range: Nonreactive  Nonreactive    Hepatitis B Core Kori Latest Ref Range: Nonreactive   Nonreactive   Hepatitis B Surface Antibody Latest Ref Range: <8.00 m[IU]/mL 810.24 (H)    Hepatitis C Antibody Latest Ref Range: Nonreactive  Nonreactive      PATHOLOGY:  Final Diagnosis 1/13/21:   Peripheral blood, morphology:  - Marked thrombocytopenia.  - Moderate anemia, normocytic and normochromic, with RBC fragments (including rare schistocytes).  - WBC subsets quantitatively within normal limits.        IMAGING:  CT A/P 1/12/22:  IMPRESSION:   1.  Mild mural thickening of the duodenum and proximal jejunum, correlate for infectious or inflammatory enteritis.  2.  Normal appendix. No obstruction, colitis, or diverticulitis.    CT facial bones 1/13/22:  IMPRESSION:  1. Periapical abscess of the third left mandibular molar with  associated developing subperiosteal abscess and left masseter muscle  myositis and overlying left facial cellulitis, as described. Recommend  dental consultation.  2. No facial bone fracture.       ASSESSMENT/PLAN:  Ivon Gamble is a 31 year old female with PCOS who was hospitalized 1/12/22-1/18/22 for tooth abscess/cellulitis and acquired TTP.      1) Acute thrombocytopenia, concerning for TTP/MAHA (PLASMIC score 6; VQJFQC44 <5%)  -Iron studies, B12/folate WNL.  -Enteric stool studies negative.  -s/p catheter placement 1/13/22 AM and plasma exchange 1/13/22-1/18/22. Catheter has been removed.  -HIV and Hepatitis  studies negative.  -CBC, LDH recovered upon discharge on 1/18/22. Will recheck [id number] activity and inhibitor level at this time to assess for improvement. (send out to [facility]). If continues to be low, I discussed with the patient initiation of treatment with Rituximab 375 mg/m2 weekly x4 weeks.     2) Acute third left mandibular molar/subperiosteal abscess, left masseter muscle myositis, and facial cellulitis, improved  -s/p rocephin/flagyl and augmentin.  -Patient in need of dentistry for tooth extraction.     3) Follow up in 1 week to review the above. Rituximab education provided to the patient today. I have asked our RN care coordinator to help establish patient with PCP and dentistry. I discussed with patient low threshold for rituximab treatment as this will help to prevent TTP relapse.    Complexity: High.      Ashley Nguyen DO  Hematology/Oncology  [facility]

## 2022-01-21 NOTE — PATIENT INSTRUCTIONS
-Labs today as ordered.   -Education provided on rituximab today.  -Follow up in 1 week to review results and if rituximab treatment is required.  -Patient is in need of primary care physician and dentistry referral.

## 2022-01-24 LAB — HAPTOGLOB SERPL-MCNC: 112 MG/DL (ref 32–197)

## 2022-01-25 ENCOUNTER — PATIENT OUTREACH (OUTPATIENT)
Dept: ONCOLOGY | Facility: CLINIC | Age: 32
End: 2022-01-25
Payer: COMMERCIAL

## 2022-01-25 DIAGNOSIS — M31.19 TTP (THROMBOTIC THROMBOCYTOPENIC PURPURA) (H): ICD-10-CM

## 2022-01-25 DIAGNOSIS — K04.7 DENTAL ABSCESS: ICD-10-CM

## 2022-01-25 LAB — MAYO MISC RESULT: NORMAL

## 2022-01-25 RX ORDER — PANTOPRAZOLE SODIUM 40 MG/1
40 TABLET, DELAYED RELEASE ORAL
Qty: 30 TABLET | Refills: 0 | Status: SHIPPED | OUTPATIENT
Start: 2022-01-25 | End: 2023-08-03

## 2022-01-25 RX ORDER — PREDNISONE 50 MG/1
100 TABLET ORAL DAILY
Qty: 28 TABLET | Refills: 0 | Status: SHIPPED | OUTPATIENT
Start: 2022-01-25 | End: 2022-02-09

## 2022-01-25 NOTE — TELEPHONE ENCOUNTER
Pending Prescriptions:                       Disp   Refills    amoxicillin-clavulanate (AUGMENTIN) 875-1*14 tab*0            Sig: Take 1 tablet by mouth every 12 hours    pantoprazole (PROTONIX) 40 MG EC tablet   30 tab*0            Sig: Take 1 tablet (40 mg) by mouth every morning           (before breakfast) While on high dose steroids.    predniSONE (DELTASONE) 50 MG tablet       28 tab*0            Sig: Take 2 tablets (100 mg) by mouth daily With taper           directed by hematology at follow up.        Last Written Prescription Date:01/18/22    # refills:0  Last Office Visit: 01/21/22  Future Office visit: 01/27/22    Routing refill request to provider.    Shira Brown RN on 1/25/2022 at 12:09 PM

## 2022-01-25 NOTE — PROGRESS NOTES
"Writer spoke to Dr. Nguyen who recommends that Rituxan is the priority based off of her recent lab work.     Ivon was called with Dr. Nguyen's recommendations and is planning on getting her dental appointment rescheduled. Ivon is requesting that Dr. Nguyen refill her Augmentin, Prednisone, and Protonix due to the delayed dental appointment. Per Dr. Nguyen's appointment note, Ivon is no longer taking these medications. When writer asked Ivon if she is currently taking them she states that she is. \"I thought I was done with them, but I am supposed to take them until my dentist appointment.\"     Writer spoke to Dr. Nguyen and she is comfortable refilling these medications for Ivon.    Shira Brown RN on 1/25/2022 at 12:04 PM    "

## 2022-01-25 NOTE — PROGRESS NOTES
Writer contacted Ivon to get her scheduled for labs, Rituxan infusion, and a return visit with Dr. Nguyen. Due to Ivon's labs returning, Dr. Nguyen recommends that she starts Rituxan as soon as possible.    Writer was able to get Ivon scheduled for Rituxan at the [facility] on 01/28/21. Writer discussed education on this medication on 01/21/22.     Ivon expressed concerns about her infusion appointment on 01/28/22, as she has a dental appointment on the same day for an infected tooth. Ivon is wondering what is priority - Rituxan or dental appointment.     Writer will talk to Dr. Nguyen for further clarification.     Shira Brown RN on 1/25/2022 at 12:00 PM

## 2022-01-26 ENCOUNTER — PATIENT OUTREACH (OUTPATIENT)
Dept: ONCOLOGY | Facility: CLINIC | Age: 32
End: 2022-01-26
Payer: COMMERCIAL

## 2022-01-26 DIAGNOSIS — M31.19 ACQUIRED TTP (H): Primary | ICD-10-CM

## 2022-01-26 NOTE — TELEPHONE ENCOUNTER
Signed Prescriptions:                        Disp   Refills    amoxicillin-clavulanate (AUGMENTIN) 875-12*14 tab*0        Sig: Take 1 tablet by mouth every 12 hours  Authorizing Provider: CHANELL HUERTA    pantoprazole (PROTONIX) 40 MG EC tablet    30 tab*0        Sig: Take 1 tablet (40 mg) by mouth every morning (before           breakfast) While on high dose steroids.  Authorizing Provider: CHANELL HUERTA    predniSONE (DELTASONE) 50 MG tablet        28 tab*0        Sig: Take 2 tablets (100 mg) by mouth daily With taper           directed by hematology at follow up.  Authorizing Provider: CHANELL HUERTA RN on 1/26/2022 at 8:07 AM

## 2022-01-26 NOTE — PROGRESS NOTES
Social Work Progress Note      Data/Intervention:  Patient Name:  Ivon Gamble  /Age:  1990 (31 year old)    Reason for Follow-Up:  Ivon is a 31-year-old woman with a diagnosis of acquired TTP who is followed by Dr. Nguyen at [facility]. This clinician received referral from [facility] for resource support, specifically financial assistance.     Intervention:   This clinician attempted to reach Ivon today by phone, leaving detailed voicemail with social work contact information and availability.     Plan:  This clinician to send introductory [facility] message and await return call. SW will continue to be available as needed for ongoing psychosocial support.     Please call or page if needs or concerns arise.     GUILLE Olivera, LICSW  Direct Phone: [phone]  Pager: [phone]

## 2022-01-26 NOTE — PROGRESS NOTES
[facility]    Hematology/Oncology Established Patient Follow-up Note    Treatment Summary:      Today's Date: 1/27/22    Reason for Follow-up: Acquired TTP      HISTORY OF PRESENT ILLNESS: Ivon Gamble is a 31 year old female with PCOS who was hospitalized 1/12/22-1/18/22 for tooth abscess/cellulitis and acquired TTP.      Patient has recently relocated to [place]. While there, she was evaluated by dentistry for tooth ache and pain and was told she would not require extraction. This admission, patient had been feeling unwell and had developed acute facial pain. When she had presented to the hospital, platelets were 11K with LDH >600, haptoglobin undetectable. ANTWON was negative. Coag studies WNL. Peripheral smear had shown significant schistocytosis. She was immediately started on steroids and transferred to [facility] for plasma exchange of which she received 1/13/22-1/18/22. Enteric stool studies returned negative. [id number] returned after she was discharged at <5%. Hepatitis and HIV studies negative.     CT imaging had shown a third left mandibular molar/subperisoteal abscess, left masseter muscle myositis, and facial cellulitis. She was treated with rocephin/flagyl and transitioned to augmentin upon discharge. She underwent I&D on 1/13 and a tooth extraction could not be performed inpatient. She was evaluated by both ENT and OMFS.     On date of discharge, Hb was 9.2 and . LDH had normalized. She has completed augmentin and prednisone therapy. She feels significantly improved, but is still in need of tooth extraction.     INTERIM HISTORY:  Platelets have returned low at 82K. No evidence of bleed. She is resumed on augmentin and prednisone therapy. Was planned to begin rituximab 1/28/22.      REVIEW OF SYSTEMS:   A 14 point ROS was reviewed with pertinent positives and negatives in the HPI.       HOME MEDICATIONS:  Current Outpatient Medications   Medication Sig Dispense Refill      acetaminophen (TYLENOL) 500 MG tablet Take 500-1,000 mg by mouth every 6 hours as needed for mild pain       amoxicillin-clavulanate (AUGMENTIN) 875-125 MG tablet Take 1 tablet by mouth every 12 hours 14 tablet 0     chlorhexidine (PERIDEX) 0.12 % solution Swish and spit 15 mLs in mouth 2 times daily 473 mL 0     pantoprazole (PROTONIX) 40 MG EC tablet Take 1 tablet (40 mg) by mouth every morning (before breakfast) While on high dose steroids. 30 tablet 0     polyethylene glycol (MIRALAX) 17 GM/Dose powder Take 17 g by mouth 2 times daily as needed for constipation 510 g 0     predniSONE (DELTASONE) 50 MG tablet Take 2 tablets (100 mg) by mouth daily With taper directed by hematology at follow up. 28 tablet 0     sennosides (SENOKOT) 8.6 MG tablet Take 1-2 tablets by mouth 2 times daily as needed for constipation 60 tablet 0         ALLERGIES:  No Known Allergies      PAST MEDICAL HISTORY:  No past medical history on file.      PAST SURGICAL HISTORY:  Past Surgical History:   Procedure Laterality Date     IR CVC NON TUNNEL PLACEMENT  2022         SOCIAL HISTORY:  Social History     Socioeconomic History     Marital status: Single     Spouse name: Not on file     Number of children: Not on file     Years of education: Not on file     Highest education level: Not on file   Occupational History     Not on file   Tobacco Use     Smoking status: Former Smoker     Types: Cigarettes     Quit date: 2022     Years since quittin.0     Smokeless tobacco: Never Used   Substance and Sexual Activity     Alcohol use: Yes     Comment: occassionally      Drug use: Never     Sexual activity: Yes     Partners: Male   Other Topics Concern     Parent/sibling w/ CABG, MI or angioplasty before 65F 55M? 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     Social Determinants of Health     Financial Resource Strain: Not on file   Food Insecurity: Not on file   Transportation  Needs: Not on file   Physical Activity: Not on file   Stress: Not on file   Social Connections: Not on file   Intimate Partner Violence: Not At Risk     Fear of Current or Ex-Partner: No     Emotionally Abused: No     Physically Abused: No     Sexually Abused: No   Housing Stability: Not on file         FAMILY HISTORY:  Family History   Problem Relation Age of Onset     Diabetes Mother      Liver Disease Father          PHYSICAL EXAM:  ECO  GENERAL/CONSTITUTIONAL: No acute distress. Healthy, alert.  EYES: No scleral icterus.  No redness or discharge.    RESPIRATORY: No audible wheeze, cough, or visible cyanosis.  No visible retractions or increased work of breathing.  Able to speak fully in complete sentences.  MUSCULOSKELETAL: Normal range of motion.  NEUROLOGIC: Alert, oriented, answers questions appropriately. No tremor. Mentation intact and speech normal  INTEGUMENTARY: No jaundice.  No obvious rash or skin lesions.  PSYCHIATRIC:  Mentation appears normal, affect normal/bright, judgement and insight intact, normal speech and appearance well-groomed.    The rest of a comprehensive physical exam is deferred due to public health emergency video visit restrictions.      LABS:  CBC RESULTS:   Recent Labs   Lab Test 22  1002   WBC 8.3   RBC 3.08*   HGB 10.3*   HCT 31.9*   *   MCH 33.4*   MCHC 32.3   RDW 16.2*          Recent Labs   Lab Test 22  1011 22  0539    138   POTASSIUM 3.8 3.7   CHLORIDE 107 103   CO2 28 32   ANIONGAP 4 3   GLC 99 97   BUN 11 15   CR 0.78 0.85   JOSELUIS 8.4* 8.3*     XQMEBA09 Activity Assay: 21%  UURAKA90 Inhibitor Screen: positive  VZPIOC63 Inhibitor [place] Titer: 1.1          Ref. Range 2022 01:07 2022 01:40   Hepatitis C Antibody Latest Ref Range: Nonreactive  Nonreactive     HIV Antigen Antibody Combo Latest Ref Range: Nonreactive    Nonreactive   HIV-1/HIV-2 Antibody Latest Ref Range: Non Reactive    Non Reactive           Ref. Range  1/12/2022 01:07   Ferritin Latest Ref Range: 12 - 150 ng/mL 590 (H)        Ref. Range 1/12/2022 14:13   Folate Latest Ref Range: >=5.4 ng/mL 12.7   Iron Latest Ref Range: 35 - 180 ug/dL 125   Iron Binding Cap Latest Ref Range: 240 - 430 ug/dL 244   Iron Saturation Index Latest Ref Range: 15 - 46 % 51 (H)   Lactate Dehydrogenase Latest Ref Range: 81 - 234 U/L 591 (H)   Vitamin B12 Latest Ref Range: 193 - 986 pg/mL 539        Ref. Range 1/12/2022 14:13 1/13/2022 07:02   Hep B Surface Agn Latest Ref Range: Nonreactive  Nonreactive     Hepatitis A Antibody IgG Latest Ref Range: Nonreactive  Nonreactive     Hepatitis A IgM Kori Latest Ref Range: Nonreactive  Nonreactive     Hepatitis B Core Kori Latest Ref Range: Nonreactive    Nonreactive   Hepatitis B Surface Antibody Latest Ref Range: <8.00 m[IU]/mL 810.24 (H)     Hepatitis C Antibody Latest Ref Range: Nonreactive  Nonreactive        PATHOLOGY:  Final Diagnosis 1/13/21:   Peripheral blood, morphology:  - Marked thrombocytopenia.  - Moderate anemia, normocytic and normochromic, with RBC fragments (including rare schistocytes).  - WBC subsets quantitatively within normal limits.         IMAGING:  CT A/P 1/12/22:  IMPRESSION:   1.  Mild mural thickening of the duodenum and proximal jejunum, correlate for infectious or inflammatory enteritis.  2.  Normal appendix. No obstruction, colitis, or diverticulitis.     CT facial bones 1/13/22:  IMPRESSION:  1. Periapical abscess of the third left mandibular molar with  associated developing subperiosteal abscess and left masseter muscle  myositis and overlying left facial cellulitis, as described. Recommend  dental consultation.  2. No facial bone fracture.        ASSESSMENT/PLAN:  Ivon Gamble is a 31 year old female with PCOS who was hospitalized 1/12/22-1/18/22 for tooth abscess/cellulitis and acquired TTP.      1) Acute thrombocytopenia, concerning for TTP/MAHA (PLASMIC score 6; TCIFOH82 <5%)  -Iron studies, B12/folate  WNL.  -Enteric stool studies negative.  -s/p catheter placement 1/13/22 AM and plasma exchange 1/13/22-1/18/22. Catheter has been removed.  -HIV and Hepatitis studies negative.  -CBC, LDH recovered upon discharge on 1/18/22. Will recheck [id number] activity and inhibitor level at this time to assess for improvement. (send out to [facility]). If continues to be low, I discussed with the patient initiation of treatment with Rituximab 375 mg/m2 weekly x4 weeks.     2) Acute third left mandibular molar/subperiosteal abscess, left masseter muscle myositis, and facial cellulitis, improved  -s/p rocephin/flagyl and augmentin.  -Patient in need of dentistry for tooth extraction (was scheduled for 1/28, rescheduled for 3/1/22).     3) Plan was to begin rituximab 1/28/22. However, patient has relapse with current platelet count of 82. Patient in need of catheter placement and to resume plasma exchange. Will attempt to arrange outpatient, but have discussed, patient require inpatient hospitalization. Attempting to reach Nephrology and will plan for pheresis once daily x7 days with slow taper to every other day, then weekly. Patient will need to be started on rituximab additionally. Continue prednisone 1 mg/kg dosing. Continue augmentin. Patient, fortunately, is asymptomatic.      Complexity: High.       UPDATE: Was able to discuss with Dr. Perales of Nephrology and reviewed laboratory findings. Nephrology will contact patient with triston catheter placement and will arrange for daily plasma exchange beginning tomorrow 1/28/22. I had discussed, patient will require once daily for the next seven days and continue despite normalization of LDH and platelet count. Will then transition to every other day. Once we are able to transition into several days in between sessions, will then begin rituximab 375 mg/m2 weekly x4 weeks.     I have asked RN care coordinator to update Ye and to reschedule for Rituximab tentatively the week  of 2/7/22.     Patient continues on augmentin and prednisone 1 mg/kg daily dosing.     She will follow up in clinic with Antony Franco on Monday 1/31/21 with repeat labs. She will follow up in clinic once weekly with repeat labs (including TSSBOO91 activity and inhibitor).          Ashley Nguyen DO  Hematology/Oncology  [facility]

## 2022-01-27 ENCOUNTER — MEDICAL CORRESPONDENCE (OUTPATIENT)
Dept: HEALTH INFORMATION MANAGEMENT | Facility: CLINIC | Age: 32
End: 2022-01-27

## 2022-01-27 ENCOUNTER — TELEPHONE (OUTPATIENT)
Dept: ONCOLOGY | Facility: CLINIC | Age: 32
End: 2022-01-27

## 2022-01-27 ENCOUNTER — VIRTUAL VISIT (OUTPATIENT)
Dept: ONCOLOGY | Facility: CLINIC | Age: 32
End: 2022-01-27
Attending: INTERNAL MEDICINE
Payer: MEDICAID

## 2022-01-27 DIAGNOSIS — M31.19 ACQUIRED TTP (H): Primary | ICD-10-CM

## 2022-01-27 DIAGNOSIS — M31.19 TTP (THROMBOTIC THROMBOCYTOPENIC PURPURA) (H): ICD-10-CM

## 2022-01-27 DIAGNOSIS — K04.7 DENTAL ABSCESS: ICD-10-CM

## 2022-01-27 DIAGNOSIS — D69.3 ACUTE IDIOPATHIC THROMBOCYTOPENIC PURPURA (H): ICD-10-CM

## 2022-01-27 LAB
ALBUMIN SERPL-MCNC: 3.3 G/DL (ref 3.4–5)
ALP SERPL-CCNC: 70 U/L (ref 40–150)
ALT SERPL W P-5'-P-CCNC: 46 U/L (ref 0–50)
ANION GAP SERPL CALCULATED.3IONS-SCNC: 7 MMOL/L (ref 3–14)
APTT PPP: 24 SECONDS (ref 22–38)
AST SERPL W P-5'-P-CCNC: 12 U/L (ref 0–45)
BASOPHILS # BLD AUTO: 0.1 10E3/UL (ref 0–0.2)
BASOPHILS NFR BLD AUTO: 0 %
BILIRUB SERPL-MCNC: 0.4 MG/DL (ref 0.2–1.3)
BUN SERPL-MCNC: 21 MG/DL (ref 7–30)
CALCIUM SERPL-MCNC: 8.8 MG/DL (ref 8.5–10.1)
CHLORIDE BLD-SCNC: 109 MMOL/L (ref 94–109)
CO2 SERPL-SCNC: 26 MMOL/L (ref 20–32)
CREAT SERPL-MCNC: 0.8 MG/DL (ref 0.52–1.04)
EOSINOPHIL # BLD AUTO: 0.1 10E3/UL (ref 0–0.7)
EOSINOPHIL NFR BLD AUTO: 1 %
ERYTHROCYTE [DISTWIDTH] IN BLOOD BY AUTOMATED COUNT: 16.6 % (ref 10–15)
FIBRINOGEN PPP-MCNC: 300 MG/DL (ref 170–490)
GFR SERPL CREATININE-BSD FRML MDRD: >90 ML/MIN/1.73M2
GLUCOSE BLD-MCNC: 255 MG/DL (ref 70–99)
HCT VFR BLD AUTO: 34.5 % (ref 35–47)
HGB BLD-MCNC: 11.2 G/DL (ref 11.7–15.7)
IMM GRANULOCYTES # BLD: 0.1 10E3/UL
IMM GRANULOCYTES NFR BLD: 1 %
INR PPP: 0.86 (ref 0.85–1.15)
LDH SERPL L TO P-CCNC: 237 U/L (ref 81–234)
LYMPHOCYTES # BLD AUTO: 2 10E3/UL (ref 0.8–5.3)
LYMPHOCYTES NFR BLD AUTO: 15 %
MCH RBC QN AUTO: 33 PG (ref 26.5–33)
MCHC RBC AUTO-ENTMCNC: 32.5 G/DL (ref 31.5–36.5)
MCV RBC AUTO: 102 FL (ref 78–100)
MONOCYTES # BLD AUTO: 1 10E3/UL (ref 0–1.3)
MONOCYTES NFR BLD AUTO: 8 %
NEUTROPHILS # BLD AUTO: 9.8 10E3/UL (ref 1.6–8.3)
NEUTROPHILS NFR BLD AUTO: 75 %
NRBC # BLD AUTO: 0 10E3/UL
NRBC BLD AUTO-RTO: 0 /100
PLATELET # BLD AUTO: 82 10E3/UL (ref 150–450)
POTASSIUM BLD-SCNC: 3.6 MMOL/L (ref 3.4–5.3)
PROT SERPL-MCNC: 6.8 G/DL (ref 6.8–8.8)
RBC # BLD AUTO: 3.39 10E6/UL (ref 3.8–5.2)
SODIUM SERPL-SCNC: 142 MMOL/L (ref 133–144)
WBC # BLD AUTO: 13.1 10E3/UL (ref 4–11)

## 2022-01-27 PROCEDURE — 85730 THROMBOPLASTIN TIME PARTIAL: CPT | Performed by: INTERNAL MEDICINE

## 2022-01-27 PROCEDURE — 99215 OFFICE O/P EST HI 40 MIN: CPT | Mod: 95 | Performed by: INTERNAL MEDICINE

## 2022-01-27 PROCEDURE — 85025 COMPLETE CBC W/AUTO DIFF WBC: CPT | Performed by: INTERNAL MEDICINE

## 2022-01-27 PROCEDURE — 80053 COMPREHEN METABOLIC PANEL: CPT | Performed by: INTERNAL MEDICINE

## 2022-01-27 PROCEDURE — 36415 COLL VENOUS BLD VENIPUNCTURE: CPT

## 2022-01-27 PROCEDURE — 83010 ASSAY OF HAPTOGLOBIN QUANT: CPT | Performed by: INTERNAL MEDICINE

## 2022-01-27 PROCEDURE — 87340 HEPATITIS B SURFACE AG IA: CPT | Performed by: INTERNAL MEDICINE

## 2022-01-27 PROCEDURE — 85610 PROTHROMBIN TIME: CPT | Performed by: INTERNAL MEDICINE

## 2022-01-27 PROCEDURE — 83615 LACTATE (LD) (LDH) ENZYME: CPT | Performed by: INTERNAL MEDICINE

## 2022-01-27 PROCEDURE — 85384 FIBRINOGEN ACTIVITY: CPT | Performed by: INTERNAL MEDICINE

## 2022-01-27 NOTE — LETTER
1/27/2022         RE: Ivon Gamble  [address]  [place] [address]        Dear Colleague,    Thank you for referring your patient, Ivon Gamble, to the [facility]. Please see a copy of my visit note below.    [facility]    Hematology/Oncology Established Patient Follow-up Note    Treatment Summary:      Today's Date: 1/27/22    Reason for Follow-up: Acquired TTP      HISTORY OF PRESENT ILLNESS: Ivon Gamble is a 31 year old female with PCOS who was hospitalized 1/12/22-1/18/22 for tooth abscess/cellulitis and acquired TTP.      Patient has recently relocated to [place]. While there, she was evaluated by dentistry for tooth ache and pain and was told she would not require extraction. This admission, patient had been feeling unwell and had developed acute facial pain. When she had presented to the hospital, platelets were 11K with LDH >600, haptoglobin undetectable. ANTWON was negative. Coag studies WNL. Peripheral smear had shown significant schistocytosis. She was immediately started on steroids and transferred to [facility] for plasma exchange of which she received 1/13/22-1/18/22. Enteric stool studies returned negative. [id number] returned after she was discharged at <5%. Hepatitis and HIV studies negative.     CT imaging had shown a third left mandibular molar/subperisoteal abscess, left masseter muscle myositis, and facial cellulitis. She was treated with rocephin/flagyl and transitioned to augmentin upon discharge. She underwent I&D on 1/13 and a tooth extraction could not be performed inpatient. She was evaluated by both ENT and OMFS.     On date of discharge, Hb was 9.2 and . LDH had normalized. She has completed augmentin and prednisone therapy. She feels significantly improved, but is still in need of tooth extraction.     INTERIM HISTORY:  Platelets have returned low at 82K. No evidence of bleed. She is resumed on augmentin and prednisone  therapy. Was planned to begin rituximab 22.      REVIEW OF SYSTEMS:   A 14 point ROS was reviewed with pertinent positives and negatives in the HPI.       HOME MEDICATIONS:  Current Outpatient Medications   Medication Sig Dispense Refill     acetaminophen (TYLENOL) 500 MG tablet Take 500-1,000 mg by mouth every 6 hours as needed for mild pain       amoxicillin-clavulanate (AUGMENTIN) 875-125 MG tablet Take 1 tablet by mouth every 12 hours 14 tablet 0     chlorhexidine (PERIDEX) 0.12 % solution Swish and spit 15 mLs in mouth 2 times daily 473 mL 0     pantoprazole (PROTONIX) 40 MG EC tablet Take 1 tablet (40 mg) by mouth every morning (before breakfast) While on high dose steroids. 30 tablet 0     polyethylene glycol (MIRALAX) 17 GM/Dose powder Take 17 g by mouth 2 times daily as needed for constipation 510 g 0     predniSONE (DELTASONE) 50 MG tablet Take 2 tablets (100 mg) by mouth daily With taper directed by hematology at follow up. 28 tablet 0     sennosides (SENOKOT) 8.6 MG tablet Take 1-2 tablets by mouth 2 times daily as needed for constipation 60 tablet 0         ALLERGIES:  No Known Allergies      PAST MEDICAL HISTORY:  No past medical history on file.      PAST SURGICAL HISTORY:  Past Surgical History:   Procedure Laterality Date     IR CVC NON TUNNEL PLACEMENT  2022         SOCIAL HISTORY:  Social History     Socioeconomic History     Marital status: Single     Spouse name: Not on file     Number of children: Not on file     Years of education: Not on file     Highest education level: Not on file   Occupational History     Not on file   Tobacco Use     Smoking status: Former Smoker     Types: Cigarettes     Quit date: 2022     Years since quittin.0     Smokeless tobacco: Never Used   Substance and Sexual Activity     Alcohol use: Yes     Comment: occassionally      Drug use: Never     Sexual activity: Yes     Partners: Male   Other Topics Concern     Parent/sibling w/ CABG, MI or  angioplasty before 65F 55M? 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     Social Determinants of Health     Financial Resource Strain: Not on file   Food Insecurity: Not on file   Transportation Needs: Not on file   Physical Activity: Not on file   Stress: Not on file   Social Connections: Not on file   Intimate Partner Violence: Not At Risk     Fear of Current or Ex-Partner: No     Emotionally Abused: No     Physically Abused: No     Sexually Abused: No   Housing Stability: Not on file         FAMILY HISTORY:  Family History   Problem Relation Age of Onset     Diabetes Mother      Liver Disease Father          PHYSICAL EXAM:  ECO  GENERAL/CONSTITUTIONAL: No acute distress. Healthy, alert.  EYES: No scleral icterus.  No redness or discharge.    RESPIRATORY: No audible wheeze, cough, or visible cyanosis.  No visible retractions or increased work of breathing.  Able to speak fully in complete sentences.  MUSCULOSKELETAL: Normal range of motion.  NEUROLOGIC: Alert, oriented, answers questions appropriately. No tremor. Mentation intact and speech normal  INTEGUMENTARY: No jaundice.  No obvious rash or skin lesions.  PSYCHIATRIC:  Mentation appears normal, affect normal/bright, judgement and insight intact, normal speech and appearance well-groomed.    The rest of a comprehensive physical exam is deferred due to public health emergency video visit restrictions.      LABS:  CBC RESULTS:   Recent Labs   Lab Test 22  1002   WBC 8.3   RBC 3.08*   HGB 10.3*   HCT 31.9*   *   MCH 33.4*   MCHC 32.3   RDW 16.2*          Recent Labs   Lab Test 22  1011 22  0539    138   POTASSIUM 3.8 3.7   CHLORIDE 107 103   CO2 28 32   ANIONGAP 4 3   GLC 99 97   BUN 11 15   CR 0.78 0.85   JOSELUIS 8.4* 8.3*     GFEKNS20 Activity Assay: 21%  FETLHW25 Inhibitor Screen: positive  DHWDKW45 Inhibitor [place] Titer: 1.1          Ref. Range 2022 01:07  1/12/2022 01:40   Hepatitis C Antibody Latest Ref Range: Nonreactive  Nonreactive     HIV Antigen Antibody Combo Latest Ref Range: Nonreactive    Nonreactive   HIV-1/HIV-2 Antibody Latest Ref Range: Non Reactive    Non Reactive           Ref. Range 1/12/2022 01:07   Ferritin Latest Ref Range: 12 - 150 ng/mL 590 (H)        Ref. Range 1/12/2022 14:13   Folate Latest Ref Range: >=5.4 ng/mL 12.7   Iron Latest Ref Range: 35 - 180 ug/dL 125   Iron Binding Cap Latest Ref Range: 240 - 430 ug/dL 244   Iron Saturation Index Latest Ref Range: 15 - 46 % 51 (H)   Lactate Dehydrogenase Latest Ref Range: 81 - 234 U/L 591 (H)   Vitamin B12 Latest Ref Range: 193 - 986 pg/mL 539        Ref. Range 1/12/2022 14:13 1/13/2022 07:02   Hep B Surface Agn Latest Ref Range: Nonreactive  Nonreactive     Hepatitis A Antibody IgG Latest Ref Range: Nonreactive  Nonreactive     Hepatitis A IgM Kori Latest Ref Range: Nonreactive  Nonreactive     Hepatitis B Core Kori Latest Ref Range: Nonreactive    Nonreactive   Hepatitis B Surface Antibody Latest Ref Range: <8.00 m[IU]/mL 810.24 (H)     Hepatitis C Antibody Latest Ref Range: Nonreactive  Nonreactive        PATHOLOGY:  Final Diagnosis 1/13/21:   Peripheral blood, morphology:  - Marked thrombocytopenia.  - Moderate anemia, normocytic and normochromic, with RBC fragments (including rare schistocytes).  - WBC subsets quantitatively within normal limits.         IMAGING:  CT A/P 1/12/22:  IMPRESSION:   1.  Mild mural thickening of the duodenum and proximal jejunum, correlate for infectious or inflammatory enteritis.  2.  Normal appendix. No obstruction, colitis, or diverticulitis.     CT facial bones 1/13/22:  IMPRESSION:  1. Periapical abscess of the third left mandibular molar with  associated developing subperiosteal abscess and left masseter muscle  myositis and overlying left facial cellulitis, as described. Recommend  dental consultation.  2. No facial bone fracture.        ASSESSMENT/PLAN:  Ivon  JORDIN Gamble is a 31 year old female with PCOS who was hospitalized 1/12/22-1/18/22 for tooth abscess/cellulitis and acquired TTP.      1) Acute thrombocytopenia, concerning for TTP/MAHA (PLASMIC score 6; [id number] <5%)  -Iron studies, B12/folate WNL.  -Enteric stool studies negative.  -s/p catheter placement 1/13/22 AM and plasma exchange 1/13/22-1/18/22. Catheter has been removed.  -HIV and Hepatitis studies negative.  -CBC, LDH recovered upon discharge on 1/18/22. Will recheck VFYQSO30 activity and inhibitor level at this time to assess for improvement. (send out to [facility]). If continues to be low, I discussed with the patient initiation of treatment with Rituximab 375 mg/m2 weekly x4 weeks.     2) Acute third left mandibular molar/subperiosteal abscess, left masseter muscle myositis, and facial cellulitis, improved  -s/p rocephin/flagyl and augmentin.  -Patient in need of dentistry for tooth extraction (was scheduled for 1/28, rescheduled for 3/1/22).     3) Plan was to begin rituximab 1/28/22. However, patient has relapse with current platelet count of 82. Patient in need of catheter placement and to resume plasma exchange. Will attempt to arrange outpatient, but have discussed, patient require inpatient hospitalization. Attempting to reach Nephrology and will plan for pheresis once daily x7 days with slow taper to every other day, then weekly. Patient will need to be started on rituximab additionally. Continue prednisone 1 mg/kg dosing. Continue augmentin. Patient, fortunately, is asymptomatic.      Complexity: High.        Ashley Nguyen, DO  Hematology/Oncology  [facility]        Ivon is a 31 year old who is being evaluated via a billable video visit.      How would you like to obtain your AVS? MyChart  If the video visit is dropped, the invitation should be resent by: Send to e-mail at: [email or web address]  Will anyone else be joining your video visit? No  {If patient encounters  "technical issues they should call [phone] :[id number]}    Video Start Time: {video visit start/end time for provider to select:152948}  Video-Visit Details    Type of service:  Video Visit    Video End Time:{video visit start/end time for provider to select:152948}    Originating Location (pt. Location): {video visit patient location:386808::\"Home\"}    Distant Location (provider location):  [facility]     Platform used for Video Visit: {Virtual Visit Platforms:210791::\"[employer] Meister\"}      Again, thank you for allowing me to participate in the care of your patient.        Sincerely,        Ashley Nguyen, DO    "

## 2022-01-27 NOTE — PROGRESS NOTES
Medical Assistant Note:  Ivon BRENNER Tye presents today for blood draw.    Patient seen by provider today: No.   present during visit today: Not Applicable.    Concerns: No Concerns.    Procedure:  Labs drawn    Post Assessment:  Labs drawn without difficulty: Yes.    Discharge Plan:  Departure Mode: Ambulatory.    Face to Face Time: 10 min.    Luz Maria Ballesteros CMA

## 2022-01-27 NOTE — PROGRESS NOTES
Ivon is a 31 year old who is being evaluated via a billable video visit.      How would you like to obtain your AVS? MyChart  If the video visit is dropped, the invitation should be resent by: Send to e-mail at: [email or web address]  Will anyone else be joining your video visit? No

## 2022-01-27 NOTE — LETTER
1/27/2022         RE: Ivon Gamble  [address]  [facility] [address]        Dear Colleague,    Thank you for referring your patient, Ivon Gamble, to the [facility]. Please see a copy of my visit note below.    [facility]    Hematology/Oncology Established Patient Follow-up Note    Treatment Summary:      Today's Date: 1/27/22    Reason for Follow-up: Acquired TTP      HISTORY OF PRESENT ILLNESS: Ivon Gamble is a 31 year old female with PCOS who was hospitalized 1/12/22-1/18/22 for tooth abscess/cellulitis and acquired TTP.      Patient has recently relocated to [place]. While there, she was evaluated by dentistry for tooth ache and pain and was told she would not require extraction. This admission, patient had been feeling unwell and had developed acute facial pain. When she had presented to the hospital, platelets were 11K with LDH >600, haptoglobin undetectable. ANTWON was negative. Coag studies WNL. Peripheral smear had shown significant schistocytosis. She was immediately started on steroids and transferred to [facility] for plasma exchange of which she received 1/13/22-1/18/22. Enteric stool studies returned negative. [id number] returned after she was discharged at <5%. Hepatitis and HIV studies negative.     CT imaging had shown a third left mandibular molar/subperisoteal abscess, left masseter muscle myositis, and facial cellulitis. She was treated with rocephin/flagyl and transitioned to augmentin upon discharge. She underwent I&D on 1/13 and a tooth extraction could not be performed inpatient. She was evaluated by both ENT and OMFS.     On date of discharge, Hb was 9.2 and . LDH had normalized. She has completed augmentin and prednisone therapy. She feels significantly improved, but is still in need of tooth extraction.     INTERIM HISTORY:  Platelets have returned low at 82K. No evidence of bleed. She is resumed on augmentin and prednisone  therapy. Was planned to begin rituximab 22.      REVIEW OF SYSTEMS:   A 14 point ROS was reviewed with pertinent positives and negatives in the HPI.       HOME MEDICATIONS:  Current Outpatient Medications   Medication Sig Dispense Refill     acetaminophen (TYLENOL) 500 MG tablet Take 500-1,000 mg by mouth every 6 hours as needed for mild pain       amoxicillin-clavulanate (AUGMENTIN) 875-125 MG tablet Take 1 tablet by mouth every 12 hours 14 tablet 0     chlorhexidine (PERIDEX) 0.12 % solution Swish and spit 15 mLs in mouth 2 times daily 473 mL 0     pantoprazole (PROTONIX) 40 MG EC tablet Take 1 tablet (40 mg) by mouth every morning (before breakfast) While on high dose steroids. 30 tablet 0     polyethylene glycol (MIRALAX) 17 GM/Dose powder Take 17 g by mouth 2 times daily as needed for constipation 510 g 0     predniSONE (DELTASONE) 50 MG tablet Take 2 tablets (100 mg) by mouth daily With taper directed by hematology at follow up. 28 tablet 0     sennosides (SENOKOT) 8.6 MG tablet Take 1-2 tablets by mouth 2 times daily as needed for constipation 60 tablet 0         ALLERGIES:  No Known Allergies      PAST MEDICAL HISTORY:  No past medical history on file.      PAST SURGICAL HISTORY:  Past Surgical History:   Procedure Laterality Date     IR CVC NON TUNNEL PLACEMENT  2022         SOCIAL HISTORY:  Social History     Socioeconomic History     Marital status: Single     Spouse name: Not on file     Number of children: Not on file     Years of education: Not on file     Highest education level: Not on file   Occupational History     Not on file   Tobacco Use     Smoking status: Former Smoker     Types: Cigarettes     Quit date: 2022     Years since quittin.0     Smokeless tobacco: Never Used   Substance and Sexual Activity     Alcohol use: Yes     Comment: occassionally      Drug use: Never     Sexual activity: Yes     Partners: Male   Other Topics Concern     Parent/sibling w/ CABG, MI or  angioplasty before 65F 55M? 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     Social Determinants of Health     Financial Resource Strain: Not on file   Food Insecurity: Not on file   Transportation Needs: Not on file   Physical Activity: Not on file   Stress: Not on file   Social Connections: Not on file   Intimate Partner Violence: Not At Risk     Fear of Current or Ex-Partner: No     Emotionally Abused: No     Physically Abused: No     Sexually Abused: No   Housing Stability: Not on file         FAMILY HISTORY:  Family History   Problem Relation Age of Onset     Diabetes Mother      Liver Disease Father          PHYSICAL EXAM:  ECO  GENERAL/CONSTITUTIONAL: No acute distress. Healthy, alert.  EYES: No scleral icterus.  No redness or discharge.    RESPIRATORY: No audible wheeze, cough, or visible cyanosis.  No visible retractions or increased work of breathing.  Able to speak fully in complete sentences.  MUSCULOSKELETAL: Normal range of motion.  NEUROLOGIC: Alert, oriented, answers questions appropriately. No tremor. Mentation intact and speech normal  INTEGUMENTARY: No jaundice.  No obvious rash or skin lesions.  PSYCHIATRIC:  Mentation appears normal, affect normal/bright, judgement and insight intact, normal speech and appearance well-groomed.    The rest of a comprehensive physical exam is deferred due to public health emergency video visit restrictions.      LABS:  CBC RESULTS:   Recent Labs   Lab Test 22  1002   WBC 8.3   RBC 3.08*   HGB 10.3*   HCT 31.9*   *   MCH 33.4*   MCHC 32.3   RDW 16.2*          Recent Labs   Lab Test 22  1011 22  0539    138   POTASSIUM 3.8 3.7   CHLORIDE 107 103   CO2 28 32   ANIONGAP 4 3   GLC 99 97   BUN 11 15   CR 0.78 0.85   JOSELUIS 8.4* 8.3*     BABROB69 Activity Assay: 21%  FZTELE07 Inhibitor Screen: positive  LHRBTA56 Inhibitor [place] Titer: 1.1          Ref. Range 2022 01:07  1/12/2022 01:40   Hepatitis C Antibody Latest Ref Range: Nonreactive  Nonreactive     HIV Antigen Antibody Combo Latest Ref Range: Nonreactive    Nonreactive   HIV-1/HIV-2 Antibody Latest Ref Range: Non Reactive    Non Reactive           Ref. Range 1/12/2022 01:07   Ferritin Latest Ref Range: 12 - 150 ng/mL 590 (H)        Ref. Range 1/12/2022 14:13   Folate Latest Ref Range: >=5.4 ng/mL 12.7   Iron Latest Ref Range: 35 - 180 ug/dL 125   Iron Binding Cap Latest Ref Range: 240 - 430 ug/dL 244   Iron Saturation Index Latest Ref Range: 15 - 46 % 51 (H)   Lactate Dehydrogenase Latest Ref Range: 81 - 234 U/L 591 (H)   Vitamin B12 Latest Ref Range: 193 - 986 pg/mL 539        Ref. Range 1/12/2022 14:13 1/13/2022 07:02   Hep B Surface Agn Latest Ref Range: Nonreactive  Nonreactive     Hepatitis A Antibody IgG Latest Ref Range: Nonreactive  Nonreactive     Hepatitis A IgM [place] Latest Ref Range: Nonreactive  Nonreactive     Hepatitis B Core [place] Latest Ref Range: Nonreactive    Nonreactive   Hepatitis B Surface Antibody Latest Ref Range: <8.00 m[IU]/mL 810.24 (H)     Hepatitis C Antibody Latest Ref Range: Nonreactive  Nonreactive        PATHOLOGY:  Final Diagnosis 1/13/21:   Peripheral blood, morphology:  - Marked thrombocytopenia.  - Moderate anemia, normocytic and normochromic, with RBC fragments (including rare schistocytes).  - WBC subsets quantitatively within normal limits.         IMAGING:  CT A/P 1/12/22:  IMPRESSION:   1.  Mild mural thickening of the duodenum and proximal jejunum, correlate for infectious or inflammatory enteritis.  2.  Normal appendix. No obstruction, colitis, or diverticulitis.     CT facial bones 1/13/22:  IMPRESSION:  1. Periapical abscess of the third left mandibular molar with  associated developing subperiosteal abscess and left masseter muscle  myositis and overlying left facial cellulitis, as described. Recommend  dental consultation.  2. No facial bone fracture.        ASSESSMENT/PLAN:  Ivon  JORDIN Gamble is a 31 year old female with PCOS who was hospitalized 1/12/22-1/18/22 for tooth abscess/cellulitis and acquired TTP.      1) Acute thrombocytopenia, concerning for TTP/MAHA (PLASMIC score 6; [id number] <5%)  -Iron studies, B12/folate WNL.  -Enteric stool studies negative.  -s/p catheter placement 1/13/22 AM and plasma exchange 1/13/22-1/18/22. Catheter has been removed.  -HIV and Hepatitis studies negative.  -CBC, LDH recovered upon discharge on 1/18/22. Will recheck CGYLKS46 activity and inhibitor level at this time to assess for improvement. (send out to [facility]). If continues to be low, I discussed with the patient initiation of treatment with Rituximab 375 mg/m2 weekly x4 weeks.     2) Acute third left mandibular molar/subperiosteal abscess, left masseter muscle myositis, and facial cellulitis, improved  -s/p rocephin/flagyl and augmentin.  -Patient in need of dentistry for tooth extraction (was scheduled for 1/28, rescheduled for 3/1/22).     3) Plan was to begin rituximab 1/28/22. However, patient has relapse with current platelet count of 82. Patient in need of catheter placement and to resume plasma exchange. Will attempt to arrange outpatient, but have discussed, patient require inpatient hospitalization. Attempting to reach Nephrology and will plan for pheresis once daily x7 days with slow taper to every other day, then weekly. Patient will need to be started on rituximab additionally. Continue prednisone 1 mg/kg dosing. Continue augmentin. Patient, fortunately, is asymptomatic.      Complexity: High.        Ashley Nguyen, DO  Hematology/Oncology  [facility]        Ivon is a 31 year old who is being evaluated via a billable video visit.      How would you like to obtain your AVS? MyChart  If the video visit is dropped, the invitation should be resent by: Send to e-mail at: [email or web address]  Will anyone else be joining your video visit? No  {If patient encounters  "technical issues they should call [phone] :[id number]}    Video Start Time: {video visit start/end time for provider to select:152948}  Video-Visit Details    Type of service:  Video Visit    Video End Time:{video visit start/end time for provider to select:152948}    Originating Location (pt. Location): {video visit patient location:309272::\"Home\"}    Distant Location (provider location):  [facility]     Platform used for Video Visit: {Virtual Visit Platforms:344138::\"inkSIG Digital\"}      Again, thank you for allowing me to participate in the care of your patient.        Sincerely,        Ashley Nguyen, DO    "

## 2022-01-28 ENCOUNTER — APPOINTMENT (OUTPATIENT)
Dept: INTERVENTIONAL RADIOLOGY/VASCULAR | Facility: CLINIC | Age: 32
End: 2022-01-28
Attending: RADIOLOGY
Payer: MEDICAID

## 2022-01-28 ENCOUNTER — HOSPITAL ENCOUNTER (OUTPATIENT)
Facility: CLINIC | Age: 32
Discharge: HOME | End: 2022-01-28
Attending: RADIOLOGY | Admitting: RADIOLOGY
Payer: MEDICAID

## 2022-01-28 ENCOUNTER — TELEPHONE (OUTPATIENT)
Dept: ONCOLOGY | Facility: CLINIC | Age: 32
End: 2022-01-28

## 2022-01-28 VITALS
SYSTOLIC BLOOD PRESSURE: 123 MMHG | TEMPERATURE: 97 F | BODY MASS INDEX: 42.79 KG/M2 | HEIGHT: 65 IN | OXYGEN SATURATION: 98 % | DIASTOLIC BLOOD PRESSURE: 80 MMHG | WEIGHT: 256.84 LBS | RESPIRATION RATE: 18 BRPM | HEART RATE: 60 BPM

## 2022-01-28 DIAGNOSIS — M31.19 T.T.P. SYNDROME (H): ICD-10-CM

## 2022-01-28 LAB
B-HCG SERPL-ACNC: <1 IU/L (ref 0–5)
BLD PROD TYP BPU: NORMAL
BLOOD COMPONENT TYPE: NORMAL
CODING SYSTEM: NORMAL
HAPTOGLOB SERPL-MCNC: 132 MG/DL (ref 32–197)
HBV SURFACE AG SERPL QL IA: NONREACTIVE
ISSUE DATE AND TIME: NORMAL
UNIT ABO/RH: NORMAL
UNIT NUMBER: NORMAL
UNIT STATUS: NORMAL
UNIT TYPE ISBT: 600
UNIT TYPE ISBT: 600
UNIT TYPE ISBT: 6200

## 2022-01-28 PROCEDURE — C1750 CATH, HEMODIALYSIS,LONG-TERM: HCPCS

## 2022-01-28 PROCEDURE — 36415 COLL VENOUS BLD VENIPUNCTURE: CPT | Performed by: PHYSICIAN ASSISTANT

## 2022-01-28 PROCEDURE — 99152 MOD SED SAME PHYS/QHP 5/>YRS: CPT

## 2022-01-28 PROCEDURE — 999N000012 HC STATISTIC ANGIOGRAM, STENT, VERTEBRO PLASTY

## 2022-01-28 PROCEDURE — 36415 COLL VENOUS BLD VENIPUNCTURE: CPT | Performed by: RADIOLOGY

## 2022-01-28 PROCEDURE — 272N000602 HC WOUND GLUE CR1

## 2022-01-28 PROCEDURE — 250N000013 HC RX MED GY IP 250 OP 250 PS 637: Performed by: INTERNAL MEDICINE

## 2022-01-28 PROCEDURE — 250N000011 HC RX IP 250 OP 636: Performed by: PHYSICIAN ASSISTANT

## 2022-01-28 PROCEDURE — 84702 CHORIONIC GONADOTROPIN TEST: CPT | Performed by: PHYSICIAN ASSISTANT

## 2022-01-28 PROCEDURE — 250N000011 HC RX IP 250 OP 636: Performed by: INTERNAL MEDICINE

## 2022-01-28 PROCEDURE — 85397 CLOTTING FUNCT ACTIVITY: CPT | Performed by: RADIOLOGY

## 2022-01-28 PROCEDURE — 250N000009 HC RX 250: Performed by: PHYSICIAN ASSISTANT

## 2022-01-28 PROCEDURE — 36514 APHERESIS PLASMA: CPT

## 2022-01-28 PROCEDURE — C1769 GUIDE WIRE: HCPCS

## 2022-01-28 PROCEDURE — 250N000009 HC RX 250: Performed by: INTERNAL MEDICINE

## 2022-01-28 PROCEDURE — 250N000011 HC RX IP 250 OP 636: Performed by: RADIOLOGY

## 2022-01-28 PROCEDURE — 272N000196 HC ACCESSORY CR5

## 2022-01-28 PROCEDURE — 258N000003 HC RX IP 258 OP 636: Performed by: INTERNAL MEDICINE

## 2022-01-28 PROCEDURE — P9059 PLASMA, FRZ BETWEEN 8-24HOUR: HCPCS | Performed by: INTERNAL MEDICINE

## 2022-01-28 PROCEDURE — P9059 PLASMA, FRZ BETWEEN 8-24HOUR: HCPCS | Performed by: RADIOLOGY

## 2022-01-28 RX ORDER — CALCIUM GLUCONATE 94 MG/ML
1 INJECTION, SOLUTION INTRAVENOUS
Status: DISCONTINUED | OUTPATIENT
Start: 2022-01-28 | End: 2022-01-28 | Stop reason: HOSPADM

## 2022-01-28 RX ORDER — FLUMAZENIL 0.1 MG/ML
0.2 INJECTION, SOLUTION INTRAVENOUS
Status: DISCONTINUED | OUTPATIENT
Start: 2022-01-28 | End: 2022-01-28 | Stop reason: HOSPADM

## 2022-01-28 RX ORDER — NALOXONE HYDROCHLORIDE 0.4 MG/ML
0.2 INJECTION, SOLUTION INTRAMUSCULAR; INTRAVENOUS; SUBCUTANEOUS
Status: DISCONTINUED | OUTPATIENT
Start: 2022-01-28 | End: 2022-01-28 | Stop reason: HOSPADM

## 2022-01-28 RX ORDER — ACETAMINOPHEN 325 MG/1
325 TABLET ORAL
Status: CANCELLED | OUTPATIENT
Start: 2022-01-28

## 2022-01-28 RX ORDER — DIPHENHYDRAMINE HYDROCHLORIDE 50 MG/ML
50 INJECTION INTRAMUSCULAR; INTRAVENOUS ONCE
Status: CANCELLED | OUTPATIENT
Start: 2022-01-29

## 2022-01-28 RX ORDER — CALCIUM GLUCONATE 94 MG/ML
1 INJECTION, SOLUTION INTRAVENOUS
Status: CANCELLED | OUTPATIENT
Start: 2022-01-29

## 2022-01-28 RX ORDER — HEPARIN SODIUM 1000 [USP'U]/ML
4000 INJECTION, SOLUTION INTRAVENOUS; SUBCUTANEOUS ONCE
Status: COMPLETED | OUTPATIENT
Start: 2022-01-28 | End: 2022-01-28

## 2022-01-28 RX ORDER — NALOXONE HYDROCHLORIDE 0.4 MG/ML
0.4 INJECTION, SOLUTION INTRAMUSCULAR; INTRAVENOUS; SUBCUTANEOUS
Status: DISCONTINUED | OUTPATIENT
Start: 2022-01-28 | End: 2022-01-28 | Stop reason: HOSPADM

## 2022-01-28 RX ORDER — DIPHENHYDRAMINE HYDROCHLORIDE 50 MG/ML
50 INJECTION INTRAMUSCULAR; INTRAVENOUS ONCE
Status: COMPLETED | OUTPATIENT
Start: 2022-01-28 | End: 2022-01-28

## 2022-01-28 RX ORDER — DIPHENHYDRAMINE HYDROCHLORIDE 50 MG/ML
50 INJECTION INTRAMUSCULAR; INTRAVENOUS
Status: COMPLETED | OUTPATIENT
Start: 2022-01-28 | End: 2022-01-28

## 2022-01-28 RX ORDER — ACETAMINOPHEN 325 MG/1
325 TABLET ORAL ONCE
Status: COMPLETED | OUTPATIENT
Start: 2022-01-28 | End: 2022-01-28

## 2022-01-28 RX ORDER — DIPHENHYDRAMINE HYDROCHLORIDE 50 MG/ML
50 INJECTION INTRAMUSCULAR; INTRAVENOUS
Status: CANCELLED | OUTPATIENT
Start: 2022-01-29

## 2022-01-28 RX ORDER — ACETAMINOPHEN 325 MG/1
325 TABLET ORAL ONCE
Status: CANCELLED | OUTPATIENT
Start: 2022-01-29

## 2022-01-28 RX ORDER — FENTANYL CITRATE 50 UG/ML
25-50 INJECTION, SOLUTION INTRAMUSCULAR; INTRAVENOUS EVERY 5 MIN PRN
Status: DISCONTINUED | OUTPATIENT
Start: 2022-01-28 | End: 2022-01-28 | Stop reason: HOSPADM

## 2022-01-28 RX ADMIN — LIDOCAINE HYDROCHLORIDE 10 ML: 10 INJECTION, SOLUTION INFILTRATION; PERINEURAL at 11:26

## 2022-01-28 RX ADMIN — MIDAZOLAM 1 MG: 1 INJECTION INTRAMUSCULAR; INTRAVENOUS at 11:11

## 2022-01-28 RX ADMIN — DIPHENHYDRAMINE HYDROCHLORIDE 50 MG: 50 INJECTION, SOLUTION INTRAMUSCULAR; INTRAVENOUS at 15:28

## 2022-01-28 RX ADMIN — FENTANYL CITRATE 50 MCG: 50 INJECTION, SOLUTION INTRAMUSCULAR; INTRAVENOUS at 11:12

## 2022-01-28 RX ADMIN — DIPHENHYDRAMINE HYDROCHLORIDE 50 MG: 50 INJECTION, SOLUTION INTRAMUSCULAR; INTRAVENOUS at 13:19

## 2022-01-28 RX ADMIN — FENTANYL CITRATE 50 MCG: 50 INJECTION, SOLUTION INTRAMUSCULAR; INTRAVENOUS at 11:31

## 2022-01-28 RX ADMIN — MIDAZOLAM 1 MG: 1 INJECTION INTRAMUSCULAR; INTRAVENOUS at 11:31

## 2022-01-28 RX ADMIN — FENTANYL CITRATE 50 MCG: 50 INJECTION, SOLUTION INTRAMUSCULAR; INTRAVENOUS at 11:25

## 2022-01-28 RX ADMIN — HEPARIN SODIUM 4000 UNITS: 1000 INJECTION INTRAVENOUS; SUBCUTANEOUS at 11:30

## 2022-01-28 RX ADMIN — MIDAZOLAM 1 MG: 1 INJECTION INTRAMUSCULAR; INTRAVENOUS at 11:24

## 2022-01-28 RX ADMIN — HYDROCORTISONE SODIUM SUCCINATE 100 MG: 100 INJECTION, POWDER, FOR SOLUTION INTRAMUSCULAR; INTRAVENOUS at 13:19

## 2022-01-28 RX ADMIN — CALCIUM GLUCONATE 4 G: 98 INJECTION, SOLUTION INTRAVENOUS at 13:19

## 2022-01-28 RX ADMIN — ANTICOAGULANT CITRATE DEXTROSE SOLUTION FORMULA A 850 ML: 12.25; 11; 3.65 SOLUTION INTRAVENOUS at 13:18

## 2022-01-28 RX ADMIN — ACETAMINOPHEN 325 MG: 325 TABLET, FILM COATED ORAL at 13:17

## 2022-01-28 ASSESSMENT — MIFFLIN-ST. JEOR: SCORE: 1877.13

## 2022-01-28 NOTE — PROGRESS NOTES
"  Interventional Radiology - Pre-Procedure Note:  1/28/2022    Procedure Requested: Tunneled dialysis catheter placement  Requested by: Dr Perales    History and Physical Reviewed: H&P documented within 30 days (by Dr Nguyen on 1/27/22).  I have personally reviewed the patient's medical history and have updated the medical record as necessary.    Brief HPI: Ivon Gamble is a 31 year old female with PCOS hospitalized two weeks ago for tooth abscess and developed TTP. Underwent plasma exchange via temporary RIJ catheter for 6 days after which catheter was removed and patient was discharged. Subsequent outpatient labs demonstrated decreasing platelets and it has been recommended patient restart plasma exchange therapy. As a result, IR consulted for tunneled catheter placement. She was restarted on BID Augmentin on 1/25.     IMAGING:  Nontunneled central catheter placement 1/13/22:  \"FINDINGS:  Ultrasound shows an anechoic and compressible right internal jugular  vein.  A permanent image was stored.       At the completion of the study, the non-tunneled dialysis catheter tip  lies in the proximal right atrium.                                                                      IMPRESSION:    1.  Non-tunneled dialysis catheter placement, as discussed above.  2.  The catheter position was verified fluoroscopically and this is  ready for use.\"    NPO: YES since [place]  ANTICOAGULANTS: NONE  ANTIBIOTICS: PO Augmentin BID (last dose this AM)    ALLERGIES  No Known Allergies      LABS:  INR   Date Value Ref Range Status   01/27/2022 0.86 0.85 - 1.15 Final      Hemoglobin   Date Value Ref Range Status   01/27/2022 11.2 (L) 11.7 - 15.7 g/dL Final   ]  Platelet Count   Date Value Ref Range Status   01/27/2022 82 (L) 150 - 450 10e3/uL Final     Creatinine   Date Value Ref Range Status   01/27/2022 0.80 0.52 - 1.04 mg/dL Final     Potassium   Date Value Ref Range Status   01/27/2022 3.6 3.4 - 5.3 mmol/L Final         EXAM:  /49 " (BP Location: Left arm)   Pulse 74   Temp 97.4  F (36.3  C) (Oral)   Resp 16   SpO2 99%   General:  Stable.  In no acute distress.    Neuro:  A&O x 3. Moves all extremities equally.  Neck: No gross JVD  Heart: RRR  Lungs: No increased work of breathing  Chest: No scarring or deformity      Pre-Sedation Code Status Assessment:  Code Status: Full Code intra procedure, per discussion with patient         ASSESSMENT/PLAN:   Thrombocytopenia, possible TTP    -Tunneled central venous catheter placement today with moderate sedation as scheduled    Procedure, risks/benefits, details, alternatives, and sedation reviewed with patient and patient verbalized understanding. All questions answered. OK to proceed with above radiology procedure.     Juan Hernandez PA-C  Interventional Radiology  [phone]      Total time spent on the date of the encounter is 30 minutes, including time spent counseling the patient, performing a medically appropriate evaluation, reviewing prior medical history, ordering medications and tests, documenting clinical information in the medical record, and communication of results.

## 2022-01-28 NOTE — PROGRESS NOTES
Care Suites Admission Nursing Note    Patient Information  Name: Ivon Gamble  Age: 31 year old  Reason for admission: Tunnel Cath  Care Suites arrival time: 0830        Patient Admission/Assessment   Pre-procedure assessment complete: Yes  If abnormal assessment/labs, provider notified: N/A  NPO: Yes  Medications held per instructions/orders: Yes  Consent: obtained  If applicable, pregnancy test status: obtained  Patient oriented to room: Yes  Education/questions answered: Yes  Plan/other: Prep for procedure    Discharge Planning  Discharge name/phone number: [place] [phone]  Overnight post sedation caregiver: [place]  Discharge location: home    Andrew Melissa RN

## 2022-01-28 NOTE — PROGRESS NOTES
Ivon is a 31-year-old woman who has a history of TTP.  She is resuming plasmapheresis due to a drop in her platelet count down to 82,000.  She currently has a left lower jaw abscess tooth which cannot be extracted or worked on until her platelets improve.  That may be the stimulus for an exacerbation of her TTP.    She received pheresis today.  I saw her on the treatment.  According to the RN she had some itchiness which was treated with extra diphenhydramine.  She required about 12 units of FFP for her full exchange.    Working on observation bed for a Saturday and Sunday.

## 2022-01-28 NOTE — PROGRESS NOTES
PATIENT/VISITOR WELLNESS SCREENING    Step 1 Patient Screening    1. In the last month, have you been in contact with someone who was confirmed or suspected to have Coronavirus/COVID-19? No    2. Do you have the following symptoms?  Fever/Chills? No   Cough? No   Shortness of breath? No   New loss of taste or smell? No  Sore throat? No  Muscle or body aches? No  Headaches? No  Fatigue? No  Vomiting or diarrhea? No            NO SYMPTOM(S)    ACTIONS:  1. Standard rooming process  2. Provider to assess per normal protocol  3. Implement precautions as needed and per guidelines     POSITIVE SYMPTOM(S)  If positive for ANY of the following symptoms: fever, cough, shortness of breath, rash    ACTION:  1. Continue to have the patient wear a mask   2. Room patient as soon as possible  3. Don appropriate PPE when entering room  4. Provider evaluation

## 2022-01-28 NOTE — IR NOTE
Interventional Radiology Intra-procedural Nursing Note    Patient Name: Ivon Gamble  Medical Record Number: [id number]  Today's Date: January 28, 2022    Procedure: Tunneled central venous catheter placement with moderate sedation  Start time: 1123  End time: 1135  Report provided to: Care Suites RN  Patient depart time and location: 1145 to CS22    Note: Patient entered Interventional Radiology Suite number 22 via cart. Patient awake, alert and orientated. Assisted onto procedural table in supine position. Prepped and draped.  Dr. Rodriguez in room. Time out and procedure started. Vital signs stable. Telemetry reading normal sinus.    Procedure well tolerated by patient without complications. Procedure end with debrief by Dr. Rodriguez.  Pressure held until hemostasis achieved. biopatch with tegaderm dressing applied to site.    Administered medication totals:  Lidocaine 1% 10 mL Intradermal  Heparin  4000Units   Versed 3 mg IVP  Fentanyl 150 mcg IVP    Last dose of sedation administered at 1131.   [place]

## 2022-01-28 NOTE — PRE-PROCEDURE
GENERAL PRE-PROCEDURE:   Procedure:  Tunneled central venous catheter placement with moderate sedation  Date/Time:  1/28/2022 9:08 AM    Written consent obtained?: Yes    Risks and benefits: Risks, benefits and alternatives were discussed    Consent given by:  Patient  Patient states understanding of procedure being performed: Yes    Patient's understanding of procedure matches consent: Yes    Procedure consent matches procedure scheduled: Yes    Expected level of sedation:  Moderate  Appropriately NPO:  Yes  ASA Class:  3  Mallampati  :  Grade 2- soft palate, base of uvula, tonsillar pillars, and portion of posterior pharyngeal wall visible  Lungs:  Lungs clear with good breath sounds bilaterally  Heart:  Normal heart sounds and rate  History & Physical reviewed:  History and physical reviewed and no updates needed  Statement of review:  I have reviewed the lab findings, diagnostic data, medications, and the plan for sedation

## 2022-01-28 NOTE — PROGRESS NOTES
1217 Report received from Andrew Melissa  1230 Pt A/O. Tunnel cath securely intact to right chest. No family present at this time.   1240 Discharge teaching & instructions given to pt w/ verbal understanding received. All questions & concerns addressed.  1245 Pt ambulated back to Overflow area for Apheresis appt. Report given to Apheresis nurse by Andrew Melissa RN. All personal belongings taken with pt. Care transferred. Pt will discharge from this area.

## 2022-01-28 NOTE — PROCEDURES
Interventional Radiology Post-Procedure Note   ?   Brief Procedure Note:   Patient name: Ivon Gamble Pt MRN:[id number]   Date of procedure: 1/28/2022     Procedure(s): Tunneled apheresis catheter placement  Sedation method: Moderate sedation was employed. The patient was monitored by an interventional radiology nurse at all times throughout the procedure under my direct guidance.  Pre Procedure Diagnosis: TTP  Post Procedure Diagnosis: Same  Indications: Need for moderate-term central venous access for apheresis.   ?   Attending: Hector Rodriguez M.D.  Specimen(s) removed: None   Additional studies ordered: None  Drains: None   Lines: 23 cm (Tip-to-cuff) 15 Fr Duraflow 2 Hemodialysis catheter  Estimated Blood Loss: Minimal  Complications: None  Vascular closure method: N/A    Findings/Notes/Comments: Uncomplicated placement of right internal jugular hemodialysis catheter. Catheter tip lies in the high/mid right atrium. Catheter is ready for immediate use.   ?   Please see dictation in PACS or under the Imaging tab in [facility] for detailed procedure note.     Hector Rodriguez M.D.   Vascular and Interventional Radiology   Pager: [phone]   After Hours / Scheduling: [phone]     1/28/2022  11:50 AM

## 2022-01-28 NOTE — TELEPHONE ENCOUNTER
Left message for Ivon that I have her scheduled at 115 pm for labs and to see our NP Antony at 130 pm on Monday 1/31/22.  I was told by Nephrology they will be scheduling her for her infusions around 8 am Mon- Friday next week.  Please call back [phone] with questions

## 2022-01-28 NOTE — PROGRESS NOTES
"Apheresis Treatment    Treatment in room CS2 using Spectra Optia [id number] apheresis machine. Consent verified.  Treatment number: 1/TBD    Height: 165cm  Weight: 116.5kg  HCT: 35%  Inlet speed: 53ml/min as determined by Optia  ACDA ratio: 10:1  Fluid balance: 100%  Access: tunneled RIJ  Post treatment line dwell: Citrate 2ml red/blue lumen  Replacement product: 3500ml FFP  Electrolyte replacement: Calcium gluconate 4 grams in NS - total 90mls, piggybacked into return lines, infused over time of treatment.  Premedications: benadryl 50mg IV, 100mg solucortef IV, 325mg tylenol PO    Treatment Notes: Patient developed itchiness in throat and intermittent coughing, as well as hives on chest/shoulders and ears feeling \"hot\". This occurred during the infusion of the second to last unit of FFP for the tx. PRN benadryl 50mg IV given, pt stated this relieved sx. VSS throughout treatment. No fever noted at any time before/during/after tx. MD notified. A similar reaction was noted in one of the patient's previous tx.     Treatment completed as ordered, VSS, see EPIC flowsheet for run data.    Next treatment: Saturday 1/29/2022 @0800  "

## 2022-01-28 NOTE — DISCHARGE INSTRUCTIONS
Tunnel Cath Insertion Discharge Instructions     After you go home:      You may resume your normal diet    Have an adult stay with you for 6 hours       For 24 hours - due to the sedation you received:    Relax and take it easy    Do NOT make any important or legal decisions    Do NOT drive or operate machines at home or at work    Do NOT drink alcohol    Care of Puncture Site:      Keep the dressing clean & dry    Check the site twice a day for signs of infection    Do not swim while you have the tunnel catheter (to prevent infection)    If you shower, place a waterproof cover over the dressing (such as plastic wrap)    You may take a bath if the water stays below your waist. Sponge bathe your upper body to keep the dressing from getting wet    Caring for the Catheter:      Check the skin around the tube each day for redness, swelling, drainage or tenderness. These are all signs of infection    Take your temperature daily     Check your catheter every day:      Make sure the clamps are closed over both ends of the tubing    Check that the caps are tight    If a cap comes off, you may have bleeding from the tube, or air could get into the bloodstream.  Wrap the end of the tube in a clean gauze and call your provider or dialysis unit immediately    Your catheter is not likely to fall out. It is secured with stitches to your skin.  Also, a small cuff under the skin helps to hold the catheter in place.  If the catheter does fall out, put firm pressure on the exit site with a clean gauze & seek medical attention immediately    Activity:       You may go back to normal activity in 24 hours     Avoid heavy lifting (greater than 10 pounds), strenuous activity or the overuse of your shoulder for 3 days    Bleeding:      If you start bleeding from the incision sites in your chest or neck - or have swelling in your neck, sit down and press on the site for 5-10 minutes.     If bleeding has not stopped after 10 minutes, call  your provider.        Call 911 right away if you have heavy bleeding or bleeding that does not stop.      Medicines:      You may resume all medications    For minor pain, you may take Acetaminophen (Tylenol) or Ibuprofen (Advil)                 Call the provider who ordered this procedure if:      The site is red, swollen, hot or tender or there is drainage at the site    You have chills or a fever greater than 100 F (37.8 C)    A cap comes off    The catheter falls out    Any questions or concerns    Call  911 or go to the Emergency Room if you have:      Trouble breathing    Chest or shoulder pain not related to procedure    Bleeding that you cannot control      If you have questions call:          [facility] Radiology Dept @ [phone]        The provider who performed your procedure was _________________.

## 2022-01-29 ENCOUNTER — HOSPITAL ENCOUNTER (OUTPATIENT)
Facility: CLINIC | Age: 32
Discharge: HOME | End: 2022-01-29
Attending: INTERNAL MEDICINE | Admitting: INTERNAL MEDICINE
Payer: MEDICAID

## 2022-01-29 VITALS
DIASTOLIC BLOOD PRESSURE: 74 MMHG | SYSTOLIC BLOOD PRESSURE: 119 MMHG | BODY MASS INDEX: 42.79 KG/M2 | TEMPERATURE: 98.2 F | RESPIRATION RATE: 18 BRPM | HEIGHT: 65 IN | HEART RATE: 88 BPM | WEIGHT: 256.84 LBS

## 2022-01-29 LAB
BASOPHILS # BLD AUTO: 0 10E3/UL (ref 0–0.2)
BASOPHILS NFR BLD AUTO: 0 %
BLD PROD TYP BPU: NORMAL
BLOOD COMPONENT TYPE: NORMAL
CODING SYSTEM: NORMAL
EOSINOPHIL # BLD AUTO: 0 10E3/UL (ref 0–0.7)
EOSINOPHIL NFR BLD AUTO: 0 %
ERYTHROCYTE [DISTWIDTH] IN BLOOD BY AUTOMATED COUNT: 16.7 % (ref 10–15)
HCT VFR BLD AUTO: 33.6 % (ref 35–47)
HGB BLD-MCNC: 10.6 G/DL (ref 11.7–15.7)
IMM GRANULOCYTES # BLD: 0.1 10E3/UL
IMM GRANULOCYTES NFR BLD: 1 %
ISSUE DATE AND TIME: NORMAL
LYMPHOCYTES # BLD AUTO: 0.9 10E3/UL (ref 0.8–5.3)
LYMPHOCYTES NFR BLD AUTO: 12 %
MCH RBC QN AUTO: 33.1 PG (ref 26.5–33)
MCHC RBC AUTO-ENTMCNC: 31.5 G/DL (ref 31.5–36.5)
MCV RBC AUTO: 105 FL (ref 78–100)
MONOCYTES # BLD AUTO: 0.3 10E3/UL (ref 0–1.3)
MONOCYTES NFR BLD AUTO: 4 %
NEUTROPHILS # BLD AUTO: 6.2 10E3/UL (ref 1.6–8.3)
NEUTROPHILS NFR BLD AUTO: 83 %
NRBC # BLD AUTO: 0 10E3/UL
NRBC BLD AUTO-RTO: 0 /100
PLATELET # BLD AUTO: 85 10E3/UL (ref 150–450)
RBC # BLD AUTO: 3.2 10E6/UL (ref 3.8–5.2)
UNIT ABO/RH: NORMAL
UNIT NUMBER: NORMAL
UNIT STATUS: NORMAL
UNIT TYPE ISBT: 600
UNIT TYPE ISBT: 6200
WBC # BLD AUTO: 7.5 10E3/UL (ref 4–11)

## 2022-01-29 PROCEDURE — P9059 PLASMA, FRZ BETWEEN 8-24HOUR: HCPCS | Mod: 91 | Performed by: INTERNAL MEDICINE

## 2022-01-29 PROCEDURE — 85018 HEMOGLOBIN: CPT | Performed by: INTERNAL MEDICINE

## 2022-01-29 PROCEDURE — 250N000011 HC RX IP 250 OP 636: Performed by: INTERNAL MEDICINE

## 2022-01-29 PROCEDURE — 36514 APHERESIS PLASMA: CPT

## 2022-01-29 PROCEDURE — 250N000013 HC RX MED GY IP 250 OP 250 PS 637: Performed by: INTERNAL MEDICINE

## 2022-01-29 PROCEDURE — P9059 PLASMA, FRZ BETWEEN 8-24HOUR: HCPCS | Performed by: INTERNAL MEDICINE

## 2022-01-29 PROCEDURE — 36514 APHERESIS PLASMA: CPT | Performed by: INTERNAL MEDICINE

## 2022-01-29 RX ORDER — ACETAMINOPHEN 325 MG/1
325 TABLET ORAL ONCE
Status: CANCELLED | OUTPATIENT
Start: 2022-01-30

## 2022-01-29 RX ORDER — DIPHENHYDRAMINE HYDROCHLORIDE 50 MG/ML
50 INJECTION INTRAMUSCULAR; INTRAVENOUS ONCE
Status: CANCELLED | OUTPATIENT
Start: 2022-01-30

## 2022-01-29 RX ORDER — CALCIUM GLUCONATE 94 MG/ML
1 INJECTION, SOLUTION INTRAVENOUS
Status: DISCONTINUED | OUTPATIENT
Start: 2022-01-29 | End: 2022-01-29

## 2022-01-29 RX ORDER — DIPHENHYDRAMINE HYDROCHLORIDE 50 MG/ML
50 INJECTION INTRAMUSCULAR; INTRAVENOUS ONCE
Status: COMPLETED | OUTPATIENT
Start: 2022-01-29 | End: 2022-01-29

## 2022-01-29 RX ORDER — DIPHENHYDRAMINE HYDROCHLORIDE 50 MG/ML
50 INJECTION INTRAMUSCULAR; INTRAVENOUS
Status: DISCONTINUED | OUTPATIENT
Start: 2022-01-29 | End: 2022-01-29

## 2022-01-29 RX ORDER — DIPHENHYDRAMINE HYDROCHLORIDE 50 MG/ML
50 INJECTION INTRAMUSCULAR; INTRAVENOUS
Status: CANCELLED | OUTPATIENT
Start: 2022-01-30

## 2022-01-29 RX ORDER — ACETAMINOPHEN 325 MG/1
325 TABLET ORAL ONCE
Status: COMPLETED | OUTPATIENT
Start: 2022-01-29 | End: 2022-01-29

## 2022-01-29 RX ADMIN — ACETAMINOPHEN 325 MG: 325 TABLET, FILM COATED ORAL at 08:56

## 2022-01-29 RX ADMIN — DIPHENHYDRAMINE HYDROCHLORIDE 50 MG: 50 INJECTION, SOLUTION INTRAMUSCULAR; INTRAVENOUS at 08:55

## 2022-01-29 RX ADMIN — HYDROCORTISONE SODIUM SUCCINATE 100 MG: 100 INJECTION, POWDER, FOR SOLUTION INTRAMUSCULAR; INTRAVENOUS at 08:55

## 2022-01-29 ASSESSMENT — MIFFLIN-ST. JEOR: SCORE: 1874

## 2022-01-29 NOTE — PROGRESS NOTES
Apheresis Treatment - for TTP    Treatment in room 519 using optia apheresis machine. Consent verified.  Treatment number: 2 of 7     Height: 165cm   Weight: 116kg # used in /treatment   HCT: 34%  Inlet speed: 53   ACDA ratio: 10:1  Fluid balance: 100%  Access: RIJ    Post treatment line dwell: Citrate  1,000units/ml, red port 2400 units (2.4mls), blue port 2400 units (2.4mls).  Replacement product: 3350 FFP  Electrolyte replacement: Calcium gluconate  4grams in NS - total 90mls, piggybacked into return lines, infused over time of treatment.  Premedications: 50 mg IV Benadryl 100mg IV Solucortif  325 mg PO tylenol    Treatment Notes: Pt tolerated procedure without difficulty No reaction noted     Treatment completed as ordered, VSS, see EPIC flowsheet for run data.    Next treatment: Sunday 1/30 @0800 in Obs

## 2022-01-29 NOTE — PROGRESS NOTES
"[facility]     Renal Progress Note       SHORTHAND KEY FOR MY NOTES:  c = with, s = without, p = after, a = before, x = except, asx = asymptomatic, tx = transplant or treatment, sx = symptoms or symptomatic, cx = canceled or culture, rxn = reaction, yday = yesterday, nl = normal, abx = antibiotics, fxn = function, dx = diagnosis, dz = disease, m/h = melena/hematochezia, c/d/l/ha = cramping/dizziness/lightheadedness/headache, d/c = discharge or diarrhea/constipation, f/c/n/v = fevers/chills/nausea/vomiting, cp/sob = chest pain/shortness of breath, tbv = total body volume, rxn = reaction, tdc = tunneled dialysis catheter, pta = prior to admission, hd = hemodialysis, pd = peritoneal dialysis, hhd = home hemodialysis, edw = estimated dry wt         Assessment/Plan:     1.  TTP.  Pt's plts are stable ~80K.  She is clinically fine.  Tolerating pheresis.  A.  Next run tmrw.         Interval History:     Pt is doing well c pheresis today.  No blurry vision, f/c, ha.  She has tenderness at the [facility] TDC site.          Medications and Allergies:       anticoagulant citrate  500-2,000 mL Apheresis Once     calcium gluconate intermittent infusion with additives - doses under 5g  4 g Intravenous Once     No Known Allergies       Physical Exam:     Vitals were reviewed     , Blood pressure 126/73, pulse 90, temperature 98.1  F (36.7  C), temperature source Oral, resp. rate 18, height 1.64 m (5' 4.57\"), weight 116.5 kg (256 lb 13.4 oz).  Wt Readings from Last 3 Encounters:   01/29/22 116.5 kg (256 lb 13.4 oz)   01/28/22 116.5 kg (256 lb 13.4 oz)   01/21/22 116.5 kg (256 lb 14.4 oz)     No intake or output data in the 24 hours ending 01/29/22 1137    GENERAL APPEARANCE: pleasant, NAD, alert  HEENT:  eyes/ears/nose/neck grossly nl  RESP: CTA B c good efforts  CV: RRR, nl S1/S2   ABDOMEN: o/s/nt/nd  EXTREMITIES/SKIN: no ble edema    Pt seen on pheresis.  Stable run.  Good BFR via RIJ TDC.  Catheter site is " tender, but not red or warm.           Data:     CBC RESULTS:     Recent Labs   Lab 01/29/22  0916 01/27/22  0953   WBC 7.5 13.1*   RBC 3.20* 3.39*   HGB 10.6* 11.2*   HCT 33.6* 34.5*   PLT 85* 82*     Basic Metabolic Panel:  Recent Labs   Lab 01/27/22  0953      POTASSIUM 3.6   CHLORIDE 109   CO2 26   BUN 21   CR 0.80   *   JOSELUIS 8.8     INR  Recent Labs   Lab 01/27/22  0949   INR 0.86      Attestation:   I have reviewed today's relevant vital signs, notes, medications, labs and imaging.    Kamran Mayes MD  [facility] Nephrology  [phone]

## 2022-01-30 ENCOUNTER — HOSPITAL ENCOUNTER (OUTPATIENT)
Facility: CLINIC | Age: 32
Discharge: HOME | End: 2022-01-30
Attending: INTERNAL MEDICINE | Admitting: INTERNAL MEDICINE
Payer: MEDICAID

## 2022-01-30 ENCOUNTER — HOSPITAL (OUTPATIENT)
Dept: MEDSURG UNIT | Facility: CLINIC | Age: 32
End: 2022-01-30

## 2022-01-30 VITALS
BODY MASS INDEX: 42.79 KG/M2 | DIASTOLIC BLOOD PRESSURE: 85 MMHG | HEART RATE: 80 BPM | HEIGHT: 65 IN | SYSTOLIC BLOOD PRESSURE: 130 MMHG | RESPIRATION RATE: 16 BRPM | TEMPERATURE: 98.3 F | WEIGHT: 256.84 LBS

## 2022-01-30 LAB
BASOPHILS # BLD AUTO: 0 10E3/UL (ref 0–0.2)
BASOPHILS NFR BLD AUTO: 0 %
BLD PROD TYP BPU: NORMAL
BLOOD COMPONENT TYPE: NORMAL
CODING SYSTEM: NORMAL
EOSINOPHIL # BLD AUTO: 0 10E3/UL (ref 0–0.7)
EOSINOPHIL NFR BLD AUTO: 0 %
ERYTHROCYTE [DISTWIDTH] IN BLOOD BY AUTOMATED COUNT: 16.2 % (ref 10–15)
HCT VFR BLD AUTO: 33.2 % (ref 35–47)
HGB BLD-MCNC: 10.6 G/DL (ref 11.7–15.7)
IMM GRANULOCYTES # BLD: 0.1 10E3/UL
IMM GRANULOCYTES NFR BLD: 1 %
ISSUE DATE AND TIME: NORMAL
LYMPHOCYTES # BLD AUTO: 1 10E3/UL (ref 0.8–5.3)
LYMPHOCYTES NFR BLD AUTO: 12 %
MCH RBC QN AUTO: 33.4 PG (ref 26.5–33)
MCHC RBC AUTO-ENTMCNC: 31.9 G/DL (ref 31.5–36.5)
MCV RBC AUTO: 105 FL (ref 78–100)
MONOCYTES # BLD AUTO: 0.4 10E3/UL (ref 0–1.3)
MONOCYTES NFR BLD AUTO: 4 %
NEUTROPHILS # BLD AUTO: 7.1 10E3/UL (ref 1.6–8.3)
NEUTROPHILS NFR BLD AUTO: 83 %
NRBC # BLD AUTO: 0 10E3/UL
NRBC BLD AUTO-RTO: 0 /100
PLATELET # BLD AUTO: 97 10E3/UL (ref 150–450)
RBC # BLD AUTO: 3.17 10E6/UL (ref 3.8–5.2)
UNIT ABO/RH: NORMAL
UNIT NUMBER: NORMAL
UNIT STATUS: NORMAL
UNIT TYPE ISBT: 600
UNIT TYPE ISBT: 6200
WBC # BLD AUTO: 8.7 10E3/UL (ref 4–11)

## 2022-01-30 PROCEDURE — 250N000011 HC RX IP 250 OP 636: Performed by: INTERNAL MEDICINE

## 2022-01-30 PROCEDURE — 85025 COMPLETE CBC W/AUTO DIFF WBC: CPT | Performed by: INTERNAL MEDICINE

## 2022-01-30 PROCEDURE — 250N000013 HC RX MED GY IP 250 OP 250 PS 637: Performed by: INTERNAL MEDICINE

## 2022-01-30 PROCEDURE — P9059 PLASMA, FRZ BETWEEN 8-24HOUR: HCPCS | Performed by: INTERNAL MEDICINE

## 2022-01-30 PROCEDURE — 36514 APHERESIS PLASMA: CPT | Performed by: INTERNAL MEDICINE

## 2022-01-30 PROCEDURE — 250N000009 HC RX 250: Performed by: INTERNAL MEDICINE

## 2022-01-30 PROCEDURE — 36514 APHERESIS PLASMA: CPT

## 2022-01-30 PROCEDURE — 258N000003 HC RX IP 258 OP 636: Performed by: INTERNAL MEDICINE

## 2022-01-30 RX ORDER — DIPHENHYDRAMINE HYDROCHLORIDE 50 MG/ML
50 INJECTION INTRAMUSCULAR; INTRAVENOUS
Status: CANCELLED | OUTPATIENT
Start: 2022-01-31

## 2022-01-30 RX ORDER — ACETAMINOPHEN 325 MG/1
325 TABLET ORAL ONCE
Status: CANCELLED | OUTPATIENT
Start: 2022-01-31

## 2022-01-30 RX ORDER — ACETAMINOPHEN 325 MG/1
325 TABLET ORAL ONCE
Status: COMPLETED | OUTPATIENT
Start: 2022-01-30 | End: 2022-01-30

## 2022-01-30 RX ORDER — DIPHENHYDRAMINE HYDROCHLORIDE 50 MG/ML
50 INJECTION INTRAMUSCULAR; INTRAVENOUS
Status: DISCONTINUED | OUTPATIENT
Start: 2022-01-30 | End: 2022-01-30 | Stop reason: HOSPADM

## 2022-01-30 RX ORDER — DIPHENHYDRAMINE HYDROCHLORIDE 50 MG/ML
50 INJECTION INTRAMUSCULAR; INTRAVENOUS ONCE
Status: CANCELLED | OUTPATIENT
Start: 2022-01-31

## 2022-01-30 RX ORDER — DIPHENHYDRAMINE HYDROCHLORIDE 50 MG/ML
50 INJECTION INTRAMUSCULAR; INTRAVENOUS ONCE
Status: COMPLETED | OUTPATIENT
Start: 2022-01-30 | End: 2022-01-30

## 2022-01-30 RX ADMIN — ANTICOAGULANT CITRATE DEXTROSE SOLUTION FORMULA A 1000 ML: 12.25; 11; 3.65 SOLUTION INTRAVENOUS at 11:07

## 2022-01-30 RX ADMIN — CALCIUM GLUCONATE 4 G: 98 INJECTION, SOLUTION INTRAVENOUS at 11:07

## 2022-01-30 RX ADMIN — HYDROCORTISONE SODIUM SUCCINATE 100 MG: 100 INJECTION, POWDER, FOR SOLUTION INTRAMUSCULAR; INTRAVENOUS at 08:39

## 2022-01-30 RX ADMIN — ACETAMINOPHEN 325 MG: 325 TABLET, FILM COATED ORAL at 08:38

## 2022-01-30 RX ADMIN — DIPHENHYDRAMINE HYDROCHLORIDE 50 MG: 50 INJECTION INTRAMUSCULAR; INTRAVENOUS at 08:40

## 2022-01-30 ASSESSMENT — MIFFLIN-ST. JEOR: SCORE: 1874

## 2022-01-30 NOTE — PROGRESS NOTES
Apheresis Treatment - for TTP    Treatment in room 519 using optia apheresis machine. Consent verified.  Treatment number: 3 of 7    Height: 165cm  Weight: 256#  HCT: 33%  Inlet speed: 51  ACDA ratio: 10:1  Fluid balance: 100  Access: right tunneled CVC  Post treatment line dwell: citrate (1.9mls), blue port (1.9mls).  Replacement product: 3450 FFP  Electrolyte replacement: Ca Gluconate 4 grams in NS - total 90 mls, piggybacked into return lines, infused over time of treatment.  Premedications: 50 mg IV Benadryl 100mg IV Solucortif  325 mg PO tylenol    Treatment Notes: Patient tolerated procedure well. No s/sx reaction noted.     Treatment completed as ordered, VSS, see EPIC flowsheet for run data.    Next treatment: Monday 1/31 @ 0800 in care suites

## 2022-01-30 NOTE — PROGRESS NOTES
"[facility]     Renal Progress Note       SHORTHAND KEY FOR MY NOTES:  c = with, s = without, p = after, a = before, x = except, asx = asymptomatic, tx = transplant or treatment, sx = symptoms or symptomatic, cx = canceled or culture, rxn = reaction, yday = yesterday, nl = normal, abx = antibiotics, fxn = function, dx = diagnosis, dz = disease, m/h = melena/hematochezia, c/d/l/ha = cramping/dizziness/lightheadedness/headache, d/c = discharge or diarrhea/constipation, f/c/n/v = fevers/chills/nausea/vomiting, cp/sob = chest pain/shortness of breath, tbv = total body volume, rxn = reaction, tdc = tunneled dialysis catheter, pta = prior to admission, hd = hemodialysis, pd = peritoneal dialysis, hhd = home hemodialysis, edw = estimated dry wt         Assessment/Plan:     1.  TTP.  Pt's plts have increased to 97K.  Tolerating pheresis s probs.  She is asx.  A.  Next run tmrw. Plan is to run daily through Friday.  B.  Check CBC c plt daily.        Interval History:     Pt is a bit tired today p receiving some diphenhydramine.  No issues c the run.          Medications and Allergies:       anticoagulant citrate  500-2,000 mL Apheresis Once     calcium gluconate intermittent infusion with additives - doses under 5g  4 g Intravenous Once     No Known Allergies       Physical Exam:     Vitals were reviewed     , Blood pressure 126/81, pulse 75, temperature 98.1  F (36.7  C), resp. rate 16, height 1.64 m (5' 4.57\"), weight 116.5 kg (256 lb 13.4 oz).  Wt Readings from Last 3 Encounters:   01/30/22 116.5 kg (256 lb 13.4 oz)   01/29/22 116.5 kg (256 lb 13.4 oz)   01/28/22 116.5 kg (256 lb 13.4 oz)     No intake or output data in the 24 hours ending 01/30/22 1059    GENERAL APPEARANCE: sleeping, but easily woken up; NAD, alert  HEENT:  eyes/ears/nose/neck grossly nl  RESP: CTA B c good efforts  CV: RRR, nl S1/S2   ABDOMEN: o/s/nt/nd  EXTREMITIES/SKIN: no ble edema    Pt seen on pheresis.  Stable run.  Good " BFR via RIJ TDC.  Catheter site is less tender today.  No redness or warmth.         Data:     CBC RESULTS:     Recent Labs   Lab 01/30/22  0837 01/29/22  0916 01/27/22  0953   WBC 8.7 7.5 13.1*   RBC 3.17* 3.20* 3.39*   HGB 10.6* 10.6* 11.2*   HCT 33.2* 33.6* 34.5*   PLT 97* 85* 82*     Basic Metabolic Panel:  Recent Labs   Lab 01/27/22  0953      POTASSIUM 3.6   CHLORIDE 109   CO2 26   BUN 21   CR 0.80   *   JOSELUIS 8.8     INR  Recent Labs   Lab 01/27/22  0949   INR 0.86      Attestation:   I have reviewed today's relevant vital signs, notes, medications, labs and imaging.    Kamran Mayes MD  [facility] - Nephrology  [phone]

## 2022-01-31 ENCOUNTER — HOSPITAL ENCOUNTER (OUTPATIENT)
Dept: LAB | Facility: CLINIC | Age: 32
End: 2022-01-31
Attending: INTERNAL MEDICINE | Admitting: INTERNAL MEDICINE
Payer: MEDICAID

## 2022-01-31 ENCOUNTER — HOSPITAL ENCOUNTER (OUTPATIENT)
Facility: CLINIC | Age: 32
Discharge: HOME | End: 2022-01-31
Attending: INTERNAL MEDICINE | Admitting: INTERNAL MEDICINE
Payer: MEDICAID

## 2022-01-31 VITALS
HEIGHT: 65 IN | DIASTOLIC BLOOD PRESSURE: 84 MMHG | WEIGHT: 256.84 LBS | HEART RATE: 87 BPM | SYSTOLIC BLOOD PRESSURE: 128 MMHG | RESPIRATION RATE: 17 BRPM | BODY MASS INDEX: 42.79 KG/M2 | TEMPERATURE: 98.9 F

## 2022-01-31 LAB
BASOPHILS # BLD AUTO: 0 10E3/UL (ref 0–0.2)
BASOPHILS NFR BLD AUTO: 0 %
EOSINOPHIL # BLD AUTO: 0 10E3/UL (ref 0–0.7)
EOSINOPHIL NFR BLD AUTO: 0 %
ERYTHROCYTE [DISTWIDTH] IN BLOOD BY AUTOMATED COUNT: 16 % (ref 10–15)
HCT VFR BLD AUTO: 33.3 % (ref 35–47)
HGB BLD-MCNC: 10.9 G/DL (ref 11.7–15.7)
IMM GRANULOCYTES # BLD: 0.1 10E3/UL
IMM GRANULOCYTES NFR BLD: 1 %
LYMPHOCYTES # BLD AUTO: 0.7 10E3/UL (ref 0.8–5.3)
LYMPHOCYTES NFR BLD AUTO: 9 %
MCH RBC QN AUTO: 33.7 PG (ref 26.5–33)
MCHC RBC AUTO-ENTMCNC: 32.7 G/DL (ref 31.5–36.5)
MCV RBC AUTO: 103 FL (ref 78–100)
MONOCYTES # BLD AUTO: 0.3 10E3/UL (ref 0–1.3)
MONOCYTES NFR BLD AUTO: 3 %
NEUTROPHILS # BLD AUTO: 7.6 10E3/UL (ref 1.6–8.3)
NEUTROPHILS NFR BLD AUTO: 87 %
NRBC # BLD AUTO: 0 10E3/UL
NRBC BLD AUTO-RTO: 0 /100
PLATELET # BLD AUTO: 107 10E3/UL (ref 150–450)
RBC # BLD AUTO: 3.23 10E6/UL (ref 3.8–5.2)
WBC # BLD AUTO: 8.7 10E3/UL (ref 4–11)

## 2022-01-31 PROCEDURE — 36514 APHERESIS PLASMA: CPT

## 2022-01-31 PROCEDURE — 85025 COMPLETE CBC W/AUTO DIFF WBC: CPT | Performed by: INTERNAL MEDICINE

## 2022-01-31 PROCEDURE — 258N000003 HC RX IP 258 OP 636: Performed by: INTERNAL MEDICINE

## 2022-01-31 PROCEDURE — 36592 COLLECT BLOOD FROM PICC: CPT | Performed by: INTERNAL MEDICINE

## 2022-01-31 PROCEDURE — 250N000013 HC RX MED GY IP 250 OP 250 PS 637: Performed by: INTERNAL MEDICINE

## 2022-01-31 PROCEDURE — 250N000011 HC RX IP 250 OP 636: Performed by: INTERNAL MEDICINE

## 2022-01-31 PROCEDURE — 999N000013 HC STATISTIC APHERESIS

## 2022-01-31 RX ORDER — DIPHENHYDRAMINE HYDROCHLORIDE 50 MG/ML
50 INJECTION INTRAMUSCULAR; INTRAVENOUS
Status: DISCONTINUED | OUTPATIENT
Start: 2022-01-31 | End: 2022-01-31 | Stop reason: HOSPADM

## 2022-01-31 RX ORDER — ACETAMINOPHEN 325 MG/1
325 TABLET ORAL ONCE
Status: CANCELLED | OUTPATIENT
Start: 2022-02-01

## 2022-01-31 RX ORDER — DIPHENHYDRAMINE HYDROCHLORIDE 50 MG/ML
50 INJECTION INTRAMUSCULAR; INTRAVENOUS
Status: CANCELLED | OUTPATIENT
Start: 2022-02-01

## 2022-01-31 RX ORDER — CALCIUM GLUCONATE 94 MG/ML
1 INJECTION, SOLUTION INTRAVENOUS
Status: CANCELLED | OUTPATIENT
Start: 2022-02-01

## 2022-01-31 RX ORDER — DIPHENHYDRAMINE HYDROCHLORIDE 50 MG/ML
50 INJECTION INTRAMUSCULAR; INTRAVENOUS ONCE
Status: COMPLETED | OUTPATIENT
Start: 2022-01-31 | End: 2022-01-31

## 2022-01-31 RX ORDER — ACETAMINOPHEN 325 MG/1
325 TABLET ORAL ONCE
Status: COMPLETED | OUTPATIENT
Start: 2022-01-31 | End: 2022-01-31

## 2022-01-31 RX ORDER — DIPHENHYDRAMINE HYDROCHLORIDE 50 MG/ML
50 INJECTION INTRAMUSCULAR; INTRAVENOUS ONCE
Status: CANCELLED | OUTPATIENT
Start: 2022-02-01

## 2022-01-31 RX ADMIN — HYDROCORTISONE SODIUM SUCCINATE 100 MG: 100 INJECTION, POWDER, FOR SOLUTION INTRAMUSCULAR; INTRAVENOUS at 08:44

## 2022-01-31 RX ADMIN — ACETAMINOPHEN 325 MG: 325 TABLET, FILM COATED ORAL at 08:43

## 2022-01-31 RX ADMIN — CALCIUM GLUCONATE 4 G: 98 INJECTION, SOLUTION INTRAVENOUS at 08:58

## 2022-01-31 RX ADMIN — DIPHENHYDRAMINE HYDROCHLORIDE 50 MG: 50 INJECTION INTRAMUSCULAR; INTRAVENOUS at 08:44

## 2022-01-31 ASSESSMENT — MIFFLIN-ST. JEOR: SCORE: 1874

## 2022-01-31 NOTE — PROGRESS NOTES
Care Suites Admission Nursing Note    Patient Information  Name: Ivon Gamble  Age: 31 year old  Reason for admission: Apheresis.  Care Suites arrival time: 0805      Patient Admission/Assessment   Pre-procedure assessment complete: Yes.  All assessment, interventions, evaluations, education and MD notifications performed by apheresis RN.    Plan/other: Proceed as scheduled.    Discharge Planning  Discharge name/phone number: [place]  [phone]  Overnight post sedation caregiver: HARSHAL  Discharge location: home    Alethea Mcgowan RN

## 2022-01-31 NOTE — PROGRESS NOTES
"Seen for PLEX Management.     Indication - TTP   Day 4/7.   PLEX running okay . Had some pain on rt shoulder earlier in the treatment which seemed muscular in origin. Now settling down. No radiation . No shortness of breath. Catheter working okay .   Plt count upto 107      AAO X3  /82   Pulse 87   Temp 98.9  F (37.2  C) (Oral)   Resp 17   Ht 1.64 m (5' 4.57\")   Wt 116.5 kg (256 lb 13.4 oz)   BMI 43.31 kg/m      Rt internal jugular TDC   Exchange with FFP   Premedications: 50 mg IV Benadryl 100mg IV Solucortif  325 mg PO tylenol      Recent Labs   Lab Test 01/27/22  0953 01/21/22  1011    139   POTASSIUM 3.6 3.8   CHLORIDE 109 107   CO2 26 28   ANIONGAP 7 4   * 99   BUN 21 11   CR 0.80 0.78   JOSELUIS 8.8 8.4*     Lab Results   Component Value Date    WBC 8.7 01/31/2022     Lab Results   Component Value Date    RBC 3.23 01/31/2022     Lab Results   Component Value Date    HGB 10.9 01/31/2022     Lab Results   Component Value Date    HCT 33.3 01/31/2022     No components found for: MCT  Lab Results   Component Value Date     01/31/2022     Lab Results   Component Value Date    MCH 33.7 01/31/2022     Lab Results   Component Value Date    MCHC 32.7 01/31/2022     Lab Results   Component Value Date    RDW 16.0 01/31/2022     Lab Results   Component Value Date     01/31/2022     A/P -     Next PLEX tomorrow. Expect daily PLEX till end of week , then switch to every other day??    As per oncology -   patient will require once daily for the next seven days and continue despite normalization of LDH and platelet count. Will then transition to every other day. Once we are able to transition into several days in between sessions, will then begin rituximab 375 mg/m2 weekly x4 weeks.    I have asked RN care coordinator to update [facility] and to reschedule for Rituximab tentatively the week of 2/7/22.     "

## 2022-01-31 NOTE — PROGRESS NOTES
Apheresis Treatment    Treatment in room CS2 using Spectra Optia [id number] apheresis machine. Consent verified.  Treatment number: 4/TBD    Height: 165cm  Weight: 117kg  HCT: 33%  Inlet speed: 51.1ml.min  ACDA ratio: 10:1  Fluid balance: 100%  Access: tunneled RIJ  Post treatment line dwell: Citrate 2ml red/blue lumen  Replacement product: 3500ml FFP  Electrolyte replacement: Calcium gluconate 4 grams in NS - total 90mls, piggybacked into return lines, infused over time of treatment.  Premedications: benadryl 50mg IV, solucortef 100mg IV, tylenol 325mg PO    Treatment Notes: unremarkable    Treatment completed as ordered, VSS, see EPIC flowsheet for run data.    Next treatment: Tuesday 2/1/22 @ 1200

## 2022-02-01 ENCOUNTER — HOSPITAL ENCOUNTER (OUTPATIENT)
Facility: CLINIC | Age: 32
Discharge: HOME | End: 2022-02-01
Attending: INTERNAL MEDICINE | Admitting: INTERNAL MEDICINE
Payer: MEDICAID

## 2022-02-01 ENCOUNTER — HOSPITAL ENCOUNTER (OUTPATIENT)
Dept: LAB | Facility: CLINIC | Age: 32
End: 2022-02-01
Attending: INTERNAL MEDICINE | Admitting: INTERNAL MEDICINE
Payer: MEDICAID

## 2022-02-01 VITALS
DIASTOLIC BLOOD PRESSURE: 72 MMHG | WEIGHT: 256.84 LBS | RESPIRATION RATE: 18 BRPM | BODY MASS INDEX: 42.79 KG/M2 | SYSTOLIC BLOOD PRESSURE: 140 MMHG | HEIGHT: 65 IN | HEART RATE: 80 BPM | TEMPERATURE: 98.6 F | OXYGEN SATURATION: 98 %

## 2022-02-01 LAB
ALBUMIN SERPL-MCNC: 3.2 G/DL (ref 3.4–5)
ALP SERPL-CCNC: 71 U/L (ref 40–150)
ALT SERPL W P-5'-P-CCNC: 46 U/L (ref 0–50)
ANION GAP SERPL CALCULATED.3IONS-SCNC: 8 MMOL/L (ref 3–14)
APTT PPP: 22 SECONDS (ref 22–38)
AST SERPL W P-5'-P-CCNC: 21 U/L (ref 0–45)
BASOPHILS # BLD AUTO: 0 10E3/UL (ref 0–0.2)
BASOPHILS NFR BLD AUTO: 0 %
BILIRUB SERPL-MCNC: 0.3 MG/DL (ref 0.2–1.3)
BLD PROD TYP BPU: NORMAL
BLOOD COMPONENT TYPE: NORMAL
BUN SERPL-MCNC: 17 MG/DL (ref 7–30)
CALCIUM SERPL-MCNC: 8.3 MG/DL (ref 8.5–10.1)
CHLORIDE BLD-SCNC: 104 MMOL/L (ref 94–109)
CO2 SERPL-SCNC: 27 MMOL/L (ref 20–32)
CODING SYSTEM: NORMAL
CREAT SERPL-MCNC: 0.71 MG/DL (ref 0.52–1.04)
EOSINOPHIL # BLD AUTO: 0 10E3/UL (ref 0–0.7)
EOSINOPHIL NFR BLD AUTO: 0 %
ERYTHROCYTE [DISTWIDTH] IN BLOOD BY AUTOMATED COUNT: 15.9 % (ref 10–15)
FIBRINOGEN PPP-MCNC: 235 MG/DL (ref 170–490)
GFR SERPL CREATININE-BSD FRML MDRD: >90 ML/MIN/1.73M2
GLUCOSE BLD-MCNC: 435 MG/DL (ref 70–99)
HCT VFR BLD AUTO: 33.3 % (ref 35–47)
HGB BLD-MCNC: 10.7 G/DL (ref 11.7–15.7)
IMM GRANULOCYTES # BLD: 0.1 10E3/UL
IMM GRANULOCYTES NFR BLD: 1 %
INR PPP: 0.91 (ref 0.85–1.15)
ISSUE DATE AND TIME: NORMAL
LDH SERPL L TO P-CCNC: 210 U/L (ref 81–234)
LYMPHOCYTES # BLD AUTO: 0.4 10E3/UL (ref 0.8–5.3)
LYMPHOCYTES NFR BLD AUTO: 5 %
MCH RBC QN AUTO: 33.4 PG (ref 26.5–33)
MCHC RBC AUTO-ENTMCNC: 32.1 G/DL (ref 31.5–36.5)
MCV RBC AUTO: 104 FL (ref 78–100)
MONOCYTES # BLD AUTO: 0.2 10E3/UL (ref 0–1.3)
MONOCYTES NFR BLD AUTO: 2 %
NEUTROPHILS # BLD AUTO: 7.6 10E3/UL (ref 1.6–8.3)
NEUTROPHILS NFR BLD AUTO: 92 %
NRBC # BLD AUTO: 0 10E3/UL
NRBC BLD AUTO-RTO: 0 /100
PLATELET # BLD AUTO: 112 10E3/UL (ref 150–450)
POTASSIUM BLD-SCNC: 3.9 MMOL/L (ref 3.4–5.3)
PROT SERPL-MCNC: 6.2 G/DL (ref 6.8–8.8)
RBC # BLD AUTO: 3.2 10E6/UL (ref 3.8–5.2)
SODIUM SERPL-SCNC: 139 MMOL/L (ref 133–144)
UNIT ABO/RH: NORMAL
UNIT NUMBER: NORMAL
UNIT STATUS: NORMAL
UNIT TYPE ISBT: 600
UNIT TYPE ISBT: 6200
WBC # BLD AUTO: 8.3 10E3/UL (ref 4–11)

## 2022-02-01 PROCEDURE — 250N000013 HC RX MED GY IP 250 OP 250 PS 637: Performed by: INTERNAL MEDICINE

## 2022-02-01 PROCEDURE — 999N000013 HC STATISTIC APHERESIS

## 2022-02-01 PROCEDURE — 80053 COMPREHEN METABOLIC PANEL: CPT | Performed by: INTERNAL MEDICINE

## 2022-02-01 PROCEDURE — 36592 COLLECT BLOOD FROM PICC: CPT | Performed by: INTERNAL MEDICINE

## 2022-02-01 PROCEDURE — P9059 PLASMA, FRZ BETWEEN 8-24HOUR: HCPCS

## 2022-02-01 PROCEDURE — 85610 PROTHROMBIN TIME: CPT | Performed by: INTERNAL MEDICINE

## 2022-02-01 PROCEDURE — 83010 ASSAY OF HAPTOGLOBIN QUANT: CPT | Performed by: INTERNAL MEDICINE

## 2022-02-01 PROCEDURE — 258N000003 HC RX IP 258 OP 636: Performed by: INTERNAL MEDICINE

## 2022-02-01 PROCEDURE — 250N000009 HC RX 250: Performed by: INTERNAL MEDICINE

## 2022-02-01 PROCEDURE — 250N000011 HC RX IP 250 OP 636: Performed by: INTERNAL MEDICINE

## 2022-02-01 PROCEDURE — 85730 THROMBOPLASTIN TIME PARTIAL: CPT | Performed by: INTERNAL MEDICINE

## 2022-02-01 PROCEDURE — 83615 LACTATE (LD) (LDH) ENZYME: CPT | Performed by: INTERNAL MEDICINE

## 2022-02-01 PROCEDURE — 85384 FIBRINOGEN ACTIVITY: CPT | Performed by: INTERNAL MEDICINE

## 2022-02-01 PROCEDURE — 36514 APHERESIS PLASMA: CPT

## 2022-02-01 PROCEDURE — 85025 COMPLETE CBC W/AUTO DIFF WBC: CPT | Performed by: INTERNAL MEDICINE

## 2022-02-01 RX ORDER — DIPHENHYDRAMINE HYDROCHLORIDE 50 MG/ML
50 INJECTION INTRAMUSCULAR; INTRAVENOUS
Status: DISCONTINUED | OUTPATIENT
Start: 2022-02-01 | End: 2022-02-01 | Stop reason: HOSPADM

## 2022-02-01 RX ORDER — ACETAMINOPHEN 325 MG/1
325 TABLET ORAL ONCE
Status: CANCELLED | OUTPATIENT
Start: 2022-02-02

## 2022-02-01 RX ORDER — DIPHENHYDRAMINE HYDROCHLORIDE 50 MG/ML
50 INJECTION INTRAMUSCULAR; INTRAVENOUS ONCE
Status: CANCELLED | OUTPATIENT
Start: 2022-02-02

## 2022-02-01 RX ORDER — CALCIUM GLUCONATE 94 MG/ML
1 INJECTION, SOLUTION INTRAVENOUS
Status: DISCONTINUED | OUTPATIENT
Start: 2022-02-01 | End: 2022-02-01 | Stop reason: HOSPADM

## 2022-02-01 RX ORDER — ACETAMINOPHEN 325 MG/1
325 TABLET ORAL ONCE
Status: COMPLETED | OUTPATIENT
Start: 2022-02-01 | End: 2022-02-01

## 2022-02-01 RX ORDER — DIPHENHYDRAMINE HYDROCHLORIDE 50 MG/ML
50 INJECTION INTRAMUSCULAR; INTRAVENOUS
Status: CANCELLED | OUTPATIENT
Start: 2022-02-02

## 2022-02-01 RX ORDER — DIPHENHYDRAMINE HYDROCHLORIDE 50 MG/ML
50 INJECTION INTRAMUSCULAR; INTRAVENOUS ONCE
Status: COMPLETED | OUTPATIENT
Start: 2022-02-01 | End: 2022-02-01

## 2022-02-01 RX ADMIN — ACETAMINOPHEN 325 MG: 325 TABLET, FILM COATED ORAL at 12:35

## 2022-02-01 RX ADMIN — CALCIUM GLUCONATE 4 G: 98 INJECTION, SOLUTION INTRAVENOUS at 12:34

## 2022-02-01 RX ADMIN — DIPHENHYDRAMINE HYDROCHLORIDE 50 MG: 50 INJECTION, SOLUTION INTRAMUSCULAR; INTRAVENOUS at 12:34

## 2022-02-01 RX ADMIN — ANTICOAGULANT CITRATE DEXTROSE SOLUTION FORMULA A 1000 ML: 12.25; 11; 3.65 SOLUTION INTRAVENOUS at 12:35

## 2022-02-01 RX ADMIN — HYDROCORTISONE SODIUM SUCCINATE 100 MG: 100 INJECTION, POWDER, FOR SOLUTION INTRAMUSCULAR; INTRAVENOUS at 12:35

## 2022-02-01 ASSESSMENT — MIFFLIN-ST. JEOR: SCORE: 1874

## 2022-02-01 NOTE — PROGRESS NOTES
Care Suites Admission Nursing Note    Patient Information  Name: Ivon Gamble  Age: 31 year old  Reason for admission: apheresis  Care Suites arrival time: 1145     i    Patient Admission/Assessment   Pre-procedure assessment complete: Yes  If abnormal assessment/labs, provider notified: N/A  NPO: N/A  Medications held per instructions/orders: N/A  Consent: N/A  If applicable, pregnancy test status: deferred  Patient oriented to room: Yes  Education/questions answered: Yes  Plan/other: per procedural plan of care    Discharge Planning  Discharge name/phone number: see epic   Overnight post sedation caregiver: N/A  Discharge location: home    Alida Rodríguez RN

## 2022-02-01 NOTE — PROGRESS NOTES
"Seen for PLEX Management.     Indication - TTP   Day 5/7.   No issues with PLEX. Plt count upto 112      AAO X3  /81   Pulse 88   Temp 98.6  F (37  C)   Resp 18   Ht 1.64 m (5' 4.57\")   Wt 116.5 kg (256 lb 13.4 oz)   SpO2 98%   BMI 43.31 kg/m      Rt internal jugular TDC   Exchange with FFP   Premedications: 50 mg IV Benadryl 100mg IV Solucortif  325 mg PO tylenol    Recent Labs   Lab Test 02/01/22  1206 01/27/22  0953    142   POTASSIUM 3.9 3.6   CHLORIDE 104 109   CO2 27 26   ANIONGAP 8 7   * 255*   BUN 17 21   CR 0.71 0.80   JOSELUIS 8.3* 8.8     Lab Results   Component Value Date    WBC 8.3 02/01/2022     Lab Results   Component Value Date    RBC 3.20 02/01/2022     Lab Results   Component Value Date    HGB 10.7 02/01/2022     Lab Results   Component Value Date    HCT 33.3 02/01/2022     No components found for: MCT  Lab Results   Component Value Date     02/01/2022     Lab Results   Component Value Date    MCH 33.4 02/01/2022     Lab Results   Component Value Date    MCHC 32.1 02/01/2022     Lab Results   Component Value Date    RDW 15.9 02/01/2022     Lab Results   Component Value Date     02/01/2022         A/P -     Next PLEX tomorrow. Expect daily PLEX till end of week , then switch to every other day?? Vs Rituximab?? I have sent a note to Dr Ashley Nguyen ( Hematology) to come up with a plan     Blanka Giraldo MD  [facility] - Nephrology   [phone]    "

## 2022-02-01 NOTE — PROGRESS NOTES
"Apheresis Treatment - fro TTP    Treatment in room Care suites  using optia  apheresis machine. Consent verified.  Treatment number: 5 of 7     Height: 5'4\"  Weight: 255lbs # used in treatment   HCT: 33%  Inlet speed: 50-53 per machine   ACDA ratio: 10:1  Fluid balance: 100%  Access: RIJ   Post treatment line dwell: Citrate  2mls in both ports   Replacement product: FFP  3450ml   Electrolyte replacement: Calcium gluconate  4grams in NS - total 90mls, piggybacked into return lines, infused over time of treatment.  Premedications: 325 mg PO Tylenaol , 50 mg IV Benadryl and 100 mg Solucortef IV     Treatment Notes: Pt tolerated procedure well No adverse side affects noted  She did say she felt \"very tired\"     Treatment completed as ordered, VSS, see EPIC flowsheet for run data.    Next treatment: Wed 2/2/2022  @ 0800  "

## 2022-02-01 NOTE — PROGRESS NOTES
Pt states no change to medications or medical history at this time. All assessment and monitoring done by dialysis RN.

## 2022-02-01 NOTE — PROGRESS NOTES
PATIENT/VISITOR WELLNESS SCREENING    Step 1 Patient Screening    1. In the last month, have you been in contact with someone who was confirmed or suspected to have Coronavirus/COVID-19? No    2. Do you have the following symptoms?  Fever/Chills? No   Cough? No   Shortness of breath? No   New loss of taste or smell? No  Sore throat? No  Muscle or body aches? No  Headaches? No  Fatigue? No  Vomiting or diarrhea? No

## 2022-02-02 ENCOUNTER — ONCOLOGY VISIT (OUTPATIENT)
Dept: ONCOLOGY | Facility: CLINIC | Age: 32
End: 2022-02-02
Attending: NURSE PRACTITIONER
Payer: MEDICAID

## 2022-02-02 ENCOUNTER — HOSPITAL ENCOUNTER (OUTPATIENT)
Facility: CLINIC | Age: 32
Discharge: HOME | End: 2022-02-02
Admitting: INTERNAL MEDICINE
Payer: MEDICAID

## 2022-02-02 ENCOUNTER — HOSPITAL ENCOUNTER (OUTPATIENT)
Dept: LAB | Facility: CLINIC | Age: 32
End: 2022-02-02
Payer: MEDICAID

## 2022-02-02 VITALS
RESPIRATION RATE: 18 BRPM | HEIGHT: 65 IN | WEIGHT: 256.84 LBS | SYSTOLIC BLOOD PRESSURE: 148 MMHG | TEMPERATURE: 98.2 F | HEART RATE: 76 BPM | DIASTOLIC BLOOD PRESSURE: 78 MMHG | BODY MASS INDEX: 42.79 KG/M2

## 2022-02-02 VITALS
RESPIRATION RATE: 16 BRPM | BODY MASS INDEX: 45.53 KG/M2 | OXYGEN SATURATION: 99 % | HEART RATE: 75 BPM | SYSTOLIC BLOOD PRESSURE: 119 MMHG | WEIGHT: 270 LBS | DIASTOLIC BLOOD PRESSURE: 73 MMHG

## 2022-02-02 DIAGNOSIS — R73.9 HYPERGLYCEMIA: Primary | ICD-10-CM

## 2022-02-02 DIAGNOSIS — M31.19 ACQUIRED TTP (H): ICD-10-CM

## 2022-02-02 LAB
BASOPHILS # BLD AUTO: 0.1 10E3/UL (ref 0–0.2)
BASOPHILS NFR BLD AUTO: 1 %
BLD PROD TYP BPU: NORMAL
BLOOD COMPONENT TYPE: NORMAL
CODING SYSTEM: NORMAL
EOSINOPHIL # BLD AUTO: 0.1 10E3/UL (ref 0–0.7)
EOSINOPHIL NFR BLD AUTO: 1 %
ERYTHROCYTE [DISTWIDTH] IN BLOOD BY AUTOMATED COUNT: 15.9 % (ref 10–15)
GLUCOSE BLDC GLUCOMTR-MCNC: 286 MG/DL (ref 70–99)
HAPTOGLOB SERPL-MCNC: 117 MG/DL (ref 32–197)
HCT VFR BLD AUTO: 33.5 % (ref 35–47)
HGB BLD-MCNC: 10.7 G/DL (ref 11.7–15.7)
IMM GRANULOCYTES # BLD: 0.1 10E3/UL
IMM GRANULOCYTES NFR BLD: 1 %
ISSUE DATE AND TIME: NORMAL
LYMPHOCYTES # BLD AUTO: 1.1 10E3/UL (ref 0.8–5.3)
LYMPHOCYTES NFR BLD AUTO: 11 %
MCH RBC QN AUTO: 34 PG (ref 26.5–33)
MCHC RBC AUTO-ENTMCNC: 31.9 G/DL (ref 31.5–36.5)
MCV RBC AUTO: 106 FL (ref 78–100)
MONOCYTES # BLD AUTO: 0.6 10E3/UL (ref 0–1.3)
MONOCYTES NFR BLD AUTO: 6 %
NEUTROPHILS # BLD AUTO: 7.9 10E3/UL (ref 1.6–8.3)
NEUTROPHILS NFR BLD AUTO: 80 %
NRBC # BLD AUTO: 0 10E3/UL
NRBC BLD AUTO-RTO: 0 /100
PLATELET # BLD AUTO: 117 10E3/UL (ref 150–450)
RBC # BLD AUTO: 3.15 10E6/UL (ref 3.8–5.2)
UNIT ABO/RH: NORMAL
UNIT NUMBER: NORMAL
UNIT STATUS: NORMAL
UNIT TYPE ISBT: 600
UNIT TYPE ISBT: 6200
WBC # BLD AUTO: 9.9 10E3/UL (ref 4–11)

## 2022-02-02 PROCEDURE — 250N000011 HC RX IP 250 OP 636: Performed by: INTERNAL MEDICINE

## 2022-02-02 PROCEDURE — P9059 PLASMA, FRZ BETWEEN 8-24HOUR: HCPCS | Performed by: INTERNAL MEDICINE

## 2022-02-02 PROCEDURE — 250N000013 HC RX MED GY IP 250 OP 250 PS 637: Performed by: INTERNAL MEDICINE

## 2022-02-02 PROCEDURE — G0463 HOSPITAL OUTPT CLINIC VISIT: HCPCS

## 2022-02-02 PROCEDURE — 36514 APHERESIS PLASMA: CPT

## 2022-02-02 PROCEDURE — 85397 CLOTTING FUNCT ACTIVITY: CPT | Performed by: NURSE PRACTITIONER

## 2022-02-02 PROCEDURE — 36592 COLLECT BLOOD FROM PICC: CPT | Performed by: INTERNAL MEDICINE

## 2022-02-02 PROCEDURE — 82962 GLUCOSE BLOOD TEST: CPT | Performed by: NURSE PRACTITIONER

## 2022-02-02 PROCEDURE — 85025 COMPLETE CBC W/AUTO DIFF WBC: CPT | Performed by: INTERNAL MEDICINE

## 2022-02-02 PROCEDURE — 99214 OFFICE O/P EST MOD 30 MIN: CPT | Performed by: NURSE PRACTITIONER

## 2022-02-02 PROCEDURE — 999N000013 HC STATISTIC APHERESIS

## 2022-02-02 RX ORDER — ACETAMINOPHEN 325 MG/1
325 TABLET ORAL ONCE
Status: CANCELLED | OUTPATIENT
Start: 2022-02-03

## 2022-02-02 RX ORDER — DIPHENHYDRAMINE HYDROCHLORIDE 50 MG/ML
50 INJECTION INTRAMUSCULAR; INTRAVENOUS ONCE
Status: CANCELLED | OUTPATIENT
Start: 2022-02-03

## 2022-02-02 RX ORDER — DIPHENHYDRAMINE HYDROCHLORIDE 50 MG/ML
50 INJECTION INTRAMUSCULAR; INTRAVENOUS
Status: DISCONTINUED | OUTPATIENT
Start: 2022-02-02 | End: 2022-02-02 | Stop reason: HOSPADM

## 2022-02-02 RX ORDER — DIPHENHYDRAMINE HYDROCHLORIDE 50 MG/ML
50 INJECTION INTRAMUSCULAR; INTRAVENOUS
Status: CANCELLED | OUTPATIENT
Start: 2022-02-03

## 2022-02-02 RX ORDER — ACETAMINOPHEN 325 MG/1
325 TABLET ORAL ONCE
Status: COMPLETED | OUTPATIENT
Start: 2022-02-02 | End: 2022-02-02

## 2022-02-02 RX ORDER — DIPHENHYDRAMINE HYDROCHLORIDE 50 MG/ML
50 INJECTION INTRAMUSCULAR; INTRAVENOUS ONCE
Status: COMPLETED | OUTPATIENT
Start: 2022-02-02 | End: 2022-02-02

## 2022-02-02 RX ADMIN — DIPHENHYDRAMINE HYDROCHLORIDE 50 MG: 50 INJECTION INTRAMUSCULAR; INTRAVENOUS at 08:52

## 2022-02-02 RX ADMIN — ACETAMINOPHEN 325 MG: 325 TABLET, FILM COATED ORAL at 08:52

## 2022-02-02 RX ADMIN — HYDROCORTISONE SODIUM SUCCINATE 100 MG: 100 INJECTION, POWDER, FOR SOLUTION INTRAMUSCULAR; INTRAVENOUS at 08:52

## 2022-02-02 ASSESSMENT — PAIN SCALES - GENERAL: PAINLEVEL: NO PAIN (0)

## 2022-02-02 ASSESSMENT — MIFFLIN-ST. JEOR: SCORE: 1874

## 2022-02-02 NOTE — PROGRESS NOTES
Care Suites Discharge Nursing Note    Patient Information  Name: Ivon Gamble  Age: 31 year old    Discharge Plans:   Discharge location: home  Discharge ride contacted: Yes  Approximate discharge time: ~1200    Discharge Criteria:  Discharge criteria met and vital signs stable: Yes    Patient Belongs:  Patient belongings returned to patient: Yes    Melisa Membreno RN

## 2022-02-02 NOTE — PROGRESS NOTES
PATIENT/VISITOR WELLNESS SCREENING      1. In the last month, have you been in contact with someone who was confirmed or suspected to have Coronavirus/COVID-19? No    2. Do you have the following symptoms?  Fever/Chills? No   Cough? No   Shortness of breath? No   New loss of taste or smell? No  Sore throat? No  Muscle or body aches? No  Headaches? No  Fatigue? No  Vomiting or diarrhea? No    Care Suites Admission Nursing Note    Patient Information  Name: Ivon Gamble  Age: 31 year old  Reason for admission: Apheresis  Care Suites arrival time: 0800      Patient Admission/Assessment   Pre-procedure assessment complete: Yes  If abnormal assessment/labs, provider notified: N/A  NPO: N/A  Medications held per instructions/orders: Yes  Consent: per Apheresis RN  Patient oriented to room: Yes  Education/questions answered: Yes  Plan/other: All cares and assessment provided by Apheresis RN    Discharge Planning  Discharge name/phone number:[place] [phone]    Discharge location: home    Melisa Membreno RN

## 2022-02-02 NOTE — PROGRESS NOTES
"Oncology Rooming Note    February 2, 2022 12:26 PM   Ivon Gamble is a 31 year old female who presents for:    Chief Complaint   Patient presents with     Oncology Clinic Visit     Initial Vitals: There were no vitals taken for this visit. Estimated body mass index is 43.31 kg/m  as calculated from the following:    Height as of an earlier encounter on 2/2/22: 1.64 m (5' 4.57\").    Weight as of an earlier encounter on 2/2/22: 116.5 kg (256 lb 13.4 oz). There is no height or weight on file to calculate BSA.  Data Unavailable Comment: Data Unavailable   No LMP recorded.  Allergies reviewed: Yes  Medications reviewed: Yes    Medications: Medication refills not needed today.  Pharmacy name entered into EPIC: [id number] - [place], [place] - [address]  [address]    Glucose was 286.       Hazel Hines, [facility]            "

## 2022-02-02 NOTE — PROGRESS NOTES
Oncology/Hematology Visit Note  Feb 2, 2022    Reason for Visit: follow up of acquired TTP    Patient of Dr. Nguyen  -Patient had tooth abscess that has not required TTP  -Currently getting treatment with daily pheresis and high-dose prednisone 1 mg/kg    Interval History:  Patient reports overall she is feeling well.  She denies fever chills sweats cough shortness of breath chest pain.  Denies nausea vomiting diarrhea abdominal pain denies bleeding  She is taking prednisone 100 mg she is also taking Augmentin for tooth abscess-denies toothache, reports the swelling has improved she is also using mouth rinses      Review of Systems:  14 point ROS of systems including Constitutional, Eyes, Respiratory, Cardiovascular, Gastroenterology, Genitourinary, Integumentary, Muscularskeletal, Psychiatric were all negative except for pertinent positives noted in my HPI.      Current Outpatient Medications   Medication Sig Dispense Refill     acetaminophen (TYLENOL) 500 MG tablet Take 500-1,000 mg by mouth every 6 hours as needed for mild pain       amoxicillin-clavulanate (AUGMENTIN) 875-125 MG tablet Take 1 tablet by mouth every 12 hours 14 tablet 0     chlorhexidine (PERIDEX) 0.12 % solution Swish and spit 15 mLs in mouth 2 times daily 473 mL 0     pantoprazole (PROTONIX) 40 MG EC tablet Take 1 tablet (40 mg) by mouth every morning (before breakfast) While on high dose steroids. 30 tablet 0     polyethylene glycol (MIRALAX) 17 GM/Dose powder Take 17 g by mouth 2 times daily as needed for constipation 510 g 0     predniSONE (DELTASONE) 50 MG tablet Take 2 tablets (100 mg) by mouth daily With taper directed by hematology at follow up. 28 tablet 0     sennosides (SENOKOT) 8.6 MG tablet Take 1-2 tablets by mouth 2 times daily as needed for constipation 60 tablet 0       Physical Examination:  General: The patient is a pleasant female in no acute distress.  /73   Pulse 75   Resp 16   Wt 122.5 kg (270 lb)   SpO2 99%   BMI  45.53 kg/m    HEENT: EOMI, PERRL. Sclerae are anicteric. Oral mucosa is pink and moist with no lesions or thrush.   Lymph: Neck is supple with no lymphadenopathy in the cervical or supraclavicular areas.   Heart: Regular rate and rhythm.   Lungs: Clear to auscultation bilaterally.   GI: Bowel sounds present, soft, nontender with no palpable hepatosplenomegaly or masses.   Extremities: No lower extremity edema noted bilaterally.   Skin: No rashes, petechiae, or bruising noted on exposed skin.    Laboratory Data:  CBC CMP LDH INR PTT fibrinogen results reviewed and discussed with patient  ADAMTS 13 ordered results pending    Assessment and Plan:    This is a 31-year-old female with    Thrombotic thrombocytopenic purpura  Patient of Dr. Nguyen  -Patient had tooth abscess that acquired  TTP  -Currently getting treatment with daily pheresis and high-dose prednisone 1 mg/kg  -Labs above reviewed with patient  -Platelets are slowly trending up  -Normal LDH, normal fibrinogen  And INR, normal WBC and ANC  ADAMTS 1 is pending   Patient will continue with daily pheresis until Friday 02/04 skip Saturday and Sunday then continue on Monday - transition to every other day  -Plan for rituximab weekly x4 weeks soon  -Continue with prednisone 100 mg daily  -Continue with Protonix while on high-dose of prednisone  Patient has scheduled follow-up appointment with Dr. Nguyen next week  -Advised patient to call monitor closely for bleeding in the event of bleeding patient is to call our clinic or go to ER    Prophylaxis  -I will message Dr. Nguyen if Bactrim prophylaxis for   PCP is ok to start       Steroid-induced hyperglycemia  -Patient needs to stay on the current steroid dose due to TTP  -I requested urgent PCP referral for steroid-induced hyperglycemia management (patient recently moved from [place] and does not have  PCP in [place])  -We will help patient schedule PCP appointment   -Patient is scheduled with PCP tomorrow  -I discussed  low-carb diet  -Drink plenty of water-Avoid juices      Tooth abscess  Hospitalized from 01/12-01/18  -Currently taking Augmentin  Continue with Augmentin  Patient reports she is in touch with her dentist.  The plan is for her platelets to stabilize before they schedule her for tooth extraction    Patient is advised to check temperature frequently in the event of fever chills sweats cough shortness of breath chest pain bleeding patient is advised to call our clinic or go to [facility]     Chart documentation with Dragon Voice recognition Software. Although reviewed after completion, some words and grammatical errors may remain.

## 2022-02-02 NOTE — PROGRESS NOTES
"Apheresis Treatment - for TTP    Treatment in room care suites  using Optia  apheresis machine. Consent verified.  Treatment number: 6 of 7  Schedule after this needs to be determined     Height: 5'4\"  Weight: 250lbs # used in treatment   HCT: 33%  Inlet speed: 51   ACDA ratio: 10:1  Fluid balance: 100%   Access: RIJ  Post treatment line dwell: Citrate   2ml instilled in both ports   Replacement product: 3400 FFP   Electrolyte replacement: Calcium gluconate 4 grams in NS - total 90mls, piggybacked into return lines, infused over time of treatment.  Premedications: tylenol 325mg PO, 50 mg IVBenadryl and 100mg IV Solu cortef    Treatment Notes: Pt tolerated procedure well No adverse side affects noted     Treatment completed as ordered, VSS, see EPIC flowsheet for run data.    Next treatment: Thursday 2/3/22/@ 0800  "

## 2022-02-02 NOTE — LETTER
"    2/2/2022         RE: Ivon Gamble  [address]  [facility] [address]        Dear Colleague,    Thank you for referring your patient, Ivon Gamble, to the [facility]. Please see a copy of my visit note below.    Oncology Rooming Note    February 2, 2022 12:26 PM   Ivon Gamble is a 31 year old female who presents for:    Chief Complaint   Patient presents with     Oncology Clinic Visit     Initial Vitals: There were no vitals taken for this visit. Estimated body mass index is 43.31 kg/m  as calculated from the following:    Height as of an earlier encounter on 2/2/22: 1.64 m (5' 4.57\").    Weight as of an earlier encounter on 2/2/22: 116.5 kg (256 lb 13.4 oz). There is no height or weight on file to calculate BSA.  Data Unavailable Comment: Data Unavailable   No LMP recorded.  Allergies reviewed: Yes  Medications reviewed: Yes    Medications: Medication refills not needed today.  Pharmacy name entered into EPIC: "[id number] - [facility] [id number]  [address]    Glucose was 286.       [facility], [facility]              Oncology/Hematology Visit Note  Feb 2, 2022    Reason for Visit: follow up of acquired TTP    Patient of Dr. Nguyen  -Patient had tooth abscess that has not required TTP  -Currently getting treatment with daily pheresis and high-dose prednisone 1 mg/kg    Interval History:  Patient reports overall she is feeling well.  She denies fever chills sweats cough shortness of breath chest pain.  Denies nausea vomiting diarrhea abdominal pain denies bleeding  She is taking prednisone 100 mg she is also taking Augmentin for tooth abscess-denies toothache, reports the swelling has improved she is also using mouth rinses      Review of Systems:  14 point ROS of systems including Constitutional, Eyes, Respiratory, Cardiovascular, Gastroenterology, Genitourinary, Integumentary, Muscularskeletal, Psychiatric were all negative except for pertinent positives noted " in my HPI.      Current Outpatient Medications   Medication Sig Dispense Refill     acetaminophen (TYLENOL) 500 MG tablet Take 500-1,000 mg by mouth every 6 hours as needed for mild pain       amoxicillin-clavulanate (AUGMENTIN) 875-125 MG tablet Take 1 tablet by mouth every 12 hours 14 tablet 0     chlorhexidine (PERIDEX) 0.12 % solution Swish and spit 15 mLs in mouth 2 times daily 473 mL 0     pantoprazole (PROTONIX) 40 MG EC tablet Take 1 tablet (40 mg) by mouth every morning (before breakfast) While on high dose steroids. 30 tablet 0     polyethylene glycol (MIRALAX) 17 GM/Dose powder Take 17 g by mouth 2 times daily as needed for constipation 510 g 0     predniSONE (DELTASONE) 50 MG tablet Take 2 tablets (100 mg) by mouth daily With taper directed by hematology at follow up. 28 tablet 0     sennosides (SENOKOT) 8.6 MG tablet Take 1-2 tablets by mouth 2 times daily as needed for constipation 60 tablet 0       Physical Examination:  General: The patient is a pleasant female in no acute distress.  /73   Pulse 75   Resp 16   Wt 122.5 kg (270 lb)   SpO2 99%   BMI 45.53 kg/m    HEENT: EOMI, PERRL. Sclerae are anicteric. Oral mucosa is pink and moist with no lesions or thrush.   Lymph: Neck is supple with no lymphadenopathy in the cervical or supraclavicular areas.   Heart: Regular rate and rhythm.   Lungs: Clear to auscultation bilaterally.   GI: Bowel sounds present, soft, nontender with no palpable hepatosplenomegaly or masses.   Extremities: No lower extremity edema noted bilaterally.   Skin: No rashes, petechiae, or bruising noted on exposed skin.    Laboratory Data:  CBC CMP LDH INR PTT fibrinogen results reviewed and discussed with patient  ADAMTS 13 ordered results pending    Assessment and Plan:    This is a 31-year-old female with    Thrombotic thrombocytopenic purpura  Patient of Dr. Nguyen  -Patient had tooth abscess that acquired  TTP  -Currently getting treatment with daily pheresis and high-dose  prednisone 1 mg/kg  -Labs above reviewed with patient  -Platelets are slowly trending up  -Normal LDH, normal fibrinogen  And INR, normal WBC and ANC  ADAMTS 1 is pending   Patient will continue with daily pheresis until Friday 02/04 skip Saturday and Sunday then continue on Monday - transition to every other day  -Plan for rituximab weekly x4 weeks soon  -Continue with prednisone 100 mg daily  -Continue with Protonix while on high-dose of prednisone  Patient has scheduled follow-up appointment with Dr. Nguyen next week  -Advised patient to call monitor closely for bleeding in the event of bleeding patient is to call our clinic or go to ER        Steroid-induced hyperglycemia  -Patient needs to stay on the current steroid dose due to TTP  -I requested urgent PCP referral for steroid-induced hyperglycemia management (patient recently moved from [place] and does not have  PCP in [place])  -We will help patient schedule PCP appointment   -Patient is scheduled with PCP tomorrow  -I discussed low-carb diet  -Drink plenty of water-Avoid juices      Tooth abscess  Hospitalized from 01/12-01/18  -Currently taking Augmentin  Continue with Augmentin  Patient reports she is in touch with her dentist.  The plan is for her platelets to stabilize before they schedule her for tooth extraction    Patient is advised to check temperature frequently in the event of fever chills sweats cough shortness of breath chest pain bleeding patient is advised to call our clinic or go to ER      ZAY Rodríguez CNP  [facility]     Chart documentation with Dragon Voice recognition Software. Although reviewed after completion, some words and grammatical errors may remain.            Again, thank you for allowing me to participate in the care of your patient.        Sincerely,        ZAY Rodríguez CNP

## 2022-02-03 ENCOUNTER — HOSPITAL ENCOUNTER (OUTPATIENT)
Facility: CLINIC | Age: 32
Discharge: HOME | End: 2022-02-03
Payer: MEDICAID

## 2022-02-03 ENCOUNTER — OFFICE VISIT (OUTPATIENT)
Dept: PHARMACY | Facility: CLINIC | Age: 32
End: 2022-02-03
Payer: MEDICAID

## 2022-02-03 ENCOUNTER — OFFICE VISIT (OUTPATIENT)
Dept: INTERNAL MEDICINE | Facility: CLINIC | Age: 32
End: 2022-02-03
Payer: MEDICAID

## 2022-02-03 ENCOUNTER — HOSPITAL ENCOUNTER (OUTPATIENT)
Dept: LAB | Facility: CLINIC | Age: 32
End: 2022-02-03
Payer: MEDICAID

## 2022-02-03 VITALS
HEIGHT: 64 IN | BODY MASS INDEX: 46.3 KG/M2 | DIASTOLIC BLOOD PRESSURE: 72 MMHG | TEMPERATURE: 98.6 F | OXYGEN SATURATION: 95 % | SYSTOLIC BLOOD PRESSURE: 132 MMHG | RESPIRATION RATE: 22 BRPM | HEART RATE: 119 BPM | WEIGHT: 271.2 LBS

## 2022-02-03 VITALS
SYSTOLIC BLOOD PRESSURE: 142 MMHG | TEMPERATURE: 97.3 F | HEIGHT: 65 IN | DIASTOLIC BLOOD PRESSURE: 86 MMHG | RESPIRATION RATE: 16 BRPM | HEART RATE: 96 BPM | BODY MASS INDEX: 45.53 KG/M2

## 2022-02-03 DIAGNOSIS — R73.9 HYPERGLYCEMIA: Primary | ICD-10-CM

## 2022-02-03 DIAGNOSIS — D69.3 ACUTE IDIOPATHIC THROMBOCYTOPENIC PURPURA (H): ICD-10-CM

## 2022-02-03 DIAGNOSIS — E13.9 OTHER SPECIFIED DIABETES MELLITUS WITHOUT COMPLICATION, WITHOUT LONG-TERM CURRENT USE OF INSULIN (H): Primary | ICD-10-CM

## 2022-02-03 LAB
BASOPHILS # BLD AUTO: 0 10E3/UL (ref 0–0.2)
BASOPHILS NFR BLD AUTO: 0 %
EOSINOPHIL # BLD AUTO: 0.1 10E3/UL (ref 0–0.7)
EOSINOPHIL NFR BLD AUTO: 1 %
ERYTHROCYTE [DISTWIDTH] IN BLOOD BY AUTOMATED COUNT: 16 % (ref 10–15)
HCT VFR BLD AUTO: 32.6 % (ref 35–47)
HGB BLD-MCNC: 10.1 G/DL (ref 11.7–15.7)
IMM GRANULOCYTES # BLD: 0.1 10E3/UL
IMM GRANULOCYTES NFR BLD: 1 %
LYMPHOCYTES # BLD AUTO: 2.3 10E3/UL (ref 0.8–5.3)
LYMPHOCYTES NFR BLD AUTO: 22 %
MCH RBC QN AUTO: 33 PG (ref 26.5–33)
MCHC RBC AUTO-ENTMCNC: 31 G/DL (ref 31.5–36.5)
MCV RBC AUTO: 107 FL (ref 78–100)
MONOCYTES # BLD AUTO: 0.7 10E3/UL (ref 0–1.3)
MONOCYTES NFR BLD AUTO: 7 %
NEUTROPHILS # BLD AUTO: 7 10E3/UL (ref 1.6–8.3)
NEUTROPHILS NFR BLD AUTO: 69 %
NRBC # BLD AUTO: 0 10E3/UL
NRBC BLD AUTO-RTO: 0 /100
PLATELET # BLD AUTO: 120 10E3/UL (ref 150–450)
RBC # BLD AUTO: 3.06 10E6/UL (ref 3.8–5.2)
WBC # BLD AUTO: 10.3 10E3/UL (ref 4–11)

## 2022-02-03 PROCEDURE — 36592 COLLECT BLOOD FROM PICC: CPT | Performed by: INTERNAL MEDICINE

## 2022-02-03 PROCEDURE — 99203 OFFICE O/P NEW LOW 30 MIN: CPT | Performed by: FAMILY MEDICINE

## 2022-02-03 PROCEDURE — P9059 PLASMA, FRZ BETWEEN 8-24HOUR: HCPCS | Performed by: INTERNAL MEDICINE

## 2022-02-03 PROCEDURE — 250N000011 HC RX IP 250 OP 636: Performed by: INTERNAL MEDICINE

## 2022-02-03 PROCEDURE — 85025 COMPLETE CBC W/AUTO DIFF WBC: CPT | Performed by: INTERNAL MEDICINE

## 2022-02-03 PROCEDURE — 99207 PR NO CHARGE LOS: CPT | Performed by: PHARMACIST

## 2022-02-03 PROCEDURE — 250N000013 HC RX MED GY IP 250 OP 250 PS 637: Performed by: INTERNAL MEDICINE

## 2022-02-03 PROCEDURE — 36514 APHERESIS PLASMA: CPT

## 2022-02-03 PROCEDURE — 258N000003 HC RX IP 258 OP 636: Performed by: INTERNAL MEDICINE

## 2022-02-03 PROCEDURE — 999N000013 HC STATISTIC APHERESIS

## 2022-02-03 PROCEDURE — 250N000009 HC RX 250: Performed by: INTERNAL MEDICINE

## 2022-02-03 RX ORDER — INSULIN HUMAN 100 [IU]/ML
15 INJECTION, SUSPENSION SUBCUTANEOUS EVERY MORNING
Qty: 15 ML | Refills: 1 | Status: SHIPPED | OUTPATIENT
Start: 2022-02-03 | End: 2023-08-03

## 2022-02-03 RX ORDER — ACETAMINOPHEN 325 MG/1
325 TABLET ORAL ONCE
Status: COMPLETED | OUTPATIENT
Start: 2022-02-03 | End: 2022-02-03

## 2022-02-03 RX ORDER — LANCETS
EACH MISCELLANEOUS
Qty: 100 EACH | Refills: 3 | Status: SHIPPED | OUTPATIENT
Start: 2022-02-03 | End: 2023-08-03

## 2022-02-03 RX ORDER — BLOOD SUGAR DIAGNOSTIC
STRIP MISCELLANEOUS
Qty: 100 STRIP | Refills: 3 | Status: SHIPPED | OUTPATIENT
Start: 2022-02-03 | End: 2023-08-03

## 2022-02-03 RX ORDER — DIPHENHYDRAMINE HYDROCHLORIDE 50 MG/ML
50 INJECTION INTRAMUSCULAR; INTRAVENOUS
Status: DISCONTINUED | OUTPATIENT
Start: 2022-02-03 | End: 2022-02-03 | Stop reason: HOSPADM

## 2022-02-03 RX ORDER — DIPHENHYDRAMINE HYDROCHLORIDE 50 MG/ML
50 INJECTION INTRAMUSCULAR; INTRAVENOUS ONCE
Status: COMPLETED | OUTPATIENT
Start: 2022-02-03 | End: 2022-02-03

## 2022-02-03 RX ADMIN — HYDROCORTISONE SODIUM SUCCINATE 100 MG: 100 INJECTION, POWDER, FOR SOLUTION INTRAMUSCULAR; INTRAVENOUS at 08:30

## 2022-02-03 RX ADMIN — ACETAMINOPHEN 325 MG: 325 TABLET, FILM COATED ORAL at 08:29

## 2022-02-03 RX ADMIN — CALCIUM GLUCONATE 3.5 G: 98 INJECTION, SOLUTION INTRAVENOUS at 08:40

## 2022-02-03 RX ADMIN — ANTICOAGULANT CITRATE DEXTROSE SOLUTION FORMULA A 1000 ML: 12.25; 11; 3.65 SOLUTION INTRAVENOUS at 08:40

## 2022-02-03 RX ADMIN — DIPHENHYDRAMINE HYDROCHLORIDE 50 MG: 50 INJECTION, SOLUTION INTRAMUSCULAR; INTRAVENOUS at 08:29

## 2022-02-03 ASSESSMENT — MIFFLIN-ST. JEOR: SCORE: 1930.16

## 2022-02-03 NOTE — PROGRESS NOTES
Clinical Pharmacy Consult:                                                    Ivon Gamble is a 31 year old female coming in for a clinical pharmacist consult.  She was referred to me from Dr. Gamble.     Reason for Consult: insulin and meter education    Discussion: Reviewed how to use Accu-Chek meter and Humulin N Kwikpen.  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    Blood sugar was 444 at 4:15pm - after drinking coffee with creamer    Plan:  1. Per Dr. Gamble- Humulin N 5 units tonight and then 15 units every morning.  She will come back tomorrow to discuss dosing plan in more detail.  2. Provided her with meter - will check twice daily, fasting and 2-hours after lunch

## 2022-02-03 NOTE — PROGRESS NOTES
"  (R73.9) Hyperglycemia  (primary encounter diagnosis)  Comment:     Secondary to steroid treatment associated with underlying condition.  Start NPH insulin.    Plan: blood glucose (ACCU-CHEK GUIDE) test strip,         blood glucose monitoring (SOFTCLIX) lancets,         insulin NPH (HUMULIN N KWIKPEN) 100 UNIT/ML         injection        Advised 5 units this afternoon and 15 units in the morning.  Plan return visit at 830 tomorrow to discuss further.      (D69.3) Acute idiopathic thrombocytopenic purpura (H)  Comment:   Rash and illness developed in second week of January.  She is being treated with infusions as well as 100 mg of prednisone daily.  Plan:         Subjective       HPI     Blood sugar keep going up.    31-year-old woman developed acute ITP in early January.  She is being treated with infusions and with oral prednisone 100 mg daily.  She was sent here because of elevated blood sugars.  Specifically, blood sugar 435 on February 1, 286 on February 2.  She does not have a history of diabetes.    Her purpuric rash has subsided.  She feels a little Seney on the prednisone especially in the afternoons.  Has not noticed excessive thirst.    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  She has given insulin shots previously.  She knows how to use a glucose meter.  All of this is, of course, fortunate for starting such treatment.      Review of Systems     No fevers or chills.  Reports weight gain of 20 pounds.  No breathing problems or cough.  No chest pain.  No nausea or abdominal pain.  No further rash.        Objective    /72 (BP Location: Right arm, Patient Position: Sitting, Cuff Size: Adult Large)   Pulse 119   Temp 98.6  F (37  C) (Tympanic)   Resp 22   Ht 1.626 m (5' 4\")   Wt 123 kg (271 lb 3.2 oz)   LMP 01/31/2022 (Approximate)   SpO2 95%   BMI 46.55 kg/m    Body mass index is 46.55 kg/m .  Physical Exam     Pleasant young woman, NAD.  BMI noted.  HEENT -conjunctiva without " Admitting MD at .      inflammation, moist mucous membranes.  Neck -no masses  Chest -nonlabored breathing.  No observed cough.  Skin -no observed rash.    Accu-Chek blood sugar today was 444.    I did confer with , endocrinologist, who was in the clinic today.  Also Melonie, a pharmacist, was helpful in supplying a meter and discussing insulin pen with the patient.    NPH insulin pen was therefore prescribed.  See above.          Update 2-2-22:  We are working on a prior authorization for the NPH insulin pen.  Patient's blood sugar this morning was 124.  She has marked fluctuation.  Consider a 5 unit dose each morning.  Monitor and record.  Discussed with her treating physicians.  Also discussed possibly getting her some diuretic for fluid retention.    Dr. Gamble.

## 2022-02-03 NOTE — PROGRESS NOTES
"Seen for PLEX Management.      Indication - TTP   Day 7/7.   No issues with PLEX. Plt count upto 120          BP (!) 142/86   Pulse 96   Temp 97.3  F (36.3  C) (Axillary)   Resp 16   Ht 1.64 m (5' 4.57\")   BMI 45.53 kg/m    Sleeping comfortably      Rt internal jugular TDC   Exchange with FFP   Premedications: 50 mg IV Benadryl 100mg IV Solucortif  325 mg PO tylenol     Recent Labs   Lab Test 02/02/22  1219 02/01/22  1206 01/27/22  0953   NA  --  139 142   POTASSIUM  --  3.9 3.6   CHLORIDE  --  104 109   CO2  --  27 26   ANIONGAP  --  8 7   * 435* 255*   BUN  --  17 21   CR  --  0.71 0.80   JOSELUIS  --  8.3* 8.8     Lab Results   Component Value Date    WBC 10.3 02/03/2022     Lab Results   Component Value Date    RBC 3.06 02/03/2022     Lab Results   Component Value Date    HGB 10.1 02/03/2022     Lab Results   Component Value Date    HCT 32.6 02/03/2022     No components found for: MCT  Lab Results   Component Value Date     02/03/2022     Lab Results   Component Value Date    MCH 33.0 02/03/2022     Lab Results   Component Value Date    MCHC 31.0 02/03/2022     Lab Results   Component Value Date    RDW 16.0 02/03/2022     Lab Results   Component Value Date     02/03/2022           A/P -    D/w Hematology. Will plan on another session tomorrow , then start with every other day session on Monday . Plan to switch to Rituximab after several sessions per Hematology        Blanka Giraldo MD  [facility] Neph[facility]   [phone]    "

## 2022-02-03 NOTE — PROGRESS NOTES
Treatment in room CS-OF using Optia apheresis machine. Consent verified.     Height: 5 ft 4in  Weight: 250#   HCT: 32%  Inlet speed: 48  ACDA ratio: 10:1  Fluid balance: 100  Access: R CVC     Replacement product: FFP  Electrolyte replacement: Ca Gluconate 3.5grams in NS - total 85mls, piggybacked into return lines, infused over time of treatment.    Premedications: Benadryl, Solucortef, Acetominophen     Treatment Notes: Unventful     Treatment completed as ordered, VSS, see EPIC flowsheet for run data.     Next treatment: Tomorrow 2/4/22    Timmy Siddiqui RN

## 2022-02-03 NOTE — PROGRESS NOTES
Seen for PLEX Management.      Indication - TTP   Day 6/7.   No issues with PLEX. Plt count upto 117   No complaints.           Rt internal jugular TDC   Exchange with FFP   Premedications: 50 mg IV Benadryl 100mg IV Solucortif  325 mg PO tylenol     Results for CONOR FELIZ (MRN [id number]) as of 2/3/2022 12:02   Ref. Range 2/1/2022 12:06 2/2/2022 09:08   WBC Latest Ref Range: 4.0 - 11.0 10e3/uL 8.3 9.9   Hemoglobin Latest Ref Range: 11.7 - 15.7 g/dL 10.7 (L) 10.7 (L)   Hematocrit Latest Ref Range: 35.0 - 47.0 % 33.3 (L) 33.5 (L)   Platelet Count Latest Ref Range: 150 - 450 10e3/uL 112 (L) 117 (L)   RBC Count Latest Ref Range: 3.80 - 5.20 10e6/uL 3.20 (L) 3.15 (L)   MCV Latest Ref Range: 78 - 100 fL 104 (H) 106 (H)   MCH Latest Ref Range: 26.5 - 33.0 pg 33.4 (H) 34.0 (H)   MCHC Latest Ref Range: 31.5 - 36.5 g/dL 32.1 31.9   RDW Latest Ref Range: 10.0 - 15.0 % 15.9 (H) 15.9 (H)   % Neutrophils Latest Units: % 92 80   % Lymphocytes Latest Units: % 5 11   % Monocytes Latest Units: % 2 6   % Eosinophils Latest Units: % 0 1        A/P -    D/w Hematology. Plan to do daily PLEX until Friday and then switch to every other day on Monday      Blanka Giraldo MD  [facility] Nephrology   [phone]

## 2022-02-03 NOTE — PROGRESS NOTES
All assessments and monitoring will be completed by apheresis RN. Discharge per dialysis nurse as well. Pt states no change to her medical history or medications since her last admission.

## 2022-02-03 NOTE — PATIENT INSTRUCTIONS
NPH Quickpen:    5 units in morning - to start.    May need to increase dose.    Check Blood Sugar at 7 AM and at 5 PM.    Record Blood Sugars    Discuss with your Doctors.

## 2022-02-04 ENCOUNTER — HOSPITAL ENCOUNTER (OUTPATIENT)
Dept: LAB | Facility: CLINIC | Age: 32
End: 2022-02-04
Payer: MEDICAID

## 2022-02-04 ENCOUNTER — HOSPITAL ENCOUNTER (OUTPATIENT)
Facility: CLINIC | Age: 32
Discharge: HOME | End: 2022-02-04
Admitting: INTERNAL MEDICINE
Payer: MEDICAID

## 2022-02-04 ENCOUNTER — TELEPHONE (OUTPATIENT)
Dept: INTERNAL MEDICINE | Facility: CLINIC | Age: 32
End: 2022-02-04
Payer: COMMERCIAL

## 2022-02-04 VITALS
SYSTOLIC BLOOD PRESSURE: 136 MMHG | RESPIRATION RATE: 22 BRPM | WEIGHT: 271.17 LBS | TEMPERATURE: 97.8 F | HEART RATE: 82 BPM | DIASTOLIC BLOOD PRESSURE: 84 MMHG | BODY MASS INDEX: 46.29 KG/M2 | HEIGHT: 64 IN

## 2022-02-04 LAB
BASOPHILS # BLD AUTO: 0 10E3/UL (ref 0–0.2)
BASOPHILS NFR BLD AUTO: 0 %
BLD PROD TYP BPU: NORMAL
BLOOD COMPONENT TYPE: NORMAL
CODING SYSTEM: NORMAL
EOSINOPHIL # BLD AUTO: 0 10E3/UL (ref 0–0.7)
EOSINOPHIL NFR BLD AUTO: 0 %
ERYTHROCYTE [DISTWIDTH] IN BLOOD BY AUTOMATED COUNT: 15.8 % (ref 10–15)
HCT VFR BLD AUTO: 34.8 % (ref 35–47)
HGB BLD-MCNC: 11 G/DL (ref 11.7–15.7)
IMM GRANULOCYTES # BLD: 0.1 10E3/UL
IMM GRANULOCYTES NFR BLD: 1 %
ISSUE DATE AND TIME: NORMAL
LYMPHOCYTES # BLD AUTO: 0.5 10E3/UL (ref 0.8–5.3)
LYMPHOCYTES NFR BLD AUTO: 4 %
MCH RBC QN AUTO: 33.3 PG (ref 26.5–33)
MCHC RBC AUTO-ENTMCNC: 31.6 G/DL (ref 31.5–36.5)
MCV RBC AUTO: 106 FL (ref 78–100)
MONOCYTES # BLD AUTO: 0.3 10E3/UL (ref 0–1.3)
MONOCYTES NFR BLD AUTO: 3 %
NEUTROPHILS # BLD AUTO: 9.8 10E3/UL (ref 1.6–8.3)
NEUTROPHILS NFR BLD AUTO: 92 %
NRBC # BLD AUTO: 0 10E3/UL
NRBC BLD AUTO-RTO: 0 /100
PLATELET # BLD AUTO: 120 10E3/UL (ref 150–450)
RBC # BLD AUTO: 3.3 10E6/UL (ref 3.8–5.2)
UNIT ABO/RH: NORMAL
UNIT NUMBER: NORMAL
UNIT STATUS: NORMAL
UNIT TYPE ISBT: 600
UNIT TYPE ISBT: 6200
WBC # BLD AUTO: 10.7 10E3/UL (ref 4–11)

## 2022-02-04 PROCEDURE — 36592 COLLECT BLOOD FROM PICC: CPT | Performed by: INTERNAL MEDICINE

## 2022-02-04 PROCEDURE — 250N000011 HC RX IP 250 OP 636: Performed by: INTERNAL MEDICINE

## 2022-02-04 PROCEDURE — P9059 PLASMA, FRZ BETWEEN 8-24HOUR: HCPCS | Performed by: INTERNAL MEDICINE

## 2022-02-04 PROCEDURE — 999N000013 HC STATISTIC APHERESIS

## 2022-02-04 PROCEDURE — 85025 COMPLETE CBC W/AUTO DIFF WBC: CPT | Performed by: INTERNAL MEDICINE

## 2022-02-04 PROCEDURE — 250N000009 HC RX 250: Performed by: INTERNAL MEDICINE

## 2022-02-04 PROCEDURE — 258N000003 HC RX IP 258 OP 636: Performed by: INTERNAL MEDICINE

## 2022-02-04 PROCEDURE — 36514 APHERESIS PLASMA: CPT

## 2022-02-04 PROCEDURE — P9059 PLASMA, FRZ BETWEEN 8-24HOUR: HCPCS

## 2022-02-04 PROCEDURE — 250N000013 HC RX MED GY IP 250 OP 250 PS 637: Performed by: INTERNAL MEDICINE

## 2022-02-04 RX ORDER — DIPHENHYDRAMINE HYDROCHLORIDE 50 MG/ML
50 INJECTION INTRAMUSCULAR; INTRAVENOUS ONCE
Status: COMPLETED | OUTPATIENT
Start: 2022-02-04 | End: 2022-02-04

## 2022-02-04 RX ORDER — ACETAMINOPHEN 325 MG/1
325 TABLET ORAL ONCE
Status: COMPLETED | OUTPATIENT
Start: 2022-02-04 | End: 2022-02-04

## 2022-02-04 RX ORDER — SYRINGE-NEEDLE,INSULIN,0.5 ML 27GX1/2"
SYRINGE, EMPTY DISPOSABLE MISCELLANEOUS
Qty: 50 EACH | Refills: 3 | Status: SHIPPED | OUTPATIENT
Start: 2022-02-04 | End: 2023-08-03

## 2022-02-04 RX ADMIN — ACETAMINOPHEN 325 MG: 325 TABLET, FILM COATED ORAL at 13:16

## 2022-02-04 RX ADMIN — DIPHENHYDRAMINE HYDROCHLORIDE 50 MG: 50 INJECTION, SOLUTION INTRAMUSCULAR; INTRAVENOUS at 13:16

## 2022-02-04 RX ADMIN — CALCIUM GLUCONATE 3.5 G: 98 INJECTION, SOLUTION INTRAVENOUS at 13:17

## 2022-02-04 RX ADMIN — HYDROCORTISONE SODIUM SUCCINATE 100 MG: 100 INJECTION, POWDER, FOR SOLUTION INTRAMUSCULAR; INTRAVENOUS at 13:16

## 2022-02-04 RX ADMIN — ANTICOAGULANT CITRATE DEXTROSE SOLUTION FORMULA A 989 ML: 12.25; 11; 3.65 SOLUTION INTRAVENOUS at 13:17

## 2022-02-04 ASSESSMENT — MIFFLIN-ST. JEOR: SCORE: 1930

## 2022-02-04 NOTE — TELEPHONE ENCOUNTER
Patient reporting Humulin N Insulin not covered by insurance. Please call patient back with update.     Called [facility] and spoke to Gavin, Pharmacist and he states that Humulin N requires a PA. They tried to run Novolin N, but this was also rejected as needed a PA. Rejection from insurance did not provide any covered alternatives. Dr. Gamble advised, urgent PA needed for patient.     Prior Authorization Retail Medication Request    Medication/Dose: insulin NPH (HUMULIN N KWIKPEN) 100 UNIT/ML injection  ICD code (if different than what is on RX):    Previously Tried and Failed:  n/a  Rationale:  Elevated glucose due to steroids for treatment of Acute idiopathic thrombocytopenic purpura. Per Dr. Gamble, Humulin N recommended for patient after conferring with LIZZY Wilhelm Pharmacist and Dr. Greenfield, Endocrinology.     Insurance Name:  [place] Medicaid  Insurance ID:  [id number]      Pharmacy Information (if different than what is on RX)  Name:    Phone:

## 2022-02-04 NOTE — PROGRESS NOTES
"Seen for PLEX Management.      Indication - TTP   8th consecutive session  No issues with PLEX. Plt count stable          /82   Pulse 86   Temp 97  F (36.1  C) (Oral)   Resp 22   Ht 1.626 m (5' 4\")   Wt 123 kg (271 lb 2.7 oz)   LMP 01/31/2022 (Approximate)   BMI 46.55 kg/m    Sleeping comfortably      Rt internal jugular TDC   Exchange with FFP   Premedications: 50 mg IV Benadryl 100mg IV Solucortif  325 mg PO tylenol     Lab Results   Component Value Date    WBC 10.7 02/04/2022     Lab Results   Component Value Date    RBC 3.30 02/04/2022     Lab Results   Component Value Date    HGB 11.0 02/04/2022     Lab Results   Component Value Date    HCT 34.8 02/04/2022     No components found for: MCT  Lab Results   Component Value Date     02/04/2022     Lab Results   Component Value Date    MCH 33.3 02/04/2022     Lab Results   Component Value Date    MCHC 31.6 02/04/2022     Lab Results   Component Value Date    RDW 15.8 02/04/2022     Lab Results   Component Value Date     02/04/2022     Recent Labs   Lab Test 02/02/22  1219 02/01/22  1206 01/27/22  0953   NA  --  139 142   POTASSIUM  --  3.9 3.6   CHLORIDE  --  104 109   CO2  --  27 26   ANIONGAP  --  8 7   * 435* 255*   BUN  --  17 21   CR  --  0.71 0.80   JOSELUIS  --  8.3* 8.8           A/P -      D/w Hematology.   Plan for next session on Monday and then every other day  Plan to switch to Rituximab after several sessions per Hematology   Received PCP's note inquiring need for Furosemide . She is on Pred 100 mg daily and retaining fluid. BP has been high lately .   Okay with Lasix 20 mg daily ( while she is on Pred) . This will need to be followed up and titrated by PCP as pt will not f/u with Nephrology as outpt once PLEX is done. I will send a note to Dr Gamble .          Blanka Giraldo MD  [facility]ph[facility]   [phone]    "

## 2022-02-04 NOTE — TELEPHONE ENCOUNTER
Spoke to patient, she does not know how to draw up insulin from vials to inject. She has an appointment this afternoon and she will be there until at least 4pm, maybe later so would be very difficult to get back to the clinic for teaching today.     She did check her glucose after leaving the clinic this morning and it was 169. She asks if possible to fast and exercise to get through the weekend and then come back on Monday for insulin teaching.     Iva Schmidt RN  [facility]

## 2022-02-04 NOTE — PROGRESS NOTES
Care Suites Admission Nursing Note    Patient Information  Name: Ivon Gamble  Age: 31 year old  Reason for admission: Apheresis.  Care Suites arrival time: 1220    Patient Admission/Assessment   Pre-procedure assessment complete: Yes, all assessments, interventions, evaluations, education and MD notifications provided by Hue Mckee RN.    Plan/other: Proceed.    Discharge Planning  Discharge name/phone number: Jovita [phone]  Overnight post sedation caregiver: NA  Discharge location: home    Alethea Mcgowan RN

## 2022-02-04 NOTE — TELEPHONE ENCOUNTER
Hue calls back.   She cannot do the teaching, but spoke to the nurses in the Care Suites. She states they could do teaching, but patient would need to have supplies and insulin with her. Advised that patient would not have these yet as we have not ordered them - we wanted to make sure we could teaching before ordering supplies. Hue states they could delay her appointment up to 2 hours if that would allow our office to do the teaching. This RN will check with patient and call back, but unsure what her transportation situation is. Hue states to call her cell phone back with an update - [phone].     Called patient, she is not able to drive and already on her way to [facility] for her appointment. 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 Patient states this RN is working until 9pm today and patient states she can go to her for teaching. Advised patient we will proceed with this plan as long as Dr. Gamble is okay with it and have him send insulin vials and syringes to [facility]; she should take supplies with her to work. This RN would call back only if there is an issue with this plan.     Discussed with Dr. Gamble, he is ok with patient having her RN co-worker teach her how to draw up insulin. He sent prescriptions for Humulin N vial and insulin syringes to [facility].     Attempted to call Hue's cell phone, no answer or voice message option. Called Care Suites at [facility] and left message with Kavita to tell Hue that patient knows a RN that has offered to do insulin teaching for her. Kavita will inform Hue of this.     Iva Schmidt RN  [facility]

## 2022-02-04 NOTE — TELEPHONE ENCOUNTER
Please advise per call to insurance Humulin N is covered however in 100/ml vials.  The kwikpen is non-formulary.     The Humulin N Kwikpen would require a PA with coverage criteria requirements of patient having tried/failed at least 3 covered alternatives listed below.      Does provider ok switching to Humulin N vials?  If so a new Rx would just need to be sent to pharmacy or a verbal ok to switch to vials.    If vials are not appropriate please send back to PA team with medical reasoning and PA team can continue request.

## 2022-02-04 NOTE — TELEPHONE ENCOUNTER
Discussed with Dr. Gamble, he was aware of patient's appointment this afternoon. He asks if nursing staff could do teaching during her infusion this afternoon - patient is scheduled at [facility]. Called [facility] Infusion center, patient is scheduled at the Hospital [facility]es in Apheresis Room, not Infusion Center. Call transferred to [phone], they state DEEPA Swann cannot come to the phone right now, message left for her to call back.     Iva Schmidt RN  [facility]

## 2022-02-07 ENCOUNTER — HOSPITAL ENCOUNTER (OUTPATIENT)
Dept: LAB | Facility: CLINIC | Age: 32
End: 2022-02-07
Payer: MEDICAID

## 2022-02-07 ENCOUNTER — HOSPITAL ENCOUNTER (OUTPATIENT)
Facility: CLINIC | Age: 32
Discharge: HOME | End: 2022-02-07
Admitting: INTERNAL MEDICINE
Payer: MEDICAID

## 2022-02-07 VITALS
RESPIRATION RATE: 18 BRPM | DIASTOLIC BLOOD PRESSURE: 80 MMHG | HEART RATE: 72 BPM | BODY MASS INDEX: 46.29 KG/M2 | TEMPERATURE: 98.1 F | SYSTOLIC BLOOD PRESSURE: 160 MMHG | WEIGHT: 271.17 LBS | HEIGHT: 64 IN

## 2022-02-07 LAB
BLD PROD TYP BPU: NORMAL
BLOOD COMPONENT TYPE: NORMAL
CODING SYSTEM: NORMAL
ERYTHROCYTE [DISTWIDTH] IN BLOOD BY AUTOMATED COUNT: 15.4 % (ref 10–15)
HCT VFR BLD AUTO: 33 % (ref 35–47)
HGB BLD-MCNC: 10.4 G/DL (ref 11.7–15.7)
ISSUE DATE AND TIME: NORMAL
MCH RBC QN AUTO: 33.7 PG (ref 26.5–33)
MCHC RBC AUTO-ENTMCNC: 31.5 G/DL (ref 31.5–36.5)
MCV RBC AUTO: 107 FL (ref 78–100)
PLATELET # BLD AUTO: 109 10E3/UL (ref 150–450)
RBC # BLD AUTO: 3.09 10E6/UL (ref 3.8–5.2)
UNIT ABO/RH: NORMAL
UNIT NUMBER: NORMAL
UNIT STATUS: NORMAL
UNIT TYPE ISBT: 6200
WBC # BLD AUTO: 8.6 10E3/UL (ref 4–11)

## 2022-02-07 PROCEDURE — 250N000009 HC RX 250: Performed by: INTERNAL MEDICINE

## 2022-02-07 PROCEDURE — 36592 COLLECT BLOOD FROM PICC: CPT | Performed by: INTERNAL MEDICINE

## 2022-02-07 PROCEDURE — 250N000011 HC RX IP 250 OP 636: Performed by: INTERNAL MEDICINE

## 2022-02-07 PROCEDURE — 250N000013 HC RX MED GY IP 250 OP 250 PS 637: Performed by: INTERNAL MEDICINE

## 2022-02-07 PROCEDURE — 36514 APHERESIS PLASMA: CPT

## 2022-02-07 PROCEDURE — 258N000003 HC RX IP 258 OP 636: Performed by: INTERNAL MEDICINE

## 2022-02-07 PROCEDURE — 85027 COMPLETE CBC AUTOMATED: CPT | Performed by: INTERNAL MEDICINE

## 2022-02-07 PROCEDURE — P9059 PLASMA, FRZ BETWEEN 8-24HOUR: HCPCS | Performed by: INTERNAL MEDICINE

## 2022-02-07 RX ORDER — DIPHENHYDRAMINE HYDROCHLORIDE 50 MG/ML
50 INJECTION INTRAMUSCULAR; INTRAVENOUS ONCE
Status: COMPLETED | OUTPATIENT
Start: 2022-02-07 | End: 2022-02-07

## 2022-02-07 RX ORDER — ACETAMINOPHEN 325 MG/1
325 TABLET ORAL ONCE
Status: COMPLETED | OUTPATIENT
Start: 2022-02-07 | End: 2022-02-07

## 2022-02-07 RX ORDER — DIPHENHYDRAMINE HYDROCHLORIDE 50 MG/ML
50 INJECTION INTRAMUSCULAR; INTRAVENOUS
Status: DISCONTINUED | OUTPATIENT
Start: 2022-02-07 | End: 2022-02-08 | Stop reason: HOSPADM

## 2022-02-07 RX ORDER — ACETAMINOPHEN 325 MG/1
650 TABLET ORAL ONCE
Status: ACTIVE | OUTPATIENT
Start: 2022-02-07

## 2022-02-07 RX ORDER — DIPHENHYDRAMINE HYDROCHLORIDE 50 MG/ML
50 INJECTION INTRAMUSCULAR; INTRAVENOUS ONCE
Status: ACTIVE | OUTPATIENT
Start: 2022-02-07

## 2022-02-07 RX ORDER — DIPHENHYDRAMINE HYDROCHLORIDE 50 MG/ML
50 INJECTION INTRAMUSCULAR; INTRAVENOUS
Status: ACTIVE | OUTPATIENT
Start: 2022-02-07

## 2022-02-07 RX ADMIN — HYDROCORTISONE SODIUM SUCCINATE 100 MG: 100 INJECTION, POWDER, FOR SOLUTION INTRAMUSCULAR; INTRAVENOUS at 12:35

## 2022-02-07 RX ADMIN — CALCIUM GLUCONATE 4 G: 98 INJECTION, SOLUTION INTRAVENOUS at 12:34

## 2022-02-07 RX ADMIN — ACETAMINOPHEN 325 MG: 325 TABLET, FILM COATED ORAL at 12:00

## 2022-02-07 RX ADMIN — ANTICOAGULANT CITRATE DEXTROSE SOLUTION FORMULA A 1000 ML: 12.25; 11; 3.65 SOLUTION INTRAVENOUS at 14:41

## 2022-02-07 RX ADMIN — DIPHENHYDRAMINE HYDROCHLORIDE 50 MG: 50 INJECTION, SOLUTION INTRAMUSCULAR; INTRAVENOUS at 12:34

## 2022-02-07 ASSESSMENT — MIFFLIN-ST. JEOR: SCORE: 1926.51

## 2022-02-07 NOTE — TELEPHONE ENCOUNTER
Patient is a 76y old  Female who presents with a chief complaint of Back pain (19 Jan 2021 13:17)      HPI:  76F with past medical history of HTN, HLD, SLE, RA, hypothyroidism, provoked R. popliteal DVT in 2019 on eliquis, chronic low back pain presents to ED for acute on chronic low back pain. Pt has several months of chronic lumbosacral back pain, evaluated by outpatient ortho and referred for physical therapy and pain management. She has been receiving ?vitamin spinal injections. She last received low back inj on 12/16 after which she her low back pain worsened. She took oxycodone 5 mg qd (prescribed in september during a ED visit) and acetaminophen for past three days without relief. She also endorses worsening pain during ambulation with walker and has affected her quality of life. She has constipation, last BM 4 days ago. Otherwise denies fever, chills, N/V, abdominal pain, dysuria, urinary retention, fecal incontinence, saddle anesthesia, fall, LOC, trauma. During ROS she endorsed LLE weakness for several months, no numbness or tingling and has LLE edema >RLE edema, reported undergoing LLE US 4 months ago, was negative for DVT. Currently has 8/10 pain, improved to 5/10 with oxycodone 5 mg x 2 in ED. (28 Dec 2020 11:16)   12/28: Mri T/Ls: IMPRESSION: Degenerative changes  Abnormal signal with associated enhancement is seen involving the endplates adjacent to the L2-3 disc space. There is slight increased T2 prolongation involving the L2-3 disc space with associated widening. While these findings could be compatible with degenerative changes possibly of early discitis and osteomyelitis must be considered.  Patient s/p L1-L5 laminectomy, T10-L5 posterior surgical fusion by Dr. Campos on 1/1/21  HMV x 3 drains. Post op with constipation needing aggressive bowel regimen.     PT/OT and PM&R evaluated patient and recommended Rehab.     Patient medically cleared and admitted to  Rehab on 1/12/21 (12 Jan 2021 15:21)      SUBJECTIVE/INTERVAL EVENTS  Patient seen and assessed at bedside. No acute events overnight. Patient c/o poor sleep overnight. She had 2 episodes of BM today, but abdominal pain much improved.       PAST MEDICAL & SURGICAL HISTORY:  HTN (hypertension)    DVT (deep venous thrombosis)    Hypothyroidism    RA (rheumatoid arthritis)    Obesity, Class II, BMI 35-39.9, no comorbidity    Hypothyroid    Benign heart murmur    Hyperlipidemia    Hypertension    H/O degenerative disc disease    Osteoarthritis    SLE (systemic lupus erythematosus)    Rheumatoid arthritis    S/P knee replacement    Status post total hip replacement, right    S/P thyroid surgery  1 lobe removed    Lung cancer  small tumor removed 2015    S/P knee surgery  bilateral    S/P carpal tunnel release  left    S/P eye surgery  child    S/P cataract surgery  left    S/P tonsillectomy        Allergies    No Known Allergies    Intolerances          VITALS  76y  Vital Signs Last 24 Hrs  T(C): 36.7 (19 Jan 2021 08:19), Max: 36.8 (18 Jan 2021 20:35)  T(F): 98.1 (19 Jan 2021 08:19), Max: 98.2 (18 Jan 2021 20:35)  HR: 70 (19 Jan 2021 08:19) (67 - 70)  BP: 126/59 (19 Jan 2021 08:19) (122/71 - 126/59)  BP(mean): --  RR: 16 (19 Jan 2021 08:19) (16 - 17)  SpO2: 98% (19 Jan 2021 08:19) (95% - 98%)  Daily       Gen - NAD, Comfortable  Pulm - CTAB,   Cardiovascular - S1S2,  Abdomen - Soft, NT/ND, +BS  Extremities - No C/C/E, No calf tenderness    Motor -                    LEFT    UE - ShAB 5/5, EF 5/5, EE 5/5, WE 5/5,  5/5                    RIGHT UE - ShAB 5/5, EF 5/5, EE 5/5, WE 5/5,  5/5                    LEFT    LE - HF 3/5, KE 3/5, DF 4/5, PF 4/5                    RIGHT LE - HF 3/5, KE 3/5, DF 4/5, PF 4/5        Sensory - Intact to LT    Psychiatric - Mood stable, Affect WNL  Skin: long back incision with staples +, c/d/i  Wounds: None Present        RECENT LABS:                          10.1   6.36  )-----------( 450      ( 18 Jan 2021 06:57 )             31.9     01-18    142  |  105  |  11  ----------------------------<  91  4.1   |  28  |  0.66    Ca    8.5      18 Jan 2021 06:57    TPro  6.1  /  Alb  2.2<L>  /  TBili  0.3  /  DBili  x   /  AST  27  /  ALT  19  /  AlkPhos  116  01-18    LIVER FUNCTIONS - ( 18 Jan 2021 06:57 )  Alb: 2.2 g/dL / Pro: 6.1 g/dL / ALK PHOS: 116 U/L / ALT: 19 U/L / AST: 27 U/L / GGT: x                 MEDICATIONS  (STANDING):  atorvastatin 20 milliGRAM(s) Oral at bedtime  enoxaparin Injectable 40 milliGRAM(s) SubCutaneous two times a day  folic acid 1 milliGRAM(s) Oral daily  hydroxychloroquine 200 milliGRAM(s) Oral daily  levothyroxine 200 MICROGram(s) Oral daily  lidocaine   Patch 1 Patch Transdermal daily  polyethylene glycol 3350 17 Gram(s) Oral two times a day  senna 2 Tablet(s) Oral at bedtime    MEDICATIONS  (PRN):  acetaminophen   Tablet .. 650 milliGRAM(s) Oral every 6 hours PRN Temp greater or equal to 38C (100.4F), Mild Pain (1 - 3)  magnesium hydroxide Suspension 30 milliLiter(s) Oral daily PRN Constipation  melatonin 3 milliGRAM(s) Oral at bedtime PRN Insomnia  oxyCODONE    IR 10 milliGRAM(s) Oral every 4 hours PRN Moderate Pain (4 - 6)  oxyCODONE    IR 15 milliGRAM(s) Oral every 4 hours PRN Severe Pain (7 - 10)  simethicone 80 milliGRAM(s) Chew every 6 hours PRN Upset Stomach         Received fax from [employer] alternatives are Novalog Flex pen, Lantus SoloStar.  FAUSTINO DOVE,CMA

## 2022-02-07 NOTE — TELEPHONE ENCOUNTER
Prescription was changed to the NPH insulin vial.  Attempted to call patient to see if there was any issue with insulin. Left voice message for patient to call back.     Iva Schmidt RN  [facility]

## 2022-02-08 ENCOUNTER — HOSPITAL ENCOUNTER (EMERGENCY)
Facility: CLINIC | Age: 32
Discharge: HOME | End: 2022-02-08
Attending: EMERGENCY MEDICINE | Admitting: EMERGENCY MEDICINE
Payer: MEDICAID

## 2022-02-08 ENCOUNTER — ONCOLOGY VISIT (OUTPATIENT)
Dept: ONCOLOGY | Facility: CLINIC | Age: 32
End: 2022-02-08
Attending: PHYSICIAN ASSISTANT
Payer: MEDICAID

## 2022-02-08 ENCOUNTER — TELEPHONE (OUTPATIENT)
Dept: ONCOLOGY | Facility: CLINIC | Age: 32
End: 2022-02-08

## 2022-02-08 ENCOUNTER — PATIENT OUTREACH (OUTPATIENT)
Dept: ONCOLOGY | Facility: CLINIC | Age: 32
End: 2022-02-08

## 2022-02-08 ENCOUNTER — APPOINTMENT (OUTPATIENT)
Dept: GENERAL RADIOLOGY | Facility: CLINIC | Age: 32
End: 2022-02-08
Attending: EMERGENCY MEDICINE
Payer: MEDICAID

## 2022-02-08 ENCOUNTER — INFUSION THERAPY VISIT (OUTPATIENT)
Dept: INFUSION THERAPY | Facility: CLINIC | Age: 32
End: 2022-02-08
Attending: INTERNAL MEDICINE
Payer: MEDICAID

## 2022-02-08 VITALS
HEART RATE: 92 BPM | DIASTOLIC BLOOD PRESSURE: 73 MMHG | BODY MASS INDEX: 47.2 KG/M2 | WEIGHT: 273.1 LBS | TEMPERATURE: 98.1 F | OXYGEN SATURATION: 100 % | SYSTOLIC BLOOD PRESSURE: 114 MMHG | RESPIRATION RATE: 24 BRPM

## 2022-02-08 VITALS
OXYGEN SATURATION: 100 % | HEART RATE: 92 BPM | SYSTOLIC BLOOD PRESSURE: 114 MMHG | DIASTOLIC BLOOD PRESSURE: 73 MMHG | TEMPERATURE: 98.1 F | BODY MASS INDEX: 47.18 KG/M2 | RESPIRATION RATE: 24 BRPM | WEIGHT: 273 LBS

## 2022-02-08 VITALS
DIASTOLIC BLOOD PRESSURE: 84 MMHG | SYSTOLIC BLOOD PRESSURE: 119 MMHG | HEART RATE: 51 BPM | RESPIRATION RATE: 24 BRPM | TEMPERATURE: 98.5 F | OXYGEN SATURATION: 98 %

## 2022-02-08 DIAGNOSIS — R06.02 SOB (SHORTNESS OF BREATH): Primary | ICD-10-CM

## 2022-02-08 DIAGNOSIS — D69.3 ACUTE IDIOPATHIC THROMBOCYTOPENIC PURPURA (H): Primary | ICD-10-CM

## 2022-02-08 DIAGNOSIS — R06.82 TACHYPNEA: ICD-10-CM

## 2022-02-08 DIAGNOSIS — M31.19 ACQUIRED TTP (H): ICD-10-CM

## 2022-02-08 DIAGNOSIS — R07.89 CHEST WALL PAIN: ICD-10-CM

## 2022-02-08 DIAGNOSIS — T82.9XXA CENTRAL LINE COMPLICATION, INITIAL ENCOUNTER: ICD-10-CM

## 2022-02-08 LAB
APTT PPP: 25 SECONDS (ref 22–38)
BASOPHILS # BLD AUTO: 0 10E3/UL (ref 0–0.2)
BASOPHILS NFR BLD AUTO: 0 %
EOSINOPHIL # BLD AUTO: 0.1 10E3/UL (ref 0–0.7)
EOSINOPHIL NFR BLD AUTO: 0 %
ERYTHROCYTE [DISTWIDTH] IN BLOOD BY AUTOMATED COUNT: 15.4 % (ref 10–15)
FIBRINOGEN PPP-MCNC: 198 MG/DL (ref 170–490)
HCT VFR BLD AUTO: 36.3 % (ref 35–47)
HGB BLD-MCNC: 11.5 G/DL (ref 11.7–15.7)
IMM GRANULOCYTES # BLD: 0.1 10E3/UL
IMM GRANULOCYTES NFR BLD: 1 %
INR PPP: 0.91 (ref 0.85–1.15)
LDH SERPL L TO P-CCNC: 243 U/L (ref 81–234)
LYMPHOCYTES # BLD AUTO: 1.1 10E3/UL (ref 0.8–5.3)
LYMPHOCYTES NFR BLD AUTO: 9 %
MCH RBC QN AUTO: 33.5 PG (ref 26.5–33)
MCHC RBC AUTO-ENTMCNC: 31.7 G/DL (ref 31.5–36.5)
MCV RBC AUTO: 106 FL (ref 78–100)
MONOCYTES # BLD AUTO: 0.6 10E3/UL (ref 0–1.3)
MONOCYTES NFR BLD AUTO: 5 %
NEUTROPHILS # BLD AUTO: 9.7 10E3/UL (ref 1.6–8.3)
NEUTROPHILS NFR BLD AUTO: 85 %
NRBC # BLD AUTO: 0 10E3/UL
NRBC BLD AUTO-RTO: 0 /100
PLATELET # BLD AUTO: 118 10E3/UL (ref 150–450)
RBC # BLD AUTO: 3.43 10E6/UL (ref 3.8–5.2)
SCANNED LAB RESULT: NORMAL
WBC # BLD AUTO: 11.5 10E3/UL (ref 4–11)

## 2022-02-08 PROCEDURE — 85025 COMPLETE CBC W/AUTO DIFF WBC: CPT | Performed by: INTERNAL MEDICINE

## 2022-02-08 PROCEDURE — G0463 HOSPITAL OUTPT CLINIC VISIT: HCPCS

## 2022-02-08 PROCEDURE — 83010 ASSAY OF HAPTOGLOBIN QUANT: CPT | Performed by: INTERNAL MEDICINE

## 2022-02-08 PROCEDURE — 85610 PROTHROMBIN TIME: CPT | Performed by: INTERNAL MEDICINE

## 2022-02-08 PROCEDURE — 99215 OFFICE O/P EST HI 40 MIN: CPT | Performed by: PHYSICIAN ASSISTANT

## 2022-02-08 PROCEDURE — 36592 COLLECT BLOOD FROM PICC: CPT

## 2022-02-08 PROCEDURE — 71046 X-RAY EXAM CHEST 2 VIEWS: CPT

## 2022-02-08 PROCEDURE — 85384 FIBRINOGEN ACTIVITY: CPT | Performed by: INTERNAL MEDICINE

## 2022-02-08 PROCEDURE — 83615 LACTATE (LD) (LDH) ENZYME: CPT | Performed by: INTERNAL MEDICINE

## 2022-02-08 PROCEDURE — 87340 HEPATITIS B SURFACE AG IA: CPT | Performed by: INTERNAL MEDICINE

## 2022-02-08 PROCEDURE — 85730 THROMBOPLASTIN TIME PARTIAL: CPT | Performed by: INTERNAL MEDICINE

## 2022-02-08 PROCEDURE — 99284 EMERGENCY DEPT VISIT MOD MDM: CPT

## 2022-02-08 RX ORDER — DIPHENHYDRAMINE HCL 25 MG
50 CAPSULE ORAL ONCE
Status: CANCELLED
Start: 2022-02-08

## 2022-02-08 RX ORDER — DIPHENHYDRAMINE HYDROCHLORIDE 50 MG/ML
50 INJECTION INTRAMUSCULAR; INTRAVENOUS ONCE
Status: CANCELLED | OUTPATIENT
Start: 2022-02-11

## 2022-02-08 RX ORDER — METHYLPREDNISOLONE SODIUM SUCCINATE 125 MG/2ML
125 INJECTION, POWDER, LYOPHILIZED, FOR SOLUTION INTRAMUSCULAR; INTRAVENOUS
Status: CANCELLED
Start: 2022-02-08

## 2022-02-08 RX ORDER — HEPARIN SODIUM (PORCINE) LOCK FLUSH IV SOLN 100 UNIT/ML 100 UNIT/ML
5 SOLUTION INTRAVENOUS
Status: CANCELLED | OUTPATIENT
Start: 2022-02-08

## 2022-02-08 RX ORDER — ACETAMINOPHEN 325 MG/1
650 TABLET ORAL ONCE
Status: DISCONTINUED | OUTPATIENT
Start: 2022-02-08 | End: 2022-02-08

## 2022-02-08 RX ORDER — EPINEPHRINE 1 MG/ML
0.3 INJECTION, SOLUTION INTRAMUSCULAR; SUBCUTANEOUS EVERY 5 MIN PRN
Status: CANCELLED | OUTPATIENT
Start: 2022-02-08

## 2022-02-08 RX ORDER — ALBUTEROL SULFATE 90 UG/1
1-2 AEROSOL, METERED RESPIRATORY (INHALATION)
Status: CANCELLED
Start: 2022-02-08

## 2022-02-08 RX ORDER — CALCIUM GLUCONATE 94 MG/ML
1 INJECTION, SOLUTION INTRAVENOUS
Status: CANCELLED | OUTPATIENT
Start: 2022-02-11

## 2022-02-08 RX ORDER — LORAZEPAM 2 MG/ML
0.5 INJECTION INTRAMUSCULAR EVERY 4 HOURS PRN
Status: CANCELLED | OUTPATIENT
Start: 2022-02-08

## 2022-02-08 RX ORDER — HEPARIN SODIUM,PORCINE 10 UNIT/ML
5 VIAL (ML) INTRAVENOUS
Status: CANCELLED | OUTPATIENT
Start: 2022-02-08

## 2022-02-08 RX ORDER — ALBUTEROL SULFATE 0.83 MG/ML
2.5 SOLUTION RESPIRATORY (INHALATION)
Status: CANCELLED | OUTPATIENT
Start: 2022-02-08

## 2022-02-08 RX ORDER — MEPERIDINE HYDROCHLORIDE 25 MG/ML
25 INJECTION INTRAMUSCULAR; INTRAVENOUS; SUBCUTANEOUS
Status: CANCELLED
Start: 2022-02-08

## 2022-02-08 RX ORDER — DIPHENHYDRAMINE HYDROCHLORIDE 50 MG/ML
50 INJECTION INTRAMUSCULAR; INTRAVENOUS
Status: CANCELLED
Start: 2022-02-08

## 2022-02-08 RX ORDER — NALOXONE HYDROCHLORIDE 0.4 MG/ML
0.2 INJECTION, SOLUTION INTRAMUSCULAR; INTRAVENOUS; SUBCUTANEOUS
Status: CANCELLED | OUTPATIENT
Start: 2022-02-08

## 2022-02-08 RX ORDER — DIPHENHYDRAMINE HYDROCHLORIDE 50 MG/ML
50 INJECTION INTRAMUSCULAR; INTRAVENOUS
Status: CANCELLED | OUTPATIENT
Start: 2022-02-11

## 2022-02-08 RX ORDER — ACETAMINOPHEN 325 MG/1
325 TABLET ORAL ONCE
Status: CANCELLED | OUTPATIENT
Start: 2022-02-11

## 2022-02-08 RX ORDER — MEPERIDINE HYDROCHLORIDE 25 MG/ML
25 INJECTION INTRAMUSCULAR; INTRAVENOUS; SUBCUTANEOUS EVERY 30 MIN PRN
Status: CANCELLED | OUTPATIENT
Start: 2022-02-08

## 2022-02-08 RX ORDER — ACETAMINOPHEN 325 MG/1
650 TABLET ORAL ONCE
Status: CANCELLED
Start: 2022-02-08

## 2022-02-08 ASSESSMENT — ENCOUNTER SYMPTOMS
NAUSEA: 0
COLOR CHANGE: 0
VOMITING: 0
MYALGIAS: 1
FEVER: 0
CHILLS: 0

## 2022-02-08 NOTE — PROGRESS NOTES
"Writer contacted Ivon to see how she was feeling after her ED visit this morning. She states, \"I am laying down right now so I feel okay.\" She rates her pain a 3/10 without medication intervention.     Writer advised Ivon to please return to the ED if she has breathing changes, increased pain, tachypnea, or ANY physical changes. Ivon is agreeable to the plan of care.    Writer also discussed Ivon's future medical appointments and that we were able to secure an infusion appointment for her on 02/09/22 for her first Rituxan dose.     Writer clarified the length of infusion, eating a breakfast before she arrives, having a , and what to bring.    Shira Brown RN on 2/8/2022 at 2:47 PM    "

## 2022-02-08 NOTE — ED PROVIDER NOTES
History     Chief Complaint:  Vascular Access Problem     The history is provided by the patient.      Ivon Gamble is a 31 year old female with history of asthma, ITP who presents with pain surrounding the central line to her right chest after a patient tugged on the skin surrounding it 3 days ago. The patient did not pull the line all the way out, but Ivon states that she has had pain to her right chest wall surrounding the site of her port since the incident occurred. The pain worsens when she lays flat. She reports that she receives RX due to ITP and went to the infusion clinic to receive this today. However, Dr. Nguyen, her oncologist, was not comfortable moving forward with today's infusion until was seen ED. Therefore, she was sent here for this workup. Her most recent infusion was yesterday.    Review of Systems   Constitutional: Negative for chills and fever.   Gastrointestinal: Negative for nausea and vomiting.   Musculoskeletal: Positive for myalgias (right chest wall pain).   Skin: Negative for color change.   All other systems reviewed and are negative.    Allergies:  The patient has no known allergies.     Medications:  Augmentin  Insulin  Protonix  Prednisone  Senokot    Past Medical History:     Thrombotic thrombocytopenic purpura  Hyperbilirubinemia  Normocytic anemia  Thrombocytopenia  Asthma       Past Surgical History:    CVC non-tunnel placement  CVC tunnel placement     Family History:    Mother: diabetes  Father: liver disease    Social History:  The patient presents to the ED alone.  The patient presents to the ED via car.    Physical Exam     Patient Vitals for the past 24 hrs:   BP Temp Temp src Pulse Resp SpO2   02/08/22 0958 119/84 98.5  F (36.9  C) Oral 51 24 98 %     Physical Exam  Constitutional: Patient is well appearing. No distress.  Head: Atraumatic.  Eyes: Conjunctivae and EOM are normal. No scleral icterus.  Neck: Normal range of motion. Neck supple.   Cardiovascular: Normal  rate, regular rhythm, normal heart sounds and intact distal pulses.   Pulmonary/Chest: Breath sounds normal. No respiratory distress. Right dual lumen central line that appears clean, dry, and intact. No bleeding or bruising, only tenderness to the area that she said was grabbed.  Abdominal: Soft. Bowel sounds are normal. No distension. No tenderness. No rebound or guarding.   Musculoskeletal: Normal range of motion. No edema or tenderness.   Neurological: Alert and orientated to person, place, and time. No observable focal neuro deficit  Skin: Warm and dry. No rash noted. Not diaphoretic.     Emergency Department Course     Imaging:  XR Chest 2 Views   Final Result   IMPRESSION: Large bore right IJ line appears in good position. No   pneumothorax. There are no acute infiltrates. The cardiac silhouette   is not enlarged. Pulmonary vasculature is unremarkable.      YAYO NAVA MD            SYSTEM ID:  [id number]      Report per radiology    Emergency Department Course:       Reviewed:  I reviewed nursing notes, vitals, past medical history, Care Everywhere    Assessments:  1023 I obtained history and examined the patient as noted above.   1111 I rechecked the patient and explained findings.     Consults:  1058 I spoke with Dr. Nguyen, Ivon's oncologist, regarding the patient's presentation and plan of care. She is comfortable with the patient returning to the infusion clinic.    Disposition:  The patient was discharged to the infusion clinic.     Impression & Plan     Medical Decision Making:  Ivon Gamble is a 31 year old female who presents to the emergency department for evaluation of right chest wall pain near her central line site after a patient tugged on the surrounding skin a few days ago. Signs and symptoms are most consistent with chest wall pain. Differential considered includes chest wall injury, rib fracture or contusion, pleurisy, pneumothorax, shingles, pneumonia or other intra-pulmonary process, PE.  Chest X-ray shows a completely intact line that they already vernon back and flushed without any malfunction. She ambulated well in the ED without dropping below 98% oxygen saturation. I discussed the case with Dr. Nguyen who is comfortable with the patient returning to the infusion clinic. They will reassess her upstairs. All questions and concerns were answered. The patient was discharged home and recommended to follow up with her primary physician and return with any new or worsening symptoms.    Diagnosis:    ICD-10-CM    1. Chest wall pain  R07.89    2. Central line complication, initial encounter  T82.9XXA      Scribe Disclosure:  I, Lynette Zaragoza, am serving as a scribe at 10:08 AM on 2/8/2022 to document services personally performed by Hector Joe MD based on my observations and the provider's statements to me.        Hector Joe MD  02/08/22 [id number]

## 2022-02-08 NOTE — TELEPHONE ENCOUNTER
"Patient was planned to begin rituximab today. I received a page from the infusion nurses patient had pain and swelling at the catheter site. Additionally, she was tachypneic with RR 25. Satting nearly 100% on room air. I had asked ERIC Sanchez, to assess patient.     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 She last received plasma exchange yesterday with the dialysis team.     Per Merly Yang's reports, patient has tenderness upon palpation of the line in the neck and chest. Additionally, patient has tachypnea when laying flat.     Patient is in need of stat CT chest to rule out PE. Additionally, her catheter needs re-evaluation and likely replacement. She is currently on every other day pheresis and was to start rituximab today.     Patient is to be transferred to [facility] ED for stat imaging. I have asked Maine Waller RN, to help coordinate direct admission to [facility] as [facility] does not have the capability of plasma exchange.     Patient will require:    -Stat CT chest PE.  -Stat blood cultures x2 to be drawn from the line.   -Consultation to Nephrology for continued pheresis. Plan is to start rituximab ASAP and pheresis will need to be timed accordingly. [id number] has just returned at 70%, however, patient requires slow taper of pheresis as she had rapid relapse.   -Continue prednisone 1 mg/kg/day dosing for now. Will plan on tapering this week.   -Check daily CBC, CMP, PT/INR/PTT, fibrinogen, LDH, and haptoglobin.  -Patient is known to have a tooth abscess with recent facial cellulitis. She has been continued on augmentin outpatient. Please check blood cultures as discussed above. Will defer to medicine team for antibiotic coverage as appropriate. She has been evaluated by dentistry and is in need of lower molar tooth resection.     Ashley Nguyen,   Hematology/Oncology  [facility]" [place] [facility]  Pager: [phone]

## 2022-02-08 NOTE — PROGRESS NOTES
"Infusion Nursing Note:  Ivon Gamble presents today for 1st Rituxan but treatment was HELD  Patient seen by provider today: Yes: in person with Trey   present during visit today: Not Applicable.    Note: Upon arrival to clinic, pt noted to be significantly short of breath.  Unable to even speak a few words without needing to take a breath.  Pt states she also has significant pain to R CVC catheter.  Reports her SOB started approx 24 hours ago.  The pain at the CVC site started three days ago, on Saturday, when her line was pulled by a patient while at work.      Unable to even lightly touch line or area around line towards chest and up towards neck on R side.  RR 24. Sats 100%  Reports \"electric shock\" feelings \"nearly constant\"  Does feel better when lying back in chair, but as soon as pt sits upright, pt becomes tachypneic, with difficulty speaking and complains of pressure in abdomen and chest    Pt reports she was having these same symptoms at apheresis yesterday    Concern for dislodgement of CVC and/or PE at this time.    Discussed with Dr Nguyen and ERIC Sanchez, who came to assess pt in infusion center in the absence of Dr Nguyen    Ultimate decision was made to sent pt to [facility] ER for stat PE study.  If admission is necessary, pt will need to be transferred to [facility] due to her need for apheresis, which is not available at [facility]  Report called to ER per ERIC Sanchez.  ER staff to alert Dr Nguyen of PE scan      Intravenous Access:  Peripheral IV placed.    Treatment Conditions:  Not Applicable.      Post Infusion Assessment:  Site patent and intact, free from redness, edema or discomfort.       Discharge Plan:   Patient escorted to ER  in stable condition accompanied by: RN staff.  Departure Mode: Wheelchair.      Ana Chandler RN                    "

## 2022-02-08 NOTE — PROGRESS NOTES
Patient seen in infusion and vitals transferred from infusion visit.    Luz Maria Ballesteros CMA on 2/8/2022 at 9:11 AM

## 2022-02-08 NOTE — PROGRESS NOTES
Oncology/Hematology Visit Note  Feb 8, 2022    Reason for Visit: follow up of acquired TTP, add-on for central line concerns    Patient of Dr. Nguyen, please see her very detailed notes and history  -Patient had tooth abscess that has not required TTP  -Currently getting treatment with daily pheresis and high-dose prednisone 1 mg/kg  -Dr. Nguyen recommended starting rituximab    She is being seen today in infusion with concerns for central line and new SOB.     Interval History: Ivon was seen in infusion today for her first infusion of rituximab.  2 days ago, 2/6/2022, she was working and her right central catheter was pulled by patient.  She was immediately able to grab it, but she noticed that the bandage was wrinkled and she has had ongoing pain since then.  She feels muscle tension in her right neck and she has noticed that she is having some shortness of breath.  She has better when she lays flat, but when she sits forward, she has difficulty breathing and speaking full sentences.  She feels pressure in her right neck and her chest.  She was recently started on insulin this past weekend as well.  She has not had difficulty swallowing or eating or drinking since this incident occurred.    Review of Systems: See interval hx. Denies fevers, chills, cough, sore throat, abdominal pain, N/V, diarrhea, changes in urination, bleeding, bruising, rash.      Current Outpatient Medications   Medication Sig Dispense Refill     acetaminophen (TYLENOL) 500 MG tablet Take 500-1,000 mg by mouth every 6 hours as needed for mild pain       amoxicillin-clavulanate (AUGMENTIN) 875-125 MG tablet Take 1 tablet by mouth every 12 hours 14 tablet 0     blood glucose (ACCU-CHEK GUIDE) test strip Use to test blood sugar 1-2 times daily or as directed. 100 strip 3     blood glucose monitoring (SOFTCLIX) lancets Use to test blood sugar 1-2 times daily or as directed. 100 each 3     chlorhexidine (PERIDEX) 0.12 % solution Swish and spit 15 mLs in  "mouth 2 times daily 473 mL 0     insulin NPH (HUMULIN N KWIKPEN) 100 UNIT/ML injection Inject 15 Units Subcutaneous every morning 15 mL 1     insulin  UNIT/ML vial 5 units sub Q in morning or as directed 10 mL 2     insulin syringe-needle U-100 (31G X 5/16\" 0.5 ML) 31G X 5/16\" 0.5 ML miscellaneous Use one syringes daily or as directed. 50 each 3     pantoprazole (PROTONIX) 40 MG EC tablet Take 1 tablet (40 mg) by mouth every morning (before breakfast) While on high dose steroids. 30 tablet 0     polyethylene glycol (MIRALAX) 17 GM/Dose powder Take 17 g by mouth 2 times daily as needed for constipation 510 g 0     predniSONE (DELTASONE) 50 MG tablet Take 2 tablets (100 mg) by mouth daily With taper directed by hematology at follow up. 28 tablet 0     sennosides (SENOKOT) 8.6 MG tablet Take 1-2 tablets by mouth 2 times daily as needed for constipation 60 tablet 0     /73   Pulse 92   Temp 98.1  F (36.7  C) (Tympanic)   Resp 24   Wt 123.8 kg (273 lb)   LMP 01/31/2022 (Approximate)   SpO2 100%   BMI 47.18 kg/m       GENERAL:  Female, in no acute distress.  Alert and oriented x3.   HEENT:  Normocephalic, atraumatic.    NECK: Right central catheter, exquisitely tender over the internal part of the catheter up to the right lateral neck.  No erythema, bulging, warmth or any additional swelling.  LUNGS: Tachypnea noted, especially with sitting forward.  Unable to speak full sentences due to tachypnea.  SKIN: See neck above.   PSYCH: Anxiosu      Laboratory Data:  Results for CONOR FELIZ (MRN [id number]) as of 2/8/2022 09:49   2/8/2022 09:02   Lactate Dehydrogenase 243 (H)   WBC 11.5 (H)   Hemoglobin 11.5 (L)   Hematocrit 36.3   Platelet Count 118 (L)   RBC Count 3.43 (L)    (H)   MCH 33.5 (H)   MCHC 31.7   RDW 15.4 (H)   % Neutrophils 85   % Lymphocytes 9   % Monocytes 5   % Eosinophils 0   % Basophils 0   Absolute Basophils 0.0   Absolute Eosinophils 0.1   Absolute Immature Granulocytes 0.1 "   Absolute Lymphocytes 1.1   Absolute Monocytes 0.6   % Immature Granulocytes 1   Absolute Neutrophils 9.7 (H)   Absolute NRBCs 0.0   NRBCs per 100 WBC 0   INR 0.91   PTT 25   Fibrinogen 198       Assessment and Plan: Ivon Gamble is a 31 year old female with:    SOB: New since catheter was pulled 2 days ago, increased tachypnea when she sits forward with increased chest pressure and shortness of breath.  She will need stat PE study.  Patient sent to the ED.  I spoke to the ED.    Catheter concerns: Right central catheter tender to touch and painful with movement.  We initially discussed x-ray or dye study, but we would recommend CT PE study to assess for PE and I have spoken to IR regarding the catheter concerns.  They feel the CT PE study should be enough to see the location of the catheter.    TTP: Tooth abscess, acquired TTP, daily pheresis and high-dose prednisone.  Was supposed to start rituximab infusions today, but this will be delayed due to catheter concerns and shortness of breath, see above.  With the delay of the rituximab, we are unable to get her in for outpatient treatment until next week and she will ultimately need to be admitted to [facility] for pheresis and possible rituximab infusions.  Please call Dr. Nguyen when patient arrives to the ED.  She can be reached at [phone].      Chart documentation done in part with Dragon Voice recognition Software. Although reviewed after completion, some word and grammatical error may remain.      50 minutes spent on the date of the encounter doing chart review, review of test results, interpretation of tests, patient visit, documentation and discussion with other provider(s)     Merly Yang PA-C  Hematology/Oncology  [facility]

## 2022-02-08 NOTE — ED TRIAGE NOTES
A&O x4, ABCs intact. Pt presents with need for a scan due to her pulling her port. She has pain in her neck. Pt brought over by oncology clinic. Dr. Nguyen would like to be called to determine if patient needs to be transferred to [facility].

## 2022-02-08 NOTE — LETTER
2/8/2022         RE: Ivon Gamble  [address]  [facility] [address]        Dear Colleague,    Thank you for referring your patient, Ivon Gamble, to the [facility]. Please see a copy of my visit note below.    Oncology/Hematology Visit Note  Feb 8, 2022    Reason for Visit: follow up of acquired TTP, add-on for central line concerns    Patient of Dr. Nguyen, please see her very detailed notes and history  -Patient had tooth abscess that has not required TTP  -Currently getting treatment with daily pheresis and high-dose prednisone 1 mg/kg  -Dr. Nguyen recommended starting rituximab    She is being seen today in infusion with concerns for central line and new SOB.     Interval History: Ivon was seen in infusion today for her first infusion of rituximab.  2 days ago, 2/6/2022, she was working and her right central catheter was pulled by patient.  She was immediately able to grab it, but she noticed that the bandage was wrinkled and she has had ongoing pain since then.  She feels muscle tension in her right neck and she has noticed that she is having some shortness of breath.  She has better when she lays flat, but when she sits forward, she has difficulty breathing and speaking full sentences.  She feels pressure in her right neck and her chest.  She was recently started on insulin this past weekend as well.  She has not had difficulty swallowing or eating or drinking since this incident occurred.    Review of Systems: See interval hx. Denies fevers, chills, cough, sore throat, abdominal pain, N/V, diarrhea, changes in urination, bleeding, bruising, rash.      Current Outpatient Medications   Medication Sig Dispense Refill     acetaminophen (TYLENOL) 500 MG tablet Take 500-1,000 mg by mouth every 6 hours as needed for mild pain       amoxicillin-clavulanate (AUGMENTIN) 875-125 MG tablet Take 1 tablet by mouth every 12 hours 14 tablet 0     blood glucose (ACCU-CHEK GUIDE)  "test strip Use to test blood sugar 1-2 times daily or as directed. 100 strip 3     blood glucose monitoring (SOFTCLIX) lancets Use to test blood sugar 1-2 times daily or as directed. 100 each 3     chlorhexidine (PERIDEX) 0.12 % solution Swish and spit 15 mLs in mouth 2 times daily 473 mL 0     insulin NPH (HUMULIN N KWIKPEN) 100 UNIT/ML injection Inject 15 Units Subcutaneous every morning 15 mL 1     insulin  UNIT/ML vial 5 units sub Q in morning or as directed 10 mL 2     insulin syringe-needle U-100 (31G X 5/16\" 0.5 ML) 31G X 5/16\" 0.5 ML miscellaneous Use one syringes daily or as directed. 50 each 3     pantoprazole (PROTONIX) 40 MG EC tablet Take 1 tablet (40 mg) by mouth every morning (before breakfast) While on high dose steroids. 30 tablet 0     polyethylene glycol (MIRALAX) 17 GM/Dose powder Take 17 g by mouth 2 times daily as needed for constipation 510 g 0     predniSONE (DELTASONE) 50 MG tablet Take 2 tablets (100 mg) by mouth daily With taper directed by hematology at follow up. 28 tablet 0     sennosides (SENOKOT) 8.6 MG tablet Take 1-2 tablets by mouth 2 times daily as needed for constipation 60 tablet 0     /73   Pulse 92   Temp 98.1  F (36.7  C) (Tympanic)   Resp 24   Wt 123.8 kg (273 lb)   LMP 01/31/2022 (Approximate)   SpO2 100%   BMI 47.18 kg/m       GENERAL:  Female, in no acute distress.  Alert and oriented x3.   HEENT:  Normocephalic, atraumatic.    NECK: Right central catheter, exquisitely tender over the internal part of the catheter up to the right lateral neck.  No erythema, bulging, warmth or any additional swelling.  LUNGS: Tachypnea noted, especially with sitting forward.  Unable to speak full sentences due to tachypnea.  SKIN: See neck above.   PSYCH: Anxiosu      Laboratory Data:  Results for CONOR FELIZ (MRN [id number]) as of 2/8/2022 09:49   2/8/2022 09:02   Lactate Dehydrogenase 243 (H)   WBC 11.5 (H)   Hemoglobin 11.5 (L)   Hematocrit 36.3   Platelet Count " 118 (L)   RBC Count 3.43 (L)    (H)   MCH 33.5 (H)   MCHC 31.7   RDW 15.4 (H)   % Neutrophils 85   % Lymphocytes 9   % Monocytes 5   % Eosinophils 0   % Basophils 0   Absolute Basophils 0.0   Absolute Eosinophils 0.1   Absolute Immature Granulocytes 0.1   Absolute Lymphocytes 1.1   Absolute Monocytes 0.6   % Immature Granulocytes 1   Absolute Neutrophils 9.7 (H)   Absolute NRBCs 0.0   NRBCs per 100 WBC 0   INR 0.91   PTT 25   Fibrinogen 198       Assessment and Plan: Ivon Gamble is a 31 year old female with:    SOB: New since catheter was pulled 2 days ago, increased tachypnea when she sits forward with increased chest pressure and shortness of breath.  She will need stat PE study.  Patient sent to the ED.  I spoke to the ED.    Catheter concerns: Right central catheter tender to touch and painful with movement.  We initially discussed x-ray or dye study, but we would recommend CT PE study to assess for PE and I have spoken to IR regarding the catheter concerns.  They feel the CT PE study should be enough to see the location of the catheter.    TTP: Tooth abscess, acquired TTP, daily pheresis and high-dose prednisone.  Was supposed to start rituximab infusions today, but this will be delayed due to catheter concerns and shortness of breath, see above.  With the delay of the rituximab, we are unable to get her in for outpatient treatment until next week and she will ultimately need to be admitted to [facility] for pheresis and possible rituximab infusions.  Please call Dr. Nguyen when patient arrives to the ED.  She can be reached at [phone].      Chart documentation done in part with Dragon Voice recognition Software. Although reviewed after completion, some word and grammatical error may remain.      50 minutes spent on the date of the encounter doing chart review, review of test results, interpretation of tests, patient visit, documentation and discussion with other provider(s)     Merly Yang  MARVIN  Hematology/Oncology  [facility]          Patient seen in infusion and vitals transferred from infusion visit.    Luz Maria Ballesteros CMA on 2/8/2022 at 9:11 AM        Again, thank you for allowing me to participate in the care of your patient.        Sincerely,        Merly Yang PA-C

## 2022-02-09 ENCOUNTER — TELEPHONE (OUTPATIENT)
Dept: ONCOLOGY | Facility: CLINIC | Age: 32
End: 2022-02-09

## 2022-02-09 ENCOUNTER — ONCOLOGY VISIT (OUTPATIENT)
Dept: ONCOLOGY | Facility: CLINIC | Age: 32
End: 2022-02-09
Attending: INTERNAL MEDICINE
Payer: MEDICAID

## 2022-02-09 ENCOUNTER — NURSE TRIAGE (OUTPATIENT)
Dept: NURSING | Facility: CLINIC | Age: 32
End: 2022-02-09

## 2022-02-09 ENCOUNTER — HOSPITAL ENCOUNTER (EMERGENCY)
Facility: CLINIC | Age: 32
Discharge: HOME | End: 2022-02-09
Attending: EMERGENCY MEDICINE | Admitting: EMERGENCY MEDICINE
Payer: MEDICAID

## 2022-02-09 ENCOUNTER — INFUSION THERAPY VISIT (OUTPATIENT)
Dept: INFUSION THERAPY | Facility: CLINIC | Age: 32
End: 2022-02-09
Attending: INTERNAL MEDICINE
Payer: MEDICAID

## 2022-02-09 VITALS
RESPIRATION RATE: 18 BRPM | WEIGHT: 270 LBS | HEIGHT: 64 IN | DIASTOLIC BLOOD PRESSURE: 68 MMHG | BODY MASS INDEX: 46.1 KG/M2 | SYSTOLIC BLOOD PRESSURE: 100 MMHG | HEART RATE: 67 BPM | OXYGEN SATURATION: 97 % | TEMPERATURE: 96.7 F

## 2022-02-09 VITALS
DIASTOLIC BLOOD PRESSURE: 75 MMHG | OXYGEN SATURATION: 96 % | RESPIRATION RATE: 16 BRPM | SYSTOLIC BLOOD PRESSURE: 108 MMHG | HEART RATE: 95 BPM | TEMPERATURE: 98.8 F

## 2022-02-09 VITALS
OXYGEN SATURATION: 96 % | HEART RATE: 95 BPM | TEMPERATURE: 98.8 F | RESPIRATION RATE: 16 BRPM | SYSTOLIC BLOOD PRESSURE: 108 MMHG | DIASTOLIC BLOOD PRESSURE: 75 MMHG

## 2022-02-09 DIAGNOSIS — D69.3 ACUTE IDIOPATHIC THROMBOCYTOPENIC PURPURA (H): ICD-10-CM

## 2022-02-09 DIAGNOSIS — M31.19 ACQUIRED TTP (H): Primary | ICD-10-CM

## 2022-02-09 DIAGNOSIS — D69.3 ACUTE IDIOPATHIC THROMBOCYTOPENIC PURPURA (H): Primary | ICD-10-CM

## 2022-02-09 DIAGNOSIS — T78.2XXA ANAPHYLAXIS, INITIAL ENCOUNTER: ICD-10-CM

## 2022-02-09 LAB
ALBUMIN SERPL-MCNC: 3.1 G/DL (ref 3.4–5)
ALBUMIN SERPL-MCNC: 3.2 G/DL (ref 3.4–5)
ALP SERPL-CCNC: 54 U/L (ref 40–150)
ALP SERPL-CCNC: 59 U/L (ref 40–150)
ALT SERPL W P-5'-P-CCNC: 69 U/L (ref 0–50)
ALT SERPL W P-5'-P-CCNC: 79 U/L (ref 0–50)
ANION GAP SERPL CALCULATED.3IONS-SCNC: 5 MMOL/L (ref 3–14)
ANION GAP SERPL CALCULATED.3IONS-SCNC: 7 MMOL/L (ref 3–14)
AST SERPL W P-5'-P-CCNC: 16 U/L (ref 0–45)
AST SERPL W P-5'-P-CCNC: 24 U/L (ref 0–45)
BASOPHILS # BLD AUTO: 0 10E3/UL (ref 0–0.2)
BASOPHILS NFR BLD AUTO: 0 %
BILIRUB SERPL-MCNC: 0.5 MG/DL (ref 0.2–1.3)
BILIRUB SERPL-MCNC: 0.5 MG/DL (ref 0.2–1.3)
BUN SERPL-MCNC: 15 MG/DL (ref 7–30)
BUN SERPL-MCNC: 16 MG/DL (ref 7–30)
CALCIUM SERPL-MCNC: 8.3 MG/DL (ref 8.5–10.1)
CALCIUM SERPL-MCNC: 8.4 MG/DL (ref 8.5–10.1)
CHLORIDE BLD-SCNC: 105 MMOL/L (ref 94–109)
CHLORIDE BLD-SCNC: 105 MMOL/L (ref 94–109)
CO2 SERPL-SCNC: 26 MMOL/L (ref 20–32)
CO2 SERPL-SCNC: 29 MMOL/L (ref 20–32)
CREAT SERPL-MCNC: 0.61 MG/DL (ref 0.52–1.04)
CREAT SERPL-MCNC: 0.76 MG/DL (ref 0.52–1.04)
EOSINOPHIL # BLD AUTO: 0 10E3/UL (ref 0–0.7)
EOSINOPHIL NFR BLD AUTO: 0 %
ERYTHROCYTE [DISTWIDTH] IN BLOOD BY AUTOMATED COUNT: 15.5 % (ref 10–15)
GFR SERPL CREATININE-BSD FRML MDRD: >90 ML/MIN/1.73M2
GFR SERPL CREATININE-BSD FRML MDRD: >90 ML/MIN/1.73M2
GLUCOSE BLD-MCNC: 156 MG/DL (ref 70–99)
GLUCOSE BLD-MCNC: 191 MG/DL (ref 70–99)
HAPTOGLOB SERPL-MCNC: 98 MG/DL (ref 32–197)
HBV SURFACE AG SERPL QL IA: NONREACTIVE
HCT VFR BLD AUTO: 41.1 % (ref 35–47)
HGB BLD-MCNC: 13 G/DL (ref 11.7–15.7)
IMM GRANULOCYTES # BLD: 0.2 10E3/UL
IMM GRANULOCYTES NFR BLD: 1 %
LYMPHOCYTES # BLD AUTO: 3.7 10E3/UL (ref 0.8–5.3)
LYMPHOCYTES NFR BLD AUTO: 25 %
Lab: NORMAL
MCH RBC QN AUTO: 33.5 PG (ref 26.5–33)
MCHC RBC AUTO-ENTMCNC: 31.6 G/DL (ref 31.5–36.5)
MCV RBC AUTO: 106 FL (ref 78–100)
MONOCYTES # BLD AUTO: 0.4 10E3/UL (ref 0–1.3)
MONOCYTES NFR BLD AUTO: 3 %
NEUTROPHILS # BLD AUTO: 10.2 10E3/UL (ref 1.6–8.3)
NEUTROPHILS NFR BLD AUTO: 71 %
NRBC # BLD AUTO: 0 10E3/UL
NRBC BLD AUTO-RTO: 0 /100
PERFORMING LABORATORY: NORMAL
PLATELET # BLD AUTO: 120 10E3/UL (ref 150–450)
POTASSIUM BLD-SCNC: 3.2 MMOL/L (ref 3.4–5.3)
POTASSIUM BLD-SCNC: 3.3 MMOL/L (ref 3.4–5.3)
PROT SERPL-MCNC: 6.1 G/DL (ref 6.8–8.8)
PROT SERPL-MCNC: 6.5 G/DL (ref 6.8–8.8)
RBC # BLD AUTO: 3.88 10E6/UL (ref 3.8–5.2)
SODIUM SERPL-SCNC: 138 MMOL/L (ref 133–144)
SODIUM SERPL-SCNC: 139 MMOL/L (ref 133–144)
TEST NAME: NORMAL
WBC # BLD AUTO: 14.5 10E3/UL (ref 4–11)

## 2022-02-09 PROCEDURE — 85025 COMPLETE CBC W/AUTO DIFF WBC: CPT | Performed by: INTERNAL MEDICINE

## 2022-02-09 PROCEDURE — 258N000003 HC RX IP 258 OP 636: Performed by: INTERNAL MEDICINE

## 2022-02-09 PROCEDURE — G0463 HOSPITAL OUTPT CLINIC VISIT: HCPCS | Mod: 25

## 2022-02-09 PROCEDURE — 96375 TX/PRO/DX INJ NEW DRUG ADDON: CPT

## 2022-02-09 PROCEDURE — 96372 THER/PROPH/DIAG INJ SC/IM: CPT | Performed by: INTERNAL MEDICINE

## 2022-02-09 PROCEDURE — 99215 OFFICE O/P EST HI 40 MIN: CPT | Performed by: INTERNAL MEDICINE

## 2022-02-09 PROCEDURE — 85397 CLOTTING FUNCT ACTIVITY: CPT | Performed by: INTERNAL MEDICINE

## 2022-02-09 PROCEDURE — 250N000011 HC RX IP 250 OP 636: Performed by: INTERNAL MEDICINE

## 2022-02-09 PROCEDURE — 250N000013 HC RX MED GY IP 250 OP 250 PS 637: Performed by: INTERNAL MEDICINE

## 2022-02-09 PROCEDURE — 96413 CHEMO IV INFUSION 1 HR: CPT

## 2022-02-09 PROCEDURE — 96374 THER/PROPH/DIAG INJ IV PUSH: CPT

## 2022-02-09 PROCEDURE — 36592 COLLECT BLOOD FROM PICC: CPT

## 2022-02-09 PROCEDURE — 99284 EMERGENCY DEPT VISIT MOD MDM: CPT | Mod: 25

## 2022-02-09 PROCEDURE — 250N000009 HC RX 250: Performed by: EMERGENCY MEDICINE

## 2022-02-09 PROCEDURE — 84450 TRANSFERASE (AST) (SGOT): CPT | Mod: 91 | Performed by: INTERNAL MEDICINE

## 2022-02-09 PROCEDURE — 84155 ASSAY OF PROTEIN SERUM: CPT | Mod: 91 | Performed by: INTERNAL MEDICINE

## 2022-02-09 PROCEDURE — 84999 UNLISTED CHEMISTRY PROCEDURE: CPT | Performed by: INTERNAL MEDICINE

## 2022-02-09 PROCEDURE — 93005 ELECTROCARDIOGRAM TRACING: CPT

## 2022-02-09 RX ORDER — METHYLPREDNISOLONE SODIUM SUCCINATE 125 MG/2ML
125 INJECTION, POWDER, LYOPHILIZED, FOR SOLUTION INTRAMUSCULAR; INTRAVENOUS
Status: CANCELLED
Start: 2022-02-09

## 2022-02-09 RX ORDER — ALBUTEROL SULFATE 90 UG/1
1-2 AEROSOL, METERED RESPIRATORY (INHALATION)
Status: DISCONTINUED | OUTPATIENT
Start: 2022-02-09 | End: 2022-02-09 | Stop reason: CLARIF

## 2022-02-09 RX ORDER — METHYLPREDNISOLONE SODIUM SUCCINATE 125 MG/2ML
125 INJECTION, POWDER, LYOPHILIZED, FOR SOLUTION INTRAMUSCULAR; INTRAVENOUS
Status: DISCONTINUED | OUTPATIENT
Start: 2022-02-09 | End: 2022-02-09 | Stop reason: HOSPADM

## 2022-02-09 RX ORDER — MEPERIDINE HYDROCHLORIDE 25 MG/ML
25 INJECTION INTRAMUSCULAR; INTRAVENOUS; SUBCUTANEOUS EVERY 30 MIN PRN
Status: CANCELLED | OUTPATIENT
Start: 2022-02-09

## 2022-02-09 RX ORDER — HEPARIN SODIUM,PORCINE 10 UNIT/ML
5 VIAL (ML) INTRAVENOUS
Status: CANCELLED | OUTPATIENT
Start: 2022-02-09

## 2022-02-09 RX ORDER — DEXAMETHASONE SODIUM PHOSPHATE 10 MG/ML
10 INJECTION, SOLUTION INTRAMUSCULAR; INTRAVENOUS ONCE
Status: DISCONTINUED | OUTPATIENT
Start: 2022-02-09 | End: 2022-02-09 | Stop reason: HOSPADM

## 2022-02-09 RX ORDER — DEXAMETHASONE SODIUM PHOSPHATE 10 MG/ML
10 INJECTION, SOLUTION INTRAMUSCULAR; INTRAVENOUS ONCE
Status: DISCONTINUED | OUTPATIENT
Start: 2022-02-09 | End: 2022-02-09

## 2022-02-09 RX ORDER — NALOXONE HYDROCHLORIDE 0.4 MG/ML
0.2 INJECTION, SOLUTION INTRAMUSCULAR; INTRAVENOUS; SUBCUTANEOUS
Status: CANCELLED | OUTPATIENT
Start: 2022-02-09

## 2022-02-09 RX ORDER — ALBUTEROL SULFATE 90 UG/1
1-2 AEROSOL, METERED RESPIRATORY (INHALATION)
Status: CANCELLED
Start: 2022-02-09

## 2022-02-09 RX ORDER — EPINEPHRINE 1 MG/ML
0.3 INJECTION, SOLUTION INTRAMUSCULAR; SUBCUTANEOUS EVERY 5 MIN PRN
Status: CANCELLED | OUTPATIENT
Start: 2022-02-09

## 2022-02-09 RX ORDER — ACETAMINOPHEN 325 MG/1
650 TABLET ORAL ONCE
Status: CANCELLED | OUTPATIENT
Start: 2022-02-09

## 2022-02-09 RX ORDER — MEPERIDINE HYDROCHLORIDE 25 MG/ML
25 INJECTION INTRAMUSCULAR; INTRAVENOUS; SUBCUTANEOUS
Status: CANCELLED
Start: 2022-02-09

## 2022-02-09 RX ORDER — HEPARIN SODIUM (PORCINE) LOCK FLUSH IV SOLN 100 UNIT/ML 100 UNIT/ML
5 SOLUTION INTRAVENOUS
Status: CANCELLED | OUTPATIENT
Start: 2022-02-09

## 2022-02-09 RX ORDER — LORAZEPAM 0.5 MG/1
.5-1 TABLET ORAL EVERY 6 HOURS PRN
Status: CANCELLED
Start: 2022-02-09

## 2022-02-09 RX ORDER — EPINEPHRINE 1 MG/ML
0.3 INJECTION, SOLUTION INTRAMUSCULAR; SUBCUTANEOUS EVERY 5 MIN PRN
Status: DISCONTINUED | OUTPATIENT
Start: 2022-02-09 | End: 2022-02-09 | Stop reason: HOSPADM

## 2022-02-09 RX ORDER — DIPHENHYDRAMINE HYDROCHLORIDE 50 MG/ML
50 INJECTION INTRAMUSCULAR; INTRAVENOUS
Status: CANCELLED
Start: 2022-02-09

## 2022-02-09 RX ORDER — DIPHENHYDRAMINE HYDROCHLORIDE 50 MG/ML
50 INJECTION INTRAMUSCULAR; INTRAVENOUS
Status: DISCONTINUED | OUTPATIENT
Start: 2022-02-09 | End: 2022-02-09 | Stop reason: CLARIF

## 2022-02-09 RX ORDER — EPINEPHRINE 0.3 MG/.3ML
0.3 INJECTION SUBCUTANEOUS
Qty: 2 EACH | Refills: 0 | Status: ON HOLD | OUTPATIENT
Start: 2022-02-09 | End: 2022-02-20

## 2022-02-09 RX ORDER — LORAZEPAM 2 MG/ML
.5-1 INJECTION INTRAMUSCULAR EVERY 6 HOURS PRN
Status: CANCELLED
Start: 2022-02-09

## 2022-02-09 RX ORDER — PROCHLORPERAZINE MALEATE 5 MG
10 TABLET ORAL EVERY 6 HOURS PRN
Status: CANCELLED
Start: 2022-02-09

## 2022-02-09 RX ORDER — NALOXONE HYDROCHLORIDE 0.4 MG/ML
0.2 INJECTION, SOLUTION INTRAMUSCULAR; INTRAVENOUS; SUBCUTANEOUS
Status: DISCONTINUED | OUTPATIENT
Start: 2022-02-09 | End: 2022-02-09 | Stop reason: CLARIF

## 2022-02-09 RX ORDER — ALBUTEROL SULFATE 0.83 MG/ML
2.5 SOLUTION RESPIRATORY (INHALATION)
Status: CANCELLED | OUTPATIENT
Start: 2022-02-09

## 2022-02-09 RX ORDER — PREDNISONE 20 MG/1
40 TABLET ORAL DAILY
Qty: 8 TABLET | Refills: 0 | Status: SHIPPED | OUTPATIENT
Start: 2022-02-09 | End: 2022-02-13

## 2022-02-09 RX ORDER — ACETAMINOPHEN 325 MG/1
650 TABLET ORAL ONCE
Status: COMPLETED | OUTPATIENT
Start: 2022-02-09 | End: 2022-02-09

## 2022-02-09 RX ORDER — MEPERIDINE HYDROCHLORIDE 25 MG/ML
25 INJECTION INTRAMUSCULAR; INTRAVENOUS; SUBCUTANEOUS EVERY 30 MIN PRN
Status: DISCONTINUED | OUTPATIENT
Start: 2022-02-09 | End: 2022-02-09 | Stop reason: HOSPADM

## 2022-02-09 RX ORDER — ALBUTEROL SULFATE 0.83 MG/ML
2.5 SOLUTION RESPIRATORY (INHALATION)
Status: DISCONTINUED | OUTPATIENT
Start: 2022-02-09 | End: 2022-02-09

## 2022-02-09 RX ORDER — LORAZEPAM 2 MG/ML
0.5 INJECTION INTRAMUSCULAR EVERY 4 HOURS PRN
Status: CANCELLED | OUTPATIENT
Start: 2022-02-09

## 2022-02-09 RX ORDER — MEPERIDINE HYDROCHLORIDE 25 MG/ML
25 INJECTION INTRAMUSCULAR; INTRAVENOUS; SUBCUTANEOUS ONCE
Status: COMPLETED | OUTPATIENT
Start: 2022-02-09 | End: 2022-02-09

## 2022-02-09 RX ORDER — DIPHENHYDRAMINE HCL 25 MG
50 CAPSULE ORAL ONCE
Status: CANCELLED
Start: 2022-02-09

## 2022-02-09 RX ADMIN — MEPERIDINE HYDROCHLORIDE 25 MG: 25 INJECTION INTRAMUSCULAR; INTRAVENOUS; SUBCUTANEOUS at 11:12

## 2022-02-09 RX ADMIN — FAMOTIDINE 40 MG: 10 INJECTION, SOLUTION INTRAVENOUS at 11:56

## 2022-02-09 RX ADMIN — RITUXIMAB 900 MG: 10 INJECTION, SOLUTION INTRAVENOUS at 10:15

## 2022-02-09 RX ADMIN — EPINEPHRINE 0.3 MG: 1 INJECTION INTRAMUSCULAR; INTRAVENOUS; SUBCUTANEOUS at 11:08

## 2022-02-09 RX ADMIN — ACETAMINOPHEN 325 MG: 325 TABLET ORAL at 09:54

## 2022-02-09 RX ADMIN — METHYLPREDNISOLONE SODIUM SUCCINATE 125 MG: 125 INJECTION, POWDER, FOR SOLUTION INTRAMUSCULAR; INTRAVENOUS at 11:08

## 2022-02-09 RX ADMIN — SODIUM CHLORIDE 250 ML: 9 INJECTION, SOLUTION INTRAVENOUS at 09:51

## 2022-02-09 RX ADMIN — DIPHENHYDRAMINE HYDROCHLORIDE 50 MG: 50 INJECTION INTRAMUSCULAR; INTRAVENOUS at 09:51

## 2022-02-09 ASSESSMENT — MIFFLIN-ST. JEOR: SCORE: 1924.71

## 2022-02-09 ASSESSMENT — PAIN SCALES - GENERAL: PAINLEVEL: NO PAIN (0)

## 2022-02-09 ASSESSMENT — ENCOUNTER SYMPTOMS
WHEEZING: 1
SHORTNESS OF BREATH: 1

## 2022-02-09 NOTE — ED PROVIDER NOTES
"  History   Chief Complaint:  Allergic Reaction       HPI   Ivon Gamble is a 31 year old female with history of asthma, thrombotic thrombocytopenic purpura who presents via EMS from [facility] via EMS with allergic reaction. She was receiving an infusion today for her TTP and had sudden onset of shortness of breath, wheezing and spO2 dropped to 80s. Patient was given 0.3mg epi, 125mg solumedrol, and 25 demorol at the center. Here, she states that she is feeling better. She denies any physical pain.     Review of Systems   Respiratory: Positive for shortness of breath and wheezing.    All other systems reviewed and are negative.    Allergies:  Rituximab    Medications:  blood glucose test strip  blood glucose monitoring   insulin   pantoprazole   prednisone  sennosides     Past Medical History:     asthma  Thrombotic thrombocytopenic purpura  Hyperbilirubinemia  Normocytic anemia  Thrombocytopenia      Past Surgical History:    Non tunnel placement     Family History:    Mother: diabetes  Father: liver disease    Social History:  Patient presents alone via EMS.    Physical Exam     Physical Exam    Patient Vitals for the past 24 hrs:   BP Temp Temp src Pulse Resp SpO2 Height Weight   02/09/22 1426 -- -- -- -- 18 -- -- --   02/09/22 1415 -- -- -- 67 19 97 % -- --   02/09/22 1400 -- -- -- 64 16 98 % -- --   02/09/22 1345 100/68 -- -- 72 21 97 % -- --   02/09/22 1330 -- -- -- 82 24 97 % -- --   02/09/22 1315 106/68 -- -- 71 18 100 % -- --   02/09/22 1302 -- -- -- 72 16 100 % -- --   02/09/22 1301 108/67 -- -- 72 15 100 % -- --   02/09/22 1300 108/67 -- -- 74 18 100 % -- --   02/09/22 1245 106/67 -- -- 72 17 100 % -- --   02/09/22 1230 101/70 -- -- 71 17 100 % -- --   02/09/22 1215 105/68 -- -- 76 17 99 % -- --   02/09/22 1201 -- (!) 96.7  F (35.9  C) Temporal -- 22 -- 1.626 m (5' 4\") 122.5 kg (270 lb)   02/09/22 1200 98/65 -- -- 85 19 96 % -- --   02/09/22 1150 93/61 -- -- 84 12 97 % -- --   02/09/22 1145 " 96/68 -- -- -- -- -- -- --     General: Alert and Interactive.   Head: No signs of trauma.   Mouth/Throat: Oropharynx is clear and moist.   Eyes: Conjunctivae are normal. Pupils are equal, round, and reactive to light.   Neck: Normal range of motion. No nuchal rigidity. No stridor  CV: Normal rate and regular rhythm.    Resp: Effort normal and breath sounds normal. No respiratory distress. No wheezing.  GI: Soft. There is no tenderness or guarding.   MSK: Normal range of motion. no edema.   Neuro: The patient is alert and oriented to person, place, and time.  PERRLA, EOMI, strength in upper/lower extremities normal and symmetrical.   Sensation normal. Speech normal.  GCS eye subscore is 4. GCS verbal subscore is 5. GCS motor subscore is 6.   Skin: Skin is warm and dry. No rash noted.   Psych: normal mood and affect. behavior is normal.     Emergency Department Course   ECG  ECG obtained at 1155, ECG read at 1156  Normal sinus rhythm.   Rate 83 bpm. WI interval 140 ms. QRS duration 82 ms. QT/QTc 268/432 ms. P-R-T axes 50 -11 2.     Emergency Department Course:     Reviewed:  I reviewed nursing notes, vitals, past medical history and Care Everywhere    Assessments:  1144 Patient arrived directly to St room 1 for allergic reaction.  1146 I obtained history and examined the patient as noted above. Patient states that she is feeling better.   1210 I rechecked the patient and explained findings.   1240 I rechecked the patient.   1427 I rechecked the patient. She is comfortable being discharged.    Consults:  1154  I called [facility] to obtain infusion medications.    Interventions:  1156 Pepcid 40mg IV    Disposition:  The patient was discharged to home.     Impression & Plan     Medical Decision Making:  Ivon Gamble is a 31 year old female who presents with allergic reaction during an infusion for her TTP at [facility]. She had shortness of breath, wheezing, and oxygen sats dropped to the 80s.  They gave her epi, solumedrol and demorol which improved her condition, and patient appears better per arrival here in the ER.  No wheezing and her oxygen stats returned back to the 97-98%. There are no new exposures to suggest a specific allergen.  The patient was treated with epinephrine/benadryl/steroids as an outpatient and pepcid IV in the ED and observed for 3 hours.  At this point there are no signs of worsening clinical status and I believe the patient is safe for discharge home.  I discharged with prescriptions for Prednisone and epipen should she develop signs of anaphylaxis.  Recommended follow-up with PCP in 1-2 days for persistent symptoms and given precautions to return to ED should symptoms worsen/change.    Critical Care Time: was 30 minutes for this patient excluding procedures    Diagnosis:    ICD-10-CM    1. Anaphylaxis, initial encounter  T78.2XXA        Discharge Medications:  New Prescriptions    EPINEPHRINE (ANY BX GENERIC EQUIV) 0.3 MG/0.3ML INJECTION 2-PACK    Inject 0.3 mLs (0.3 mg) into the muscle once as needed for anaphylaxis    PREDNISONE (DELTASONE) 20 MG TABLET    Take 2 tablets (40 mg) by mouth daily for 4 days       Scribe Disclosure:  I, Darrian Almodovar, am serving as a scribe on 2/9/2022 at 12:51 PM to personally document services performed by Nathan Baez MD based on my observations and the provider's statements to me.            Nathan Baez MD  02/09/22 [id number]

## 2022-02-09 NOTE — TELEPHONE ENCOUNTER
Ivon called regarding a request for epi pen to have on hand and is currently having high blood pressure. She was emitted into the ER recently and would like guidance on her f/u care. She reported her BP is 186/114 and is very stressed. She was routed to nurse, but did not successfully get through to a triage nurse.      Perla   Central Scheduling

## 2022-02-09 NOTE — PROGRESS NOTES
Infusion Nursing Note:  Ivon Gamble presents today for Rituxan, first dose.    Patient seen by provider today: Yes: Dr Nguyen   present during visit today: Not Applicable.    Note: Feeling better today - denies any SOB/chest pain/light-headed/dizziness. Very mild discomfort/soreness at IJ line site. Reports blood glucose levels are well controlled today - morning check 113.     Patient started feeling tongue/throat swelling and SOB/Wheezing at 1105 (50mins into infusion).   --Rituxan was stopped after patient recieved ~65mL of Rituxan.   --See charting below regarding interventions during hypersensitivity reaction.  --EMS was called and patient was taken to [facility] ER.      Intravenous Access:  Labs drawn without difficulty.  Peripheral IV placed.    Treatment Conditions:  Lab Results   Component Value Date    HGB 11.5 (L) 02/08/2022    WBC 11.5 (H) 02/08/2022    ANEUTAUTO 9.7 (H) 02/08/2022     (L) 02/08/2022      Results reviewed, labs MET treatment parameters, ok to proceed with treatment.      Post Infusion Assessment:  Patient tolerated infusion poorly due to : Hypersensitivity: Did patient have a hypersensitivity reaction? : Yes  Drug or Product name: Rituxan  Were pre-meds administered?: Yes  What pre-meds were administered?: Acetaminophen (Tylenol);Diphenhydramine (Benadryl)  First or Subsequent treatment: First time receiving  Rate of infusion when patient had hypersensitivity reaction: 100mL/hr  Time the hypersensitivity reaction was first recognized: 1107  Symptoms observed or reported (select all that apply): Shortness of breath;Hypertension;Other: (Comment) (tounge swelling)  Interventions/treatment following reaction: Infusion stopped;Hypersensitivity medications administered;Oxygen applied;EMS called;Transported to hospital;Therapy discontinued  What hypersensitivity medications were administered?: Epinephrine;Meperidine (Demerol);Methylprednisolone;NaCl 0.9% Bolus  Name of  provider notified: Dr Nguyen  Time provider notified: 1110  Type of notification (select all that apply): Provider at bedside  Was the patient re-challenged today?: No - no re-challenge today       Discharge Plan:   Departure Mode: Cart with EMS transport to [facility].      Chaitanya Clarke RN                       no

## 2022-02-09 NOTE — PROGRESS NOTES
[facility]    Hematology/Oncology Established Patient Follow-up Note    Treatment Summary:      Today's Date: 2/9/22    Reason for Follow-up: Acquired TTP      HISTORY OF PRESENT ILLNESS: Ivon Gamble is a 31 year old female with PCOS who was hospitalized 1/12/22-1/18/22 for tooth abscess/cellulitis and acquired TTP.      Patient has recently relocated to [place]. While there, she was evaluated by dentistry for tooth ache and pain and was told she would not require extraction. This admission, patient had been feeling unwell and had developed acute facial pain. When she had presented to the hospital, platelets were 11K with LDH >600, haptoglobin undetectable. ANTWON was negative. Coag studies WNL. Peripheral smear had shown significant schistocytosis. She was immediately started on steroids and transferred to [facility] for plasma exchange of which she received 1/13/22-1/18/22. Enteric stool studies returned negative. [id number] returned after she was discharged at <5%. Hepatitis and HIV studies negative.     CT imaging had shown a third left mandibular molar/subperisoteal abscess, left masseter muscle myositis, and facial cellulitis. She was treated with rocephin/flagyl and transitioned to augmentin upon discharge. She underwent I&D on 1/13 and a tooth extraction could not be performed inpatient. She was evaluated by both ENT and OMFS.     On date of discharge, Hb was 9.2 and . LDH had normalized. She has completed augmentin and prednisone therapy. She feels significantly improved, but is still in need of tooth extraction.     INTERIM HISTORY:  See my telephone note from yesterday 2/8/22 and Merly Yang's progress note from 2/8/22 for yesterday's events.     Patient returned home without issue. She still has tenderness at the site, but is no longer having any shortness of breath or chest pain. She continues on augmentin and prednisone. She has 2-3 days remaining of augmentin. Patient has  "been started on insulin. She checks BG 5-6x daily and it can range anywhere from 116-444. She does not have follow up with PCP. She has not yet rescheduled with dentistry.       REVIEW OF SYSTEMS:   A 14 point ROS was reviewed with pertinent positives and negatives in the HPI.       HOME MEDICATIONS:  Current Outpatient Medications   Medication Sig Dispense Refill     acetaminophen (TYLENOL) 500 MG tablet Take 500-1,000 mg by mouth every 6 hours as needed for mild pain       amoxicillin-clavulanate (AUGMENTIN) 875-125 MG tablet Take 1 tablet by mouth every 12 hours 14 tablet 0     blood glucose (ACCU-CHEK GUIDE) test strip Use to test blood sugar 1-2 times daily or as directed. 100 strip 3     blood glucose monitoring (SOFTCLIX) lancets Use to test blood sugar 1-2 times daily or as directed. 100 each 3     chlorhexidine (PERIDEX) 0.12 % solution Swish and spit 15 mLs in mouth 2 times daily 473 mL 0     insulin NPH (HUMULIN N KWIKPEN) 100 UNIT/ML injection Inject 15 Units Subcutaneous every morning 15 mL 1     insulin  UNIT/ML vial 5 units sub Q in morning or as directed 10 mL 2     insulin syringe-needle U-100 (31G X 5/16\" 0.5 ML) 31G X 5/16\" 0.5 ML miscellaneous Use one syringes daily or as directed. 50 each 3     pantoprazole (PROTONIX) 40 MG EC tablet Take 1 tablet (40 mg) by mouth every morning (before breakfast) While on high dose steroids. 30 tablet 0     polyethylene glycol (MIRALAX) 17 GM/Dose powder Take 17 g by mouth 2 times daily as needed for constipation 510 g 0     predniSONE (DELTASONE) 50 MG tablet Take 2 tablets (100 mg) by mouth daily With taper directed by hematology at follow up. 28 tablet 0     sennosides (SENOKOT) 8.6 MG tablet Take 1-2 tablets by mouth 2 times daily as needed for constipation 60 tablet 0         ALLERGIES:  No Known Allergies      PAST MEDICAL HISTORY:  Past Medical History:   Diagnosis Date     TTP (thrombotic thrombocytopenic purpura)          PAST SURGICAL " HISTORY:  Past Surgical History:   Procedure Laterality Date     IR CVC NON TUNNEL PLACEMENT  2022     IR CVC TUNNEL PLACEMENT > 5 YRS OF AGE  2022         SOCIAL HISTORY:  Social History     Socioeconomic History     Marital status: Single     Spouse name: Not on file     Number of children: Not on file     Years of education: Not on file     Highest education level: Not on file   Occupational History     Not on file   Tobacco Use     Smoking status: Former Smoker     Types: Cigarettes     Quit date: 2022     Years since quittin.0     Smokeless tobacco: Never Used   Vaping Use     Vaping Use: Never used   Substance and Sexual Activity     Alcohol use: Yes     Comment: occassionally      Drug use: Never     Sexual activity: Yes     Partners: Male   Other Topics Concern     Parent/sibling w/ CABG, MI or angioplasty before 65F 55M? 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     Social Determinants of Health     Financial Resource Strain: Not on file   Food Insecurity: Not on file   Transportation Needs: Not on file   Physical Activity: Not on file   Stress: Not on file   Social Connections: Not on file   Intimate Partner Violence: Not At Risk     Fear of Current or Ex-Partner: No     Emotionally Abused: No     Physically Abused: No     Sexually Abused: No   Housing Stability: Not on file         FAMILY HISTORY:  Family History   Problem Relation Age of Onset     Diabetes Mother      Liver Disease Father          PHYSICAL EXAM:  Vital signs:    ECO  GENERAL/CONSTITUTIONAL: No acute distress.  EYES: Pupils are equal, round, and react to light and accommodation. Extraocular movements intact.  No scleral icterus.  ENT/MOUTH: Neck supple. Oropharynx clear, no mucositis.  LYMPH: No anterior cervical, posterior cervical, supraclavicular, axillary or inguinal adenopathy.   RESPIRATORY: Clear to auscultation bilaterally. No crackles or wheezing.    CARDIOVASCULAR: Regular rate and rhythm without murmurs, gallops, or rubs.  GASTROINTESTINAL: No hepatosplenomegaly, masses, or tenderness. The patient has normal bowel sounds. No guarding.  No distention.  MUSCULOSKELETAL: Warm and well-perfused, no cyanosis, clubbing, or edema.  NEUROLOGIC: Cranial nerves II-XII are intact. Alert, oriented, answers questions appropriately.  INTEGUMENTARY: No rashes or jaundice.  GAIT: Steady, does not use assistive device      LABS:  CBC RESULTS:   Recent Labs   Lab Test 02/08/22  0902   WBC 11.5*   RBC 3.43*   HGB 11.5*   HCT 36.3   *   MCH 33.5*   MCHC 31.7   RDW 15.4*   *       Recent Labs   Lab Test 02/02/22  1219 02/01/22  1206 01/27/22  0953   NA  --  139 142   POTASSIUM  --  3.9 3.6   CHLORIDE  --  104 109   CO2  --  27 26   ANIONGAP  --  8 7   * 435* 255*   BUN  --  17 21   CR  --  0.71 0.80   JOSELUIS  --  8.3* 8.8         PATHOLOGY:      [id number] Activity Assay: 21%  [id number] Inhibitor Screen: positive  [id number] Inhibitor [place] Titer: 1.1            Ref. Range 1/12/2022 01:07 1/12/2022 01:40   Hepatitis C Antibody Latest Ref Range: Nonreactive  Nonreactive     HIV Antigen Antibody Combo Latest Ref Range: Nonreactive    Nonreactive   HIV-1/HIV-2 Antibody Latest Ref Range: Non Reactive    Non Reactive           Ref. Range 1/12/2022 01:07   Ferritin Latest Ref Range: 12 - 150 ng/mL 590 (H)        Ref. Range 1/12/2022 14:13   Folate Latest Ref Range: >=5.4 ng/mL 12.7   Iron Latest Ref Range: 35 - 180 ug/dL 125   Iron Binding Cap Latest Ref Range: 240 - 430 ug/dL 244   Iron Saturation Index Latest Ref Range: 15 - 46 % 51 (H)   Lactate Dehydrogenase Latest Ref Range: 81 - 234 U/L 591 (H)   Vitamin B12 Latest Ref Range: 193 - 986 pg/mL 539        Ref. Range 1/12/2022 14:13 1/13/2022 07:02   Hep B Surface Agn Latest Ref Range: Nonreactive  Nonreactive     Hepatitis A Antibody IgG Latest Ref Range: Nonreactive  Nonreactive     Hepatitis A IgM [place] Latest Ref  Range: Nonreactive  Nonreactive     Hepatitis B Core K[place] Latest Ref Range: Nonreactive    Nonreactive   Hepatitis B Surface Antibody Latest Ref Range: <8.00 m[IU]/mL 810.24 (H)     Hepatitis C Antibody Latest Ref Range: Nonreactive  Nonreactive        PATHOLOGY:  Final Diagnosis 1/13/21:   Peripheral blood, morphology:  - Marked thrombocytopenia.  - Moderate anemia, normocytic and normochromic, with RBC fragments (including rare schistocytes).  - WBC subsets quantitatively within normal limits.         IMAGING:  CT A/P 1/12/22:  IMPRESSION:   1.  Mild mural thickening of the duodenum and proximal jejunum, correlate for infectious or inflammatory enteritis.  2.  Normal appendix. No obstruction, colitis, or diverticulitis.     CT facial bones 1/13/22:  IMPRESSION:  1. Periapical abscess of the third left mandibular molar with  associated developing subperiosteal abscess and left masseter muscle  myositis and overlying left facial cellulitis, as described. Recommend  dental consultation.  2. No facial bone fracture.        ASSESSMENT/PLAN:  Ivon Gamble is a 31 year old female with PCOS who was hospitalized 1/12/22-1/18/22 for tooth abscess/cellulitis and acquired TTP.      1) Acute thrombocytopenia, concerning for TTP/MAHA (PLASMIC score 6; [id number] <5%)  -Iron studies, B12/folate WNL.  -Enteric stool studies negative.  -HIV and Hepatitis studies negative.  -s/p catheter placement 1/13/22 AM and plasma exchange 1/13/22-1/18/22. Catheter has been removed.  -Patient had relapse with platelets decreasing to 82K. Catheter replaced and patient received 8x daily sessions through 2/4/22. She last received pheresis on 2/7/22. Was planned to begin rituximab 2/8/22 (see progress notes from that date).   -Labs reviewed. Begin rituximab 375 mg/m2 weekly x 4 weeks today 2/922. Patient is planned for therapyp on 2/14, 2/21, and 2/28.   -I have discussed with Nephrology (Dr. Charles) the dates of rituximab. She will receive  "pheresis on 2/11. Next week, will move from every other day to two times. Then move to once weekly x 4 weeks. Patient should not receive pheresis on the date of rituximab treatment. I have informed the patient of her dates of rituximab treatment and I have asked her to reschedule pheresis for later in the week if she is scheduled on any dates of rituximab treatment.   -Decrease prednisone to 75  Mg daily. Will plan on further taper to 50 mg daily next week.   -Complete augmentin treatment. Patient is encouraged to follow up with dentistry for tooth extraction. Her platelets are >100K and she should be okay for extraction.  -She has had [id number] recovery with most recent results of 71%. However, she does not yet have clinical recovery.     2) Acute third left mandibular molar/subperiosteal abscess, left masseter muscle myositis, and facial cellulitis, improved  -s/p rocephin/flagyl and augmentin.  -Patient in need of dentistry for tooth extraction (was scheduled for 1/28, rescheduled for 3/1/22).  -See discussion above.     3) Hyperglycemia.   -She is instructed to follow up with PCP and prednisone dose is decreasing and she will require titration of insulin.     3) Okay for rituximab today. Follow up with me in clinic on 2/14/22.     Complexity: High.      UPDATE 11:30 AM:  Paged to infusion center as patient began to have infusion reaction. Whereas earlier today patient was A&Ox3 and in no respiratory distress, patient now lethargic with diffuse wheeze with reported feelings of \"throat tightness.\" She was satting 98% but placed on supplemental oxygen. Rituximab had just been increased to 100 ml/hr. Patient currently had received 75 ml of rituximab in total. 125 mg solu-medrol given, 0.3 mg epi, as well as 25 mg demerol. Patient oriented x4, but weak and lethargic. 911 called and patient to be transferred to [facility] ED as she is in need of pheresis 2/10/22.      Ashley Nguyen,   Hematology/Oncology  [facility]" [facility]

## 2022-02-09 NOTE — ED NOTES
Bed: ST01  Expected date:   Expected time:   Means of arrival:   Comments:  Enrike 31 F allergic reaction eta 1150

## 2022-02-09 NOTE — LETTER
2/9/2022         RE: Ivon Gamble  [address]  [facility] [address]        Dear Colleague,    Thank you for referring your patient, Ivon Gamble, to the [facility]. Please see a copy of my visit note below.    [facility]    Hematology/Oncology Established Patient Follow-up Note    Treatment Summary:      Today's Date: 2/9/22    Reason for Follow-up: Acquired TTP      HISTORY OF PRESENT ILLNESS: Ivon Gamble is a 31 year old female with PCOS who was hospitalized 1/12/22-1/18/22 for tooth abscess/cellulitis and acquired TTP.      Patient has recently relocated to [place]. While there, she was evaluated by dentistry for tooth ache and pain and was told she would not require extraction. This admission, patient had been feeling unwell and had developed acute facial pain. When she had presented to the hospital, platelets were 11K with LDH >600, haptoglobin undetectable. ANTWON was negative. Coag studies WNL. Peripheral smear had shown significant schistocytosis. She was immediately started on steroids and transferred to [facility] for plasma exchange of which she received 1/13/22-1/18/22. Enteric stool studies returned negative. [id number] returned after she was discharged at <5%. Hepatitis and HIV studies negative.     CT imaging had shown a third left mandibular molar/subperisoteal abscess, left masseter muscle myositis, and facial cellulitis. She was treated with rocephin/flagyl and transitioned to augmentin upon discharge. She underwent I&D on 1/13 and a tooth extraction could not be performed inpatient. She was evaluated by both ENT and OMFS.     On date of discharge, Hb was 9.2 and . LDH had normalized. She has completed augmentin and prednisone therapy. She feels significantly improved, but is still in need of tooth extraction.     INTERIM HISTORY:  See my telephone note from yesterday 2/8/22 and Merly Yang's progress note from 2/8/22 for  "yesterday's events.     Patient returned home without issue. She still has tenderness at the site, but is no longer having any shortness of breath or chest pain. She continues on augmentin and prednisone. She has 2-3 days remaining of augmentin. Patient has been started on insulin. She checks BG 5-6x daily and it can range anywhere from 116-444. She does not have follow up with PCP. She has not yet rescheduled with dentistry.       REVIEW OF SYSTEMS:   A 14 point ROS was reviewed with pertinent positives and negatives in the HPI.       HOME MEDICATIONS:  Current Outpatient Medications   Medication Sig Dispense Refill     acetaminophen (TYLENOL) 500 MG tablet Take 500-1,000 mg by mouth every 6 hours as needed for mild pain       amoxicillin-clavulanate (AUGMENTIN) 875-125 MG tablet Take 1 tablet by mouth every 12 hours 14 tablet 0     blood glucose (ACCU-CHEK GUIDE) test strip Use to test blood sugar 1-2 times daily or as directed. 100 strip 3     blood glucose monitoring (SOFTCLIX) lancets Use to test blood sugar 1-2 times daily or as directed. 100 each 3     chlorhexidine (PERIDEX) 0.12 % solution Swish and spit 15 mLs in mouth 2 times daily 473 mL 0     insulin NPH (HUMULIN N KWIKPEN) 100 UNIT/ML injection Inject 15 Units Subcutaneous every morning 15 mL 1     insulin  UNIT/ML vial 5 units sub Q in morning or as directed 10 mL 2     insulin syringe-needle U-100 (31G X 5/16\" 0.5 ML) 31G X 5/16\" 0.5 ML miscellaneous Use one syringes daily or as directed. 50 each 3     pantoprazole (PROTONIX) 40 MG EC tablet Take 1 tablet (40 mg) by mouth every morning (before breakfast) While on high dose steroids. 30 tablet 0     polyethylene glycol (MIRALAX) 17 GM/Dose powder Take 17 g by mouth 2 times daily as needed for constipation 510 g 0     predniSONE (DELTASONE) 50 MG tablet Take 2 tablets (100 mg) by mouth daily With taper directed by hematology at follow up. 28 tablet 0     sennosides (SENOKOT) 8.6 MG tablet Take " 1-2 tablets by mouth 2 times daily as needed for constipation 60 tablet 0         ALLERGIES:  No Known Allergies      PAST MEDICAL HISTORY:  Past Medical History:   Diagnosis Date     TTP (thrombotic thrombocytopenic purpura)          PAST SURGICAL HISTORY:  Past Surgical History:   Procedure Laterality Date     IR CVC NON TUNNEL PLACEMENT  2022     IR CVC TUNNEL PLACEMENT > 5 YRS OF AGE  2022         SOCIAL HISTORY:  Social History     Socioeconomic History     Marital status: Single     Spouse name: Not on file     Number of children: Not on file     Years of education: Not on file     Highest education level: Not on file   Occupational History     Not on file   Tobacco Use     Smoking status: Former Smoker     Types: Cigarettes     Quit date: 2022     Years since quittin.0     Smokeless tobacco: Never Used   Vaping Use     Vaping Use: Never used   Substance and Sexual Activity     Alcohol use: Yes     Comment: occassionally      Drug use: Never     Sexual activity: Yes     Partners: Male   Other Topics Concern     Parent/sibling w/ CABG, MI or angioplasty before 65F 55M? 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     Social Determinants of Health     Financial Resource Strain: Not on file   Food Insecurity: Not on file   Transportation Needs: Not on file   Physical Activity: Not on file   Stress: Not on file   Social Connections: Not on file   Intimate Partner Violence: Not At Risk     Fear of Current or Ex-Partner: No     Emotionally Abused: No     Physically Abused: No     Sexually Abused: No   Housing Stability: Not on file         FAMILY HISTORY:  Family History   Problem Relation Age of Onset     Diabetes Mother      Liver Disease Father          PHYSICAL EXAM:  Vital signs:    ECO  GENERAL/CONSTITUTIONAL: No acute distress.  EYES: Pupils are equal, round, and react to light and accommodation. Extraocular movements intact.  No  scleral icterus.  ENT/MOUTH: Neck supple. Oropharynx clear, no mucositis.  LYMPH: No anterior cervical, posterior cervical, supraclavicular, axillary or inguinal adenopathy.   RESPIRATORY: Clear to auscultation bilaterally. No crackles or wheezing.   CARDIOVASCULAR: Regular rate and rhythm without murmurs, gallops, or rubs.  GASTROINTESTINAL: No hepatosplenomegaly, masses, or tenderness. The patient has normal bowel sounds. No guarding.  No distention.  MUSCULOSKELETAL: Warm and well-perfused, no cyanosis, clubbing, or edema.  NEUROLOGIC: Cranial nerves II-XII are intact. Alert, oriented, answers questions appropriately.  INTEGUMENTARY: No rashes or jaundice.  GAIT: Steady, does not use assistive device      LABS:  CBC RESULTS:   Recent Labs   Lab Test 02/08/22  0902   WBC 11.5*   RBC 3.43*   HGB 11.5*   HCT 36.3   *   MCH 33.5*   MCHC 31.7   RDW 15.4*   *       Recent Labs   Lab Test 02/02/22  1219 02/01/22  1206 01/27/22  0953   NA  --  139 142   POTASSIUM  --  3.9 3.6   CHLORIDE  --  104 109   CO2  --  27 26   ANIONGAP  --  8 7   * 435* 255*   BUN  --  17 21   CR  --  0.71 0.80   JOSELUIS  --  8.3* 8.8         PATHOLOGY:      [id number] Activity Assay: 21%  ZKRMOG01 Inhibitor Screen: positive  [id number] Inhibitor [place] Titer: 1.1            Ref. Range 1/12/2022 01:07 1/12/2022 01:40   Hepatitis C Antibody Latest Ref Range: Nonreactive  Nonreactive     HIV Antigen Antibody Combo Latest Ref Range: Nonreactive    Nonreactive   HIV-1/HIV-2 Antibody Latest Ref Range: Non Reactive    Non Reactive           Ref. Range 1/12/2022 01:07   Ferritin Latest Ref Range: 12 - 150 ng/mL 590 (H)        Ref. Range 1/12/2022 14:13   Folate Latest Ref Range: >=5.4 ng/mL 12.7   Iron Latest Ref Range: 35 - 180 ug/dL 125   Iron Binding Cap Latest Ref Range: 240 - 430 ug/dL 244   Iron Saturation Index Latest Ref Range: 15 - 46 % 51 (H)   Lactate Dehydrogenase Latest Ref Range: 81 - 234 U/L 591 (H)   Vitamin B12 Latest Ref  Range: 193 - 986 pg/mL 539        Ref. Range 1/12/2022 14:13 1/13/2022 07:02   Hep B Surface Agn Latest Ref Range: Nonreactive  Nonreactive     Hepatitis A Antibody IgG Latest Ref Range: Nonreactive  Nonreactive     Hepatitis A IgM Kori Latest Ref Range: Nonreactive  Nonreactive     Hepatitis B Core Kori Latest Ref Range: Nonreactive    Nonreactive   Hepatitis B Surface Antibody Latest Ref Range: <8.00 m[IU]/mL 810.24 (H)     Hepatitis C Antibody Latest Ref Range: Nonreactive  Nonreactive        PATHOLOGY:  Final Diagnosis 1/13/21:   Peripheral blood, morphology:  - Marked thrombocytopenia.  - Moderate anemia, normocytic and normochromic, with RBC fragments (including rare schistocytes).  - WBC subsets quantitatively within normal limits.         IMAGING:  CT A/P 1/12/22:  IMPRESSION:   1.  Mild mural thickening of the duodenum and proximal jejunum, correlate for infectious or inflammatory enteritis.  2.  Normal appendix. No obstruction, colitis, or diverticulitis.     CT facial bones 1/13/22:  IMPRESSION:  1. Periapical abscess of the third left mandibular molar with  associated developing subperiosteal abscess and left masseter muscle  myositis and overlying left facial cellulitis, as described. Recommend  dental consultation.  2. No facial bone fracture.        ASSESSMENT/PLAN:  Ivon Gamble is a 31 year old female with PCOS who was hospitalized 1/12/22-1/18/22 for tooth abscess/cellulitis and acquired TTP.      1) Acute thrombocytopenia, concerning for TTP/MAHA (PLASMIC score 6; STPTFT12 <5%)  -Iron studies, B12/folate WNL.  -Enteric stool studies negative.  -HIV and Hepatitis studies negative.  -s/p catheter placement 1/13/22 AM and plasma exchange 1/13/22-1/18/22. Catheter has been removed.  -Patient had relapse with platelets decreasing to 82K. Catheter replaced and patient received 8x daily sessions through 2/4/22. She last received pheresis on 2/7/22. Was planned to begin rituximab 2/8/22 (see progress  notes from that date).   -Labs reviewed. Begin rituximab 375 mg/m2 weekly x 4 weeks today 2/922. Patient is planned for therapyp on 2/14, 2/21, and 2/28.   -I have discussed with Nephrology (Dr. Charles) the dates of rituximab. She will receive pheresis on 2/11. Next week, will move from every other day to two times. Then move to once weekly x 4 weeks. Patient should not receive pheresis on the date of rituximab treatment. I have informed the patient of her dates of rituximab treatment and I have asked her to reschedule pheresis for later in the week if she is scheduled on any dates of rituximab treatment.   -Decrease prednisone to 75  Mg daily. Will plan on further taper to 50 mg daily next week.   -Complete augmentin treatment. Patient is encouraged to follow up with dentistry for tooth extraction. Her platelets are >100K and she should be okay for extraction.  -She has had [id number] recovery with most recent results of 71%. However, she does not yet have clinical recovery.     2) Acute third left mandibular molar/subperiosteal abscess, left masseter muscle myositis, and facial cellulitis, improved  -s/p rocephin/flagyl and augmentin.  -Patient in need of dentistry for tooth extraction (was scheduled for 1/28, rescheduled for 3/1/22).  -See discussion above.     3) Hyperglycemia.   -She is instructed to follow up with PCP and prednisone dose is decreasing and she will require titration of insulin.     3) Okay for rituximab today. Follow up with me in clinic on 2/14/22.     Complexity: High.          Ashley Nguyen DO  Hematology/Oncology  [facility]      [facility]    Hematology/Oncology Established Patient Follow-up Note    Treatment Summary:      Today's Date: 2/9/22    Reason for Follow-up: Acquired TTP      HISTORY OF PRESENT ILLNESS: Ivon Gamble is a 31 year old female with PCOS who was hospitalized 1/12/22-1/18/22 for tooth abscess/cellulitis and acquired TTP.       Patient has recently relocated to [place]. While there, she was evaluated by dentistry for tooth ache and pain and was told she would not require extraction. This admission, patient had been feeling unwell and had developed acute facial pain. When she had presented to the hospital, platelets were 11K with LDH >600, haptoglobin undetectable. ANTWON was negative. Coag studies WNL. Peripheral smear had shown significant schistocytosis. She was immediately started on steroids and transferred to [facility] for plasma exchange of which she received 1/13/22-1/18/22. Enteric stool studies returned negative. [id number] returned after she was discharged at <5%. Hepatitis and HIV studies negative.     CT imaging had shown a third left mandibular molar/subperisoteal abscess, left masseter muscle myositis, and facial cellulitis. She was treated with rocephin/flagyl and transitioned to augmentin upon discharge. She underwent I&D on 1/13 and a tooth extraction could not be performed inpatient. She was evaluated by both ENT and OMFS.     On date of discharge, Hb was 9.2 and . LDH had normalized. She has completed augmentin and prednisone therapy. She feels significantly improved, but is still in need of tooth extraction.     INTERIM HISTORY:  See my telephone note from yesterday 2/8/22 and Merly Yang's progress note from 2/8/22 for yesterday's events.     Patient returned home without issue. She still has tenderness at the site, but is no longer having any shortness of breath or chest pain. She continues on augmentin and prednisone. She has 2-3 days remaining of augmentin. Patient has been started on insulin. She checks BG 5-6x daily and it can range anywhere from 116-444. She does not have follow up with PCP. She has not yet rescheduled with dentistry.       REVIEW OF SYSTEMS:   A 14 point ROS was reviewed with pertinent positives and negatives in the HPI.       HOME MEDICATIONS:  Current Outpatient Medications   Medication  "Sig Dispense Refill     acetaminophen (TYLENOL) 500 MG tablet Take 500-1,000 mg by mouth every 6 hours as needed for mild pain       amoxicillin-clavulanate (AUGMENTIN) 875-125 MG tablet Take 1 tablet by mouth every 12 hours 14 tablet 0     blood glucose (ACCU-CHEK GUIDE) test strip Use to test blood sugar 1-2 times daily or as directed. 100 strip 3     blood glucose monitoring (SOFTCLIX) lancets Use to test blood sugar 1-2 times daily or as directed. 100 each 3     chlorhexidine (PERIDEX) 0.12 % solution Swish and spit 15 mLs in mouth 2 times daily 473 mL 0     insulin NPH (HUMULIN N KWIKPEN) 100 UNIT/ML injection Inject 15 Units Subcutaneous every morning 15 mL 1     insulin  UNIT/ML vial 5 units sub Q in morning or as directed 10 mL 2     insulin syringe-needle U-100 (31G X 5/16\" 0.5 ML) 31G X 5/16\" 0.5 ML miscellaneous Use one syringes daily or as directed. 50 each 3     pantoprazole (PROTONIX) 40 MG EC tablet Take 1 tablet (40 mg) by mouth every morning (before breakfast) While on high dose steroids. 30 tablet 0     polyethylene glycol (MIRALAX) 17 GM/Dose powder Take 17 g by mouth 2 times daily as needed for constipation 510 g 0     predniSONE (DELTASONE) 50 MG tablet Take 2 tablets (100 mg) by mouth daily With taper directed by hematology at follow up. 28 tablet 0     sennosides (SENOKOT) 8.6 MG tablet Take 1-2 tablets by mouth 2 times daily as needed for constipation 60 tablet 0         ALLERGIES:  No Known Allergies      PAST MEDICAL HISTORY:  Past Medical History:   Diagnosis Date     TTP (thrombotic thrombocytopenic purpura)          PAST SURGICAL HISTORY:  Past Surgical History:   Procedure Laterality Date     IR CVC NON TUNNEL PLACEMENT  1/13/2022     IR CVC TUNNEL PLACEMENT > 5 YRS OF AGE  1/28/2022         SOCIAL HISTORY:  Social History     Socioeconomic History     Marital status: Single     Spouse name: Not on file     Number of children: Not on file     Years of education: Not on file     " Highest education level: Not on file   Occupational History     Not on file   Tobacco Use     Smoking status: Former Smoker     Types: Cigarettes     Quit date: 2022     Years since quittin.0     Smokeless tobacco: Never Used   Vaping Use     Vaping Use: Never used   Substance and Sexual Activity     Alcohol use: Yes     Comment: occassionally      Drug use: Never     Sexual activity: Yes     Partners: Male   Other Topics Concern     Parent/sibling w/ CABG, MI or angioplasty before 65F 55M? 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     Social Determinants of Health     Financial Resource Strain: Not on file   Food Insecurity: Not on file   Transportation Needs: Not on file   Physical Activity: Not on file   Stress: Not on file   Social Connections: Not on file   Intimate Partner Violence: Not At Risk     Fear of Current or Ex-Partner: No     Emotionally Abused: No     Physically Abused: No     Sexually Abused: No   Housing Stability: Not on file         FAMILY HISTORY:  Family History   Problem Relation Age of Onset     Diabetes Mother      Liver Disease Father          PHYSICAL EXAM:  Vital signs:    ECO  GENERAL/CONSTITUTIONAL: No acute distress.  EYES: Pupils are equal, round, and react to light and accommodation. Extraocular movements intact.  No scleral icterus.  ENT/MOUTH: Neck supple. Oropharynx clear, no mucositis.  LYMPH: No anterior cervical, posterior cervical, supraclavicular, axillary or inguinal adenopathy.   RESPIRATORY: Clear to auscultation bilaterally. No crackles or wheezing.   CARDIOVASCULAR: Regular rate and rhythm without murmurs, gallops, or rubs.  GASTROINTESTINAL: No hepatosplenomegaly, masses, or tenderness. The patient has normal bowel sounds. No guarding.  No distention.  MUSCULOSKELETAL: Warm and well-perfused, no cyanosis, clubbing, or edema.  NEUROLOGIC: Cranial nerves II-XII are intact. Alert, oriented, answers  questions appropriately.  INTEGUMENTARY: No rashes or jaundice.  GAIT: Steady, does not use assistive device      LABS:  CBC RESULTS:   Recent Labs   Lab Test 02/08/22  0902   WBC 11.5*   RBC 3.43*   HGB 11.5*   HCT 36.3   *   MCH 33.5*   MCHC 31.7   RDW 15.4*   *       Recent Labs   Lab Test 02/02/22  1219 02/01/22  1206 01/27/22  0953   NA  --  139 142   POTASSIUM  --  3.9 3.6   CHLORIDE  --  104 109   CO2  --  27 26   ANIONGAP  --  8 7   * 435* 255*   BUN  --  17 21   CR  --  0.71 0.80   JOSELUIS  --  8.3* 8.8         PATHOLOGY:      [id number] Activity Assay: 21%  [id number] Inhibitor Screen: positive  [id number] Inhibitor [place] Titer: 1.1            Ref. Range 1/12/2022 01:07 1/12/2022 01:40   Hepatitis C Antibody Latest Ref Range: Nonreactive  Nonreactive     HIV Antigen Antibody Combo Latest Ref Range: Nonreactive    Nonreactive   HIV-1/HIV-2 Antibody Latest Ref Range: Non Reactive    Non Reactive           Ref. Range 1/12/2022 01:07   Ferritin Latest Ref Range: 12 - 150 ng/mL 590 (H)        Ref. Range 1/12/2022 14:13   Folate Latest Ref Range: >=5.4 ng/mL 12.7   Iron Latest Ref Range: 35 - 180 ug/dL 125   Iron Binding Cap Latest Ref Range: 240 - 430 ug/dL 244   Iron Saturation Index Latest Ref Range: 15 - 46 % 51 (H)   Lactate Dehydrogenase Latest Ref Range: 81 - 234 U/L 591 (H)   Vitamin B12 Latest Ref Range: 193 - 986 pg/mL 539        Ref. Range 1/12/2022 14:13 1/13/2022 07:02   Hep B Surface Agn Latest Ref Range: Nonreactive  Nonreactive     Hepatitis A Antibody IgG Latest Ref Range: Nonreactive  Nonreactive     Hepatitis A IgM Kori Latest Ref Range: Nonreactive  Nonreactive     Hepatitis B Core Kori Latest Ref Range: Nonreactive    Nonreactive   Hepatitis B Surface Antibody Latest Ref Range: <8.00 m[IU]/mL 810.24 (H)     Hepatitis C Antibody Latest Ref Range: Nonreactive  Nonreactive        PATHOLOGY:  Final Diagnosis 1/13/21:   Peripheral blood, morphology:  - Marked thrombocytopenia.  -  Moderate anemia, normocytic and normochromic, with RBC fragments (including rare schistocytes).  - WBC subsets quantitatively within normal limits.         IMAGING:  CT A/P 1/12/22:  IMPRESSION:   1.  Mild mural thickening of the duodenum and proximal jejunum, correlate for infectious or inflammatory enteritis.  2.  Normal appendix. No obstruction, colitis, or diverticulitis.     CT facial bones 1/13/22:  IMPRESSION:  1. Periapical abscess of the third left mandibular molar with  associated developing subperiosteal abscess and left masseter muscle  myositis and overlying left facial cellulitis, as described. Recommend  dental consultation.  2. No facial bone fracture.        ASSESSMENT/PLAN:  Ivon Gamble is a 31 year old female with PCOS who was hospitalized 1/12/22-1/18/22 for tooth abscess/cellulitis and acquired TTP.      1) Acute thrombocytopenia, concerning for TTP/MAHA (PLASMIC score 6; [id number] <5%)  -Iron studies, B12/folate WNL.  -Enteric stool studies negative.  -HIV and Hepatitis studies negative.  -s/p catheter placement 1/13/22 AM and plasma exchange 1/13/22-1/18/22. Catheter has been removed.  -Patient had relapse with platelets decreasing to 82K. Catheter replaced and patient received 8x daily sessions through 2/4/22. She last received pheresis on 2/7/22. Was planned to begin rituximab 2/8/22 (see progress notes from that date).   -Labs reviewed. Begin rituximab 375 mg/m2 weekly x 4 weeks today 2/922. Patient is planned for therapyp on 2/14, 2/21, and 2/28.   -I have discussed with Nephrology (Dr. Charles) the dates of rituximab. She will receive pheresis on 2/11. Next week, will move from every other day to two times. Then move to once weekly x 4 weeks. Patient should not receive pheresis on the date of rituximab treatment. I have informed the patient of her dates of rituximab treatment and I have asked her to reschedule pheresis for later in the week if she is scheduled on any dates of rituximab  "treatment.   -Decrease prednisone to 75  Mg daily. Will plan on further taper to 50 mg daily next week.   -Complete augmentin treatment. Patient is encouraged to follow up with dentistry for tooth extraction. Her platelets are >100K and she should be okay for extraction.  -She has had [id number] recovery with most recent results of 71%. However, she does not yet have clinical recovery.     2) Acute third left mandibular molar/subperiosteal abscess, left masseter muscle myositis, and facial cellulitis, improved  -s/p rocephin/flagyl and augmentin.  -Patient in need of dentistry for tooth extraction (was scheduled for 1/28, rescheduled for 3/1/22).  -See discussion above.     3) Hyperglycemia.   -She is instructed to follow up with PCP and prednisone dose is decreasing and she will require titration of insulin.     3) Okay for rituximab today. Follow up with me in clinic on 2/14/22.     Complexity: High.      UPDATE 11:30 AM:  Paged to infusion center as patient began to have infusion reaction. Whereas earlier today patient was A&Ox3 and in no respiratory distress, patient now lethargic with diffuse wheeze with reported feelings of \"throat tightness.\" She was satting 98% but placed on supplemental oxygen. Rituximab had just been increased to 100 ml/hr. Patient currently had received 75 ml of rituximab in total. 125 mg solu-medrol given, 0.3 mg epi, as well as 25 mg demerol. Patient oriented x4, but weak and lethargic. 911 called and patient to be transferred to [facility] ED as she is in need of pheresis 2/10/22.      Ashley Nguyen DO  Hematology/Oncology  [facility]        Again, thank you for allowing me to participate in the care of your patient.        Sincerely,        Ashley Nguyen, DO    "

## 2022-02-09 NOTE — PROGRESS NOTES
"[facility]     Hematology/Oncology Established Patient Follow-up Note     Treatment Summary:        Today's Date: 2/9/22     Reason for Follow-up: Acquired TTP        HISTORY OF PRESENT ILLNESS: Ivon Gamble is a 31 year old female with PCOS who was hospitalized 1/12/22-1/18/22 for tooth abscess/cellulitis and acquired TTP.      Patient has recently relocated to [place]. While there, she was evaluated by dentistry for tooth ache and pain and was told she would not require extraction. This admission, patient had been feeling unwell and had developed acute facial pain. When she had presented to the hospital, platelets were 11K with LDH >600, haptoglobin undetectable. ANTWON was negative. Coag studies WNL. Peripheral smear had shown significant schistocytosis. She was immediately started on steroids and transferred to [facility] for plasma exchange of which she received 1/13/22-1/18/22. Enteric stool studies returned negative. [id number] returned after she was discharged at <5%. Hepatitis and HIV studies negative.     CT imaging had shown a third left mandibular molar/subperisoteal abscess, left masseter muscle myositis, and facial cellulitis. She was treated with rocephin/flagyl and transitioned to augmentin upon discharge. She underwent I&D on 1/13 and a tooth extraction could not be performed inpatient. She was evaluated by both ENT and OMFS.     On date of discharge, Hb was 9.2 and . LDH had normalized. She has completed augmentin and prednisone therapy. She feels significantly improved, but is still in need of tooth extraction.      INTERIM HISTORY:  See my progress note from 2/9/22. Patient discharged home from the ED with epi pen. ECG was negative. Overnight, she did not have any issues. She continues to feel \"abdominal fullness\" when seated. She has fluid retention in the B/L LE.         REVIEW OF SYSTEMS:   A 14 point ROS was reviewed with pertinent positives and negatives in the HPI. " "        HOME MEDICATIONS:  Current Outpatient Prescriptions          Current Outpatient Medications   Medication Sig Dispense Refill     acetaminophen (TYLENOL) 500 MG tablet Take 500-1,000 mg by mouth every 6 hours as needed for mild pain         amoxicillin-clavulanate (AUGMENTIN) 875-125 MG tablet Take 1 tablet by mouth every 12 hours 14 tablet 0     blood glucose (ACCU-CHEK GUIDE) test strip Use to test blood sugar 1-2 times daily or as directed. 100 strip 3     blood glucose monitoring (SOFTCLIX) lancets Use to test blood sugar 1-2 times daily or as directed. 100 each 3     chlorhexidine (PERIDEX) 0.12 % solution Swish and spit 15 mLs in mouth 2 times daily 473 mL 0     insulin NPH (HUMULIN N KWIKPEN) 100 UNIT/ML injection Inject 15 Units Subcutaneous every morning 15 mL 1     insulin  UNIT/ML vial 5 units sub Q in morning or as directed 10 mL 2     insulin syringe-needle U-100 (31G X 5/16\" 0.5 ML) 31G X 5/16\" 0.5 ML miscellaneous Use one syringes daily or as directed. 50 each 3     pantoprazole (PROTONIX) 40 MG EC tablet Take 1 tablet (40 mg) by mouth every morning (before breakfast) While on high dose steroids. 30 tablet 0     polyethylene glycol (MIRALAX) 17 GM/Dose powder Take 17 g by mouth 2 times daily as needed for constipation 510 g 0     predniSONE (DELTASONE) 50 MG tablet Take 2 tablets (100 mg) by mouth daily With taper directed by hematology at follow up. 28 tablet 0     sennosides (SENOKOT) 8.6 MG tablet Take 1-2 tablets by mouth 2 times daily as needed for constipation 60 tablet 0               ALLERGIES:  No Known Allergies        PAST MEDICAL HISTORY:  Past Medical History        Past Medical History:   Diagnosis Date     TTP (thrombotic thrombocytopenic purpura)                 PAST SURGICAL HISTORY:  Past Surgical History         Past Surgical History:   Procedure Laterality Date     IR CVC NON TUNNEL PLACEMENT   1/13/2022     IR CVC TUNNEL PLACEMENT > 5 YRS OF AGE   1/28/2022    "            SOCIAL HISTORY:  Social History   Social History            Socioeconomic History     Marital status: Single       Spouse name: Not on file     Number of children: Not on file     Years of education: Not on file     Highest education level: Not on file   Occupational History     Not on file   Tobacco Use     Smoking status: Former Smoker       Types: Cigarettes       Quit date: 2022       Years since quittin.0     Smokeless tobacco: Never Used   Vaping Use     Vaping Use: Never used   Substance and Sexual Activity     Alcohol use: Yes       Comment: occassionally      Drug use: Never     Sexual activity: Yes       Partners: Male   Other Topics Concern     Parent/sibling w/ CABG, MI or angioplasty before 65F 55M? Not Asked   Social History Narrative     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      Social Determinants of Health          Financial Resource Strain: Not on file   Food Insecurity: Not on file   Transportation Needs: Not on file   Physical Activity: Not on file   Stress: Not on file   Social Connections: Not on file   Intimate Partner Violence: Not At Risk     Fear of Current or Ex-Partner: No     Emotionally Abused: No     Physically Abused: No     Sexually Abused: No   Housing Stability: Not on file               FAMILY HISTORY:  Family History         Family History   Problem Relation Age of Onset     Diabetes Mother       Liver Disease Father                 PHYSICAL EXAM:  Vital signs:  BP (!) 147/85   Pulse 83   Temp 98.3  F (36.8  C) (Oral)   Resp 18   Wt 124.1 kg (273 lb 9.6 oz)   LMP 2022 (Approximate)   SpO2 99%   BMI 46.96 kg/m      ECO  GENERAL/CONSTITUTIONAL: No acute distress.  EYES: Pupils are equal, round, and react to light and accommodation. Extraocular movements intact.  No scleral icterus.  ENT/MOUTH: Neck supple. Oropharynx clear, no mucositis.  LYMPH: No anterior cervical, posterior cervical, supraclavicular, axillary or inguinal  adenopathy.   RESPIRATORY: Clear to auscultation bilaterally. No crackles or wheezing.   CARDIOVASCULAR: Regular rate and rhythm without murmurs, gallops, or rubs.  GASTROINTESTINAL: No hepatosplenomegaly, masses, or tenderness. The patient has normal bowel sounds. No guarding.  No distention.  MUSCULOSKELETAL: Warm and well-perfused, no cyanosis, clubbing. Plus pitting edema up to thigh.   NEUROLOGIC: Cranial nerves II-XII are intact. Alert, oriented, answers questions appropriately.  INTEGUMENTARY: No rashes or jaundice.  GAIT: Steady, does not use assistive device        LABS:  CBC RESULTS:       Recent Labs   Lab Test 02/08/22  0902   WBC 11.5*   RBC 3.43*   HGB 11.5*   HCT 36.3   *   MCH 33.5*   MCHC 31.7   RDW 15.4*   *               Recent Labs   Lab Test 02/02/22  1219 02/01/22  1206 01/27/22  0953   NA  --  139 142   POTASSIUM  --  3.9 3.6   CHLORIDE  --  104 109   CO2  --  27 26   ANIONGAP  --  8 7   * 435* 255*   BUN  --  17 21   CR  --  0.71 0.80   JOSELUIS  --  8.3* 8.8            PATHOLOGY:       [id number] Activity Assay: 21%  CHCETQ33 Inhibitor Screen: positive  LEOBRA87 Inhibitor [place] Titer: 1.1            Ref. Range 1/12/2022 01:07 1/12/2022 01:40   Hepatitis C Antibody Latest Ref Range: Nonreactive  Nonreactive     HIV Antigen Antibody Combo Latest Ref Range: Nonreactive    Nonreactive   HIV-1/HIV-2 Antibody Latest Ref Range: Non Reactive    Non Reactive           Ref. Range 1/12/2022 01:07   Ferritin Latest Ref Range: 12 - 150 ng/mL 590 (H)        Ref. Range 1/12/2022 14:13   Folate Latest Ref Range: >=5.4 ng/mL 12.7   Iron Latest Ref Range: 35 - 180 ug/dL 125   Iron Binding Cap Latest Ref Range: 240 - 430 ug/dL 244   Iron Saturation Index Latest Ref Range: 15 - 46 % 51 (H)   Lactate Dehydrogenase Latest Ref Range: 81 - 234 U/L 591 (H)   Vitamin B12 Latest Ref Range: 193 - 986 pg/mL 539        Ref. Range 1/12/2022 14:13 1/13/2022 07:02   Hep B Surface Agn Latest Ref Range:  Nonreactive  Nonreactive     Hepatitis A Antibody IgG Latest Ref Range: Nonreactive  Nonreactive     Hepatitis A IgM [place] Latest Ref Range: Nonreactive  Nonreactive     Hepatitis B Core Kori Latest Ref Range: Nonreactive    Nonreactive   Hepatitis B Surface Antibody Latest Ref Range: <8.00 m[IU]/mL 810.24 (H)     Hepatitis C Antibody Latest Ref Range: Nonreactive  Nonreactive        PATHOLOGY:  Final Diagnosis 1/13/21:   Peripheral blood, morphology:  - Marked thrombocytopenia.  - Moderate anemia, normocytic and normochromic, with RBC fragments (including rare schistocytes).  - WBC subsets quantitatively within normal limits.         IMAGING:  CT A/P 1/12/22:  IMPRESSION:   1.  Mild mural thickening of the duodenum and proximal jejunum, correlate for infectious or inflammatory enteritis.  2.  Normal appendix. No obstruction, colitis, or diverticulitis.     CT facial bones 1/13/22:  IMPRESSION:  1. Periapical abscess of the third left mandibular molar with  associated developing subperiosteal abscess and left masseter muscle  myositis and overlying left facial cellulitis, as described. Recommend  dental consultation.  2. No facial bone fracture.        ASSESSMENT/PLAN:  Ivon Gamble is a 31 year old female with PCOS who was hospitalized 1/12/22-1/18/22 for tooth abscess/cellulitis and acquired TTP.      1) Acute thrombocytopenia, concerning for TTP/MAHA (PLASMIC score 6; [id number] <5%)  -Iron studies, B12/folate WNL.  -Enteric stool studies negative.  -HIV and Hepatitis studies negative.  -s/p catheter placement 1/13/22 AM and plasma exchange 1/13/22-1/18/22. Catheter has been removed.  -Patient had relapse with platelets decreasing to 82K. Catheter replaced and patient received 8x daily sessions through 2/4/22. She last received pheresis on 2/7/22. Was planned to begin rituximab 2/8/22 (see progress notes from that date).   -I have discussed with Nephrology (Dr. Charles) the dates of rituximab. She will receive  pheresis on 2/11. Next week, will move from every other day to two times. Then move to once weekly x 4 weeks. Patient should not receive pheresis on the date of rituximab treatment. I have informed the patient of her dates of rituximab treatment and I have asked her to reschedule pheresis for later in the week if she is scheduled on any dates of rituximab treatment.   -Decrease prednisone to 75 Mg daily. Will plan on further taper to 50 mg daily next week.   -Complete augmentin treatment. Patient is encouraged to follow up with dentistry for tooth extraction. Her platelets are >100K and she should be okay for extraction.  -Discussed with patient and reviewed her clinical course from the beginning of her hospital presentation to yesterday's events. We discussed her [id number] has recovered to 71%, but she has not yet had clinical recover with PLT still 120K. Additionally, she still has underlying tooth abscess. I informed patient we can increase premedications and split rituximab dosing into 2 days to treat at a slow rate of 50 ml/hour. Additionally, typically bronchospasm is not seen with subsequent rituximab treatments. Patient would elect to continue on with treatment as she has significant fear of relapse. Discussed with pharmacy and increase premedications and split treatment into 2 days. Patient is planned for therapy on 2/14, 2/15, 2/21, 2/22, 2/28 and 2/29. Nephrology has been updated.     2) Acute third left mandibular molar/subperiosteal abscess, left masseter muscle myositis, and facial cellulitis, improved  -s/p rocephin/flagyl and augmentin.  -Patient in need of dentistry for tooth extraction (was scheduled for 1/28, rescheduled for 3/1/22).  -See discussion above.      3) Hyperglycemia.   -She is instructed to follow up with PCP and prednisone dose is decreasing and she will require titration of insulin.    4) Fluid retention.  -Give lasix 40 mg IV x1 in clinic today.   -Start lasix 20 mg BID with Kdur 20  mEq BID.     5) Follow up with me in clinic on 2/14/22 with plans for rituximab.         Complexity: High.           Ashley Nguyen DO  Hematology/Oncology  [facility]

## 2022-02-09 NOTE — ED TRIAGE NOTES
Patient receiving an infusion at [facility] today and had sudden onset of SOB, wheezing and sats dropped to 80s. Patient given 0.3mg epi, 125mg solumedrol and 25 demorol. EMS called and brought to ED.

## 2022-02-09 NOTE — TELEPHONE ENCOUNTER
PCP: [facility] chemotherapy in [facility]   I need an epi pen for the precaution of today's infusion.   [facility] does not have her insurance information. Would not help her.   [facility] has her insurance information, but does not carry the brand the insurance will cover.   [facility] [phone] [place] on [address].     Per chart review: Insurance information for Medicaid [place] given to patient.     Erna Justin RN Triage Nurse Advisor 5:46 PM 2/9/2022

## 2022-02-10 ENCOUNTER — INFUSION THERAPY VISIT (OUTPATIENT)
Dept: INFUSION THERAPY | Facility: CLINIC | Age: 32
End: 2022-02-10
Attending: INTERNAL MEDICINE
Payer: MEDICAID

## 2022-02-10 ENCOUNTER — ONCOLOGY VISIT (OUTPATIENT)
Dept: ONCOLOGY | Facility: CLINIC | Age: 32
End: 2022-02-10
Attending: INTERNAL MEDICINE
Payer: MEDICAID

## 2022-02-10 VITALS
TEMPERATURE: 98.3 F | DIASTOLIC BLOOD PRESSURE: 85 MMHG | OXYGEN SATURATION: 99 % | RESPIRATION RATE: 18 BRPM | BODY MASS INDEX: 46.96 KG/M2 | WEIGHT: 273.6 LBS | SYSTOLIC BLOOD PRESSURE: 147 MMHG | HEART RATE: 83 BPM

## 2022-02-10 DIAGNOSIS — R60.9 FLUID RETENTION: Primary | ICD-10-CM

## 2022-02-10 DIAGNOSIS — M31.19 ACQUIRED TTP (H): ICD-10-CM

## 2022-02-10 DIAGNOSIS — R60.9 EDEMA: Primary | ICD-10-CM

## 2022-02-10 DIAGNOSIS — D69.3 ACUTE IDIOPATHIC THROMBOCYTOPENIC PURPURA (H): Primary | ICD-10-CM

## 2022-02-10 DIAGNOSIS — R60.0 FLUID RETENTION IN LEGS: Primary | ICD-10-CM

## 2022-02-10 DIAGNOSIS — K04.7 DENTAL ABSCESS: ICD-10-CM

## 2022-02-10 LAB
ATRIAL RATE - MUSE: 83 BPM
BLD PROD TYP BPU: NORMAL
BLOOD COMPONENT TYPE: NORMAL
CODING SYSTEM: NORMAL
DIASTOLIC BLOOD PRESSURE - MUSE: NORMAL MMHG
INTERPRETATION ECG - MUSE: NORMAL
ISSUE DATE AND TIME: NORMAL
P AXIS - MUSE: 50 DEGREES
PR INTERVAL - MUSE: 140 MS
QRS DURATION - MUSE: 82 MS
QT - MUSE: 368 MS
QTC - MUSE: 432 MS
R AXIS - MUSE: -11 DEGREES
SCANNED LAB RESULT: ABNORMAL
SYSTOLIC BLOOD PRESSURE - MUSE: NORMAL MMHG
T AXIS - MUSE: 2 DEGREES
UNIT ABO/RH: NORMAL
UNIT NUMBER: NORMAL
UNIT STATUS: NORMAL
UNIT TYPE ISBT: 600
UNIT TYPE ISBT: 6200
VENTRICULAR RATE- MUSE: 83 BPM

## 2022-02-10 PROCEDURE — 96374 THER/PROPH/DIAG INJ IV PUSH: CPT

## 2022-02-10 PROCEDURE — 250N000011 HC RX IP 250 OP 636: Performed by: INTERNAL MEDICINE

## 2022-02-10 PROCEDURE — G0463 HOSPITAL OUTPT CLINIC VISIT: HCPCS | Mod: 25

## 2022-02-10 PROCEDURE — 99215 OFFICE O/P EST HI 40 MIN: CPT | Performed by: INTERNAL MEDICINE

## 2022-02-10 RX ORDER — HEPARIN SODIUM (PORCINE) LOCK FLUSH IV SOLN 100 UNIT/ML 100 UNIT/ML
5 SOLUTION INTRAVENOUS
Status: CANCELLED | OUTPATIENT
Start: 2022-03-29

## 2022-02-10 RX ORDER — HEPARIN SODIUM,PORCINE 10 UNIT/ML
5 VIAL (ML) INTRAVENOUS
Status: CANCELLED | OUTPATIENT
Start: 2022-03-29

## 2022-02-10 RX ORDER — ALBUTEROL SULFATE 90 UG/1
1-2 AEROSOL, METERED RESPIRATORY (INHALATION)
Status: CANCELLED
Start: 2022-03-29

## 2022-02-10 RX ORDER — FUROSEMIDE 20 MG
20 TABLET ORAL 2 TIMES DAILY
Qty: 60 TABLET | Refills: 1 | Status: ON HOLD | OUTPATIENT
Start: 2022-02-10 | End: 2022-02-20

## 2022-02-10 RX ORDER — DIPHENHYDRAMINE HCL 50 MG
50 CAPSULE ORAL ONCE
Status: CANCELLED
Start: 2022-03-29

## 2022-02-10 RX ORDER — NALOXONE HYDROCHLORIDE 0.4 MG/ML
0.2 INJECTION, SOLUTION INTRAMUSCULAR; INTRAVENOUS; SUBCUTANEOUS
Status: CANCELLED | OUTPATIENT
Start: 2022-03-29

## 2022-02-10 RX ORDER — MEPERIDINE HYDROCHLORIDE 25 MG/ML
25 INJECTION INTRAMUSCULAR; INTRAVENOUS; SUBCUTANEOUS
Status: CANCELLED
Start: 2022-03-29

## 2022-02-10 RX ORDER — MEPERIDINE HYDROCHLORIDE 25 MG/ML
25 INJECTION INTRAMUSCULAR; INTRAVENOUS; SUBCUTANEOUS EVERY 30 MIN PRN
Status: CANCELLED | OUTPATIENT
Start: 2022-03-29

## 2022-02-10 RX ORDER — POTASSIUM CHLORIDE 1500 MG/1
20 TABLET, EXTENDED RELEASE ORAL 2 TIMES DAILY
Qty: 60 TABLET | Refills: 1 | Status: ON HOLD | OUTPATIENT
Start: 2022-02-10 | End: 2022-02-20

## 2022-02-10 RX ORDER — ALBUTEROL SULFATE 0.83 MG/ML
2.5 SOLUTION RESPIRATORY (INHALATION)
Status: CANCELLED | OUTPATIENT
Start: 2022-03-29

## 2022-02-10 RX ORDER — FUROSEMIDE 10 MG/ML
40 INJECTION INTRAMUSCULAR; INTRAVENOUS ONCE
Status: DISCONTINUED | OUTPATIENT
Start: 2022-02-10 | End: 2022-02-10

## 2022-02-10 RX ORDER — FUROSEMIDE 10 MG/ML
40 INJECTION INTRAMUSCULAR; INTRAVENOUS ONCE
Status: COMPLETED | OUTPATIENT
Start: 2022-02-10 | End: 2022-02-10

## 2022-02-10 RX ORDER — CHLORHEXIDINE GLUCONATE ORAL RINSE 1.2 MG/ML
15 SOLUTION DENTAL 2 TIMES DAILY
Qty: 473 ML | Refills: 0 | Status: SHIPPED | OUTPATIENT
Start: 2022-02-10 | End: 2023-08-03

## 2022-02-10 RX ORDER — LORAZEPAM 2 MG/ML
0.5 INJECTION INTRAMUSCULAR EVERY 4 HOURS PRN
Status: CANCELLED | OUTPATIENT
Start: 2022-03-29

## 2022-02-10 RX ORDER — DIPHENHYDRAMINE HYDROCHLORIDE 50 MG/ML
50 INJECTION INTRAMUSCULAR; INTRAVENOUS
Status: CANCELLED
Start: 2022-03-29

## 2022-02-10 RX ORDER — ACETAMINOPHEN 325 MG/1
650 TABLET ORAL ONCE
Status: CANCELLED
Start: 2022-03-29

## 2022-02-10 RX ORDER — EPINEPHRINE 1 MG/ML
0.3 INJECTION, SOLUTION, CONCENTRATE INTRAVENOUS EVERY 5 MIN PRN
Status: CANCELLED | OUTPATIENT
Start: 2022-03-29

## 2022-02-10 RX ADMIN — FUROSEMIDE 40 MG: 10 INJECTION, SOLUTION INTRAMUSCULAR; INTRAVENOUS at 13:48

## 2022-02-10 ASSESSMENT — PAIN SCALES - GENERAL: PAINLEVEL: MODERATE PAIN (4)

## 2022-02-10 NOTE — PROGRESS NOTES
"Oncology Rooming Note    February 10, 2022 12:12 PM   Ivon Gamble is a 31 year old female who presents for:    Chief Complaint   Patient presents with     Oncology Clinic Visit     Initial Vitals: LMP 01/31/2022 (Approximate)  Estimated body mass index is 46.35 kg/m  as calculated from the following:    Height as of 2/9/22: 1.626 m (5' 4\").    Weight as of 2/9/22: 122.5 kg (270 lb). There is no height or weight on file to calculate BSA.  Data Unavailable Comment: Data Unavailable   Patient's last menstrual period was 01/31/2022 (approximate).  Allergies reviewed: Yes  Medications reviewed: Yes    Medications: Medication refills not needed today.  Pharmacy name entered into EPIC: [id number] - [place], [place] - [address]    Clinical concerns: Port Dressing change.  Pt states that she can do it at home if she has the supplies.  Pt would like to have lasix increased to get rid of water weight.      Shari J. Schoenberger, [facility]            "

## 2022-02-10 NOTE — TELEPHONE ENCOUNTER
Spoke to Ivon to see how she is doing and inquire about her blood pressure.  She stated she was having some issues overnight but feeling stable at the moment.  She is planning on her appointment at 1230 pm with Dr. Nguyen

## 2022-02-10 NOTE — PROGRESS NOTES
Infusion Nursing Note:  Ivon Gamble presents today for ADD ON for lasix 40mg.    Patient seen by provider today: Yes: Patrick   present during visit today: Not Applicable.    Note: Apheresis dressing changed today.      Intravenous Access:  Peripheral IV placed.    Treatment Conditions:  Not Applicable.      Post Infusion Assessment:  Patient tolerated infusion without incident.  Blood return noted pre and post infusion.  Site patent and intact, free from redness, edema or discomfort.  No evidence of extravasations.  Access discontinued per protocol.       Discharge Plan:   Prescription refills given for Lasix PO.  Patient discharged in stable condition accompanied by: self.  Departure Mode: Ambulatory.      Terrence Benton RN

## 2022-02-10 NOTE — Clinical Note
2/10/2022         RE: Ivon Gamble  [address]  [facility] [address]        Dear Colleague,    Thank you for referring your patient, Ivon Gamble, to the [facility]. Please see a copy of my visit note below.    [facility]     Hematology/Oncology Established Patient Follow-up Note     Treatment Summary:        Today's Date: 2/9/22     Reason for Follow-up: Acquired TTP        HISTORY OF PRESENT ILLNESS: Ivon Gamble is a 31 year old female with PCOS who was hospitalized 1/12/22-1/18/22 for tooth abscess/cellulitis and acquired TTP.      Patient has recently relocated to [place]. While there, she was evaluated by dentistry for tooth ache and pain and was told she would not require extraction. This admission, patient had been feeling unwell and had developed acute facial pain. When she had presented to the hospital, platelets were 11K with LDH >600, haptoglobin undetectable. ANTWON was negative. Coag studies WNL. Peripheral smear had shown significant schistocytosis. She was immediately started on steroids and transferred to [facility] for plasma exchange of which she received 1/13/22-1/18/22. Enteric stool studies returned negative. [id number] returned after she was discharged at <5%. Hepatitis and HIV studies negative.     CT imaging had shown a third left mandibular molar/subperisoteal abscess, left masseter muscle myositis, and facial cellulitis. She was treated with rocephin/flagyl and transitioned to augmentin upon discharge. She underwent I&D on 1/13 and a tooth extraction could not be performed inpatient. She was evaluated by both ENT and OMFS.     On date of discharge, Hb was 9.2 and . LDH had normalized. She has completed augmentin and prednisone therapy. She feels significantly improved, but is still in need of tooth extraction.      INTERIM HISTORY:  See my progress note from 2/9/22. Patient discharged home from the ED with epi pen. ECG was  "negative. Overnight, she did not have any issues. She continues to feel \"abdominal fullness\" when seated. She has fluid retention in the B/L LE.         REVIEW OF SYSTEMS:   A 14 point ROS was reviewed with pertinent positives and negatives in the HPI.         HOME MEDICATIONS:  Current Outpatient Prescriptions          Current Outpatient Medications   Medication Sig Dispense Refill     acetaminophen (TYLENOL) 500 MG tablet Take 500-1,000 mg by mouth every 6 hours as needed for mild pain         amoxicillin-clavulanate (AUGMENTIN) 875-125 MG tablet Take 1 tablet by mouth every 12 hours 14 tablet 0     blood glucose (ACCU-CHEK GUIDE) test strip Use to test blood sugar 1-2 times daily or as directed. 100 strip 3     blood glucose monitoring (SOFTCLIX) lancets Use to test blood sugar 1-2 times daily or as directed. 100 each 3     chlorhexidine (PERIDEX) 0.12 % solution Swish and spit 15 mLs in mouth 2 times daily 473 mL 0     insulin NPH (HUMULIN N KWIKPEN) 100 UNIT/ML injection Inject 15 Units Subcutaneous every morning 15 mL 1     insulin  UNIT/ML vial 5 units sub Q in morning or as directed 10 mL 2     insulin syringe-needle U-100 (31G X 5/16\" 0.5 ML) 31G X 5/16\" 0.5 ML miscellaneous Use one syringes daily or as directed. 50 each 3     pantoprazole (PROTONIX) 40 MG EC tablet Take 1 tablet (40 mg) by mouth every morning (before breakfast) While on high dose steroids. 30 tablet 0     polyethylene glycol (MIRALAX) 17 GM/Dose powder Take 17 g by mouth 2 times daily as needed for constipation 510 g 0     predniSONE (DELTASONE) 50 MG tablet Take 2 tablets (100 mg) by mouth daily With taper directed by hematology at follow up. 28 tablet 0     sennosides (SENOKOT) 8.6 MG tablet Take 1-2 tablets by mouth 2 times daily as needed for constipation 60 tablet 0               ALLERGIES:  No Known Allergies        PAST MEDICAL HISTORY:  Past Medical History        Past Medical History:   Diagnosis Date     TTP (thrombotic " thrombocytopenic purpura)                 PAST SURGICAL HISTORY:  Past Surgical History         Past Surgical History:   Procedure Laterality Date     IR CVC NON TUNNEL PLACEMENT   2022     IR CVC TUNNEL PLACEMENT > 5 YRS OF AGE   2022               SOCIAL HISTORY:  Social History   Social History            Socioeconomic History     Marital status: Single       Spouse name: Not on file     Number of children: Not on file     Years of education: Not on file     Highest education level: Not on file   Occupational History     Not on file   Tobacco Use     Smoking status: Former Smoker       Types: Cigarettes       Quit date: 2022       Years since quittin.0     Smokeless tobacco: Never Used   Vaping Use     Vaping Use: Never used   Substance and Sexual Activity     Alcohol use: Yes       Comment: occassionally      Drug use: Never     Sexual activity: Yes       Partners: Male   Other Topics Concern     Parent/sibling w/ CABG, MI or angioplasty before 65F 55M? Not Asked   Social History Narrative     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      Social Determinants of Health          Financial Resource Strain: Not on file   Food Insecurity: Not on file   Transportation Needs: Not on file   Physical Activity: Not on file   Stress: Not on file   Social Connections: Not on file   Intimate Partner Violence: Not At Risk     Fear of Current or Ex-Partner: No     Emotionally Abused: No     Physically Abused: No     Sexually Abused: No   Housing Stability: Not on file               FAMILY HISTORY:  Family History         Family History   Problem Relation Age of Onset     Diabetes Mother       Liver Disease Father                 PHYSICAL EXAM:  Vital signs:  BP (!) 147/85   Pulse 83   Temp 98.3  F (36.8  C) (Oral)   Resp 18   Wt 124.1 kg (273 lb 9.6 oz)   LMP 2022 (Approximate)   SpO2 99%   BMI 46.96 kg/m      ECO  GENERAL/CONSTITUTIONAL: No acute distress.  EYES: Pupils  are equal, round, and react to light and accommodation. Extraocular movements intact.  No scleral icterus.  ENT/MOUTH: Neck supple. Oropharynx clear, no mucositis.  LYMPH: No anterior cervical, posterior cervical, supraclavicular, axillary or inguinal adenopathy.   RESPIRATORY: Clear to auscultation bilaterally. No crackles or wheezing.   CARDIOVASCULAR: Regular rate and rhythm without murmurs, gallops, or rubs.  GASTROINTESTINAL: No hepatosplenomegaly, masses, or tenderness. The patient has normal bowel sounds. No guarding.  No distention.  MUSCULOSKELETAL: Warm and well-perfused, no cyanosis, clubbing. Plus pitting edema up to thigh.   NEUROLOGIC: Cranial nerves II-XII are intact. Alert, oriented, answers questions appropriately.  INTEGUMENTARY: No rashes or jaundice.  GAIT: Steady, does not use assistive device        LABS:  CBC RESULTS:       Recent Labs   Lab Test 02/08/22  0902   WBC 11.5*   RBC 3.43*   HGB 11.5*   HCT 36.3   *   MCH 33.5*   MCHC 31.7   RDW 15.4*   *               Recent Labs   Lab Test 02/02/22  1219 02/01/22  1206 01/27/22  0953   NA  --  139 142   POTASSIUM  --  3.9 3.6   CHLORIDE  --  104 109   CO2  --  27 26   ANIONGAP  --  8 7   * 435* 255*   BUN  --  17 21   CR  --  0.71 0.80   JOSELUIS  --  8.3* 8.8            PATHOLOGY:       [id number] Activity Assay: 21%  [id number] Inhibitor Screen: positive  [id number] Inhibitor Lysite Titer: 1.1            Ref. Range 1/12/2022 01:07 1/12/2022 01:40   Hepatitis C Antibody Latest Ref Range: Nonreactive  Nonreactive     HIV Antigen Antibody Combo Latest Ref Range: Nonreactive    Nonreactive   HIV-1/HIV-2 Antibody Latest Ref Range: Non Reactive    Non Reactive           Ref. Range 1/12/2022 01:07   Ferritin Latest Ref Range: 12 - 150 ng/mL 590 (H)        Ref. Range 1/12/2022 14:13   Folate Latest Ref Range: >=5.4 ng/mL 12.7   Iron Latest Ref Range: 35 - 180 ug/dL 125   Iron Binding Cap Latest Ref Range: 240 - 430 ug/dL 244   Iron  Saturation Index Latest Ref Range: 15 - 46 % 51 (H)   Lactate Dehydrogenase Latest Ref Range: 81 - 234 U/L 591 (H)   Vitamin B12 Latest Ref Range: 193 - 986 pg/mL 539        Ref. Range 1/12/2022 14:13 1/13/2022 07:02   Hep B Surface Agn Latest Ref Range: Nonreactive  Nonreactive     Hepatitis A Antibody IgG Latest Ref Range: Nonreactive  Nonreactive     Hepatitis A IgM Kori Latest Ref Range: Nonreactive  Nonreactive     Hepatitis B Core Kori Latest Ref Range: Nonreactive    Nonreactive   Hepatitis B Surface Antibody Latest Ref Range: <8.00 m[IU]/mL 810.24 (H)     Hepatitis C Antibody Latest Ref Range: Nonreactive  Nonreactive        PATHOLOGY:  Final Diagnosis 1/13/21:   Peripheral blood, morphology:  - Marked thrombocytopenia.  - Moderate anemia, normocytic and normochromic, with RBC fragments (including rare schistocytes).  - WBC subsets quantitatively within normal limits.         IMAGING:  CT A/P 1/12/22:  IMPRESSION:   1.  Mild mural thickening of the duodenum and proximal jejunum, correlate for infectious or inflammatory enteritis.  2.  Normal appendix. No obstruction, colitis, or diverticulitis.     CT facial bones 1/13/22:  IMPRESSION:  1. Periapical abscess of the third left mandibular molar with  associated developing subperiosteal abscess and left masseter muscle  myositis and overlying left facial cellulitis, as described. Recommend  dental consultation.  2. No facial bone fracture.        ASSESSMENT/PLAN:  Ivon Gamble is a 31 year old female with PCOS who was hospitalized 1/12/22-1/18/22 for tooth abscess/cellulitis and acquired TTP.      1) Acute thrombocytopenia, concerning for TTP/MAHA (PLASMIC score 6; SZGNTR20 <5%)  -Iron studies, B12/folate WNL.  -Enteric stool studies negative.  -HIV and Hepatitis studies negative.  -s/p catheter placement 1/13/22 AM and plasma exchange 1/13/22-1/18/22. Catheter has been removed.  -Patient had relapse with platelets decreasing to 82K. Catheter replaced and  patient received 8x daily sessions through 2/4/22. She last received pheresis on 2/7/22. Was planned to begin rituximab 2/8/22 (see progress notes from that date).   -I have discussed with Nephrology (Dr. Charles) the dates of rituximab. She will receive pheresis on 2/11. Next week, will move from every other day to two times. Then move to once weekly x 4 weeks. Patient should not receive pheresis on the date of rituximab treatment. I have informed the patient of her dates of rituximab treatment and I have asked her to reschedule pheresis for later in the week if she is scheduled on any dates of rituximab treatment.   -Decrease prednisone to 75 Mg daily. Will plan on further taper to 50 mg daily next week.   -Complete augmentin treatment. Patient is encouraged to follow up with dentistry for tooth extraction. Her platelets are >100K and she should be okay for extraction.  -Discussed with patient and reviewed her clinical course from the beginning of her hospital presentation to yesterday's events. We discussed her SPDCJZ17 has recovered to 71%, but she has not yet had clinical recover with PLT still 120K. Additionally, she still has underlying tooth abscess. I informed patient we can increase premedications and split rituximab dosing into 2 days to treat at a slow rate of 50 ml/hour. Additionally, typically bronchospasm is not seen with subsequent rituximab treatments. Patient would elect to continue on with treatment as she has significant fear of relapse. Discussed with pharmacy and increase premedications and split treatment into 2 days. Patient is planned for therapy on 2/14, 2/15, 2/21, 2/22, 2/28 and 2/29. Nephrology has been updated.     2) Acute third left mandibular molar/subperiosteal abscess, left masseter muscle myositis, and facial cellulitis, improved  -s/p rocephin/flagyl and augmentin.  -Patient in need of dentistry for tooth extraction (was scheduled for 1/28, rescheduled for 3/1/22).  -See  "discussion above.      3) Hyperglycemia.   -She is instructed to follow up with PCP and prednisone dose is decreasing and she will require titration of insulin.    4) Fluid retention.  -Give lasix 40 mg IV x1 in clinic today.   -Start lasix 20 mg BID with Kdur 20 mEq BID.     5) Follow up with me in clinic on 2/14/22 with plans for rituximab.         Complexity: High.           Ashley Nguyen DO  Hematology/Oncology  [facility]           Oncology Rooming Note    February 10, 2022 12:12 PM   Ivon Gamble is a 31 year old female who presents for:    Chief Complaint   Patient presents with     Oncology Clinic Visit     Initial Vitals: LMP 01/31/2022 (Approximate)  Estimated body mass index is 46.35 kg/m  as calculated from the following:    Height as of 2/9/22: 1.626 m (5' 4\").    Weight as of 2/9/22: 122.5 kg (270 lb). There is no height or weight on file to calculate BSA.  Data Unavailable Comment: Data Unavailable   Patient's last menstrual period was 01/31/2022 (approximate).  Allergies reviewed: Yes  Medications reviewed: Yes    Medications: Medication refills not needed today.  Pharmacy name entered into EPIC: [id number] [address] [address]    Clinical concerns: [place] change.  Pt states that she can do it at home if she has the supplies.  Pt would like to have lasix increased to get rid of water weight.      Shari J. Schoenberger, [facility]                Again, thank you for allowing me to participate in the care of your patient.        Sincerely,        Ashley Nguyen DO  "

## 2022-02-11 ENCOUNTER — HOSPITAL ENCOUNTER (OUTPATIENT)
Age: 32
End: 2022-02-11
Attending: INTERNAL MEDICINE | Admitting: INTERNAL MEDICINE
Payer: COMMERCIAL

## 2022-02-11 ENCOUNTER — HOSPITAL ENCOUNTER (OUTPATIENT)
Facility: CLINIC | Age: 32
End: 2022-02-11
Attending: INTERNAL MEDICINE | Admitting: INTERNAL MEDICINE
Payer: MEDICAID

## 2022-02-11 ENCOUNTER — HOSPITAL ENCOUNTER (OUTPATIENT)
Age: 32
End: 2022-02-11
Payer: MEDICAID

## 2022-02-11 ENCOUNTER — MYC MEDICAL ADVICE (OUTPATIENT)
Dept: ONCOLOGY | Facility: CLINIC | Age: 32
End: 2022-02-11

## 2022-02-11 ENCOUNTER — PATIENT OUTREACH (OUTPATIENT)
Dept: CARE COORDINATION | Facility: CLINIC | Age: 32
End: 2022-02-11
Payer: COMMERCIAL

## 2022-02-11 ENCOUNTER — HOSPITAL ENCOUNTER (OUTPATIENT)
Facility: CLINIC | Age: 32
Discharge: HOME | End: 2022-02-11
Attending: INTERNAL MEDICINE | Admitting: INTERNAL MEDICINE
Payer: MEDICAID

## 2022-02-11 ENCOUNTER — HOSPITAL ENCOUNTER (OUTPATIENT)
Dept: LAB | Facility: CLINIC | Age: 32
End: 2022-02-11
Payer: MEDICAID

## 2022-02-11 ENCOUNTER — HOSPITAL ENCOUNTER (OUTPATIENT)
Facility: CLINIC | Age: 32
Discharge: HOME | End: 2022-02-11
Admitting: INTERNAL MEDICINE
Payer: MEDICAID

## 2022-02-11 ENCOUNTER — TELEPHONE (OUTPATIENT)
Dept: INTERNAL MEDICINE | Facility: CLINIC | Age: 32
End: 2022-02-11
Payer: COMMERCIAL

## 2022-02-11 VITALS
TEMPERATURE: 97.5 F | RESPIRATION RATE: 18 BRPM | HEART RATE: 76 BPM | HEIGHT: 64 IN | BODY MASS INDEX: 46.71 KG/M2 | DIASTOLIC BLOOD PRESSURE: 80 MMHG | SYSTOLIC BLOOD PRESSURE: 130 MMHG | WEIGHT: 273.59 LBS

## 2022-02-11 LAB
ANION GAP SERPL CALCULATED.3IONS-SCNC: 6 MMOL/L (ref 3–14)
BASOPHILS # BLD AUTO: 0 10E3/UL (ref 0–0.2)
BASOPHILS NFR BLD AUTO: 0 %
BUN SERPL-MCNC: 26 MG/DL (ref 7–30)
CALCIUM SERPL-MCNC: 8.7 MG/DL (ref 8.5–10.1)
CHLORIDE BLD-SCNC: 102 MMOL/L (ref 94–109)
CO2 SERPL-SCNC: 27 MMOL/L (ref 20–32)
CREAT SERPL-MCNC: 0.7 MG/DL (ref 0.52–1.04)
EOSINOPHIL # BLD AUTO: 0 10E3/UL (ref 0–0.7)
EOSINOPHIL NFR BLD AUTO: 0 %
ERYTHROCYTE [DISTWIDTH] IN BLOOD BY AUTOMATED COUNT: 15.3 % (ref 10–15)
GFR SERPL CREATININE-BSD FRML MDRD: >90 ML/MIN/1.73M2
GLUCOSE BLD-MCNC: 282 MG/DL (ref 70–99)
HCT VFR BLD AUTO: 38 % (ref 35–47)
HGB BLD-MCNC: 12 G/DL (ref 11.7–15.7)
IMM GRANULOCYTES # BLD: 0.1 10E3/UL
IMM GRANULOCYTES NFR BLD: 1 %
LYMPHOCYTES # BLD AUTO: 0.5 10E3/UL (ref 0.8–5.3)
LYMPHOCYTES NFR BLD AUTO: 5 %
MAYO MISC RESULT: ABNORMAL
MCH RBC QN AUTO: 33.4 PG (ref 26.5–33)
MCHC RBC AUTO-ENTMCNC: 31.6 G/DL (ref 31.5–36.5)
MCV RBC AUTO: 106 FL (ref 78–100)
MONOCYTES # BLD AUTO: 0.3 10E3/UL (ref 0–1.3)
MONOCYTES NFR BLD AUTO: 3 %
NEUTROPHILS # BLD AUTO: 9.9 10E3/UL (ref 1.6–8.3)
NEUTROPHILS NFR BLD AUTO: 91 %
NRBC # BLD AUTO: 0 10E3/UL
NRBC BLD AUTO-RTO: 0 /100
PLATELET # BLD AUTO: 117 10E3/UL (ref 150–450)
POTASSIUM BLD-SCNC: 4.2 MMOL/L (ref 3.4–5.3)
RBC # BLD AUTO: 3.59 10E6/UL (ref 3.8–5.2)
SODIUM SERPL-SCNC: 135 MMOL/L (ref 133–144)
WBC # BLD AUTO: 10.8 10E3/UL (ref 4–11)

## 2022-02-11 PROCEDURE — 36592 COLLECT BLOOD FROM PICC: CPT | Performed by: INTERNAL MEDICINE

## 2022-02-11 PROCEDURE — 36514 APHERESIS PLASMA: CPT

## 2022-02-11 PROCEDURE — 250N000009 HC RX 250: Performed by: INTERNAL MEDICINE

## 2022-02-11 PROCEDURE — 250N000013 HC RX MED GY IP 250 OP 250 PS 637: Performed by: INTERNAL MEDICINE

## 2022-02-11 PROCEDURE — 250N000011 HC RX IP 250 OP 636: Performed by: INTERNAL MEDICINE

## 2022-02-11 PROCEDURE — 82310 ASSAY OF CALCIUM: CPT | Performed by: INTERNAL MEDICINE

## 2022-02-11 PROCEDURE — 85014 HEMATOCRIT: CPT | Performed by: INTERNAL MEDICINE

## 2022-02-11 PROCEDURE — P9059 PLASMA, FRZ BETWEEN 8-24HOUR: HCPCS | Performed by: INTERNAL MEDICINE

## 2022-02-11 PROCEDURE — 36430 TRANSFUSION BLD/BLD COMPNT: CPT

## 2022-02-11 PROCEDURE — 258N000003 HC RX IP 258 OP 636: Performed by: INTERNAL MEDICINE

## 2022-02-11 RX ORDER — ACETAMINOPHEN 325 MG/1
325 TABLET ORAL ONCE
Status: COMPLETED | OUTPATIENT
Start: 2022-02-11 | End: 2022-02-11

## 2022-02-11 RX ORDER — CALCIUM GLUCONATE 94 MG/ML
1 INJECTION, SOLUTION INTRAVENOUS
Status: CANCELLED | OUTPATIENT
Start: 2022-02-18

## 2022-02-11 RX ORDER — DIPHENHYDRAMINE HYDROCHLORIDE 50 MG/ML
50 INJECTION INTRAMUSCULAR; INTRAVENOUS
Status: CANCELLED | OUTPATIENT
Start: 2022-02-18

## 2022-02-11 RX ORDER — DIPHENHYDRAMINE HYDROCHLORIDE 50 MG/ML
50 INJECTION INTRAMUSCULAR; INTRAVENOUS ONCE
Status: CANCELLED | OUTPATIENT
Start: 2022-02-18

## 2022-02-11 RX ORDER — DIPHENHYDRAMINE HYDROCHLORIDE 50 MG/ML
50 INJECTION INTRAMUSCULAR; INTRAVENOUS ONCE
Status: COMPLETED | OUTPATIENT
Start: 2022-02-11 | End: 2022-02-11

## 2022-02-11 RX ORDER — CALCIUM GLUCONATE 94 MG/ML
1 INJECTION, SOLUTION INTRAVENOUS
Status: DISCONTINUED | OUTPATIENT
Start: 2022-02-11 | End: 2022-02-11 | Stop reason: HOSPADM

## 2022-02-11 RX ORDER — ACETAMINOPHEN 325 MG/1
325 TABLET ORAL ONCE
Status: CANCELLED | OUTPATIENT
Start: 2022-02-18

## 2022-02-11 RX ORDER — DIPHENHYDRAMINE HYDROCHLORIDE 50 MG/ML
50 INJECTION INTRAMUSCULAR; INTRAVENOUS
Status: DISCONTINUED | OUTPATIENT
Start: 2022-02-11 | End: 2022-02-11 | Stop reason: HOSPADM

## 2022-02-11 RX ADMIN — CALCIUM GLUCONATE 3.5 G: 98 INJECTION, SOLUTION INTRAVENOUS at 12:56

## 2022-02-11 RX ADMIN — ANTICOAGULANT CITRATE DEXTROSE SOLUTION FORMULA A 750 ML: 12.25; 11; 3.65 SOLUTION INTRAVENOUS at 12:56

## 2022-02-11 RX ADMIN — DIPHENHYDRAMINE HYDROCHLORIDE 50 MG: 50 INJECTION, SOLUTION INTRAMUSCULAR; INTRAVENOUS at 12:42

## 2022-02-11 RX ADMIN — ACETAMINOPHEN 325 MG: 325 TABLET, FILM COATED ORAL at 12:42

## 2022-02-11 RX ADMIN — HYDROCORTISONE SODIUM SUCCINATE 100 MG: 100 INJECTION, POWDER, FOR SOLUTION INTRAMUSCULAR; INTRAVENOUS at 12:43

## 2022-02-11 ASSESSMENT — ACTIVITIES OF DAILY LIVING (ADL): DEPENDENT_IADLS:: INDEPENDENT

## 2022-02-11 ASSESSMENT — MIFFLIN-ST. JEOR: SCORE: 1941.25

## 2022-02-11 NOTE — PROGRESS NOTES
"         Assessment and Plan:   PLEX: 1 PV XC, replacing with FFP and Ca. Using R CVC: functioning well. Planning 1 run q week (Friday) for 4 weeks.             Interval History:   TTP: on IV rituxan and PLEX.              Review of Systems:   No complaints on pharesis.           Medications:           Current active medications and PTA medications reviewed, see medication list for details.            Physical Exam:   Vitals were reviewed  Patient Vitals for the past 24 hrs:   BP Temp Temp src Pulse Resp Height Weight   22 1417 129/83 -- -- 80 -- -- --   22 1403 135/85 -- -- 83 -- -- --   22 1353 137/86 -- -- 81 -- -- --   22 1338 (!) 133/91 -- -- 80 -- -- --   22 1323 (!) 140/90 -- -- 74 -- -- --   22 1310 129/80 -- -- 85 -- -- --   22 1250 130/87 -- -- 84 -- -- --   22 1200 (!) 142/98 98.6  F (37  C) Oral 89 17 -- --   22 1000 -- -- -- -- -- 1.626 m (5' 4.02\") 124.1 kg (273 lb 9.5 oz)       Temp:  [98.6  F (37  C)] 98.6  F (37  C)  Pulse:  [74-89] 80  Resp:  [17] 17  BP: (129-142)/(80-98) 129/83    Temperatures:  Current - Temp: 98.6  F (37  C); Max - Temp  Av.6  F (37  C)  Min: 98.6  F (37  C)  Max: 98.6  F (37  C)  Respiration range: Resp  Av  Min: 17  Max: 17  Pulse range: Pulse  Av  Min: 74  Max: 89  Blood pressure range: Systolic (24hrs), Av , Min:129 , Max:142   ; Diastolic (24hrs), Av, Min:80, Max:98    Pulse oximetry range: No data recorded    No intake/output data recorded.    No intake or output data in the 24 hours ending 22 1421    Resting comfortably in bed  R CVC with no redness or tenderness       Wt Readings from Last 4 Encounters:   22 124.1 kg (273 lb 9.5 oz)   02/10/22 124.1 kg (273 lb 9.6 oz)   22 122.5 kg (270 lb)   22 123.8 kg (273 lb)          Data:          Lab Results   Component Value Date     2022     2022     2022    Lab Results   Component Value Date "    CHLORIDE 102 02/11/2022    CHLORIDE 105 02/09/2022    CHLORIDE 105 02/09/2022    Lab Results   Component Value Date    BUN 26 02/11/2022    BUN 16 02/09/2022    BUN 15 02/09/2022      Lab Results   Component Value Date    POTASSIUM 4.2 02/11/2022    POTASSIUM 3.2 02/09/2022    POTASSIUM 3.3 02/09/2022    Lab Results   Component Value Date    CO2 27 02/11/2022    CO2 26 02/09/2022    CO2 29 02/09/2022    Lab Results   Component Value Date    CR 0.70 02/11/2022    CR 0.76 02/09/2022    CR 0.61 02/09/2022        Recent Labs   Lab Test 02/11/22  1250 02/09/22  1114 02/08/22  0902   WBC 10.8 14.5* 11.5*   HGB 12.0 13.0 11.5*   HCT 38.0 41.1 36.3   * 106* 106*   * 120* 118*     Recent Labs   Lab Test 02/09/22  1114 02/09/22  0937 02/01/22  1206   AST 24 16 21   ALT 79* 69* 46   ALKPHOS 59 54 71   BILITOTAL 0.5 0.5 0.3       Recent Labs   Lab Test 01/18/22  0539 01/17/22  0552 01/16/22  0611   MAG 2.2 2.0 2.0     Recent Labs   Lab Test 01/18/22  0539 01/17/22  0552 01/16/22  0611   PHOS 3.7 3.4 2.9     Recent Labs   Lab Test 02/11/22  1250 02/09/22  1114 02/09/22  0937   JOSELUIS 8.7 8.4* 8.3*       Lab Results   Component Value Date    JOSELUIS 8.7 02/11/2022     Lab Results   Component Value Date    WBC 10.8 02/11/2022    HGB 12.0 02/11/2022    HCT 38.0 02/11/2022     (H) 02/11/2022     (L) 02/11/2022     Lab Results   Component Value Date     02/11/2022    POTASSIUM 4.2 02/11/2022    CHLORIDE 102 02/11/2022    CO2 27 02/11/2022     (H) 02/11/2022     Lab Results   Component Value Date    BUN 26 02/11/2022    CR 0.70 02/11/2022     Lab Results   Component Value Date    MAG 2.2 01/18/2022     Lab Results   Component Value Date    PHOS 3.7 01/18/2022       Creatinine   Date Value Ref Range Status   02/11/2022 0.70 0.52 - 1.04 mg/dL Final   02/09/2022 0.76 0.52 - 1.04 mg/dL Final   02/09/2022 0.61 0.52 - 1.04 mg/dL Final   02/01/2022 0.71 0.52 - 1.04 mg/dL Final   01/27/2022 0.80 0.52 -  1.04 mg/dL Final   01/21/2022 0.78 0.52 - 1.04 mg/dL Final       Attestation:  I have reviewed today's vital signs, notes, medications, labs and imaging.  Seen during PLEX.      Kuldip Charles MD

## 2022-02-11 NOTE — LETTER
[facility] CARE COORDINATION  [facility]   [address], [address]  [place] [address]    February 11, 2022    Ivon Gamble  [address]  [place] [address]      Dear Ivon,    I am a clinic care coordinator who works with [facility]. I wanted to introduce myself and provide you with my contact information for you to be able to call me with any questions or concerns. Below is a description of clinic care coordination and how I can further assist you.      The clinic care coordination team is made up of a registered nurse,  and community health worker who understand the health care system. The goal of clinic care coordination is to help you manage your health and improve access to the health care system in the most efficient manner. The team can assist you in meeting your health care goals by providing education, coordinating services, strengthening the communication among your providers and supporting you with any resource needs.    Please feel free to contact Itzel Snow at [phone] with any questions or concerns. We are focused on providing you with the highest-quality healthcare experience possible and that all starts with you.     Sincerely,     Therese Jones RN, Casual Care Coordinator  [facility] Care [facility] Care Coordination  [phone]        Enclosed: I have enclosed a copy of a 24 Hour Access Plan. This has helpful phone numbers for you to call when needed. Please keep this in an easy to access place to use as needed.

## 2022-02-11 NOTE — TELEPHONE ENCOUNTER
Dr. Gamble spoke to patient earlier this week, nothing further needed.     Iva Schmidt RN  [facility]

## 2022-02-11 NOTE — TELEPHONE ENCOUNTER
Discussed with Dr. Gamble. He states okay to hold insulin today and not take any tonight regardless of blood sugar. He recommends patient take insulin every morning by 10 am as long as she has eaten, not just as needed and no extra doses later in the day. Patient will likely need 8-10 units to control blood sugars.     Called patient back, advised her of Dr. Gamble's recommendations for insulin. Also advised patient to call the clinic if she is having elevated glucoses above 300 or fasting glucoses below 100 prior to her appointment with Pearl. Patient verbalizes understanding.    Iva Schmidt RN  [facility]

## 2022-02-11 NOTE — TELEPHONE ENCOUNTER
Dr. Gamble requested this RN follow-up with patient to see how blood sugar reading are doing. He had spoke to patient earlier this week and recommended increasing insulin from 5 units to 8 units as glucoses were still in the 200s in the morning. Patient also should schedule follow-up with one of the regular providers in our office to establish care for any follow-up needed as Dr. Gamble is a float provider and not here long term.     Called patient. She states that her glucose is 94 this morning and not going to take any insulin. She states glucoses have been fluctuating a lot so taking her insulin as needed. She will be on current dose of prednisone for another 2 weeks before weaning down, Dr. Nguyen recommended discussing her insulin with Dr. Gamble since he ordered this for her. Patient states she took 5 units at noon yesterday and another 7 units last night around 8:30 pm as glucose was 298. Advised patient that taking 2 doses yesterday may have caused her low glucose today. Will discuss with Dr. Gamble for insulin recommendations and call back.     Patient did schedule video appointment with Pearl for 2/17 to follow-up on glucoses and establish care.     Iva Schmidt RN  [facility]

## 2022-02-11 NOTE — PROGRESS NOTES
"Clinic Care Coordination Contact    Clinic Care Coordination Contact  OUTREACH    Referral Information:  Referral Source: Specialist         Chief Complaint   Patient presents with     Clinic Care Coordination - Initial        Universal Utilization: Patient was seen at the [facility] Emergency Room for an anaphylactic reaction during her infusion for TTP. Referral was made to clinic care coordination from her Oncologist Dr. Ashley Nguyen.   Clinic Utilization  Difficulty keeping appointments:: No  Compliance Concerns: No  No-Show Concerns: No  No PCP office visit in Past Year: No  Utilization    Hospital Admissions  11             ED Visits  3             No Show Count (past year)  0                Current as of: 2/11/2022  9:44 AM            Clinical Concerns:  Current Medical Concerns:  Yes. In treatment for new diagnosis of TTP.     Current Behavioral Concerns: No   Education Provided to patient: Sent introduction letter re: Clinic Care Coordination      Health Maintenance Reviewed: Due/Overdue Yes.  (Patient declined to discuss at this time)  Clinical Pathway: None at this time    Medication Management:  Medication review status: Medications reviewed and no changes reported per patient.          Current Outpatient Medications   Medication     acetaminophen (TYLENOL) 500 MG tablet     amoxicillin-clavulanate (AUGMENTIN) 875-125 MG tablet     blood glucose (ACCU-CHEK GUIDE) test strip     blood glucose monitoring (SOFTCLIX) lancets     chlorhexidine (PERIDEX) 0.12 % solution     EPINEPHrine (ANY BX GENERIC EQUIV) 0.3 MG/0.3ML injection 2-pack     furosemide (LASIX) 20 MG tablet     insulin NPH (HUMULIN N KWIKPEN) 100 UNIT/ML injection     insulin  UNIT/ML vial     insulin syringe-needle U-100 (31G X 5/16\" 0.5 ML) 31G X 5/16\" 0.5 ML miscellaneous     pantoprazole (PROTONIX) 40 MG EC tablet     polyethylene glycol (MIRALAX) 17 GM/Dose powder     potassium chloride ER (KLOR-CON M) 20 MEQ CR tablet     " predniSONE (DELTASONE) 20 MG tablet     sennosides (SENOKOT) 8.6 MG tablet     No current facility-administered medications for this visit.     Facility-Administered Medications Ordered in Other Visits   Medication     0.9% sodium chloride BOLUS     acetaminophen (TYLENOL) tablet 650 mg     Anticoagulant Citrate Dextrose Formula A      anticoagulant citrate flush 3 mL     calcium gluconate 3.5 g in sodium chloride 0.9 % 50 mL     diphenhydrAMINE (BENADRYL) injection 50 mg     diphenhydrAMINE (BENADRYL) injection 50 mg     hydrocortisone sodium succinate PF (solu-CORTEF) injection 100 mg     Functional Status:  Dependent ADLs:: Independent  Dependent IADLs:: Independent  Bed or wheelchair confined:: No  Mobility Status: Independent  Fallen 2 or more times in the past year?: No  Any fall with injury in the past year?: No    Living Situation: Apartment     Lifestyle & Psychosocial Needs: No issues.     Social Determinants of Health     Tobacco Use: Medium Risk     Smoking Tobacco Use: Former Smoker     Smokeless Tobacco Use: Never Used   Alcohol Use: Not on file   Financial Resource Strain: Not on file   Food Insecurity: Not on file   Transportation Needs: Not on file   Physical Activity: Not on file   Stress: Not on file   Social Connections: Not on file   Intimate Partner Violence: Not At Risk     Fear of Current or Ex-Partner: No     Emotionally Abused: No     Physically Abused: No     Sexually Abused: No   Depression: Not on file   Housing Stability: Not on file     Mosque or spiritual beliefs that impact treatment:: No  Mental health DX:: No  Mental health management concern (GOAL)::  (N/A)  Chemical Dependency Status: No Current Concerns  Chemical Dependency Management:  (N/A)  Informal Support system:: Friends     PAST MEDICAL HISTORY:   Past Medical History:   Diagnosis Date     TTP (thrombotic thrombocytopenic purpura)        PAST SURGICAL HISTORY:   Past Surgical History:   Procedure Laterality Date     IR  CVC NON TUNNEL PLACEMENT  2022     IR CVC TUNNEL PLACEMENT > 5 YRS OF AGE  2022       FAMILY HISTORY:   Family History   Problem Relation Age of Onset     Diabetes Mother      Liver Disease Father        SOCIAL HISTORY:   Social History     Tobacco Use     Smoking status: Former Smoker     Types: Cigarettes     Quit date: 2022     Years since quittin.0     Smokeless tobacco: Never Used   Substance Use Topics     Alcohol use: Yes     Comment: occassionally      Resources and Interventions:  Current Resources: Care Coordination at Hem/Onc office has been following.      Community Resources: None  Supplies used at home:: Diabetic Supplies  Equipment Currently Used at Home: glucometer  Employment Status: employed part-time (states she makes $19,000 per year)     Advance Care Plan/Directive  Advanced Care Plans/Directives on file:: No  Type Advanced Care Plans/Directives:  (N/A)  Advanced Care Plan/Directive Status: Declined Further Information    Goals: Not determined yet.     Patient/Caregiver understanding: Patient verbalized understanding and denies any additional questions or concerns at this time. RNCC engaged in AIDET communications during encounter.      Future Appointments              Today APHERESIS ROOM [facility] N[facility]DA    In 3 days  CANCER INFUSION NURSE; RH INFUSION CHAIR 9 M [facility]    In 3 days Ashley Nguyen M, DO [facility]    In 6 days Pearl Jon APRN CNP [place], [place]    In 1 week RH CANCER INFUSION NURSE; RH INFUSION CHAIR 1 M [facility] [place] [facility]    In 2 weeks RH CANCER INFUSION NURSE; RH INFUSION CHAIR 6 M [facility] [place] [facility]    In 3 weeks RH INFUSION CHAIR 11 [facility]          Plan: Patient is not ready  to enroll in clinic care coordination. She stated her treatment for TTP is scheduled to conclude by the end of February. She is agreeable to RN CC contacting her early in March. She has appointments almost daily at this time. Plan to contact her on Friday, March 4, 2022. Introduction to Care Coordination letter to be sent via [employer].     Therese Jones RN, Casual Care Coordinator  [facility] Care Coordination  [phone]

## 2022-02-11 NOTE — PROGRESS NOTES
Apheresis Treatment    Treatment in room CS2 using Spectra Optia [id number] apheresis machine. Consent verified    Height: 163cm  Weight: 124kg  HCT: 41%  Inlet speed: 59ml.min  ACDA ratio: 10:1  Fluid balance: 100%  Access: tunneled RIJ  Post treatment line dwell: Citrate 2ml blue/red lumen.  Replacement product: 3500ml FFP  Electrolyte replacement: Calcium gluconate 3.5 grams in NS - total 85mls, piggybacked into return lines, infused over time of treatment.  Premedications: benadryl 50mg IV, solucortef 100mg IV, tylenol 325mg PO    Treatment Notes: unremarkable    Treatment completed as ordered, VSS, see EPIC flowsheet for run data.    Next treatment: Friday 2/18/2022 @1200

## 2022-02-14 ENCOUNTER — MYC MEDICAL ADVICE (OUTPATIENT)
Dept: ONCOLOGY | Facility: CLINIC | Age: 32
End: 2022-02-14
Payer: COMMERCIAL

## 2022-02-15 ENCOUNTER — ONCOLOGY VISIT (OUTPATIENT)
Dept: ONCOLOGY | Facility: CLINIC | Age: 32
End: 2022-02-15
Attending: NURSE PRACTITIONER
Payer: MEDICAID

## 2022-02-15 ENCOUNTER — LAB (OUTPATIENT)
Dept: INFUSION THERAPY | Facility: CLINIC | Age: 32
End: 2022-02-15
Attending: NURSE PRACTITIONER
Payer: MEDICAID

## 2022-02-15 ENCOUNTER — HOSPITAL ENCOUNTER (OUTPATIENT)
Dept: GENERAL RADIOLOGY | Facility: CLINIC | Age: 32
End: 2022-02-15
Attending: NURSE PRACTITIONER
Payer: MEDICAID

## 2022-02-15 ENCOUNTER — MYC MEDICAL ADVICE (OUTPATIENT)
Dept: ONCOLOGY | Facility: CLINIC | Age: 32
End: 2022-02-15
Payer: COMMERCIAL

## 2022-02-15 ENCOUNTER — NURSE TRIAGE (OUTPATIENT)
Dept: NURSING | Facility: CLINIC | Age: 32
End: 2022-02-15

## 2022-02-15 VITALS
HEIGHT: 64 IN | SYSTOLIC BLOOD PRESSURE: 124 MMHG | WEIGHT: 257.4 LBS | OXYGEN SATURATION: 96 % | TEMPERATURE: 98.1 F | HEART RATE: 102 BPM | RESPIRATION RATE: 16 BRPM | DIASTOLIC BLOOD PRESSURE: 84 MMHG | BODY MASS INDEX: 43.94 KG/M2

## 2022-02-15 DIAGNOSIS — D69.3 ACUTE IDIOPATHIC THROMBOCYTOPENIC PURPURA (H): ICD-10-CM

## 2022-02-15 DIAGNOSIS — M31.19 TTP (THROMBOTIC THROMBOCYTOPENIC PURPURA) (H): ICD-10-CM

## 2022-02-15 DIAGNOSIS — D69.3 ACUTE IDIOPATHIC THROMBOCYTOPENIC PURPURA (H): Primary | ICD-10-CM

## 2022-02-15 LAB
ALBUMIN SERPL-MCNC: 4 G/DL (ref 3.4–5)
ALBUMIN UR-MCNC: NEGATIVE MG/DL
ALP SERPL-CCNC: 78 U/L (ref 40–150)
ALT SERPL W P-5'-P-CCNC: 55 U/L (ref 0–50)
ANION GAP SERPL CALCULATED.3IONS-SCNC: 6 MMOL/L (ref 3–14)
APPEARANCE UR: CLEAR
APTT PPP: 27 SECONDS (ref 22–38)
AST SERPL W P-5'-P-CCNC: 32 U/L (ref 0–45)
BASOPHILS # BLD AUTO: 0 10E3/UL (ref 0–0.2)
BASOPHILS NFR BLD AUTO: 0 %
BILIRUB SERPL-MCNC: 0.5 MG/DL (ref 0.2–1.3)
BILIRUB UR QL STRIP: NEGATIVE
BUN SERPL-MCNC: 28 MG/DL (ref 7–30)
CALCIUM SERPL-MCNC: 9.6 MG/DL (ref 8.5–10.1)
CHLORIDE BLD-SCNC: 99 MMOL/L (ref 94–109)
CO2 SERPL-SCNC: 26 MMOL/L (ref 20–32)
COLOR UR AUTO: ABNORMAL
CREAT SERPL-MCNC: 0.84 MG/DL (ref 0.52–1.04)
EOSINOPHIL # BLD AUTO: 0 10E3/UL (ref 0–0.7)
EOSINOPHIL NFR BLD AUTO: 0 %
ERYTHROCYTE [DISTWIDTH] IN BLOOD BY AUTOMATED COUNT: 14.3 % (ref 10–15)
GFR SERPL CREATININE-BSD FRML MDRD: >90 ML/MIN/1.73M2
GLUCOSE BLD-MCNC: 409 MG/DL (ref 70–99)
GLUCOSE UR STRIP-MCNC: >=1000 MG/DL
HCT VFR BLD AUTO: 45.1 % (ref 35–47)
HGB BLD-MCNC: 14.6 G/DL (ref 11.7–15.7)
HGB UR QL STRIP: NEGATIVE
IMM GRANULOCYTES # BLD: 0.1 10E3/UL
IMM GRANULOCYTES NFR BLD: 1 %
INR PPP: 0.97 (ref 0.85–1.15)
KETONES UR STRIP-MCNC: NEGATIVE MG/DL
LDH SERPL L TO P-CCNC: 448 U/L (ref 81–234)
LEUKOCYTE ESTERASE UR QL STRIP: NEGATIVE
LYMPHOCYTES # BLD AUTO: 0.5 10E3/UL (ref 0.8–5.3)
LYMPHOCYTES NFR BLD AUTO: 4 %
MCH RBC QN AUTO: 33.7 PG (ref 26.5–33)
MCHC RBC AUTO-ENTMCNC: 32.4 G/DL (ref 31.5–36.5)
MCV RBC AUTO: 104 FL (ref 78–100)
MONOCYTES # BLD AUTO: 0.3 10E3/UL (ref 0–1.3)
MONOCYTES NFR BLD AUTO: 2 %
NEUTROPHILS # BLD AUTO: 11.9 10E3/UL (ref 1.6–8.3)
NEUTROPHILS NFR BLD AUTO: 93 %
NITRATE UR QL: NEGATIVE
NRBC # BLD AUTO: 0 10E3/UL
NRBC BLD AUTO-RTO: 0 /100
PH UR STRIP: 6 [PH] (ref 5–7)
PLATELET # BLD AUTO: 138 10E3/UL (ref 150–450)
POTASSIUM BLD-SCNC: 5.4 MMOL/L (ref 3.4–5.3)
PROT SERPL-MCNC: 8 G/DL (ref 6.8–8.8)
RBC # BLD AUTO: 4.33 10E6/UL (ref 3.8–5.2)
RBC URINE: 2 /HPF
SARS-COV-2 RNA RESP QL NAA+PROBE: NEGATIVE
SODIUM SERPL-SCNC: 131 MMOL/L (ref 133–144)
SP GR UR STRIP: 1.03 (ref 1–1.03)
SQUAMOUS EPITHELIAL: 1 /HPF
UROBILINOGEN UR STRIP-MCNC: NORMAL MG/DL
WBC # BLD AUTO: 12.8 10E3/UL (ref 4–11)
WBC URINE: <1 /HPF

## 2022-02-15 PROCEDURE — 36592 COLLECT BLOOD FROM PICC: CPT

## 2022-02-15 PROCEDURE — 87635 SARS-COV-2 COVID-19 AMP PRB: CPT | Performed by: NURSE PRACTITIONER

## 2022-02-15 PROCEDURE — 71046 X-RAY EXAM CHEST 2 VIEWS: CPT

## 2022-02-15 PROCEDURE — 87040 BLOOD CULTURE FOR BACTERIA: CPT | Performed by: NURSE PRACTITIONER

## 2022-02-15 PROCEDURE — 81003 URINALYSIS AUTO W/O SCOPE: CPT | Performed by: NURSE PRACTITIONER

## 2022-02-15 PROCEDURE — 99214 OFFICE O/P EST MOD 30 MIN: CPT | Performed by: NURSE PRACTITIONER

## 2022-02-15 PROCEDURE — 85610 PROTHROMBIN TIME: CPT | Performed by: NURSE PRACTITIONER

## 2022-02-15 PROCEDURE — 85730 THROMBOPLASTIN TIME PARTIAL: CPT | Performed by: NURSE PRACTITIONER

## 2022-02-15 PROCEDURE — 83010 ASSAY OF HAPTOGLOBIN QUANT: CPT | Performed by: NURSE PRACTITIONER

## 2022-02-15 PROCEDURE — 83615 LACTATE (LD) (LDH) ENZYME: CPT | Performed by: NURSE PRACTITIONER

## 2022-02-15 PROCEDURE — 85025 COMPLETE CBC W/AUTO DIFF WBC: CPT | Performed by: NURSE PRACTITIONER

## 2022-02-15 PROCEDURE — G0463 HOSPITAL OUTPT CLINIC VISIT: HCPCS

## 2022-02-15 PROCEDURE — 80053 COMPREHEN METABOLIC PANEL: CPT | Performed by: NURSE PRACTITIONER

## 2022-02-15 ASSESSMENT — PAIN SCALES - GENERAL: PAINLEVEL: NO PAIN (0)

## 2022-02-15 ASSESSMENT — MIFFLIN-ST. JEOR: SCORE: 1867.56

## 2022-02-15 NOTE — PROGRESS NOTES
"Oncology/Hematology Visit Note  Feb 15, 2022    Reason for Visit: follow up of acquired TTP    Patient of Dr. Nguyen  -Patient had tooth abscess that acquired  TTP  -Currently getting treatment with weekly  pheresis  and prednisone 50 mg daily  02/09-patient started rituximab.  Patient developed hypersensitivity reaction during infusion only 65ml infused,.  Patient was sent to ER for evaluation      Patient comes here today for follow-up    Interval History:  Patient reports she has been dealing with a lot of anxiety/stress since last week when she had rituximab reaction .  Patient reports she checked online and there is a lot of different side effects listed.  Patient is wondering if she needs to get rituximab or if there are any other options for her  Patient also reports that somebody told her that she has ITP instead of TTP which caused more anxiety  Patient reports her blood sugar was 170 this morning that she skipped her insulin today    Patient reports she called dental offices for tooth extraction she was told there is no openings until June.  Patient denies any dental pain.  Denies swelling of the gums    Patient denies fever chills sweats  Denies bleeding bruising    Review of Systems:  14 point ROS of systems including Constitutional, Eyes, Respiratory, Cardiovascular, Gastroenterology, Genitourinary, Integumentary, Muscularskeletal, Psychiatric were all negative except for pertinent positives noted in my HPI.        Physical Examination:  General: The patient is a pleasant female in no acute distress.  /84   Pulse 102   Temp 98.1  F (36.7  C) (Oral)   Resp 16   Ht 1.626 m (5' 4\")   Wt 116.8 kg (257 lb 6.4 oz)   LMP 01/31/2022 (Approximate)   SpO2 96%   BMI 44.18 kg/m    HEENT: EOMI, PERRL. Sclerae are anicteric. Oral mucosa is pink and moist with no lesions or thrush.   Lymph: Neck is supple with no lymphadenopathy in the cervical or supraclavicular areas.   Heart: Regular rate and rhythm. "   Lungs: Clear to auscultation bilaterally.   GI: Bowel sounds present, soft, nontender with no palpable hepatosplenomegaly or masses.   Extremities: No lower extremity edema noted bilaterally.   Skin: No rashes, petechiae, or bruising noted on exposed skin.    Laboratory Data:  CBC CMP LDH UA INR   results reviewed  Blood cultures, COVID haptoglobin are pending    Assessment and Plan:  This is a 31-year-old female with    Thrombotic thrombocytopenic purpura  Patient of Dr. Nguyen  -Patient had tooth abscess that acquired  TTP  Currently getting treatment with weekly pheresis  And prednisone 50 mg p.o. daily  02/09-started rituximab however developed severe reaction after only 65 mL infused.Patient was sent to ER.  -I informed patient that  hypersensitivity  reaction is common with first rituximab infusion  -I also reviewed the diagnosis with patient which is TTP   -All questions answered-patient feels more better now/less anxious  -Platelet count is 138 up from 117 -  -Patient has appointment with Dr. Nguyen on Thursday to discuss future plan of care/treatment      Steroid-induced hyperglycemia  -Blood sugar is 409-I advised patient to go to ER   Pt Refuses ER visit  -Patient reports this morning her blood sugar was 170 and she skipped insulin  She was instructed by her PCP to take  NPH [id number] units in the morning and 5 units as  needed  -She will get 5 units of NPH insulin  when she gets home and recheck her blood sugar in 1 hour  If blood sugar above 400 then patient reports she will go to ER      Tooth abscess  Hospitalized from 01/12-01/18  Continue with Augmentin  -As per 's note patient is okay to proceed for tooth extraction  Patient called local  dental offices there is no opening until June  I advised patient to call different dental offices besides her local dentists and emphasize  the urgency.         Hyperkalemia  K is .5.4  I Called lab- specimen slightly hemolyzed-most likely falsely  elevated    Hyponatremia  Presumed due to elevated blood sugar  Patient is asymptomatic  We will re check on Thursday     Leukocytosis  Presumed due to steroids  She is at risk for infection I will get blood cultures UA COVID  and chest x-ray      Coping  Anxiety  Patient reports she does not want to see psychiatrist-patient reports she does not want to be on antianxiety medication  I discussed different coping mechanisms.  I discussed appointment with Dr. Lincoln Bernal our clinical psychologist  Patient agrees with appointment with Dr. Bernal       Patient is advised to check temperature frequently in the event of fever chills sweats cough shortness of breath chest pain bleeding patient is advised to call our clinic or go to [facility]     Chart documentation with Dragon Voice recognition Software. Although reviewed after completion, some words and grammatical errors may remain.

## 2022-02-15 NOTE — TELEPHONE ENCOUNTER
Update sent to Antony Franco, ahead of visit today.    [place] Bejarano, TERELLN, RN, OCN  Oncology Care Coordinator  [facility]

## 2022-02-15 NOTE — TELEPHONE ENCOUNTER
Called Ivon and informed her of her appointment today.  Advised her to discuss her symptoms/concerns during that visit.    Informed her that Dr. Nguyen will provide letter for dentist after her appointment with Dr. Nguyen on 2/17.    Advised her to contact her PCP for refill of Accu check test strips.      She verbalized understanding and agreement with this plan.    TERELL CauseyN, RN, OCN  Oncology Care Coordinator  [facility]

## 2022-02-15 NOTE — TELEPHONE ENCOUNTER
"\"I'm an in-patient, out-patient\"  Acute idiopathic thrombocytopenic purpura (H)    Had riTUXimab Maintenance 2/10 but had anaphylactic reaction   Had Apheresis     Pt states she is not sure if her symptoms are from the treatments or from something else    Symptoms started on Friday   Cough  Dizzy  Confusion since Friday  -alert and oriented x3, but states that she is having a hard time with speech and forming sentences.   Runny nose  Congestion  Oxygen 96%  Some difficulty breathing with moving around  Severely anxious   temp not sure if tylenol is suppressing it     Patient request message be sent to Dr. Nguyen and have her call her back. Patient will go to her work to get a test done now. Will call back and let us know the results.     Reason for Disposition    [1] Caller has NON-URGENT medication question about med that PCP prescribed AND [2] triager unable to answer question    Additional Information    Negative: Drug overdose and triager unable to answer question    Negative: Caller requesting information unrelated to medicine    Negative: Caller requesting a prescription for Strep throat and has a positive culture result    Negative: Rash while taking a medication or within 3 days of stopping it    Negative: Immunization reaction suspected    Negative: [1] Asthma and [2] having symptoms of asthma (cough, wheezing, etc.)    Negative: [1] Influenza symptoms AND [2] anti-viral med prescription request, such as Tamiflu    Negative: [1] Symptom of illness (e.g., headache, abdominal pain, earache, vomiting) AND [2] more than mild    Negative: MORE THAN A DOUBLE DOSE of a prescription or over-the-counter (OTC) drug    Negative: [1] DOUBLE DOSE (an extra dose or lesser amount) of over-the-counter (OTC) drug AND [2] any symptoms (e.g., dizziness, nausea, pain, sleepiness)    Negative: [1] DOUBLE DOSE (an extra dose or lesser amount) of prescription drug AND [2] any symptoms (e.g., dizziness, nausea, pain, " "sleepiness)    Negative: Took another person's prescription drug    Negative: [1] DOUBLE DOSE (an extra dose or lesser amount) of prescription drug AND [2] NO symptoms (Exception: a double dose of antibiotics)    Negative: Diabetes drug error or overdose (e.g., took wrong type of insulin or took extra dose)    Negative: [1] Request for URGENT new prescription or refill of \"essential\" medication (i.e., likelihood of harm to patient if not taken) AND [2] triager unable to fill per unit policy    Negative: [1] Prescription not at pharmacy AND [2] was prescribed by PCP recently    Negative: [1] Pharmacy calling with prescription questions AND [2] triager unable to answer question    Negative: [1] Caller has URGENT medication question about med that PCP or specialist prescribed AND [2] triager unable to answer question    Protocols used: MEDICATION QUESTION CALL-A-AH      "

## 2022-02-15 NOTE — PROGRESS NOTES
"Oncology Rooming Note    February 15, 2022 2:17 PM   Ivon Gamble is a 31 year old female who presents for:    No chief complaint on file.    Initial Vitals: LMP 01/31/2022 (Approximate)  Estimated body mass index is 46.94 kg/m  as calculated from the following:    Height as of 2/11/22: 1.626 m (5' 4.02\").    Weight as of 2/11/22: 124.1 kg (273 lb 9.5 oz). There is no height or weight on file to calculate BSA.  Data Unavailable Comment: Data Unavailable   Patient's last menstrual period was 01/31/2022 (approximate).  Allergies reviewed: Yes  Medications reviewed: Yes    Medications: Medication refills not needed today.  Pharmacy name entered into EPIC: [id number] - [place], [place] - [address]    Clinical concerns: Some pain in abdomen while walking.       Rhonda Weston MA                "

## 2022-02-15 NOTE — LETTER
"    2/15/2022         RE: Ivon Gamble  [address]  [place] [address]        Dear Colleague,    Thank you for referring your patient, Ivon Gamble, to the [facility]. Please see a copy of my visit note below.    Oncology Rooming Note    February 15, 2022 2:17 PM   Ivon Gamble is a 31 year old female who presents for:    No chief complaint on file.    Initial Vitals: LMP 01/31/2022 (Approximate)  Estimated body mass index is 46.94 kg/m  as calculated from the following:    Height as of 2/11/22: 1.626 m (5' 4.02\").    Weight as of 2/11/22: 124.1 kg (273 lb 9.5 oz). There is no height or weight on file to calculate BSA.  Data Unavailable Comment: Data Unavailable   Patient's last menstrual period was 01/31/2022 (approximate).  Allergies reviewed: Yes  Medications reviewed: Yes    Medications: Medication refills not needed today.  Pharmacy name entered into EPIC: [id number] - [facility] [address]  [address]    Clinical concerns: Some pain in abdomen while walking.       Rhonda Weston MA                  Oncology/Hematology Visit Note  Feb 15, 2022    Reason for Visit: follow up of acquired TTP    Patient of Dr. Nguyen  -Patient had tooth abscess that has not required TTP  -Currently getting treatment with weekly  pheresis and and prednisone 50 mg daily  02/09-patient started rituximab.  Patient developed hypersensitivity reaction during infusion only 65ml infused,.  Patient was sent to ER for evaluation      Patient comes here today for follow-up    Interval History:  Patient reports she has been dealing with a lot of anxiety/stress since last week when she had rituximab reaction .  Patient reports she checked online and there is a lot of different side effects listed.  Patient is wondering if she needs to get rituximab or if there are any other options for her  Patient also reports that somebody told her that she has ITP instead of TTP which caused more " "anxiety  Patient reports her blood sugar was 170 this morning that she skipped her insulin today    Patient reports she called dental offices for tooth extraction she was told there is no openings until June.  Patient denies any dental pain.  Denies swelling of the gums    Patient denies fever chills sweats  Denies bleeding bruising    Review of Systems:  14 point ROS of systems including Constitutional, Eyes, Respiratory, Cardiovascular, Gastroenterology, Genitourinary, Integumentary, Muscularskeletal, Psychiatric were all negative except for pertinent positives noted in my HPI.        Physical Examination:  General: The patient is a pleasant female in no acute distress.  /84   Pulse 102   Temp 98.1  F (36.7  C) (Oral)   Resp 16   Ht 1.626 m (5' 4\")   Wt 116.8 kg (257 lb 6.4 oz)   LMP 01/31/2022 (Approximate)   SpO2 96%   BMI 44.18 kg/m    HEENT: EOMI, PERRL. Sclerae are anicteric. Oral mucosa is pink and moist with no lesions or thrush.   Lymph: Neck is supple with no lymphadenopathy in the cervical or supraclavicular areas.   Heart: Regular rate and rhythm.   Lungs: Clear to auscultation bilaterally.   GI: Bowel sounds present, soft, nontender with no palpable hepatosplenomegaly or masses.   Extremities: No lower extremity edema noted bilaterally.   Skin: No rashes, petechiae, or bruising noted on exposed skin.    Laboratory Data:  CBC CMP LDH UA INR   results reviewed  Blood cultures, COVID haptoglobin are pending    Assessment and Plan:  This is a 31-year-old female with    Thrombotic thrombocytopenic purpura  Patient of Dr. Nguyen  -Patient had tooth abscess that acquired  TTP  Currently getting treatment with weekly pheresis  And prednisone 50 mg p.o. daily  02/09-started rituximab however developed severe reaction after only 65 mL infused.  Can but she needs to understand-patient was sent to ER.  -I informed patient that some hypersensitivity  reaction is common with first rituximab infusion  -I " also reviewed the diagnosis with patient which is TTP   -All questions answered-patient feels more better now/less anxious  -Patient has appointment with Dr. Nguyen on Thursday to discuss future plan of care      Steroid-induced hyperglycemia  -Blood sugar is 409-I advised patient to go to ER   Refuses ER visit  -Patient reports this morning her blood sugar was 170 and she skipped insulin  She was instructed by her PCP to take  NPH [id number] units in the morning and 5 units if needed  -She will get 5 units of NPH insulin  when she gets home and recheck her blood sugar in 1 hour  If blood sugar above 400 then patient reports she will go to ER      Tooth abscess  Hospitalized from 01/12-01/18  -Currently taking Augmentin  Continue with Augmentin  -As per 's note patient is okay to proceed for tooth extraction  Patient called different dental offices there is no opening until June  I advised patient to call different dental offices no just locally but within the [facility] area and emphasized the urgency.         Hyperkalemia  K is .5.4  I Called lab specimen slightly hemolyzed-most likely falsely elevated    Hyponatremia  Presumed due to elevated blood sugar  Patient is asymptomatic  We will re check on Thursday     Leukocytosis  Presumed due to steroids  She is at risk for infection I will get blood cultures UA COVID  and chest x-ray      Coping  Anxiety  Patient reports she does not want to see psychiatrist-patient reports she does not want to be on antianxiety medication  I discussed different coping mechanisms.  I discussed appointment with Dr. Lincoln Bernal our clinical psychologist  Patient agrees with appointment with Dr. Bernal       Patient is advised to check temperature frequently in the event of fever chills sweats cough shortness of breath chest pain bleeding patient is advised to call our clinic or go to ER      ZAY Rodríguez CNP  [facility]     Chart documentation with  [employer] Voice recognition Software. Although reviewed after completion, some words and grammatical errors may remain.            Again, thank you for allowing me to participate in the care of your patient.        Sincerely,        ZAY Rodríguez CNP

## 2022-02-15 NOTE — TELEPHONE ENCOUNTER
Update sent to Antony Franco, ahead of visit today.  Requested he discuss these symptoms/concerns with patient during visit.    TERELL CauseyN, RN, OCN  Oncology Care Coordinator  [facility]

## 2022-02-15 NOTE — PROGRESS NOTES
Medical Assistant Note:  Ivon Gamble presents today for blood draw.    Patient seen by provider today: Yes: Antony Franco.   present during visit today: Not Applicable.    Concerns: No Concerns.    Procedure:  Lab draw site: RAC, Needle type: BF, Gauge: 23g.    Post Assessment:  Labs drawn without difficulty: Yes.    Discharge Plan:  Departure Mode: Ambulatory.    Face to Face Time: 5.    Hazel Hines, CMA

## 2022-02-15 NOTE — TELEPHONE ENCOUNTER
Update sent to Antony Franco, ahead of visit today.  Requested he discuss these symptoms/concerns with patient during visit.    TERELL CauseyN, RN, OCN  Oncology Care Coordinator  [facility]

## 2022-02-16 LAB — HAPTOGLOB SERPL-MCNC: 121 MG/DL (ref 32–197)

## 2022-02-16 NOTE — PROGRESS NOTES
[facility]    Hematology/Oncology Established Patient Follow-up Note    Treatment Summary:      Today's Date: 2/17/22    Reason for Follow-up: Acquired TTP      HISTORY OF PRESENT ILLNESS: Ivon Gamble is a 31 year old female with PCOS who was hospitalized 1/12/22-1/18/22 for tooth abscess/cellulitis and acquired TTP.      Patient has recently relocated to [place]. While there, she was evaluated by dentistry for tooth ache and pain and was told she would not require extraction. This admission, patient had been feeling unwell and had developed acute facial pain. When she had presented to the hospital, platelets were 11K with LDH >600, haptoglobin undetectable. ANTWON was negative. Coag studies WNL. Peripheral smear had shown significant schistocytosis. She was immediately started on steroids and transferred to [facility] for plasma exchange of which she received 1/13/22-1/18/22. Enteric stool studies returned negative. [id number] returned after she was discharged at <5%. Hepatitis and HIV studies negative.     CT imaging had shown a third left mandibular molar/subperisoteal abscess, left masseter muscle myositis, and facial cellulitis. She was treated with rocephin/flagyl and transitioned to augmentin upon discharge. She underwent I&D on 1/13 and a tooth extraction could not be performed inpatient. She was evaluated by both ENT and OMFS.     On date of discharge, Hb was 9.2 and . LDH had normalized. She has completed augmentin and prednisone therapy. She feels significantly improved, but is still in need of tooth extraction.       INTERIM HISTORY:  Ivon returns to clinic today for follow up. On 2/14/22, the day of scheduled rituximab, patient had no showed for her appointment. She states she awoke that morning with dizziness, coughing, and feeling anxious. T was 99. She states she has not yet involved her family members in her current illness and began talking to them over the weekend. They had  made her second guess her medical treatment. She had called the office for a second opinion and she was provided with contact information for [facility]. I had instructed patient needed to contact [facility] to establish care. She never called.     On Tuesday AM, patient had panic attack and had messaged the office via My Chart with multiple messages. I had asked she be evaluated by Antony Franco on that date, please see his note. CBC returned with PLT 138K. CXR negative. Blood cultures and COVID negative.     In the last 24 hours, patient has had return of tooth pain and facial swelling. She completed augmentin 2-3 days ago. She remains on prednisone 50 mg daily. She feels pain and is having difficulty eating. Stat labs pending. She states she has tried to call several dentists and is unable to get in office.     Patient is taking lasix PRN with potassium. She has not required the use of lasix in over 24 hours.       REVIEW OF SYSTEMS:   A 14 point ROS was reviewed with pertinent positives and negatives in the HPI.       HOME MEDICATIONS:  Current Outpatient Medications   Medication Sig Dispense Refill     acetaminophen (TYLENOL) 500 MG tablet Take 500-1,000 mg by mouth every 6 hours as needed for mild pain       amoxicillin-clavulanate (AUGMENTIN) 875-125 MG tablet Take 1 tablet by mouth every 12 hours (Patient not taking: Reported on 2/15/2022) 14 tablet 0     blood glucose (ACCU-CHEK GUIDE) test strip Use to test blood sugar 1-2 times daily or as directed. 100 strip 3     blood glucose monitoring (SOFTCLIX) lancets Use to test blood sugar 1-2 times daily or as directed. 100 each 3     chlorhexidine (PERIDEX) 0.12 % solution Swish and spit 15 mLs in mouth 2 times daily 473 mL 0     EPINEPHrine (ANY BX GENERIC EQUIV) 0.3 MG/0.3ML injection 2-pack Inject 0.3 mLs (0.3 mg) into the muscle once as needed for anaphylaxis 2 each 0     furosemide (LASIX) 20 MG tablet Take 1 tablet (20 mg) by mouth 2 times daily 60 tablet 1  "    insulin NPH (HUMULIN N KWIKPEN) 100 UNIT/ML injection Inject 15 Units Subcutaneous every morning (Patient taking differently: Inject 15 Units Subcutaneous every morning Pt taking as needed) 15 mL 1     insulin  UNIT/ML vial 5 units sub Q in morning or as directed 10 mL 2     insulin syringe-needle U-100 (31G X 5/16\" 0.5 ML) 31G X 5/16\" 0.5 ML miscellaneous Use one syringes daily or as directed. 50 each 3     pantoprazole (PROTONIX) 40 MG EC tablet Take 1 tablet (40 mg) by mouth every morning (before breakfast) While on high dose steroids. 30 tablet 0     polyethylene glycol (MIRALAX) 17 GM/Dose powder Take 17 g by mouth 2 times daily as needed for constipation 510 g 0     potassium chloride ER (KLOR-CON M) 20 MEQ CR tablet Take 1 tablet (20 mEq) by mouth 2 times daily 60 tablet 1     sennosides (SENOKOT) 8.6 MG tablet Take 1-2 tablets by mouth 2 times daily as needed for constipation 60 tablet 0         ALLERGIES:  Allergies   Allergen Reactions     Rituximab Anaphylaxis         PAST MEDICAL HISTORY:  Past Medical History:   Diagnosis Date     TTP (thrombotic thrombocytopenic purpura)          PAST SURGICAL HISTORY:  Past Surgical History:   Procedure Laterality Date     IR CVC NON TUNNEL PLACEMENT  2022     IR CVC TUNNEL PLACEMENT > 5 YRS OF AGE  2022         SOCIAL HISTORY:  Social History     Socioeconomic History     Marital status: Single     Spouse name: Not on file     Number of children: Not on file     Years of education: Not on file     Highest education level: Not on file   Occupational History     Not on file   Tobacco Use     Smoking status: Former Smoker     Types: Cigarettes     Quit date: 2022     Years since quittin.1     Smokeless tobacco: Never Used   Vaping Use     Vaping Use: Never used   Substance and Sexual Activity     Alcohol use: Yes     Comment: occassionally      Drug use: Never     Sexual activity: Yes     Partners: Male   Other Topics Concern     " Parent/sibling w/ CABG, MI or angioplasty before 65F 55M? 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     Social Determinants of Health     Financial Resource Strain: Not on file   Food Insecurity: Not on file   Transportation Needs: Not on file   Physical Activity: Not on file   Stress: Not on file   Social Connections: Not on file   Intimate Partner Violence: Not At Risk     Fear of Current or Ex-Partner: No     Emotionally Abused: No     Physically Abused: No     Sexually Abused: No   Housing Stability: Not on file         FAMILY HISTORY:  Family History   Problem Relation Age of Onset     Diabetes Mother      Liver Disease Father          PHYSICAL EXAM:  Vital signs:  /75   Pulse 84   Temp 98.5  F (36.9  C) (Oral)   Resp 18   Wt 118.2 kg (260 lb 9.6 oz)   LMP 2022 (Approximate)   SpO2 98%   BMI 44.73 kg/m     ECO  GENERAL/CONSTITUTIONAL: Acutely ill appearing.  EYES: Pupils are equal, round, and react to light and accommodation. Extraocular movements intact.  No scleral icterus.  ENT/MOUTH: Patient has swelling of the neck and chin. She is unable to open her mouth.  LYMPH: No anterior cervical, posterior cervical, supraclavicular, axillary or inguinal adenopathy.   RESPIRATORY: Clear to auscultation bilaterally. No crackles or wheezing.   CARDIOVASCULAR: Regular rate and rhythm without murmurs, gallops, or rubs.  GASTROINTESTINAL: No hepatosplenomegaly, masses, or tenderness. The patient has normal bowel sounds. No guarding.  No distention.  MUSCULOSKELETAL: Warm and well-perfused, no cyanosis, clubbing. Plus pitting edema up to thigh.   NEUROLOGIC: Cranial nerves II-XII are intact. Alert, oriented, answers questions appropriately.  INTEGUMENTARY: No rashes or jaundice.  GAIT: Steady, does not use assistive device      LABS:  CBC RESULTS:   Recent Labs   Lab Test 02/15/22  1458   WBC 12.8*   RBC 4.33   HGB 14.6   HCT 45.1   *    MCH 33.7*   MCHC 32.4   RDW 14.3   *        Ref. Range 2/15/2022 14:58   INR Latest Ref Range: 0.85 - 1.15  0.97   PTT Latest Ref Range: 22 - 38 Seconds 27     Recent Labs   Lab Test 02/15/22  1458 02/11/22  1250   * 135   POTASSIUM 5.4* 4.2   CHLORIDE 99 102   CO2 26 27   ANIONGAP 6 6   * 282*   BUN 28 26   CR 0.84 0.70   JOSELUIS 9.6 8.7      Ref. Range 2/15/2022 14:58   Anion Gap Latest Ref Range: 3 - 14 mmol/L 6   Albumin Latest Ref Range: 3.4 - 5.0 g/dL 4.0   Protein Total Latest Ref Range: 6.8 - 8.8 g/dL 8.0   Bilirubin Total Latest Ref Range: 0.2 - 1.3 mg/dL 0.5   Alkaline Phosphatase Latest Ref Range: 40 - 150 U/L 78   ALT Latest Ref Range: 0 - 50 U/L 55 (H)   AST Latest Ref Range: 0 - 45 U/L 32   Lactate Dehydrogenase Latest Ref Range: 81 - 234 U/L 448 (H)         PATHOLOGY:  Neely Result 2/9/22 SEE NOTE Abnormal     Comment:     Test                                Result        Flag  Unit  RefValue   ----------------------------------------------------------------------   LYULBN55 Activity and Inhibitor Profile     FLYQEQ08 Activity Assay           61             L    %     >/=70                 DRYFJJ90 Activity Assay: 21%  CMYOYM22 Inhibitor Screen: positive  OEPXVB51 Inhibitor [facility] Titer: 1.1              Ref. Range 1/12/2022 01:07 1/12/2022 01:40   Hepatitis C Antibody Latest Ref Range: Nonreactive  Nonreactive     HIV Antigen Antibody Combo Latest Ref Range: Nonreactive    Nonreactive   HIV-1/HIV-2 Antibody Latest Ref Range: Non Reactive    Non Reactive           Ref. Range 1/12/2022 01:07   Ferritin Latest Ref Range: 12 - 150 ng/mL 590 (H)        Ref. Range 1/12/2022 14:13   Folate Latest Ref Range: >=5.4 ng/mL 12.7   Iron Latest Ref Range: 35 - 180 ug/dL 125   Iron Binding Cap Latest Ref Range: 240 - 430 ug/dL 244   Iron Saturation Index Latest Ref Range: 15 - 46 % 51 (H)   Lactate Dehydrogenase Latest Ref Range: 81 - 234 U/L 591 (H)   Vitamin B12 Latest Ref Range: 193 - 986  pg/mL 539        Ref. Range 1/12/2022 14:13 1/13/2022 07:02   Hep B Surface Agn Latest Ref Range: Nonreactive  Nonreactive     Hepatitis A Antibody IgG Latest Ref Range: Nonreactive  Nonreactive     Hepatitis A IgM Kori Latest Ref Range: Nonreactive  Nonreactive     Hepatitis B Core Kori Latest Ref Range: Nonreactive    Nonreactive   Hepatitis B Surface Antibody Latest Ref Range: <8.00 m[IU]/mL 810.24 (H)     Hepatitis C Antibody Latest Ref Range: Nonreactive  Nonreactive        PATHOLOGY:  Final Diagnosis 1/13/21:   Peripheral blood, morphology:  - Marked thrombocytopenia.  - Moderate anemia, normocytic and normochromic, with RBC fragments (including rare schistocytes).  - WBC subsets quantitatively within normal limits.         IMAGING:  CT A/P 1/12/22:  IMPRESSION:   1.  Mild mural thickening of the duodenum and proximal jejunum, correlate for infectious or inflammatory enteritis.  2.  Normal appendix. No obstruction, colitis, or diverticulitis.     CT facial bones 1/13/22:  IMPRESSION:  1. Periapical abscess of the third left mandibular molar with  associated developing subperiosteal abscess and left masseter muscle  myositis and overlying left facial cellulitis, as described. Recommend  dental consultation.  2. No facial bone fracture.    EXAM: XR CHEST 2 VW  DATE/TIME: 2/15/2022                                                                    IMPRESSION: Right IJ dialysis catheter again noted. Lungs are clear. No signs of pneumonia or failure. Heart and pulmonary vascularity are normal.        ASSESSMENT/PLAN:  Ivon Gamble is a 31 year old female with PCOS who was hospitalized 1/12/22-1/18/22 for tooth abscess/cellulitis and acquired TTP.      1) Acute thrombocytopenia, concerning for TTP/MAHA (PLASMIC score 6; [id number] <5%)  -Iron studies, B12/folate WNL.  -Enteric stool studies negative.  -HIV and Hepatitis studies negative.  -s/p catheter placement 1/13/22 AM and plasma exchange 1/13/22-1/18/22. Catheter  has been removed.  -Patient had relapse with platelets decreasing to 82K. Catheter replaced and patient received 8x daily sessions through 2/4/22. She last received pheresis on 2/7/22, 2/11/22. Was planned to begin rituximab 2/8/22 (see progress notes from that date).   -I have discussed with Nephrology (Dr. Charles) the dates of rituximab. She will receive pheresis on 2/11. Next week, will move from every other day to two times. Then move to once weekly x 4 weeks. Patient should not receive pheresis on the date of rituximab treatment. I have informed the patient of her dates of rituximab treatment and I have asked her to reschedule pheresis for later in the week if she is scheduled on any dates of rituximab treatment.   -Decrease prednisone to 25 Mg daily.   -Complete augmentin treatment. Patient is encouraged to follow up with dentistry for tooth extraction. Her platelets are >100K and she should be okay for extraction.  -Discussed with patient and reviewed her clinical course from the beginning of her hospital presentation to yesterday's events. We discussed her [id number] has recovered to 71%, but she has not yet had clinical recover with PLT still 120K. Additionally, she still has underlying tooth abscess. I informed patient we can increase premedications and split rituximab dosing into 2 days to treat at a slow rate of 50 ml/hour. Additionally, typically bronchospasm is not seen with subsequent rituximab treatments. Patient would elect to continue on with treatment as she has significant fear of relapse. Discussed with pharmacy and increase premedications and split treatment into 2 days. Patient is planned for therapy on 2/14, 2/15, 2/21, 2/22, 2/28 and 2/29. Nephrology has been updated.    -Patient did not show for her appointment on 2/14/22. She had called the office stating she was seeking a second opinion. She has not yet contacted [facility] to establish care. She states her family made her second guess her  care.   -As I have discussed with the patient, she has had recovery of her [id number], but platelets are slow to respond. As of 2/15/22, PLTs were 138K.   -I have had multiple discussions with the patient my concern for relapse, given her rapid relapse with her first discharge and continued tooth abscess. She continues on once weekly pheresis.  -She may require inpatient admission. We have discussed continuing rituximab vs caplacizumab. However, patient may require inpatient admission due to her current clinical presentation.     2) Acute third left mandibular molar/subperiosteal abscess, left masseter muscle myositis, and facial cellulitis, improved  -s/p rocephin/flagyl and augmentin.  -Patient in need of dentistry for tooth extraction (was scheduled for 1/28, rescheduled for 3/1/22).  -See discussion above.      3) Hyperglycemia.   -She is instructed to follow up with PCP and prednisone dose is decreasing and she will require titration of insulin.     4) Fluid retention, improved.  -Lasix PRN.     5) Awaiting labs. Will contact [facility]. Possible admission.           Complexity: High.           Ashley Nguyen,   Hematology/Oncology  [facility]

## 2022-02-17 ENCOUNTER — TELEPHONE (OUTPATIENT)
Dept: ONCOLOGY | Facility: CLINIC | Age: 32
End: 2022-02-17

## 2022-02-17 ENCOUNTER — ONCOLOGY VISIT (OUTPATIENT)
Dept: ONCOLOGY | Facility: CLINIC | Age: 32
End: 2022-02-17
Attending: INTERNAL MEDICINE
Payer: MEDICAID

## 2022-02-17 ENCOUNTER — HOSPITAL ENCOUNTER (EMERGENCY)
Facility: CLINIC | Age: 32
Discharge: HOME | End: 2022-02-17
Attending: EMERGENCY MEDICINE | Admitting: EMERGENCY MEDICINE
Payer: MEDICAID

## 2022-02-17 VITALS
DIASTOLIC BLOOD PRESSURE: 75 MMHG | SYSTOLIC BLOOD PRESSURE: 107 MMHG | BODY MASS INDEX: 44.73 KG/M2 | OXYGEN SATURATION: 98 % | RESPIRATION RATE: 18 BRPM | HEART RATE: 84 BPM | TEMPERATURE: 98.5 F | WEIGHT: 260.6 LBS

## 2022-02-17 VITALS
RESPIRATION RATE: 18 BRPM | HEIGHT: 64 IN | DIASTOLIC BLOOD PRESSURE: 81 MMHG | OXYGEN SATURATION: 98 % | BODY MASS INDEX: 44.05 KG/M2 | SYSTOLIC BLOOD PRESSURE: 137 MMHG | TEMPERATURE: 96.9 F | WEIGHT: 258 LBS | HEART RATE: 95 BPM

## 2022-02-17 DIAGNOSIS — K04.7 DENTAL ABSCESS: ICD-10-CM

## 2022-02-17 DIAGNOSIS — M31.19 ACQUIRED TTP (H): ICD-10-CM

## 2022-02-17 DIAGNOSIS — K05.30 PERICORONITIS: ICD-10-CM

## 2022-02-17 DIAGNOSIS — D69.3 ACUTE IDIOPATHIC THROMBOCYTOPENIC PURPURA (H): ICD-10-CM

## 2022-02-17 LAB
ALBUMIN SERPL-MCNC: 3.7 G/DL (ref 3.4–5)
ALP SERPL-CCNC: 66 U/L (ref 40–150)
ALT SERPL W P-5'-P-CCNC: 53 U/L (ref 0–50)
ANION GAP SERPL CALCULATED.3IONS-SCNC: 4 MMOL/L (ref 3–14)
APTT PPP: 26 SECONDS (ref 22–38)
AST SERPL W P-5'-P-CCNC: 15 U/L (ref 0–45)
BASOPHILS # BLD AUTO: 0 10E3/UL (ref 0–0.2)
BASOPHILS NFR BLD AUTO: 0 %
BILIRUB SERPL-MCNC: 0.6 MG/DL (ref 0.2–1.3)
BUN SERPL-MCNC: 23 MG/DL (ref 7–30)
CALCIUM SERPL-MCNC: 8.7 MG/DL (ref 8.5–10.1)
CHLORIDE BLD-SCNC: 104 MMOL/L (ref 94–109)
CO2 SERPL-SCNC: 30 MMOL/L (ref 20–32)
CREAT SERPL-MCNC: 0.67 MG/DL (ref 0.52–1.04)
EOSINOPHIL # BLD AUTO: 0.1 10E3/UL (ref 0–0.7)
EOSINOPHIL NFR BLD AUTO: 1 %
ERYTHROCYTE [DISTWIDTH] IN BLOOD BY AUTOMATED COUNT: 14.4 % (ref 10–15)
FIBRINOGEN PPP-MCNC: 229 MG/DL (ref 170–490)
GFR SERPL CREATININE-BSD FRML MDRD: >90 ML/MIN/1.73M2
GLUCOSE BLD-MCNC: 155 MG/DL (ref 70–99)
HCT VFR BLD AUTO: 43.8 % (ref 35–47)
HGB BLD-MCNC: 13.7 G/DL (ref 11.7–15.7)
IMM GRANULOCYTES # BLD: 0.1 10E3/UL
IMM GRANULOCYTES NFR BLD: 1 %
INR PPP: 0.95 (ref 0.85–1.15)
LDH SERPL L TO P-CCNC: 285 U/L (ref 81–234)
LYMPHOCYTES # BLD AUTO: 1.8 10E3/UL (ref 0.8–5.3)
LYMPHOCYTES NFR BLD AUTO: 18 %
MCH RBC QN AUTO: 32.9 PG (ref 26.5–33)
MCHC RBC AUTO-ENTMCNC: 31.3 G/DL (ref 31.5–36.5)
MCV RBC AUTO: 105 FL (ref 78–100)
MONOCYTES # BLD AUTO: 0.9 10E3/UL (ref 0–1.3)
MONOCYTES NFR BLD AUTO: 9 %
NEUTROPHILS # BLD AUTO: 7.1 10E3/UL (ref 1.6–8.3)
NEUTROPHILS NFR BLD AUTO: 71 %
NRBC # BLD AUTO: 0 10E3/UL
NRBC BLD AUTO-RTO: 0 /100
PLATELET # BLD AUTO: 112 10E3/UL (ref 150–450)
POTASSIUM BLD-SCNC: 3.8 MMOL/L (ref 3.4–5.3)
PROT SERPL-MCNC: 6.8 G/DL (ref 6.8–8.8)
RBC # BLD AUTO: 4.17 10E6/UL (ref 3.8–5.2)
SODIUM SERPL-SCNC: 138 MMOL/L (ref 133–144)
WBC # BLD AUTO: 10 10E3/UL (ref 4–11)

## 2022-02-17 PROCEDURE — 96365 THER/PROPH/DIAG IV INF INIT: CPT

## 2022-02-17 PROCEDURE — 36592 COLLECT BLOOD FROM PICC: CPT | Performed by: INTERNAL MEDICINE

## 2022-02-17 PROCEDURE — 83615 LACTATE (LD) (LDH) ENZYME: CPT | Performed by: INTERNAL MEDICINE

## 2022-02-17 PROCEDURE — 85610 PROTHROMBIN TIME: CPT | Performed by: INTERNAL MEDICINE

## 2022-02-17 PROCEDURE — 83010 ASSAY OF HAPTOGLOBIN QUANT: CPT | Performed by: INTERNAL MEDICINE

## 2022-02-17 PROCEDURE — 96375 TX/PRO/DX INJ NEW DRUG ADDON: CPT

## 2022-02-17 PROCEDURE — 99284 EMERGENCY DEPT VISIT MOD MDM: CPT | Mod: 25

## 2022-02-17 PROCEDURE — 250N000011 HC RX IP 250 OP 636: Performed by: EMERGENCY MEDICINE

## 2022-02-17 PROCEDURE — 85384 FIBRINOGEN ACTIVITY: CPT | Performed by: INTERNAL MEDICINE

## 2022-02-17 PROCEDURE — 80053 COMPREHEN METABOLIC PANEL: CPT | Performed by: INTERNAL MEDICINE

## 2022-02-17 PROCEDURE — G0463 HOSPITAL OUTPT CLINIC VISIT: HCPCS

## 2022-02-17 PROCEDURE — 85730 THROMBOPLASTIN TIME PARTIAL: CPT | Performed by: INTERNAL MEDICINE

## 2022-02-17 PROCEDURE — 250N000013 HC RX MED GY IP 250 OP 250 PS 637: Performed by: EMERGENCY MEDICINE

## 2022-02-17 PROCEDURE — 99215 OFFICE O/P EST HI 40 MIN: CPT | Performed by: INTERNAL MEDICINE

## 2022-02-17 PROCEDURE — 85025 COMPLETE CBC W/AUTO DIFF WBC: CPT | Performed by: INTERNAL MEDICINE

## 2022-02-17 RX ORDER — AMPICILLIN AND SULBACTAM 2; 1 G/1; G/1
3 INJECTION, POWDER, FOR SOLUTION INTRAMUSCULAR; INTRAVENOUS ONCE
Status: COMPLETED | OUTPATIENT
Start: 2022-02-17 | End: 2022-02-17

## 2022-02-17 RX ORDER — ACETAMINOPHEN 500 MG
1000 TABLET ORAL ONCE
Status: COMPLETED | OUTPATIENT
Start: 2022-02-17 | End: 2022-02-17

## 2022-02-17 RX ORDER — ALBUTEROL SULFATE 90 UG/1
AEROSOL, METERED RESPIRATORY (INHALATION)
COMMUNITY
Start: 2021-11-15 | End: 2023-08-03

## 2022-02-17 RX ORDER — PREDNISONE 50 MG/1
15 TABLET ORAL DAILY
COMMUNITY
End: 2022-03-31

## 2022-02-17 RX ORDER — DEXAMETHASONE SODIUM PHOSPHATE 4 MG/ML
10 INJECTION, SOLUTION INTRA-ARTICULAR; INTRALESIONAL; INTRAMUSCULAR; INTRAVENOUS; SOFT TISSUE ONCE
Status: COMPLETED | OUTPATIENT
Start: 2022-02-17 | End: 2022-02-17

## 2022-02-17 RX ADMIN — DEXAMETHASONE SODIUM PHOSPHATE 10 MG: 4 INJECTION, SOLUTION INTRAMUSCULAR; INTRAVENOUS at 16:17

## 2022-02-17 RX ADMIN — ACETAMINOPHEN 1000 MG: 500 TABLET ORAL at 16:24

## 2022-02-17 RX ADMIN — AMPICILLIN SODIUM AND SULBACTAM SODIUM 3 G: 2; 1 INJECTION, POWDER, FOR SOLUTION INTRAMUSCULAR; INTRAVENOUS at 16:20

## 2022-02-17 ASSESSMENT — PAIN SCALES - GENERAL: PAINLEVEL: SEVERE PAIN (7)

## 2022-02-17 NOTE — DISCHARGE INSTRUCTIONS
Take Augmentin  Continue your prednisone  If you develop increasing pain, trouble swallowing, drooling, or difficulty opening your mouth, go to the [facility] emergency department, as this is where your oncologist has been trying to arrange for your definitive care.

## 2022-02-17 NOTE — LETTER
2/17/2022         RE: Ivon Gamble  [address]  [facility] [address]        Dear Colleague,    Thank you for referring your patient, Ivon Gamble, to the [facility]. Please see a copy of my visit note below.    [facility]    Hematology/Oncology Established Patient Follow-up Note    Treatment Summary:      Today's Date: 2/17/22    Reason for Follow-up: Acquired TTP      HISTORY OF PRESENT ILLNESS: Ivon Gamble is a 31 year old female with PCOS who was hospitalized 1/12/22-1/18/22 for tooth abscess/cellulitis and acquired TTP.      Patient has recently relocated to [place]. While there, she was evaluated by dentistry for tooth ache and pain and was told she would not require extraction. This admission, patient had been feeling unwell and had developed acute facial pain. When she had presented to the hospital, platelets were 11K with LDH >600, haptoglobin undetectable. ANTWON was negative. Coag studies WNL. Peripheral smear had shown significant schistocytosis. She was immediately started on steroids and transferred to [facility] for plasma exchange of which she received 1/13/22-1/18/22. Enteric stool studies returned negative. [id number] returned after she was discharged at <5%. Hepatitis and HIV studies negative.     CT imaging had shown a third left mandibular molar/subperisoteal abscess, left masseter muscle myositis, and facial cellulitis. She was treated with rocephin/flagyl and transitioned to augmentin upon discharge. She underwent I&D on 1/13 and a tooth extraction could not be performed inpatient. She was evaluated by both ENT and OMFS.     On date of discharge, Hb was 9.2 and . LDH had normalized. She has completed augmentin and prednisone therapy. She feels significantly improved, but is still in need of tooth extraction.       INTERIM HISTORY:  Ivon returns to clinic today for follow up. On 2/14/22, the day of scheduled rituximab, patient  had no showed for her appointment. She states she awoke that morning with dizziness, coughing, and feeling anxious. T was 99. She states she has not yet involved her family members in her current illness and began talking to them over the weekend. They had made her second guess her medical treatment. She had called the office for a second opinion and she was provided with contact information for [facility]. I had instructed patient needed to contact [facility] to establish care. She never called.     On Tuesday AM, patient had panic attack and had messaged the office via My Chart with multiple messages. I had asked she be evaluated by Antony Franco on that date, please see his note. CBC returned with PLT 138K. CXR negative. Blood cultures and COVID negative.     In the last 24 hours, patient has had return of tooth pain and facial swelling. She completed augmentin 2-3 days ago. She remains on prednisone 50 mg daily. She feels pain and is having difficulty eating. Stat labs pending. She states she has tried to call several dentists and is unable to get in office.     Patient is taking lasix PRN with potassium. She has not required the use of lasix in over 24 hours.       REVIEW OF SYSTEMS:   A 14 point ROS was reviewed with pertinent positives and negatives in the HPI.       HOME MEDICATIONS:  Current Outpatient Medications   Medication Sig Dispense Refill     acetaminophen (TYLENOL) 500 MG tablet Take 500-1,000 mg by mouth every 6 hours as needed for mild pain       amoxicillin-clavulanate (AUGMENTIN) 875-125 MG tablet Take 1 tablet by mouth every 12 hours (Patient not taking: Reported on 2/15/2022) 14 tablet 0     blood glucose (ACCU-CHEK GUIDE) test strip Use to test blood sugar 1-2 times daily or as directed. 100 strip 3     blood glucose monitoring (SOFTCLIX) lancets Use to test blood sugar 1-2 times daily or as directed. 100 each 3     chlorhexidine (PERIDEX) 0.12 % solution Swish and spit 15 mLs in mouth 2 times  "daily 473 mL 0     EPINEPHrine (ANY BX GENERIC EQUIV) 0.3 MG/0.3ML injection 2-pack Inject 0.3 mLs (0.3 mg) into the muscle once as needed for anaphylaxis 2 each 0     furosemide (LASIX) 20 MG tablet Take 1 tablet (20 mg) by mouth 2 times daily 60 tablet 1     insulin NPH (HUMULIN N KWIKPEN) 100 UNIT/ML injection Inject 15 Units Subcutaneous every morning (Patient taking differently: Inject 15 Units Subcutaneous every morning Pt taking as needed) 15 mL 1     insulin  UNIT/ML vial 5 units sub Q in morning or as directed 10 mL 2     insulin syringe-needle U-100 (31G X 5/16\" 0.5 ML) 31G X 5/16\" 0.5 ML miscellaneous Use one syringes daily or as directed. 50 each 3     pantoprazole (PROTONIX) 40 MG EC tablet Take 1 tablet (40 mg) by mouth every morning (before breakfast) While on high dose steroids. 30 tablet 0     polyethylene glycol (MIRALAX) 17 GM/Dose powder Take 17 g by mouth 2 times daily as needed for constipation 510 g 0     potassium chloride ER (KLOR-CON M) 20 MEQ CR tablet Take 1 tablet (20 mEq) by mouth 2 times daily 60 tablet 1     sennosides (SENOKOT) 8.6 MG tablet Take 1-2 tablets by mouth 2 times daily as needed for constipation 60 tablet 0         ALLERGIES:  Allergies   Allergen Reactions     Rituximab Anaphylaxis         PAST MEDICAL HISTORY:  Past Medical History:   Diagnosis Date     TTP (thrombotic thrombocytopenic purpura)          PAST SURGICAL HISTORY:  Past Surgical History:   Procedure Laterality Date     IR CVC NON TUNNEL PLACEMENT  2022     IR CVC TUNNEL PLACEMENT > 5 YRS OF AGE  2022         SOCIAL HISTORY:  Social History     Socioeconomic History     Marital status: Single     Spouse name: Not on file     Number of children: Not on file     Years of education: Not on file     Highest education level: Not on file   Occupational History     Not on file   Tobacco Use     Smoking status: Former Smoker     Types: Cigarettes     Quit date: 2022     Years since quittin.1 "     Smokeless tobacco: Never Used   Vaping Use     Vaping Use: Never used   Substance and Sexual Activity     Alcohol use: Yes     Comment: occassionally      Drug use: Never     Sexual activity: Yes     Partners: Male   Other Topics Concern     Parent/sibling w/ CABG, MI or angioplasty before 65F 55M? 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     Social Determinants of Health     Financial Resource Strain: Not on file   Food Insecurity: Not on file   Transportation Needs: Not on file   Physical Activity: Not on file   Stress: Not on file   Social Connections: Not on file   Intimate Partner Violence: Not At Risk     Fear of Current or Ex-Partner: No     Emotionally Abused: No     Physically Abused: No     Sexually Abused: No   Housing Stability: Not on file         FAMILY HISTORY:  Family History   Problem Relation Age of Onset     Diabetes Mother      Liver Disease Father          PHYSICAL EXAM:  Vital signs:  /75   Pulse 84   Temp 98.5  F (36.9  C) (Oral)   Resp 18   Wt 118.2 kg (260 lb 9.6 oz)   LMP 2022 (Approximate)   SpO2 98%   BMI 44.73 kg/m     ECO  GENERAL/CONSTITUTIONAL: Acutely ill appearing.  EYES: Pupils are equal, round, and react to light and accommodation. Extraocular movements intact.  No scleral icterus.  ENT/MOUTH: Patient has swelling of the neck and chin. She is unable to open her mouth.  LYMPH: No anterior cervical, posterior cervical, supraclavicular, axillary or inguinal adenopathy.   RESPIRATORY: Clear to auscultation bilaterally. No crackles or wheezing.   CARDIOVASCULAR: Regular rate and rhythm without murmurs, gallops, or rubs.  GASTROINTESTINAL: No hepatosplenomegaly, masses, or tenderness. The patient has normal bowel sounds. No guarding.  No distention.  MUSCULOSKELETAL: Warm and well-perfused, no cyanosis, clubbing. Plus pitting edema up to thigh.   NEUROLOGIC: Cranial nerves II-XII are intact. Alert,  oriented, answers questions appropriately.  INTEGUMENTARY: No rashes or jaundice.  GAIT: Steady, does not use assistive device      LABS:  CBC RESULTS:   Recent Labs   Lab Test 02/15/22  1458   WBC 12.8*   RBC 4.33   HGB 14.6   HCT 45.1   *   MCH 33.7*   MCHC 32.4   RDW 14.3   *        Ref. Range 2/15/2022 14:58   INR Latest Ref Range: 0.85 - 1.15  0.97   PTT Latest Ref Range: 22 - 38 Seconds 27     Recent Labs   Lab Test 02/15/22  1458 02/11/22  1250   * 135   POTASSIUM 5.4* 4.2   CHLORIDE 99 102   CO2 26 27   ANIONGAP 6 6   * 282*   BUN 28 26   CR 0.84 0.70   JOSELUIS 9.6 8.7      Ref. Range 2/15/2022 14:58   Anion Gap Latest Ref Range: 3 - 14 mmol/L 6   Albumin Latest Ref Range: 3.4 - 5.0 g/dL 4.0   Protein Total Latest Ref Range: 6.8 - 8.8 g/dL 8.0   Bilirubin Total Latest Ref Range: 0.2 - 1.3 mg/dL 0.5   Alkaline Phosphatase Latest Ref Range: 40 - 150 U/L 78   ALT Latest Ref Range: 0 - 50 U/L 55 (H)   AST Latest Ref Range: 0 - 45 U/L 32   Lactate Dehydrogenase Latest Ref Range: 81 - 234 U/L 448 (H)         PATHOLOGY:  Neely Result 2/9/22 SEE NOTE Abnormal     Comment:     Test                                Result        Flag  Unit  RefValue   ----------------------------------------------------------------------   MCPQFN68 Activity and Inhibitor Profile     [id number] Activity Assay           61             L    %     >/=70                 DSHARD47 Activity Assay: 21%  BSTDEZ96 Inhibitor Screen: positive  PDDXMA80 Inhibitor Austin Titer: 1.1              Ref. Range 1/12/2022 01:07 1/12/2022 01:40   Hepatitis C Antibody Latest Ref Range: Nonreactive  Nonreactive     HIV Antigen Antibody Combo Latest Ref Range: Nonreactive    Nonreactive   HIV-1/HIV-2 Antibody Latest Ref Range: Non Reactive    Non Reactive           Ref. Range 1/12/2022 01:07   Ferritin Latest Ref Range: 12 - 150 ng/mL 590 (H)        Ref. Range 1/12/2022 14:13   Folate Latest Ref Range: >=5.4 ng/mL 12.7   Iron Latest Ref  Range: 35 - 180 ug/dL 125   Iron Binding Cap Latest Ref Range: 240 - 430 ug/dL 244   Iron Saturation Index Latest Ref Range: 15 - 46 % 51 (H)   Lactate Dehydrogenase Latest Ref Range: 81 - 234 U/L 591 (H)   Vitamin B12 Latest Ref Range: 193 - 986 pg/mL 539        Ref. Range 1/12/2022 14:13 1/13/2022 07:02   Hep B Surface Agn Latest Ref Range: Nonreactive  Nonreactive     Hepatitis A Antibody IgG Latest Ref Range: Nonreactive  Nonreactive     Hepatitis A IgM Kori Latest Ref Range: Nonreactive  Nonreactive     Hepatitis B Core Kori Latest Ref Range: Nonreactive    Nonreactive   Hepatitis B Surface Antibody Latest Ref Range: <8.00 m[IU]/mL 810.24 (H)     Hepatitis C Antibody Latest Ref Range: Nonreactive  Nonreactive        PATHOLOGY:  Final Diagnosis 1/13/21:   Peripheral blood, morphology:  - Marked thrombocytopenia.  - Moderate anemia, normocytic and normochromic, with RBC fragments (including rare schistocytes).  - WBC subsets quantitatively within normal limits.         IMAGING:  CT A/P 1/12/22:  IMPRESSION:   1.  Mild mural thickening of the duodenum and proximal jejunum, correlate for infectious or inflammatory enteritis.  2.  Normal appendix. No obstruction, colitis, or diverticulitis.     CT facial bones 1/13/22:  IMPRESSION:  1. Periapical abscess of the third left mandibular molar with  associated developing subperiosteal abscess and left masseter muscle  myositis and overlying left facial cellulitis, as described. Recommend  dental consultation.  2. No facial bone fracture.    EXAM: XR CHEST 2 VW  DATE/TIME: 2/15/2022                                                                    IMPRESSION: Right IJ dialysis catheter again noted. Lungs are clear. No signs of pneumonia or failure. Heart and pulmonary vascularity are normal.        ASSESSMENT/PLAN:  Ivon Gamble is a 31 year old female with PCOS who was hospitalized 1/12/22-1/18/22 for tooth abscess/cellulitis and acquired TTP.      1) Acute  thrombocytopenia, concerning for TTP/MAHA (PLASMIC score 6; GQOCKI95 <5%)  -Iron studies, B12/folate WNL.  -Enteric stool studies negative.  -HIV and Hepatitis studies negative.  -s/p catheter placement 1/13/22 AM and plasma exchange 1/13/22-1/18/22. Catheter has been removed.  -Patient had relapse with platelets decreasing to 82K. Catheter replaced and patient received 8x daily sessions through 2/4/22. She last received pheresis on 2/7/22, 2/11/22. Was planned to begin rituximab 2/8/22 (see progress notes from that date).   -I have discussed with Nephrology (Dr. Charles) the dates of rituximab. She will receive pheresis on 2/11. Next week, will move from every other day to two times. Then move to once weekly x 4 weeks. Patient should not receive pheresis on the date of rituximab treatment. I have informed the patient of her dates of rituximab treatment and I have asked her to reschedule pheresis for later in the week if she is scheduled on any dates of rituximab treatment.   -Decrease prednisone to 25 Mg daily.   -Complete augmentin treatment. Patient is encouraged to follow up with dentistry for tooth extraction. Her platelets are >100K and she should be okay for extraction.  -Discussed with patient and reviewed her clinical course from the beginning of her hospital presentation to yesterday's events. We discussed her HZUYQI63 has recovered to 71%, but she has not yet had clinical recover with PLT still 120K. Additionally, she still has underlying tooth abscess. I informed patient we can increase premedications and split rituximab dosing into 2 days to treat at a slow rate of 50 ml/hour. Additionally, typically bronchospasm is not seen with subsequent rituximab treatments. Patient would elect to continue on with treatment as she has significant fear of relapse. Discussed with pharmacy and increase premedications and split treatment into 2 days. Patient is planned for therapy on 2/14, 2/15, 2/21, 2/22, 2/28 and  "2/29. Nephrology has been updated.    -Patient did not show for her appointment on 2/14/22. She had called the office stating she was seeking a second opinion. She has not yet contacted [facility] to establish care. She states her family made her second guess her care.   -As I have discussed with the patient, she has had recovery of her JIJIEF03, but platelets are slow to respond. As of 2/15/22, PLTs were 138K.   -I have had multiple discussions with the patient my concern for relapse, given her rapid relapse with her first discharge and continued tooth abscess. She continues on once weekly pheresis.  -She may require inpatient admission. We have discussed continuing rituximab vs caplacizumab. However, patient may require inpatient admission due to her current clinical presentation.     2) Acute third left mandibular molar/subperiosteal abscess, left masseter muscle myositis, and facial cellulitis, improved  -s/p rocephin/flagyl and augmentin.  -Patient in need of dentistry for tooth extraction (was scheduled for 1/28, rescheduled for 3/1/22).  -See discussion above.      3) Hyperglycemia.   -She is instructed to follow up with PCP and prednisone dose is decreasing and she will require titration of insulin.     4) Fluid retention, improved.  -Lasix PRN.     5) Awaiting labs. Will contact [facility]. Possible admission.           Complexity: High.           Ashley Nguyen DO  Hematology/Oncology  [facility]          Oncology Rooming Note    February 17, 2022 12:27 PM   Ivon Gamble is a 31 year old female who presents for:    Chief Complaint   Patient presents with     Oncology Clinic Visit     Initial Vitals: Resp 18   Wt 118.2 kg (260 lb 9.6 oz)   LMP 01/31/2022 (Approximate)   BMI 44.73 kg/m   Estimated body mass index is 44.73 kg/m  as calculated from the following:    Height as of 2/15/22: 1.626 m (5' 4\").    Weight as of this encounter: 118.2 kg (260 lb 9.6 oz). Body surface area is 2.31 " meters squared.  Severe Pain (7) Comment: Data Unavailable   Patient's last menstrual period was 01/31/2022 (approximate).  Allergies reviewed: Yes  Medications reviewed: Yes    Medications: Medication refills not needed today.  Pharmacy name entered into EPIC: [id number] - [place], [place] - [address]    Clinical concerns: no       Sana Brandt                Again, thank you for allowing me to participate in the care of your patient.        Sincerely,        Ashley Nguyen, DO

## 2022-02-17 NOTE — TELEPHONE ENCOUNTER
Ivon Gambel is a 32 yo female with acquired TTP due to left lower molar tooth abscess in need of removal.     Patient was hospitalized in mid January for severe sepsis with plats of 11-12K, LDH nearly 700. VWCDSO21 <5% that admission. She was started on pheresis. Platelets had stabilized upon discharge. Patient had relapse of TTP and unfortunately, was not able to get her tooth extracted. She was planned to begin rituximab, but had severe bronchospasm with this on 2/9/22. She has since declined further rituximab currently. She has been continued on rituximab since.    In today's clinic visit, patient has significant swelling and pain of the left cheek and neck, as well as tooth pain similar to her previous presentation in January.     She was given augmentin in clinic. CBC shows WBC 10, Hb 13.7, PLT 112K.     I called to discuss her case with [facility] (Dr. Jose Toro) given her high risk of relapse, need for pheresis, and possible inpatient rituximab. Additionally, I had discussed patient was evaluated by ENT and OMFS last visit and an inpatient tooth extraction was not able to be performed.      Dr. Toro has accepted transfer downtown, but beds are not currently available. Will send patient to ED immediately and admit in anticipation of transfer to [facility].     -Admit to hospitalist team with plans to transfer to [facility] (Dr. Jose Toro has accepted transfer).  -Patient in need of stat dental CT vs CT head/neck.  -Please culture from catheter and peripherally and begin appropriate IV coverage.  -Consultation to ENT and OMFS.  -Consultation to Nephrology for continued pheresis (patient should receive on 2/18, 2/21, 2/23, 2/25 as she has declined rituximab).   -Do not remove catheter unless approved by Hematology.   -May discontinue steroids.   -Check daily CBC, CMP, PT/INR/fibrinogen, LDH, hapto.   -Transfuse for Hb <7.  -Transfuse cryoprecipitate for fibrinogen <100.  -DO NOT transfuse platelets.      Discussed with ED triage team. Instructed ED RN to have ED attending contact me as well as admitting hospitalist.     Ashley Nguyen DO  Hematology/Oncology  [facility]  Pager: [phone]

## 2022-02-17 NOTE — ED PROVIDER NOTES
History     Chief Complaint:  Dental Pain       HPI   Ivon Gamble is a 31 year old female who presents with a history of TTP who has been dealing with pericoronitis as it relates to her wisdom teeth.  In particular, she has been having some increased pain and inflammation at tooth #17, the left mandibular wisdom tooth.  She was recently placed on Augmentin and prednisone given the increased sensitivity and pain.  She was seen by dentistry earlier.  It was felt that she warranted a dose of intravenous antibiotic.  The patient has no stridor.  There is no difficulty swallowing.  She does have some increased pain and sensitivity of the gingival tissue overlying the posterior aspect of tooth #17.  There is no trouble handling secretions.  She notes that her platelet count was recently on the order of ,000 which is actually quite good for her.  Apparently, it is been difficult for her to get her wisdom teeth removed until her platelet count stabilizes.  The patient does have a history of diabetes.  She has used chlorhexidine solution rinses.  The patient's TTP is treated with pheresis and Rituxan.  The patient notes that she does not have diabetes mellitus.    The patient was recently in the hospital for a facial cellulitis and dental abscess. She was seen by ENT and oral maxillofacial surgery. Surgery was not contemplated on the inpatient side. She was referred for outpatient management.    ROS:  Review of Systems  No fevers, chills, sweats.  No trouble swallowing.  There is been no significant bleeding complications.  All other systems are negative.    Allergies:  Rituximab     Medications:    acetaminophen (TYLENOL) 500 MG tablet  albuterol (PROAIR HFA/PROVENTIL HFA/VENTOLIN HFA) 108 (90 Base) MCG/ACT inhaler  amoxicillin-clavulanate (AUGMENTIN) 875-125 MG tablet  blood glucose (ACCU-CHEK GUIDE) test strip  blood glucose monitoring (SOFTCLIX) lancets  chlorhexidine (PERIDEX) 0.12 % solution  EPINEPHrine  "(ANY BX GENERIC EQUIV) 0.3 MG/0.3ML injection 2-pack  furosemide (LASIX) 20 MG tablet  insulin NPH (HUMULIN N KWIKPEN) 100 UNIT/ML injection  insulin  UNIT/ML vial  insulin syringe-needle U-100 (31G X 5/16\" 0.5 ML) 31G X 5/16\" 0.5 ML miscellaneous  pantoprazole (PROTONIX) 40 MG EC tablet  polyethylene glycol (MIRALAX) 17 GM/Dose powder  potassium chloride ER (KLOR-CON M) 20 MEQ CR tablet  predniSONE (DELTASONE) 50 MG tablet  sennosides (SENOKOT) 8.6 MG tablet        Past Medical History:    Past Medical History:   Diagnosis Date     TTP (thrombotic thrombocytopenic purpura)      Patient Active Problem List   Diagnosis     Abdominal pain, generalized     Hyperbilirubinemia     Normocytic anemia     Thrombocytopenia (H)     Elevated serum lactate dehydrogenase     Hematuria, unspecified type     Acute idiopathic thrombocytopenic purpura (H)     TTP (thrombotic thrombocytopenic purpura)      The patient notes that she has no definitive history of diabetes mellitus but does take insulin given an untoward side effect from one of her medications.    Past Surgical History:    Past Surgical History:   Procedure Laterality Date     IR CVC NON TUNNEL PLACEMENT  1/13/2022     IR CVC TUNNEL PLACEMENT > 5 YRS OF AGE  1/28/2022        Family History:    family history includes Diabetes in her mother; Liver Disease in her father.    Social History:   reports that she quit smoking about 5 weeks ago. Her smoking use included cigarettes. She has never used smokeless tobacco. She reports current alcohol use. She reports that she does not use drugs.  PCP: Ashley Nguyen     Physical Exam     Patient Vitals for the past 24 hrs:   BP Temp Temp src Pulse Resp SpO2 Height Weight   02/17/22 1450 137/81 96.9  F (36.1  C) Temporal 95 18 98 % 1.626 m (5' 4\") 117 kg (258 lb)        Physical Exam  General: Resting uncomfortably on the gurney  Head:  The scalp, face, and head appear normal  Eyes:  The pupils are normal    Conjunctivae and " sclera appear normal  ENT:    The nose is normal    Ears/pinnae are normal    Intraoral exam demonstrates pericoronitis involving tooth #17 and the overlying gingiva.  There is likely   some mild food debris in the area.  There is no soledad purulence.  There is no drainable abscess.  The   other wisdom teeth are also erupting but are less inflamed.  The tongue is normal.  Posterior pharynx   is normal.  There is no Gómez's angina.  There is no obvious trismus.  Neck:  Normal range of motion  Skin:  No rash or lesions noted.  Neuro: Speech is normal and fluent  Psych:  Awake. Alert.  Normal affect.      Appropriate interactions            Emergency Department Course       Emergency Department Course:       The patient underwent detailed examination.  Intravenous antibiotics are administered.  The patient does not require hospital admission at this time.      Reviewed:  I reviewed nursing notes, vitals and past medical history    Assessments:  4:12 PM  I obtained history and examined the patient as noted above.         Interventions:  Medications   ampicillin-sulbactam (UNASYN) 3 g vial to attach to  mL bag (3 g Intravenous New Bag 2/17/22 1620)   dexamethasone (DECADRON) injection 10 mg (10 mg Intravenous Given 2/17/22 1617)   acetaminophen (TYLENOL) tablet 1,000 mg (1,000 mg Oral Given 2/17/22 1624)        Disposition:  The patient was discharged to home.     Impression & Plan    CMS Diagnoses: None      Medical Decision Making:  This patient presents with a known pericoronitis involving tooth #17.  There is no obvious abscess.  She is waiting to get her wisdom teeth removed but is waiting for her platelet count and TTP to fully stabilize.  There is nothing emergent about her presentation today other than there was a request to administer intravenous antibiotics which is easy to comply with.  She is given intravenous Unasyn and 1 dose of dexamethasone to help with gingival inflammation.  She should continue  her chlorhexidine rinses and should follow-up with oral surgery as planned.  There is no evidence of significant intraoral abscess and there is nothing that I find that is drainable via a procedure at this moment.    5:19 PM  I spoke with the patient's oncologist Dr. Arcos, she notes that she is monitoring the patient's platelet counts carefully and they have been in the 100-130,000 range. She is hoping to get the patient admitted to the hospital at the [facility] for dental extraction, rituximab and ongoing pheresis treatments. Patient does have outpatient pheresis scheduled for tomorrow. At this juncture of the hospital is full. The patient does not have any acute life-threatening infection in the mouth or throat. Reportedly, the patient may have a subapical or periapical abscess, at least she did back in January. This is certainly something that should be able to be managed with sedation, local anesthesia, and an extraction of this tooth in the outpatient arena. The patient's platelet count is not sufficiently low enough to preclude that. At this juncture we are going to discharge the patient. She will get pheresis therapy tomorrow. She will continue on the Augmentin. If the patient situation worsens, she should report to the [facility] emergency department for ongoing care, as that has been the plan from her oncologist.      Diagnosis:  Pericoronitis tooth #17          2/17/2022   Vinod Puga MD Rock, Michael P, MD  02/17/22 1297       Vinod Puga MD  02/17/22 0003       Vinod Puga MD  02/17/22 6263

## 2022-02-17 NOTE — PROGRESS NOTES
"Oncology Rooming Note    February 17, 2022 12:27 PM   Ivon Gamble is a 31 year old female who presents for:    Chief Complaint   Patient presents with     Oncology Clinic Visit     Initial Vitals: Resp 18   Wt 118.2 kg (260 lb 9.6 oz)   LMP 01/31/2022 (Approximate)   BMI 44.73 kg/m   Estimated body mass index is 44.73 kg/m  as calculated from the following:    Height as of 2/15/22: 1.626 m (5' 4\").    Weight as of this encounter: 118.2 kg (260 lb 9.6 oz). Body surface area is 2.31 meters squared.  Severe Pain (7) Comment: Data Unavailable   Patient's last menstrual period was 01/31/2022 (approximate).  Allergies reviewed: Yes  Medications reviewed: Yes    Medications: Medication refills not needed today.  Pharmacy name entered into EPIC: [id number] - [place], [place] - [address]    Clinical concerns: no       Sana Brandt            "

## 2022-02-17 NOTE — ED TRIAGE NOTES
Pt reports having dental pain since January. Pt notes going to the dental office with Dr. Nguyen today and was then referred to the ED for IV abx. Pt has been waiting to get L lower wisdom tooth out but is dealing with TTP and needed to get platelets up first.

## 2022-02-18 ENCOUNTER — HOSPITAL ENCOUNTER (INPATIENT)
Facility: CLINIC | Age: 32
LOS: 2 days | Discharge: HOME | End: 2022-02-21
Attending: EMERGENCY MEDICINE | Admitting: STUDENT IN AN ORGANIZED HEALTH CARE EDUCATION/TRAINING PROGRAM
Payer: MEDICAID

## 2022-02-18 ENCOUNTER — CARE COORDINATION (OUTPATIENT)
Dept: INFUSION THERAPY | Facility: CLINIC | Age: 32
End: 2022-02-18
Payer: COMMERCIAL

## 2022-02-18 ENCOUNTER — HOSPITAL ENCOUNTER (OUTPATIENT)
Dept: LAB | Facility: CLINIC | Age: 32
End: 2022-02-18
Payer: MEDICAID

## 2022-02-18 ENCOUNTER — HOSPITAL ENCOUNTER (OUTPATIENT)
Facility: CLINIC | Age: 32
Discharge: HOME | End: 2022-02-18
Admitting: INTERNAL MEDICINE
Payer: MEDICAID

## 2022-02-18 VITALS
WEIGHT: 257.94 LBS | RESPIRATION RATE: 16 BRPM | BODY MASS INDEX: 44.04 KG/M2 | SYSTOLIC BLOOD PRESSURE: 121 MMHG | HEART RATE: 98 BPM | TEMPERATURE: 98.6 F | DIASTOLIC BLOOD PRESSURE: 78 MMHG | HEIGHT: 64 IN

## 2022-02-18 DIAGNOSIS — M31.19 TTP (THROMBOTIC THROMBOCYTOPENIC PURPURA) (H): Primary | ICD-10-CM

## 2022-02-18 DIAGNOSIS — A41.9 SEPSIS, DUE TO UNSPECIFIED ORGANISM, UNSPECIFIED WHETHER ACUTE ORGAN DYSFUNCTION PRESENT (H): ICD-10-CM

## 2022-02-18 DIAGNOSIS — L03.211 FACIAL CELLULITIS: ICD-10-CM

## 2022-02-18 DIAGNOSIS — Z20.822 CONTACT WITH AND (SUSPECTED) EXPOSURE TO COVID-19: ICD-10-CM

## 2022-02-18 DIAGNOSIS — M31.19 TTP (THROMBOTIC THROMBOPENIC PURPURA) (H): ICD-10-CM

## 2022-02-18 DIAGNOSIS — M31.19 ACQUIRED TTP (H): Primary | ICD-10-CM

## 2022-02-18 DIAGNOSIS — K04.7 DENTAL ABSCESS: ICD-10-CM

## 2022-02-18 DIAGNOSIS — M31.19 ACQUIRED TTP (H): ICD-10-CM

## 2022-02-18 LAB
ANION GAP SERPL CALCULATED.3IONS-SCNC: 6 MMOL/L (ref 3–14)
ANION GAP SERPL CALCULATED.3IONS-SCNC: 8 MMOL/L (ref 3–14)
BASOPHILS # BLD AUTO: 0 10E3/UL (ref 0–0.2)
BASOPHILS # BLD AUTO: 0 10E3/UL (ref 0–0.2)
BASOPHILS NFR BLD AUTO: 0 %
BASOPHILS NFR BLD AUTO: 0 %
BLD PROD TYP BPU: NORMAL
BLOOD COMPONENT TYPE: NORMAL
BUN SERPL-MCNC: 23 MG/DL (ref 7–30)
BUN SERPL-MCNC: 24 MG/DL (ref 7–30)
CALCIUM SERPL-MCNC: 9 MG/DL (ref 8.5–10.1)
CALCIUM SERPL-MCNC: 9.2 MG/DL (ref 8.5–10.1)
CHLORIDE BLD-SCNC: 100 MMOL/L (ref 94–109)
CHLORIDE BLD-SCNC: 102 MMOL/L (ref 94–109)
CO2 SERPL-SCNC: 26 MMOL/L (ref 20–32)
CO2 SERPL-SCNC: 26 MMOL/L (ref 20–32)
CODING SYSTEM: NORMAL
CREAT SERPL-MCNC: 0.7 MG/DL (ref 0.52–1.04)
CREAT SERPL-MCNC: 0.79 MG/DL (ref 0.52–1.04)
CRP SERPL-MCNC: 5.2 MG/L (ref 0–8)
EOSINOPHIL # BLD AUTO: 0 10E3/UL (ref 0–0.7)
EOSINOPHIL # BLD AUTO: 0 10E3/UL (ref 0–0.7)
EOSINOPHIL NFR BLD AUTO: 0 %
EOSINOPHIL NFR BLD AUTO: 0 %
ERYTHROCYTE [DISTWIDTH] IN BLOOD BY AUTOMATED COUNT: 13.6 % (ref 10–15)
ERYTHROCYTE [DISTWIDTH] IN BLOOD BY AUTOMATED COUNT: 13.9 % (ref 10–15)
GFR SERPL CREATININE-BSD FRML MDRD: >90 ML/MIN/1.73M2
GFR SERPL CREATININE-BSD FRML MDRD: >90 ML/MIN/1.73M2
GLUCOSE BLD-MCNC: 345 MG/DL (ref 70–99)
GLUCOSE BLD-MCNC: 476 MG/DL (ref 70–99)
HAPTOGLOB SERPL-MCNC: 120 MG/DL (ref 32–197)
HCG SERPL QL: NEGATIVE
HCT VFR BLD AUTO: 40.7 % (ref 35–47)
HCT VFR BLD AUTO: 46.4 % (ref 35–47)
HGB BLD-MCNC: 13.4 G/DL (ref 11.7–15.7)
HGB BLD-MCNC: 14.9 G/DL (ref 11.7–15.7)
HOLD SPECIMEN: NORMAL
HOLD SPECIMEN: NORMAL
IMM GRANULOCYTES # BLD: 0.1 10E3/UL
IMM GRANULOCYTES # BLD: 0.1 10E3/UL
IMM GRANULOCYTES NFR BLD: 1 %
IMM GRANULOCYTES NFR BLD: 1 %
ISSUE DATE AND TIME: NORMAL
LACTATE SERPL-SCNC: 3.2 MMOL/L (ref 0.7–2)
LYMPHOCYTES # BLD AUTO: 0.4 10E3/UL (ref 0.8–5.3)
LYMPHOCYTES # BLD AUTO: 0.6 10E3/UL (ref 0.8–5.3)
LYMPHOCYTES NFR BLD AUTO: 3 %
LYMPHOCYTES NFR BLD AUTO: 4 %
MCH RBC QN AUTO: 33 PG (ref 26.5–33)
MCH RBC QN AUTO: 33.8 PG (ref 26.5–33)
MCHC RBC AUTO-ENTMCNC: 32.1 G/DL (ref 31.5–36.5)
MCHC RBC AUTO-ENTMCNC: 32.9 G/DL (ref 31.5–36.5)
MCV RBC AUTO: 103 FL (ref 78–100)
MCV RBC AUTO: 103 FL (ref 78–100)
MONOCYTES # BLD AUTO: 0.4 10E3/UL (ref 0–1.3)
MONOCYTES # BLD AUTO: 1 10E3/UL (ref 0–1.3)
MONOCYTES NFR BLD AUTO: 3 %
MONOCYTES NFR BLD AUTO: 6 %
NEUTROPHILS # BLD AUTO: 13.9 10E3/UL (ref 1.6–8.3)
NEUTROPHILS # BLD AUTO: 14 10E3/UL (ref 1.6–8.3)
NEUTROPHILS NFR BLD AUTO: 89 %
NEUTROPHILS NFR BLD AUTO: 93 %
NRBC # BLD AUTO: 0 10E3/UL
NRBC # BLD AUTO: 0 10E3/UL
NRBC BLD AUTO-RTO: 0 /100
NRBC BLD AUTO-RTO: 0 /100
PLATELET # BLD AUTO: 105 10E3/UL (ref 150–450)
PLATELET # BLD AUTO: 131 10E3/UL (ref 150–450)
POTASSIUM BLD-SCNC: 4.2 MMOL/L (ref 3.4–5.3)
POTASSIUM BLD-SCNC: 4.5 MMOL/L (ref 3.4–5.3)
RBC # BLD AUTO: 3.97 10E6/UL (ref 3.8–5.2)
RBC # BLD AUTO: 4.51 10E6/UL (ref 3.8–5.2)
SODIUM SERPL-SCNC: 134 MMOL/L (ref 133–144)
SODIUM SERPL-SCNC: 134 MMOL/L (ref 133–144)
UNIT ABO/RH: NORMAL
UNIT NUMBER: NORMAL
UNIT STATUS: NORMAL
UNIT TYPE ISBT: 6200
WBC # BLD AUTO: 14.9 10E3/UL (ref 4–11)
WBC # BLD AUTO: 15.7 10E3/UL (ref 4–11)

## 2022-02-18 PROCEDURE — C9803 HOPD COVID-19 SPEC COLLECT: HCPCS | Performed by: EMERGENCY MEDICINE

## 2022-02-18 PROCEDURE — 96365 THER/PROPH/DIAG IV INF INIT: CPT | Mod: 59 | Performed by: EMERGENCY MEDICINE

## 2022-02-18 PROCEDURE — 36592 COLLECT BLOOD FROM PICC: CPT | Performed by: INTERNAL MEDICINE

## 2022-02-18 PROCEDURE — 36514 APHERESIS PLASMA: CPT

## 2022-02-18 PROCEDURE — 99285 EMERGENCY DEPT VISIT HI MDM: CPT | Mod: 25 | Performed by: EMERGENCY MEDICINE

## 2022-02-18 PROCEDURE — 250N000011 HC RX IP 250 OP 636: Performed by: INTERNAL MEDICINE

## 2022-02-18 PROCEDURE — 258N000003 HC RX IP 258 OP 636: Performed by: INTERNAL MEDICINE

## 2022-02-18 PROCEDURE — P9059 PLASMA, FRZ BETWEEN 8-24HOUR: HCPCS | Performed by: INTERNAL MEDICINE

## 2022-02-18 PROCEDURE — 999N000013 HC STATISTIC APHERESIS

## 2022-02-18 PROCEDURE — 83605 ASSAY OF LACTIC ACID: CPT | Performed by: PHYSICIAN ASSISTANT

## 2022-02-18 PROCEDURE — 36592 COLLECT BLOOD FROM PICC: CPT | Performed by: EMERGENCY MEDICINE

## 2022-02-18 PROCEDURE — 96361 HYDRATE IV INFUSION ADD-ON: CPT | Performed by: EMERGENCY MEDICINE

## 2022-02-18 PROCEDURE — 85025 COMPLETE CBC W/AUTO DIFF WBC: CPT | Performed by: PHYSICIAN ASSISTANT

## 2022-02-18 PROCEDURE — 99285 EMERGENCY DEPT VISIT HI MDM: CPT | Performed by: EMERGENCY MEDICINE

## 2022-02-18 PROCEDURE — 36593 DECLOT VASCULAR DEVICE: CPT

## 2022-02-18 PROCEDURE — 36415 COLL VENOUS BLD VENIPUNCTURE: CPT | Performed by: PHYSICIAN ASSISTANT

## 2022-02-18 PROCEDURE — 258N000003 HC RX IP 258 OP 636: Performed by: EMERGENCY MEDICINE

## 2022-02-18 PROCEDURE — 250N000013 HC RX MED GY IP 250 OP 250 PS 637: Performed by: INTERNAL MEDICINE

## 2022-02-18 PROCEDURE — 84703 CHORIONIC GONADOTROPIN ASSAY: CPT | Performed by: EMERGENCY MEDICINE

## 2022-02-18 PROCEDURE — 87040 BLOOD CULTURE FOR BACTERIA: CPT | Performed by: EMERGENCY MEDICINE

## 2022-02-18 PROCEDURE — 250N000011 HC RX IP 250 OP 636: Performed by: EMERGENCY MEDICINE

## 2022-02-18 PROCEDURE — 86140 C-REACTIVE PROTEIN: CPT | Performed by: PHYSICIAN ASSISTANT

## 2022-02-18 PROCEDURE — 82310 ASSAY OF CALCIUM: CPT | Performed by: INTERNAL MEDICINE

## 2022-02-18 PROCEDURE — 36415 COLL VENOUS BLD VENIPUNCTURE: CPT | Performed by: EMERGENCY MEDICINE

## 2022-02-18 PROCEDURE — 85025 COMPLETE CBC W/AUTO DIFF WBC: CPT | Performed by: INTERNAL MEDICINE

## 2022-02-18 PROCEDURE — 80048 BASIC METABOLIC PNL TOTAL CA: CPT | Performed by: PHYSICIAN ASSISTANT

## 2022-02-18 RX ORDER — SODIUM CHLORIDE 9 MG/ML
INJECTION, SOLUTION INTRAVENOUS CONTINUOUS
Status: DISCONTINUED | OUTPATIENT
Start: 2022-02-18 | End: 2022-02-20

## 2022-02-18 RX ORDER — DIPHENHYDRAMINE HYDROCHLORIDE 50 MG/ML
50 INJECTION INTRAMUSCULAR; INTRAVENOUS ONCE
Status: COMPLETED | OUTPATIENT
Start: 2022-02-18 | End: 2022-02-18

## 2022-02-18 RX ORDER — CALCIUM GLUCONATE 94 MG/ML
1 INJECTION, SOLUTION INTRAVENOUS
Status: DISCONTINUED | OUTPATIENT
Start: 2022-02-18 | End: 2022-02-18 | Stop reason: HOSPADM

## 2022-02-18 RX ORDER — DIPHENHYDRAMINE HYDROCHLORIDE 50 MG/ML
50 INJECTION INTRAMUSCULAR; INTRAVENOUS
Status: DISCONTINUED | OUTPATIENT
Start: 2022-02-18 | End: 2022-02-18 | Stop reason: HOSPADM

## 2022-02-18 RX ORDER — AMPICILLIN AND SULBACTAM 2; 1 G/1; G/1
3 INJECTION, POWDER, FOR SOLUTION INTRAMUSCULAR; INTRAVENOUS ONCE
Status: COMPLETED | OUTPATIENT
Start: 2022-02-18 | End: 2022-02-19

## 2022-02-18 RX ORDER — ACETAMINOPHEN 325 MG/1
325 TABLET ORAL ONCE
Status: COMPLETED | OUTPATIENT
Start: 2022-02-18 | End: 2022-02-18

## 2022-02-18 RX ADMIN — DIPHENHYDRAMINE HYDROCHLORIDE 50 MG: 50 INJECTION, SOLUTION INTRAMUSCULAR; INTRAVENOUS at 14:09

## 2022-02-18 RX ADMIN — AMPICILLIN SODIUM AND SULBACTAM SODIUM 3 G: 2; 1 INJECTION, POWDER, FOR SOLUTION INTRAMUSCULAR; INTRAVENOUS at 23:55

## 2022-02-18 RX ADMIN — CALCIUM GLUCONATE 3.5 G: 98 INJECTION, SOLUTION INTRAVENOUS at 14:14

## 2022-02-18 RX ADMIN — SODIUM CHLORIDE 1000 ML: 9 INJECTION, SOLUTION INTRAVENOUS at 23:40

## 2022-02-18 RX ADMIN — HYDROCORTISONE SODIUM SUCCINATE 100 MG: 100 INJECTION, POWDER, FOR SOLUTION INTRAMUSCULAR; INTRAVENOUS at 14:11

## 2022-02-18 RX ADMIN — ACETAMINOPHEN 325 MG: 325 TABLET, FILM COATED ORAL at 14:08

## 2022-02-18 ASSESSMENT — ENCOUNTER SYMPTOMS
FACIAL SWELLING: 1
FEVER: 1
ABDOMINAL PAIN: 0
SHORTNESS OF BREATH: 0
VOMITING: 0
COUGH: 0
TROUBLE SWALLOWING: 0
NAUSEA: 0

## 2022-02-18 NOTE — PROGRESS NOTES
Writer spoke with Hem/Onc Dr.Jessica Nguyen, RNCC Maine Waller and RNCC Shira Brown regarding pt reporting worsening facial cellulitis as evident by increased pain, swelling and difficulty eating. Pt reports feeling worse today, was seen in the ER at [facility] yesterday and discharged. Pt is on Augmentin and decadron (has history of hyperglycemia). Pt is currently receiving apheresis at [facility] for her TTP. Plan for the pt to go [facility] ER following apheresis run. Pt will likely need Oral surgery consult/dental extraction, rituximab and on going pheresis per provider note yesterday. Coordinating to have on going care transferred to Dr. Honeycutt at the . Pt aware of plan and directed to [facility].

## 2022-02-18 NOTE — PROGRESS NOTES
" Renal Medicine Progress Note            Assessment/Plan:     Procedure: Apheresis  Indication: TTP  Access: RIJ tunneled CVC    1:1 volume exchange with FFP (3500 ml).     Ports clotted. tPA instilled for half an hour. If this does not work, then will have IR exchange for a new CVC.     She is on oral Augmentin for tooth infection. She was discharged from the ER last night due to no bed availability. Next apheresis on Monday and here or a [facility] to be determined.           Interval History:     She feels okay. CVC site is painful, which she says has been painful since placement. No pus. No evidence of cellulitis.          Medications and Allergies:       acetaminophen  325 mg Oral Once     alteplase  2 mg Intracatheter Q2H     alteplase  2 mg Intracatheter Q2H     anticoagulant citrate  500-2,000 mL Apheresis Once     calcium gluconate intermittent infusion with additives - doses under 5g  3.5 g Intravenous Once     diphenhydrAMINE  50 mg Intravenous Once     hydrocortisone sodium succinate PF  100 mg Intravenous Once        Allergies   Allergen Reactions     Rituximab Anaphylaxis            Physical Exam:   Vitals were reviewed   , Blood pressure (!) 138/96, pulse 95, temperature 97.4  F (36.3  C), temperature source Axillary, resp. rate 18, height 1.626 m (5' 4\"), weight 117 kg (257 lb 15 oz), last menstrual period 01/31/2022.    Wt Readings from Last 3 Encounters:   02/18/22 117 kg (257 lb 15 oz)   02/17/22 117 kg (258 lb)   02/17/22 118.2 kg (260 lb 9.6 oz)     No intake or output data in the 24 hours ending 02/18/22 1323           Data:     CBC RESULTS:     Recent Labs   Lab 02/17/22  1328 02/15/22  1458   WBC 10.0 12.8*   RBC 4.17 4.33   HGB 13.7 14.6   HCT 43.8 45.1   * 138*       Basic Metabolic Panel:  Recent Labs   Lab 02/17/22  1328 02/15/22  1458    131*   POTASSIUM 3.8 5.4*   CHLORIDE 104 99   CO2 30 26   BUN 23 28   CR 0.67 0.84   * 409*   JOSELUIS 8.7 9.6       INR  Recent Labs   Lab " 02/17/22  1328 02/15/22  1458   INR 0.95 0.97      Attestation:   I have reviewed today's relevant vital signs, notes, medications, labs and imaging.    Sudhir Caldwell MD  [facility] - Nephrology  Office phone :[phone]  Pager: [phone]

## 2022-02-18 NOTE — PROGRESS NOTES
Apheresis Treatment     Treatment in room CS19 using Optia apheresis machine. Consent verified.    Height: 163cm  Weight: 117 kg  HCT: 41%  Inlet speed: 86 ml.min  ACDA ratio: 10:1  Fluid balance: 100%  Access: tunneled RIJ  Post treatment line dwell: Citrate 1.9 ml blue/red lumen.  Replacement product: 3000ml FFP  Electrolyte replacement: Calcium gluconate 3.5 grams in NS - total 85mls, piggybacked into return lines, infused over time of treatment.  Premedications: benadryl 50mg IV, solucortef 100mg IV, tylenol 325mg PO  Treatment Notes: Pt had occluded catheter- see apheresis flowsheet for detailed orders/description. Pt education regarding dressing importance and infection prevention of CVC site.    Treatment completed as ordered, VSS, see EPIC flowsheet for run data.    Next treatment: Monday, TBD if at the [facility] or [facility] per Dr. Caldwell

## 2022-02-19 ENCOUNTER — APPOINTMENT (OUTPATIENT)
Dept: CT IMAGING | Facility: CLINIC | Age: 32
End: 2022-02-19
Attending: EMERGENCY MEDICINE
Payer: MEDICAID

## 2022-02-19 PROBLEM — A41.9 SEPSIS, DUE TO UNSPECIFIED ORGANISM, UNSPECIFIED WHETHER ACUTE ORGAN DYSFUNCTION PRESENT (H): Status: ACTIVE | Noted: 2022-02-19

## 2022-02-19 PROBLEM — L03.211 FACIAL CELLULITIS: Status: ACTIVE | Noted: 2022-02-19

## 2022-02-19 PROBLEM — M31.19: Status: ACTIVE | Noted: 2022-02-19

## 2022-02-19 LAB
ALBUMIN SERPL-MCNC: 3.2 G/DL (ref 3.4–5)
ALP SERPL-CCNC: 56 U/L (ref 40–150)
ALT SERPL W P-5'-P-CCNC: 53 U/L (ref 0–50)
ANION GAP SERPL CALCULATED.3IONS-SCNC: 4 MMOL/L (ref 3–14)
AST SERPL W P-5'-P-CCNC: 30 U/L (ref 0–45)
BASOPHILS # BLD AUTO: 0 10E3/UL (ref 0–0.2)
BASOPHILS NFR BLD AUTO: 0 %
BILIRUB SERPL-MCNC: 0.7 MG/DL (ref 0.2–1.3)
BUN SERPL-MCNC: 20 MG/DL (ref 7–30)
CALCIUM SERPL-MCNC: 8 MG/DL (ref 8.5–10.1)
CHLORIDE BLD-SCNC: 107 MMOL/L (ref 94–109)
CO2 SERPL-SCNC: 26 MMOL/L (ref 20–32)
CREAT SERPL-MCNC: 0.65 MG/DL (ref 0.52–1.04)
EOSINOPHIL # BLD AUTO: 0 10E3/UL (ref 0–0.7)
EOSINOPHIL NFR BLD AUTO: 0 %
ERYTHROCYTE [DISTWIDTH] IN BLOOD BY AUTOMATED COUNT: 13.9 % (ref 10–15)
GFR SERPL CREATININE-BSD FRML MDRD: >90 ML/MIN/1.73M2
GLUCOSE BLD-MCNC: 262 MG/DL (ref 70–99)
GLUCOSE BLDC GLUCOMTR-MCNC: 130 MG/DL (ref 70–99)
GLUCOSE BLDC GLUCOMTR-MCNC: 151 MG/DL (ref 70–99)
GLUCOSE BLDC GLUCOMTR-MCNC: 184 MG/DL (ref 70–99)
GLUCOSE BLDC GLUCOMTR-MCNC: 253 MG/DL (ref 70–99)
GLUCOSE BLDC GLUCOMTR-MCNC: 416 MG/DL (ref 70–99)
HBA1C MFR BLD: 6.5 % (ref 0–5.6)
HCT VFR BLD AUTO: 40.4 % (ref 35–47)
HGB BLD-MCNC: 12.9 G/DL (ref 11.7–15.7)
IMM GRANULOCYTES # BLD: 0.1 10E3/UL
IMM GRANULOCYTES NFR BLD: 1 %
LYMPHOCYTES # BLD AUTO: 0.8 10E3/UL (ref 0.8–5.3)
LYMPHOCYTES NFR BLD AUTO: 6 %
MCH RBC QN AUTO: 32.9 PG (ref 26.5–33)
MCHC RBC AUTO-ENTMCNC: 31.9 G/DL (ref 31.5–36.5)
MCV RBC AUTO: 103 FL (ref 78–100)
MONOCYTES # BLD AUTO: 0.6 10E3/UL (ref 0–1.3)
MONOCYTES NFR BLD AUTO: 5 %
NEUTROPHILS # BLD AUTO: 11.9 10E3/UL (ref 1.6–8.3)
NEUTROPHILS NFR BLD AUTO: 88 %
NRBC # BLD AUTO: 0 10E3/UL
NRBC BLD AUTO-RTO: 0 /100
PLATELET # BLD AUTO: 111 10E3/UL (ref 150–450)
POTASSIUM BLD-SCNC: 3.9 MMOL/L (ref 3.4–5.3)
PROT SERPL-MCNC: 6 G/DL (ref 6.8–8.8)
RBC # BLD AUTO: 3.92 10E6/UL (ref 3.8–5.2)
SARS-COV-2 RNA RESP QL NAA+PROBE: NEGATIVE
SODIUM SERPL-SCNC: 137 MMOL/L (ref 133–144)
WBC # BLD AUTO: 13.4 10E3/UL (ref 4–11)

## 2022-02-19 PROCEDURE — 250N000011 HC RX IP 250 OP 636: Performed by: STUDENT IN AN ORGANIZED HEALTH CARE EDUCATION/TRAINING PROGRAM

## 2022-02-19 PROCEDURE — 85025 COMPLETE CBC W/AUTO DIFF WBC: CPT | Performed by: STUDENT IN AN ORGANIZED HEALTH CARE EDUCATION/TRAINING PROGRAM

## 2022-02-19 PROCEDURE — 250N000011 HC RX IP 250 OP 636: Performed by: EMERGENCY MEDICINE

## 2022-02-19 PROCEDURE — 70491 CT SOFT TISSUE NECK W/DYE: CPT

## 2022-02-19 PROCEDURE — U0005 INFEC AGEN DETEC AMPLI PROBE: HCPCS | Performed by: EMERGENCY MEDICINE

## 2022-02-19 PROCEDURE — 96361 HYDRATE IV INFUSION ADD-ON: CPT | Performed by: EMERGENCY MEDICINE

## 2022-02-19 PROCEDURE — 258N000003 HC RX IP 258 OP 636: Performed by: EMERGENCY MEDICINE

## 2022-02-19 PROCEDURE — 83036 HEMOGLOBIN GLYCOSYLATED A1C: CPT | Performed by: PHYSICIAN ASSISTANT

## 2022-02-19 PROCEDURE — 80053 COMPREHEN METABOLIC PANEL: CPT | Performed by: STUDENT IN AN ORGANIZED HEALTH CARE EDUCATION/TRAINING PROGRAM

## 2022-02-19 PROCEDURE — 120N000005 HC R&B MS OVERFLOW UMMC

## 2022-02-19 PROCEDURE — 70491 CT SOFT TISSUE NECK W/DYE: CPT | Mod: 26 | Performed by: RADIOLOGY

## 2022-02-19 PROCEDURE — 82040 ASSAY OF SERUM ALBUMIN: CPT | Performed by: STUDENT IN AN ORGANIZED HEALTH CARE EDUCATION/TRAINING PROGRAM

## 2022-02-19 PROCEDURE — 99223 1ST HOSP IP/OBS HIGH 75: CPT | Performed by: INTERNAL MEDICINE

## 2022-02-19 PROCEDURE — 250N000012 HC RX MED GY IP 250 OP 636 PS 637: Performed by: STUDENT IN AN ORGANIZED HEALTH CARE EDUCATION/TRAINING PROGRAM

## 2022-02-19 PROCEDURE — 250N000013 HC RX MED GY IP 250 OP 250 PS 637: Performed by: STUDENT IN AN ORGANIZED HEALTH CARE EDUCATION/TRAINING PROGRAM

## 2022-02-19 PROCEDURE — 99223 1ST HOSP IP/OBS HIGH 75: CPT | Mod: AI | Performed by: STUDENT IN AN ORGANIZED HEALTH CARE EDUCATION/TRAINING PROGRAM

## 2022-02-19 PROCEDURE — 36415 COLL VENOUS BLD VENIPUNCTURE: CPT | Performed by: STUDENT IN AN ORGANIZED HEALTH CARE EDUCATION/TRAINING PROGRAM

## 2022-02-19 RX ORDER — NICOTINE POLACRILEX 4 MG
15-30 LOZENGE BUCCAL
Status: DISCONTINUED | OUTPATIENT
Start: 2022-02-19 | End: 2022-02-19

## 2022-02-19 RX ORDER — DEXTROSE MONOHYDRATE 25 G/50ML
25-50 INJECTION, SOLUTION INTRAVENOUS
Status: DISCONTINUED | OUTPATIENT
Start: 2022-02-19 | End: 2022-02-19

## 2022-02-19 RX ORDER — NICOTINE POLACRILEX 4 MG
15-30 LOZENGE BUCCAL
Status: DISCONTINUED | OUTPATIENT
Start: 2022-02-19 | End: 2022-02-21 | Stop reason: HOSPADM

## 2022-02-19 RX ORDER — LIDOCAINE HYDROCHLORIDE AND EPINEPHRINE BITARTRATE 20; .01 MG/ML; MG/ML
10 INJECTION, SOLUTION SUBCUTANEOUS ONCE
Status: DISCONTINUED | OUTPATIENT
Start: 2022-02-19 | End: 2022-02-19

## 2022-02-19 RX ORDER — ACETAMINOPHEN 500 MG
500-1000 TABLET ORAL EVERY 6 HOURS PRN
Status: DISCONTINUED | OUTPATIENT
Start: 2022-02-19 | End: 2022-02-19

## 2022-02-19 RX ORDER — ALBUTEROL SULFATE 90 UG/1
1 AEROSOL, METERED RESPIRATORY (INHALATION) EVERY 4 HOURS PRN
Status: DISCONTINUED | OUTPATIENT
Start: 2022-02-19 | End: 2022-02-19

## 2022-02-19 RX ORDER — AMPICILLIN AND SULBACTAM 2; 1 G/1; G/1
3 INJECTION, POWDER, FOR SOLUTION INTRAMUSCULAR; INTRAVENOUS EVERY 6 HOURS
Status: DISCONTINUED | OUTPATIENT
Start: 2022-02-19 | End: 2022-02-21 | Stop reason: HOSPADM

## 2022-02-19 RX ORDER — ALBUTEROL SULFATE 5 MG/ML
2.5 SOLUTION RESPIRATORY (INHALATION) EVERY 4 HOURS PRN
Status: DISCONTINUED | OUTPATIENT
Start: 2022-02-19 | End: 2022-02-21 | Stop reason: HOSPADM

## 2022-02-19 RX ORDER — POLYETHYLENE GLYCOL 3350 17 G/17G
17 POWDER, FOR SOLUTION ORAL 2 TIMES DAILY PRN
Status: DISCONTINUED | OUTPATIENT
Start: 2022-02-19 | End: 2022-02-21 | Stop reason: HOSPADM

## 2022-02-19 RX ORDER — LIDOCAINE HYDROCHLORIDE AND EPINEPHRINE 10; 10 MG/ML; UG/ML
INJECTION, SOLUTION INFILTRATION; PERINEURAL
Status: DISCONTINUED
Start: 2022-02-19 | End: 2022-02-19 | Stop reason: HOSPADM

## 2022-02-19 RX ORDER — ACETAMINOPHEN 500 MG
500-1000 TABLET ORAL EVERY 4 HOURS PRN
Status: DISCONTINUED | OUTPATIENT
Start: 2022-02-19 | End: 2022-02-20

## 2022-02-19 RX ORDER — PREDNISONE 50 MG/1
50 TABLET ORAL DAILY
Status: DISCONTINUED | OUTPATIENT
Start: 2022-02-19 | End: 2022-02-21 | Stop reason: HOSPADM

## 2022-02-19 RX ORDER — CHLORHEXIDINE GLUCONATE ORAL RINSE 1.2 MG/ML
15 SOLUTION DENTAL 2 TIMES DAILY
Status: DISCONTINUED | OUTPATIENT
Start: 2022-02-19 | End: 2022-02-21 | Stop reason: HOSPADM

## 2022-02-19 RX ORDER — FUROSEMIDE 20 MG
20 TABLET ORAL 2 TIMES DAILY
Status: DISCONTINUED | OUTPATIENT
Start: 2022-02-19 | End: 2022-02-21 | Stop reason: HOSPADM

## 2022-02-19 RX ORDER — LIDOCAINE 40 MG/G
CREAM TOPICAL
Status: DISCONTINUED | OUTPATIENT
Start: 2022-02-19 | End: 2022-02-21 | Stop reason: HOSPADM

## 2022-02-19 RX ORDER — SENNOSIDES 8.6 MG
1-2 TABLET ORAL 2 TIMES DAILY PRN
Status: DISCONTINUED | OUTPATIENT
Start: 2022-02-19 | End: 2022-02-21 | Stop reason: HOSPADM

## 2022-02-19 RX ORDER — DEXTROSE MONOHYDRATE 25 G/50ML
25-50 INJECTION, SOLUTION INTRAVENOUS
Status: DISCONTINUED | OUTPATIENT
Start: 2022-02-19 | End: 2022-02-21 | Stop reason: HOSPADM

## 2022-02-19 RX ORDER — POTASSIUM CHLORIDE 750 MG/1
20 TABLET, EXTENDED RELEASE ORAL 2 TIMES DAILY
Status: DISCONTINUED | OUTPATIENT
Start: 2022-02-19 | End: 2022-02-21 | Stop reason: HOSPADM

## 2022-02-19 RX ORDER — IOPAMIDOL 755 MG/ML
100 INJECTION, SOLUTION INTRAVASCULAR ONCE
Status: COMPLETED | OUTPATIENT
Start: 2022-02-19 | End: 2022-02-19

## 2022-02-19 RX ORDER — PANTOPRAZOLE SODIUM 40 MG/1
40 TABLET, DELAYED RELEASE ORAL
Status: DISCONTINUED | OUTPATIENT
Start: 2022-02-19 | End: 2022-02-21 | Stop reason: HOSPADM

## 2022-02-19 RX ADMIN — PREDNISONE 50 MG: 50 TABLET ORAL at 08:45

## 2022-02-19 RX ADMIN — AMPICILLIN SODIUM AND SULBACTAM SODIUM 3 G: 2; 1 INJECTION, POWDER, FOR SOLUTION INTRAMUSCULAR; INTRAVENOUS at 11:51

## 2022-02-19 RX ADMIN — SODIUM CHLORIDE: 9 INJECTION, SOLUTION INTRAVENOUS at 19:02

## 2022-02-19 RX ADMIN — SODIUM CHLORIDE: 9 INJECTION, SOLUTION INTRAVENOUS at 09:03

## 2022-02-19 RX ADMIN — IOPAMIDOL 100 ML: 755 INJECTION, SOLUTION INTRAVENOUS at 00:56

## 2022-02-19 RX ADMIN — AMPICILLIN SODIUM AND SULBACTAM SODIUM 3 G: 2; 1 INJECTION, POWDER, FOR SOLUTION INTRAMUSCULAR; INTRAVENOUS at 17:13

## 2022-02-19 RX ADMIN — ACETAMINOPHEN 1000 MG: 500 TABLET, FILM COATED ORAL at 04:02

## 2022-02-19 RX ADMIN — AMPICILLIN SODIUM AND SULBACTAM SODIUM 3 G: 2; 1 INJECTION, POWDER, FOR SOLUTION INTRAMUSCULAR; INTRAVENOUS at 23:55

## 2022-02-19 RX ADMIN — ACETAMINOPHEN 1000 MG: 500 TABLET, FILM COATED ORAL at 09:52

## 2022-02-19 RX ADMIN — CHLORHEXIDINE GLUCONATE 15 ML: 1.2 SOLUTION ORAL at 19:04

## 2022-02-19 RX ADMIN — INSULIN ASPART 6 UNITS: 100 INJECTION, SOLUTION INTRAVENOUS; SUBCUTANEOUS at 17:09

## 2022-02-19 RX ADMIN — ACETAMINOPHEN 1000 MG: 500 TABLET, FILM COATED ORAL at 16:03

## 2022-02-19 RX ADMIN — ACETAMINOPHEN 1000 MG: 500 TABLET, FILM COATED ORAL at 21:36

## 2022-02-19 RX ADMIN — AMPICILLIN SODIUM AND SULBACTAM SODIUM 3 G: 2; 1 INJECTION, POWDER, FOR SOLUTION INTRAMUSCULAR; INTRAVENOUS at 06:00

## 2022-02-19 RX ADMIN — PANTOPRAZOLE SODIUM 40 MG: 40 TABLET, DELAYED RELEASE ORAL at 08:45

## 2022-02-19 RX ADMIN — CHLORHEXIDINE GLUCONATE 15 ML: 1.2 SOLUTION ORAL at 08:45

## 2022-02-19 RX ADMIN — SODIUM CHLORIDE: 9 INJECTION, SOLUTION INTRAVENOUS at 00:45

## 2022-02-19 RX ADMIN — FUROSEMIDE 20 MG: 20 TABLET ORAL at 19:04

## 2022-02-19 ASSESSMENT — ACTIVITIES OF DAILY LIVING (ADL)
ADLS_ACUITY_SCORE: 3
HEARING_DIFFICULTY_OR_DEAF: NO
ADLS_ACUITY_SCORE: 3
WALKING_OR_CLIMBING_STAIRS_DIFFICULTY: NO
DIFFICULTY_COMMUNICATING: NO
ADLS_ACUITY_SCORE: 7
ADLS_ACUITY_SCORE: 3
DRESSING/BATHING_DIFFICULTY: NO
DOING_ERRANDS_INDEPENDENTLY_DIFFICULTY: NO
CONCENTRATING,_REMEMBERING_OR_MAKING_DECISIONS_DIFFICULTY: NO
ADLS_ACUITY_SCORE: 3
ADLS_ACUITY_SCORE: 3
FALL_HISTORY_WITHIN_LAST_SIX_MONTHS: NO
ADLS_ACUITY_SCORE: 3
CHANGE_IN_FUNCTIONAL_STATUS_SINCE_ONSET_OF_CURRENT_ILLNESS/INJURY: NO
ADLS_ACUITY_SCORE: 3
ADLS_ACUITY_SCORE: 7
ADLS_ACUITY_SCORE: 3
TOILETING_ISSUES: NO
WEAR_GLASSES_OR_BLIND: NO
ADLS_ACUITY_SCORE: 3
ADLS_ACUITY_SCORE: 7
ADLS_ACUITY_SCORE: 3
DIFFICULTY_EATING/SWALLOWING: NO

## 2022-02-19 NOTE — ED PROVIDER NOTES
ED Provider Note  [facility]      History     Chief Complaint   Patient presents with     Dental Pain     The history is provided by the patient and medical records.     Ivon Gamble is a 31 year old female with a PMH of acquired TTP and recent third left lower molar/subperisoteal abscess, left masseter muscle myositis, and facial cellulitis presenting to the ED with complaint of worsening left lower tooth pain and left-sided facial swelling. Patient endorses increased pain and inflammation at tooth #17.  She states that this began on 1/12 while in the hospital.  She was put on Augmentin and prednisone.  She finished the Augmentin 4 days ago, but then had the return of pain and swelling.  She reports having night sweats as well as a fever of 101 4 days ago.  She had some difficulty swallowing yesterday, but this is improved today.  She was seen in the [facility] ED yesterday and Augmentin was restarted.  No imaging was done.  She last took Augmentin this morning.  She also had her apheresis treatment today.  She is currently on 50 mg prednisone.  She denies nausea, vomiting, cough, shortness of breath, abdominal pain.    Per review of chart patient was admitted 1/12/22-1/18/22 for tooth abscess/cellulitis and acquired TTP. She recently relocated to [place]. While there, she was evaluated by dentistry for tooth ache and pain and was told she would not require extraction. This admission, patient had been feeling unwell and had developed acute facial pain. When she had presented to the hospital, platelets were 11K with LDH >600, haptoglobin undetectable. ANTWON was negative. Coag studies WNL. Peripheral smear had shown significant schistocytosis. She was immediately started on steroids and transferred to [facility] for plasma exchange of which she received 1/13/22-1/18/22. Enteric stool studies returned negative. [id number] returned after she was discharged at <5%. Hepatitis and HIV studies  "negative.     CT imaging had shown a third left mandibular molar/subperisoteal abscess, left masseter muscle myositis, and facial cellulitis. She was treated with rocephin/flagyl and transitioned to augmentin upon discharge. She underwent I&D on 1/13 and a tooth extraction could not be performed inpatient. She was evaluated by both ENT and OMFS. On date of discharge, Hb was 9.2 and . LDH had normalized.     Patient then seen in the [facility] ED yesterday for pericoronitis involving tooth #17. She was given intravenous Unasyn and 1 dose of dexamethasone to help with gingival inflammation. Plan to continue her chlorhexidine rinses and follow-up with oral surgery as planned. They spoke to the patient's oncologist, who is monitoring her platelets closely, and have been in the 100-300,000 range and they are hoping to get the patient admitted to the hospital at the [facility] for dental extraction, rituximab and ongoing pheresis treatments. She was restarted on Augmentin and to attend apheresis today and f/u to the [facility] ED if symptoms worsened.     Past Medical History  Past Medical History:   Diagnosis Date     TTP (thrombotic thrombocytopenic purpura)      Past Surgical History:   Procedure Laterality Date     IR CVC NON TUNNEL PLACEMENT  1/13/2022     IR CVC TUNNEL PLACEMENT > 5 YRS OF AGE  1/28/2022     acetaminophen (TYLENOL) 500 MG tablet  albuterol (PROAIR HFA/PROVENTIL HFA/VENTOLIN HFA) 108 (90 Base) MCG/ACT inhaler  amoxicillin-clavulanate (AUGMENTIN) 875-125 MG tablet  blood glucose (ACCU-CHEK GUIDE) test strip  blood glucose monitoring (SOFTCLIX) lancets  chlorhexidine (PERIDEX) 0.12 % solution  EPINEPHrine (ANY BX GENERIC EQUIV) 0.3 MG/0.3ML injection 2-pack  furosemide (LASIX) 20 MG tablet  insulin NPH (HUMULIN N KWIKPEN) 100 UNIT/ML injection  insulin  UNIT/ML vial  insulin syringe-needle U-100 (31G X 5/16\" 0.5 ML) 31G X 5/16\" 0.5 ML miscellaneous  pantoprazole (PROTONIX) 40 MG EC " tablet  polyethylene glycol (MIRALAX) 17 GM/Dose powder  potassium chloride ER (KLOR-CON M) 20 MEQ CR tablet  predniSONE (DELTASONE) 50 MG tablet  sennosides (SENOKOT) 8.6 MG tablet      Allergies   Allergen Reactions     Rituximab Anaphylaxis     Family History  Family History   Problem Relation Age of Onset     Diabetes Mother      Liver Disease Father      Social History   Social History     Tobacco Use     Smoking status: Former Smoker     Types: Cigarettes     Quit date: 2022     Years since quittin.1     Smokeless tobacco: Never Used   Vaping Use     Vaping Use: Never used   Substance Use Topics     Alcohol use: Yes     Comment: occassionally      Drug use: Never      Past medical history, past surgical history, medications, allergies, family history, and social history were reviewed with the patient. No additional pertinent items.       Review of Systems   Constitutional: Positive for fever.   HENT: Positive for dental problem and facial swelling. Negative for trouble swallowing.    Respiratory: Negative for cough and shortness of breath.    Gastrointestinal: Negative for abdominal pain, nausea and vomiting.   All other systems reviewed and are negative.    A complete review of systems was performed with pertinent positives and negatives noted in the HPI, and all other systems negative.    Physical Exam   BP: 127/69  Pulse: 116  Temp: 98.3  F (36.8  C)  Resp: 18  Weight: 115.2 kg (254 lb)  SpO2: 97 %  Physical Exam  General: patient is alert and oriented and in no acute distress   Head: atraumatic and normocephalic   EENT: moist mucus membranes without tonsillar erythema or exudates, slight maceration of tissue around the left lower third molar, no fluctuant area of the gingiva but quite tender to palpation, tenderness to palpation along the mandible with associated swelling, no trismus, pupils round and reactive   Neck: supple, no induration of the submandibular tissue  Cardiovascular: regular rate  and rhythm, extremities warm and well perfused, no lower extremity edema  Pulmonary: lungs clear to auscultation bilaterally   Abdomen: soft, non-tender   Musculoskeletal: normal range of motion   Neurological: alert and oriented, moving all extremities symmetrically, gait normal   Skin: warm, dry     ED Course      Procedures            The patient has signs of Severe Sepsis as evidenced by:    1. 2 SIRS criteria, AND  2. Suspected infection, AND   3. Organ dysfunction: Lactic Acidosis with value >2.0    Time severe sepsis diagnosis confirmed: 2040 02/19/22 as this was the time when Lactate resulted, and the level was > 2.0    3 Hour Severe Sepsis Bundle Completion:    1. Initial Lactic Acid Result:   Recent Labs   Lab Test 02/18/22 2040 01/12/22  0732   LACT 3.2* 0.6*     2. Blood Cultures before Antibiotics: Yes  3. Broad Spectrum Antibiotics Administered:  yes       Anti-infectives (From admission through now)    Start     Dose/Rate Route Frequency Ordered Stop    02/18/22 2235  ampicillin-sulbactam (UNASYN) 3 g vial to attach to  mL bag        Note to Pharmacy: DO NOT USE THIS FIELD FOR ADMIN INSTRUCTIONS; INFORMATION DOES NOT SHOW ON MAR. USE THE FIELD ABOVE MARKED ADMIN INSTRUCTIONS    3 g  200 mL/hr over 30 Minutes Intravenous ONCE 02/18/22 2233            4. Fluid volume administered in ED:  Obese patient (BMI >30); 30 mL/kg bolus based on IBW given (see amount below).    BMI Readings from Last 1 Encounters:   02/18/22 43.60 kg/m      30 mL/kg fluids based on weight: 3,460 mL  30 mL/kg fluids based on IBW (must be >= 60 inches tall): 1,640 mL             Results for orders placed or performed during the hospital encounter of 02/18/22   Basic metabolic panel     Status: Abnormal   Result Value Ref Range    Sodium 134 133 - 144 mmol/L    Potassium 4.5 3.4 - 5.3 mmol/L    Chloride 100 94 - 109 mmol/L    Carbon Dioxide (CO2) 26 20 - 32 mmol/L    Anion Gap 8 3 - 14 mmol/L    Urea Nitrogen 24 7 - 30 mg/dL     Creatinine 0.79 0.52 - 1.04 mg/dL    Calcium 9.2 8.5 - 10.1 mg/dL    Glucose 476 (H) 70 - 99 mg/dL    GFR Estimate >90 >60 mL/min/1.73m2   CRP inflammation     Status: Normal   Result Value Ref Range    CRP Inflammation 5.2 0.0 - 8.0 mg/L   Lactic acid whole blood     Status: Abnormal   Result Value Ref Range    Lactic Acid 3.2 (H) 0.7 - 2.0 mmol/L   CBC with platelets and differential     Status: Abnormal   Result Value Ref Range    WBC Count 15.7 (H) 4.0 - 11.0 10e3/uL    RBC Count 4.51 3.80 - 5.20 10e6/uL    Hemoglobin 14.9 11.7 - 15.7 g/dL    Hematocrit 46.4 35.0 - 47.0 %     (H) 78 - 100 fL    MCH 33.0 26.5 - 33.0 pg    MCHC 32.1 31.5 - 36.5 g/dL    RDW 13.9 10.0 - 15.0 %    Platelet Count 131 (L) 150 - 450 10e3/uL    % Neutrophils 89 %    % Lymphocytes 4 %    % Monocytes 6 %    % Eosinophils 0 %    % Basophils 0 %    % Immature Granulocytes 1 %    NRBCs per 100 WBC 0 <1 /100    Absolute Neutrophils 13.9 (H) 1.6 - 8.3 10e3/uL    Absolute Lymphocytes 0.6 (L) 0.8 - 5.3 10e3/uL    Absolute Monocytes 1.0 0.0 - 1.3 10e3/uL    Absolute Eosinophils 0.0 0.0 - 0.7 10e3/uL    Absolute Basophils 0.0 0.0 - 0.2 10e3/uL    Absolute Immature Granulocytes 0.1 <=0.4 10e3/uL    Absolute NRBCs 0.0 10e3/uL   Extra Blue Top Tube     Status: None   Result Value Ref Range    Hold Specimen JIC    Extra Red Top Tube     Status: None   Result Value Ref Range    Hold Specimen JIC    CBC with platelets differential     Status: Abnormal    Narrative    The following orders were created for panel order CBC with platelets differential.  Procedure                               Abnormality         Status                     ---------                               -----------         ------                     CBC with platelets and d...[[id number]]  Abnormal            Final result                 Please view results for these tests on the individual orders.   [place] Draw     Status: None (In process)    Narrative    The following  orders were created for panel order [place] Draw.  Procedure                               Abnormality         Status                     ---------                               -----------         ------                     Extra Blue Top Tube[[id number]]                              Final result               Extra Red Top Tube[[id number]]                               Final result               Extra Green Top (Lithium...[[id number]]                                                 Extra Purple Top Tube[[id number]]                                                         Please view results for these tests on the individual orders.   Results for orders placed or performed during the hospital encounter of 02/18/22   Basic metabolic panel     Status: Abnormal   Result Value Ref Range    Sodium 134 133 - 144 mmol/L    Potassium 4.2 3.4 - 5.3 mmol/L    Chloride 102 94 - 109 mmol/L    Carbon Dioxide (CO2) 26 20 - 32 mmol/L    Anion Gap 6 3 - 14 mmol/L    Urea Nitrogen 23 7 - 30 mg/dL    Creatinine 0.70 0.52 - 1.04 mg/dL    Calcium 9.0 8.5 - 10.1 mg/dL    Glucose 345 (H) 70 - 99 mg/dL    GFR Estimate >90 >60 mL/min/1.73m2   CBC with platelets and differential     Status: Abnormal   Result Value Ref Range    WBC Count 14.9 (H) 4.0 - 11.0 10e3/uL    RBC Count 3.97 3.80 - 5.20 10e6/uL    Hemoglobin 13.4 11.7 - 15.7 g/dL    Hematocrit 40.7 35.0 - 47.0 %     (H) 78 - 100 fL    MCH 33.8 (H) 26.5 - 33.0 pg    MCHC 32.9 31.5 - 36.5 g/dL    RDW 13.6 10.0 - 15.0 %    Platelet Count 105 (L) 150 - 450 10e3/uL    % Neutrophils 93 %    % Lymphocytes 3 %    % Monocytes 3 %    % Eosinophils 0 %    % Basophils 0 %    % Immature Granulocytes 1 %    NRBCs per 100 WBC 0 <1 /100    Absolute Neutrophils 14.0 (H) 1.6 - 8.3 10e3/uL    Absolute Lymphocytes 0.4 (L) 0.8 - 5.3 10e3/uL    Absolute Monocytes 0.4 0.0 - 1.3 10e3/uL    Absolute Eosinophils 0.0 0.0 - 0.7 10e3/uL    Absolute Basophils 0.0 0.0 - 0.2 10e3/uL    Absolute Immature  Granulocytes 0.1 <=0.4 10e3/uL    Absolute NRBCs 0.0 10e3/uL   Prepare plasma (unit)     Status: None (Preliminary result)   Result Value Ref Range    UNIT ABO/RH A Pos     Unit Number [id number]     Unit Status Issued     Blood Component Type FROZEN PLASMA     Product Code [id number]     CODING SYSTEM [id number]     UNIT TYPE ISBT 6200     ISSUE DATE AND TIME 20220218114600    Prepare plasma (unit)     Status: None   Result Value Ref Range    UNIT ABO/RH A Pos     Unit Number [id number]     Unit Status Returned from Issue     Blood Component Type FROZEN PLASMA     Product Code L8100U92     CODING SYSTEM [id number]     UNIT TYPE ISBT 6200     ISSUE DATE AND TIME 20220218114600    Prepare plasma (unit)     Status: None (Preliminary result)   Result Value Ref Range    UNIT ABO/RH A Pos     Unit Number [id number]     Unit Status Issued     Blood Component Type FROZEN PLASMA     Product Code F8080A85     CODING SYSTEM [id number]     UNIT TYPE ISBT 6200     ISSUE DATE AND TIME 20220218114600    Prepare plasma (unit)     Status: None   Result Value Ref Range    UNIT ABO/RH A Pos     Unit Number [id number]     Unit Status Returned from Issue     Blood Component Type FROZEN PLASMA     Product Code Q8403L66     CODING SYSTEM [id number]     UNIT TYPE ISBT 6200     ISSUE DATE AND TIME 20220218114600    Prepare plasma (unit)     Status: None (Preliminary result)   Result Value Ref Range    UNIT ABO/RH A Pos     Unit Number [id number]     Unit Status Issued     Blood Component Type FROZEN PLASMA     Product Code D9460S82     CODING SYSTEM [id number]     UNIT TYPE ISBT 6200     ISSUE DATE AND TIME 20220218114600    Prepare plasma (unit)     Status: None (Preliminary result)   Result Value Ref Range    UNIT ABO/RH A Pos     Unit Number [id number]     Unit Status Issued     Blood Component Type FROZEN PLASMA     Product Code O3626O54     CODING SYSTEM [id number]     UNIT TYPE ISBT 6200     ISSUE DATE AND TIME 20220218114600     Prepare plasma (unit)     Status: None (Preliminary result)   Result Value Ref Range    UNIT ABO/RH A Pos     Unit Number [id number]     Unit Status Issued     Blood Component Type FROZEN PLASMA     Product Code S9219X82     CODING SYSTEM [id number]     UNIT TYPE ISBT 6200     ISSUE DATE AND TIME 20220218114600    Prepare plasma (unit)     Status: None (Preliminary result)   Result Value Ref Range    UNIT ABO/RH A Pos     Unit Number [id number]     Unit Status Issued     Blood Component Type FROZEN PLASMA     Product Code P8736D72     CODING SYSTEM [id number]     UNIT TYPE ISBT 6200     ISSUE DATE AND TIME 20220218114600    Prepare plasma (unit)     Status: None (Preliminary result)   Result Value Ref Range    UNIT ABO/RH A Pos     Unit Number [id number]     Unit Status Issued     Blood Component Type FROZEN PLASMA     Product Code D9524Z33     CODING SYSTEM [id number]     UNIT TYPE ISBT 6200     ISSUE DATE AND TIME 20220218114600    Prepare plasma (unit)     Status: None (Preliminary result)   Result Value Ref Range    UNIT ABO/RH A Pos     Unit Number [id number]     Unit Status Issued     Blood Component Type FROZEN PLASMA     Product Code C5512X43     CODING SYSTEM [id number]     UNIT TYPE ISBT 6200     ISSUE DATE AND TIME 20220218114600    Prepare plasma (unit)     Status: None (Preliminary result)   Result Value Ref Range    UNIT ABO/RH A Pos     Unit Number [id number]     Unit Status Issued     Blood Component Type FROZEN PLASMA     Product Code P2934H20     CODING SYSTEM [id number]     UNIT TYPE ISBT 6200     ISSUE DATE AND TIME 20220218114600    Prepare plasma (in mL)     Status: None (Preliminary result)   Result Value Ref Range    UNIT ABO/RH A Pos     Unit Number [id number]     Unit Status Issued     Blood Component Type FROZEN PLASMA     Product Code Y4549L78     CODING SYSTEM [id number]     UNIT TYPE ISBT 6200     ISSUE DATE AND TIME 20220218114600    Prepare plasma (unit)     Status: None  (Preliminary result)   Result Value Ref Range    UNIT ABO/RH A Pos     Unit Number [id number]     Unit Status Issued     Blood Component Type FROZEN PLASMA     Product Code X1224T32     CODING SYSTEM [id number]     UNIT TYPE ISBT 6200     ISSUE DATE AND TIME [id number]    CBC with platelets differential     Status: Abnormal    Narrative    The following orders were created for panel order CBC with platelets differential.  Procedure                               Abnormality         Status                     ---------                               -----------         ------                     CBC with platelets and d...[[id number]]  Abnormal            Final result                 Please view results for these tests on the individual orders.     Medications - No data to display     Assessments & Plan (with Medical Decision Making)   Ms. Gamble is a 31 year old female with a PMH of acquired TTP and recent third left lower molar/subperisoteal abscess, left masseter muscle myositis, and facial cellulitis presenting to the ED with complaint of worsening left lower tooth pain and left-sided facial swelling.  She is noted to be tachycardic but otherwise hemodynamically stable and afebrile.  On exam she does have erythema and swelling of the left side of her face, no obvious gingival abscess on exam.  She does have an uptrending white count to 15.7 with a lactate of 3.2.  CRP is 5.2.  Her platelet count is 131.  Blood cultures were drawn and she was started on Unasyn.  CT was ordered to further evaluate for evidence of abscess or signs of osteomyelitis given her mandibular tenderness.  I did discuss with dentistry who is currently seeing and evaluating the patient.  Patient was signed out pending CT with plan for likely medicine admission to continue IV antibiotics.    I have reviewed the nursing notes. I have reviewed the findings, diagnosis, plan and need for follow up with the patient.    New Prescriptions    No  medications on file       Final diagnoses:   Facial cellulitis   TTP (thrombotic thrombopenic purpura)   Sepsis, due to unspecified organism, unspecified whether acute organ dysfunction present (H)   I, Jazmine Enciso, am serving as a trained medical scribe to document services personally performed by eCcile Torre MD, based on the provider's statements to me.     I, Cecile Torre MD, was physically present and have reviewed and verified the accuracy of this note documented by Jazmine Enciso.      --  Cecile Torre MD  [facility] EMERGENCY DEPARTMENT  2/18/2022     Cecile Torre MD  02/19/22 0021

## 2022-02-19 NOTE — PROGRESS NOTES
Patient admitted to: 5c at 0300  Admitted from: ED     Arrived by: stretcher  Reason for admission: PMH of acquired TTP and recent third left lower molar/subperisoteal abscess, left masseter muscle myositis and facial cellulitis.   Patient accompanied by: self   Belongings: remain with pt       Teaching: done per unit routine  Skin double check completed by:Atif Tomlin and Liliya Calderon

## 2022-02-19 NOTE — CONSULTS
Dental Service Consultation  ED Visit - LL third molar pain and facial cellulitis     Ivon Gamble MRN# [id number]  YOB: 1990 Age: 31 year old  Date of Admission: 2/18/2022    Reason for consult: I was asked by Dr. Higginbotham to evaluate this patient for lower left tooth pain and facial swelling.    Assessment:  -Clinical evaluation reveals adult dentition in good condition. Pt's lower left third molar appears to be partially impacted with erythematous and edematous gingiva tissue surround it and stained caries coronally on the occlusal portion of the tooth.  -Pt reports pain upon extraoral palpation on her lower angle of the mandible along the anterior mandible around the left premolar region. Absence of purulent discharge and absence of compromised breathing. Swelling appears to be facial cellulitis in the left buccal region, slightly into the submandibular region, but significant asymmetry is not noted. Limited opening due to dental pain and swelling (~25 mm).   -CT reveals pocket of fluid on lower left region and decayed partially impacted #17 lower left third molar.        Plan:     Give patient Inferior alveolar nerve block to temporarily relieve pain. 5 mL of 1% lidocaine with 1:100k epi administered via left inferior alveolar nerve block. Pt reported loss of sensation on lower left and pain relief.     The patient is to come to the [facility] dental clinic for additional radiographs and extraction of #17 under local anesthetic. The appointment slot for this patient is at 12:45 PM on Monday, February 21st, 2022. The patient is to get a CBC and platelet count prior to the procedure and needs to have at least 50,000 platelets prior to doing the dental extraction. If the patient does not have enough platelets, pt is to get infusion prior to surgery and monitored after as an inpatient after surgery is complete or upon discretion of hematology team. This information as been relayed to Dr. Srinivasa Toro and he  will work on arranging transportation and communicating the plan to both the primary healthcare team and the patient. The [facility] dental scheduling team has been made aware of the case and has the patient information.     [facility]   [address], [address]   [place] [address]  Phone: [phone]    Chief Complaint:  Pt reports pain over the last few days and facial swelling on her lower left region.     History is obtained from the patient.        History of Present Illness:  Ivon Gamble is a 31 year old female with a PMH of acquired TTP and recent third left lower molar/subperisoteal abscess, left masseter muscle myositis, and facial cellulitis presenting to the ED with complaint of worsening left lower tooth pain and left-sided facial swelling. Patient endorses increased pain and inflammation at tooth #17.  She states that this began on 1/12 while in the hospital.  She was put on Augmentin and prednisone.  She finished the Augmentin 4 days ago, but then had the return of pain and swelling.  She reports having night sweats as well as a fever of 101 4 days ago.  She had some difficulty swallowing yesterday, but this is improved today.  She was seen in the [facility] ED yesterday and Augmentin was restarted.  No imaging was done.  She last took Augmentin this morning.  She also had her apheresis treatment today.  She is currently on 50 mg prednisone.  She denies nausea, vomiting, cough, shortness of breath, abdominal pain. Pt is to be admitted until the tooth is extracted, per her physician's instructions.       Past Medical History:  Past Medical History:   Diagnosis Date    TTP (thrombotic thrombocytopenic purpura)        Past Surgical History:  Past Surgical History:   Procedure Laterality Date    IR CVC NON TUNNEL PLACEMENT  1/13/2022    IR CVC TUNNEL PLACEMENT > 5 YRS OF AGE  1/28/2022       Social History:  Social History     Tobacco Use    Smoking status: Former Smoker     Types: Cigarettes      Quit date: 2022     Years since quittin.1    Smokeless tobacco: Never Used   Substance Use Topics    Alcohol use: Yes     Comment: occassionally        Family History:  Family History   Problem Relation Age of Onset    Diabetes Mother     Liver Disease Father        Immunizations:  Immunization History   Administered Date(s) Administered    COVID-19,PF,Pfizer (12+ Yrs) 2021, 2021    Hep B, Peds or Adolescent 2002, 2003, 2004    Historical DTP/aP 1991    Influenza (IIV3) PF 2014    OPV, trivalent, live 1991    Td (Adult), Adsorbed 2002       Allergies:  Allergies   Allergen Reactions    Rituximab Anaphylaxis       Medications:  Current Facility-Administered Medications Ordered in Epic   Medication Dose Route Frequency Last Rate Last Admin    0.9% sodium chloride BOLUS  1,000 mL Intravenous Once 1,000 mL/hr at 22 2340 1,000 mL at 22 2340    Followed by    sodium chloride 0.9% infusion   Intravenous Continuous        0.9% sodium chloride BOLUS  250 mL Intravenous BID PRN        acetaminophen (TYLENOL) tablet 650 mg  650 mg Oral Once        Anticoagulant Citrate Dextrose Formula A   500-2,000 mL Apheresis Once        anticoagulant citrate flush 3 mL  3 mL Intravenous Once PRN        calcium gluconate 3.5 g in sodium chloride 0.9 % 50 mL  3.5 g Intravenous Once        diphenhydrAMINE (BENADRYL) injection 50 mg  50 mg Intravenous Once        diphenhydrAMINE (BENADRYL) injection 50 mg  50 mg Intravenous Once PRN        hydrocortisone sodium succinate PF (solu-CORTEF) injection 100 mg  100 mg Intravenous Once        lidocaine 2%-EPINEPHrine 1:100,000 2 %-1:135144 injection 10 mL  10 mL Dental Once         Current Outpatient Medications Ordered in Epic   Medication    acetaminophen (TYLENOL) 500 MG tablet    albuterol (PROAIR HFA/PROVENTIL HFA/VENTOLIN HFA) 108 (90 Base) MCG/ACT inhaler    amoxicillin-clavulanate (AUGMENTIN) 875-125 MG tablet    blood  "glucose (ACCU-CHEK GUIDE) test strip    blood glucose monitoring (SOFTCLIX) lancets    chlorhexidine (PERIDEX) 0.12 % solution    EPINEPHrine (ANY BX GENERIC EQUIV) 0.3 MG/0.3ML injection 2-pack    furosemide (LASIX) 20 MG tablet    insulin NPH (HUMULIN N KWIKPEN) 100 UNIT/ML injection    insulin  UNIT/ML vial    insulin syringe-needle U-100 (31G X 5/16\" 0.5 ML) 31G X 5/16\" 0.5 ML miscellaneous    pantoprazole (PROTONIX) 40 MG EC tablet    polyethylene glycol (MIRALAX) 17 GM/Dose powder    potassium chloride ER (KLOR-CON M) 20 MEQ CR tablet    predniSONE (DELTASONE) 50 MG tablet    sennosides (SENOKOT) 8.6 MG tablet       Review of Systems:  The 10 point Review of Systems is negative other than noted in the HPI    Physical Exam:  Vitals were reviewed  Temp: 98.3  F (36.8  C) Temp src: Oral BP: 127/69 Pulse: 116   Resp: 18 SpO2: 97 % O2 Device: None (Room air)          Head and neck exam:  Pt reports pain upon extraoral palpation on her lower angle of the mandible along the anterior mandible around the left premolar region. Absence of purulent discharge and absence of compromised breathing. Swelling appears to be facial cellulitis in the left buccal region, slightly into the submandibular region, but significant asymmetry is not noted. Limited opening due to dental pain and swelling (~25 mm). Pt reports pain, but no clicking or popping of TMJ upon opening and lateral movements.     Intraoral exam:   Clinical evaluation reveals adult dentition in good condition. Pt's lower left third molar appears to be partially impacted with erythematous and edematous gingiva tissue surround it and stained caries coronally on the occlusal portion of the tooth.    Data:  Radiographic interpretation: CT taken on 2/19/22. CT reveals pocket of fluid on lower left region and decayed partially impacted #17 lower left third molar.   Osseous pathology: Not noted   Pulpal Pathology: Probable necrosis of #17   Periodontal Pathology: " pericoronitis of #17 (lower left third molar).   Caries: #17 occlusal   Odontogenic pathology: cellulitis, caries, and pericoronitis associated with #17.         The patient was discussed with: Dr. Garcia.         Mary Grace Loyd DMD   PGY1  Pager: [phone]

## 2022-02-19 NOTE — ED PROVIDER NOTES
ED Triage Provider Note  [facility]  Encounter Date: Feb 18, 2022    History:  Chief Complaint   Patient presents with     Dental Pain     Ivon Gamble is a 31 year old female past medical history significant for acquired TTP, thrombocytopenia, pericoronitis, facial cellulitis and abscess with recent discharge from the emergency department yesterday who presents to the ED with ongoing left-sided lower jaw swelling, and reported fevers, along with tooth pain.  Patient is here today with significant other.  She reports over the last 1 month she has had ongoing pain localized to the left posterior wisdom tooth.  She states that she has not noticed any drainage, though has noticed some swelling, and fevers at home in the last 1 month.  Most recent temperature about 3 days ago, 101.  She is able to eat and drink, but does report decreased appetite.  No nausea or vomiting.  She denies significant history of tooth problems in the past.  She is a former smoker, quit 5 months ago.  No other current tobacco use.  Patient was seen yesterday in the emergency department, at that time diagnosed with pericoronitis.  She was discharged on Augmentin.  At that time oncology had been brought on board, and did recommend patient be further evaluated here at the [place] if she has worsening difficulties for consideration of dental consult.    Review of Systems:  Negative for chest pain shortness of breath, oral drainage.    Exam:  /69   Pulse 116   Temp 98.3  F (36.8  C) (Oral)   Resp 18   Wt 115.2 kg (254 lb)   LMP 01/31/2022 (Approximate)   SpO2 97%   BMI 43.60 kg/m    General: No acute distress. Appears stated age.   Mouth: Patient has visible swelling noted to the left inferior wisdom tooth.  Appreciate no visible drainage, fluctuance.  Tooth itself is tender without any other tenderness noted.  Patient does have some visible swelling localized to the inferior aspect of  the left jaw.  Appreciate no significant involvement of the neck.  He is able to tolerate secretions.  Cardio: Regular rate, extremities well perfused  Resp: Normal work of breathing, grossly normal respiratory rate  Neuro: Alert.    Medical Decision Making:  Patient arriving to the ED with problem as above. A medical screening exam was performed.  CBC metabolic panel lactate CRP IV placement orders initiated from Triage. The patient is appropriate to wait in triage.      Hailey Gonzales PA-C on 2/18/2022 at 8:57 PM       Hailey Gonzales PA-C  02/18/22 2102

## 2022-02-19 NOTE — PROGRESS NOTES
4 bottles meds brought with patient: pantoprazole sodium 40mg, prednisone 50mg, furosemide 20mg, amoxicillin 875-125    Family will come in during day and pick them up. meds kept in med's room.

## 2022-02-19 NOTE — PROGRESS NOTES
Patient has been NPO for possible dental work, and the primary team was notified and he stated that the oral surgeon will be the one to make the decision as of when the surgery will be done and decide the status of being on NPO.  Patient is resting in bed, and tylenol was given for pain with some relief, PIV running NS at 125 ml an hour.  No BM this shift, alertt and oriented, left chin area is swollen.  Continue with POC

## 2022-02-19 NOTE — H&P
"[facility]       History and Physical - Hospitalist Service       Date of Admission:  2/18/2022    Assessment & Plan      Ivon Gamble is a 31 year old female admitted on 2/18/2022. She has TTP undergoing treatment and presents today for acute worsening of recent dental infection.     #Dental infection  Details TBD awaiting read of CT. But timing of infection in same region worsening right after finishing course of abx suggests poor source control requiring extraction.   - Dental consult  - Unasyn (2/18 - )   - awaiting extraction    #TTP   - heme consult  - continue pred 50 (per patient, plan to go to 25 on 2/21)  - consideration of ritux    #steroid induced hyperglycemia  - patient would prefer to avoid insulin  - while NPO, will avoid        Diet:   NPO   DVT Prophylaxis: VTE Prophylaxis contraindicated due to TTP  Siddiqui Catheter: Not present  Central Lines: PRESENT     Cardiac Monitoring: None  Code Status:   full    Clinically Significant Risk Factors Present on Admission                 # Severe Obesity: Estimated body mass index is 43.6 kg/m  as calculated from the following:    Height as of an earlier encounter on 2/18/22: 1.626 m (5' 4\").    Weight as of this encounter: 115.2 kg (254 lb).      Disposition Plan        The patient's care was discussed with the Bedside Nurse and Patient.    Julienne Carlson MD  Hospitalist Service  [facility]  Securely message with the Vocera Web Console (learn more here)  Text page via [facility] Paging/Directory         ______________________________________________________________________    Chief Complaint   Left jaw and tooth pain    History is obtained from the patient    History of Present Illness   Ivon Gamble is a 31 year old female with history of TTP on outpatient apheresis therapy with recent dental infection who presents with tooth pain and facial swelling similar to " initial infection. She has been on outpatient augmentin. She finished her augmentin 4 days ago and the pain and swelling returned. She has not had fevers, ear ache, runny nose, chest pain, or SOB. She does get SOB in general when she uses insulin which makes her bloated and then SOB. She uses tylenol for pain as she is opposed to in opiate pain meds.     Dental assessed: abx (unasyn), planned extraction     CT scan tooth infection and overlying cellulitis     Per review of chart patient was admitted 1/12/22-1/18/22 for tooth abscess/cellulitis and acquired TTP. CT imaging had shown a third left mandibular molar/subperisoteal abscess, left masseter muscle myositis, and facial cellulitis. She was treated with rocephin/flagyl and transitioned to augmentin upon discharge. She underwent I&D on 1/13 and a tooth extraction could not be performed inpatient.   Patient then seen in the [facility] ED on 2/17 for pericoronitis involving tooth #17. She was given intravenous Unasyn and 1 dose of dexamethasone to help with gingival inflammation. Plan to continue her chlorhexidine rinses and follow-up with oral surgery as planned. They spoke to the patient's oncologist, who is monitoring her platelets closely, and have been in the 100-300,000 range and they are hoping to get the patient admitted to the hospital at the [facility] for dental extraction, rituximab and ongoing pheresis treatments. She was restarted on Augmentin and to attend apheresis today and [facility] if symptoms worsened.     Review of Systems    The 10 point Review of Systems is negative other than noted in the HPI or here.     Past Medical History    I have reviewed this patient's medical history and updated it with pertinent information if needed.   Past Medical History:   Diagnosis Date     TTP (thrombotic thrombocytopenic purpura)        Past Surgical History   I have reviewed this patient's surgical history and updated it with pertinent  information if needed.  Past Surgical History:   Procedure Laterality Date     IR CVC NON TUNNEL PLACEMENT  2022     IR CVC TUNNEL PLACEMENT > 5 YRS OF AGE  2022       Social History   I have reviewed this patient's social history and updated it with pertinent information if needed.  Social History     Tobacco Use     Smoking status: Former Smoker     Types: Cigarettes     Quit date: 2022     Years since quittin.1     Smokeless tobacco: Never Used   Vaping Use     Vaping Use: Never used   Substance Use Topics     Alcohol use: Yes     Comment: occassionally      Drug use: Never       Family History   I have reviewed this patient's family history and updated it with pertinent information if needed.  Family History   Problem Relation Age of Onset     Diabetes Mother      Liver Disease Father        Prior to Admission Medications   Prior to Admission Medications   Prescriptions Last Dose Informant Patient Reported? Taking?   EPINEPHrine (ANY BX GENERIC EQUIV) 0.3 MG/0.3ML injection 2-pack   No No   Sig: Inject 0.3 mLs (0.3 mg) into the muscle once as needed for anaphylaxis   acetaminophen (TYLENOL) 500 MG tablet   Yes No   Sig: Take 500-1,000 mg by mouth every 6 hours as needed for mild pain   albuterol (PROAIR HFA/PROVENTIL HFA/VENTOLIN HFA) 108 (90 Base) MCG/ACT inhaler   Yes No   Sig: INHALE ONE PUFF BY MOUTH EVERY 4 HOURS AS NEEDED   amoxicillin-clavulanate (AUGMENTIN) 875-125 MG tablet   No No   Sig: Take 1 tablet by mouth every 12 hours   blood glucose (ACCU-CHEK GUIDE) test strip   No No   Sig: Use to test blood sugar 1-2 times daily or as directed.   blood glucose monitoring (SOFTCLIX) lancets   No No   Sig: Use to test blood sugar 1-2 times daily or as directed.   chlorhexidine (PERIDEX) 0.12 % solution   No No   Sig: Swish and spit 15 mLs in mouth 2 times daily   furosemide (LASIX) 20 MG tablet   No No   Sig: Take 1 tablet (20 mg) by mouth 2 times daily   insulin NPH (HUMULIN N KWIKPEN) 100  "UNIT/ML injection   No No   Sig: Inject 15 Units Subcutaneous every morning   Patient taking differently: Inject 15 Units Subcutaneous every morning Pt taking as needed   insulin  UNIT/ML vial   No No   Si units sub Q in morning or as directed   insulin syringe-needle U-100 (31G X /16\" 0.5 ML) 31G X 5/16\" 0.5 ML miscellaneous   No No   Sig: Use one syringes daily or as directed.   pantoprazole (PROTONIX) 40 MG EC tablet   No No   Sig: Take 1 tablet (40 mg) by mouth every morning (before breakfast) While on high dose steroids.   polyethylene glycol (MIRALAX) 17 GM/Dose powder   No No   Sig: Take 17 g by mouth 2 times daily as needed for constipation   potassium chloride ER (KLOR-CON M) 20 MEQ CR tablet   No No   Sig: Take 1 tablet (20 mEq) by mouth 2 times daily   predniSONE (DELTASONE) 50 MG tablet   Yes No   Sig: Take 50 mg by mouth daily   sennosides (SENOKOT) 8.6 MG tablet   No No   Sig: Take 1-2 tablets by mouth 2 times daily as needed for constipation      Facility-Administered Medications: None     Allergies   Allergies   Allergen Reactions     Rituximab Anaphylaxis       Physical Exam   Vital Signs: Temp: 98.3  F (36.8  C) Temp src: Oral BP: 127/69 Pulse: 116   Resp: 18 SpO2: 97 % O2 Device: None (Room air)    Weight: 254 lbs 0 oz    Constitutional: awake, alert, cooperative, no apparent distress, and appears stated age  Eyes: Lids and lashes normal, pupils equal, round and reactive to light, extra ocular muscles intact, sclera clear, conjunctiva normal  ENT: Normocephalic except for mild left mandibular swelling atraumatic,external ears without lesions, oral pharynx with moist mucous membranes  Respiratory: No increased work of breathing, good air exchange, clear to auscultation bilaterally, no crackles or wheezing  Cardiovascular: Normal apical impulse, regular rate and rhythm, normal S1 and S2, no S3 or S4, and no murmur noted  GI: soft, non-distended, non-tender  Skin: no bruising or " bleeding  Musculoskeletal: no lower extremity pitting edema present  there is no redness, warmth, or swelling of the joints  Neurologic: Awake, alert, oriented to name, place and time.  Cranial nerves II-XII are grossly intact.     Data   Data reviewed today: I reviewed all medications, new labs and imaging results over the last 24 hours. I personally reviewed the head CT image(s) showing infection without abscess.

## 2022-02-19 NOTE — PLAN OF CARE
"/82 (BP Location: Right arm)   Pulse 73   Temp 97.3  F (36.3  C) (Oral)   Resp 17   Ht 1.65 m (5' 4.96\")   Wt 117.7 kg (259 lb 6.4 oz)   LMP 01/31/2022 (Approximate)   SpO2 97%   BMI 43.22 kg/m      Patient was admitted from ER this shift for continuous TTP treatment, dental consult/awaiting extraction, Unasyn treatment. Tylenol PRN for pain. AVSS, afebrile. Patient alert and oriented *4, independently up and lid in her room. Patient denied nausea/vomiting/diarrhea. C/O tooth pain as 7/10, 1 x Tylenol 1000 mg admitted with good relief, cold pack applied on left chin. NS continuously at 125 ml/hr, scheduled Unasyn given. No urine/BM since come in unit. Blood check starts tomorrow AM. Keep monitor and current POC.     Problem: Pain Acute  Goal: Acceptable Pain Control and Functional Ability  Intervention: Develop Pain Management Plan  Flowsheets (Taken 2/19/2022 0472)  Pain Management Interventions: medication (see MAR)     Problem: Pain Acute  Goal: Acceptable Pain Control and Functional Ability  Intervention: Prevent or Manage Pain  Recent Flowsheet Documentation  Taken 2/19/2022 0333 by Atif Tomlin RN  Medication Review/Management: medications reviewed   Goal Outcome Evaluation:                          "

## 2022-02-20 LAB
ANION GAP SERPL CALCULATED.3IONS-SCNC: 3 MMOL/L (ref 3–14)
BACTERIA BLD CULT: NO GROWTH
BASOPHILS # BLD AUTO: 0 10E3/UL (ref 0–0.2)
BASOPHILS NFR BLD AUTO: 0 %
BUN SERPL-MCNC: 16 MG/DL (ref 7–30)
C DIFF TOX B STL QL: NEGATIVE
CALCIUM SERPL-MCNC: 8.2 MG/DL (ref 8.5–10.1)
CHLORIDE BLD-SCNC: 109 MMOL/L (ref 94–109)
CO2 SERPL-SCNC: 27 MMOL/L (ref 20–32)
CREAT SERPL-MCNC: 0.69 MG/DL (ref 0.52–1.04)
EOSINOPHIL # BLD AUTO: 0.1 10E3/UL (ref 0–0.7)
EOSINOPHIL NFR BLD AUTO: 1 %
ERYTHROCYTE [DISTWIDTH] IN BLOOD BY AUTOMATED COUNT: 13.8 % (ref 10–15)
GFR SERPL CREATININE-BSD FRML MDRD: >90 ML/MIN/1.73M2
GLUCOSE BLD-MCNC: 128 MG/DL (ref 70–99)
GLUCOSE BLDC GLUCOMTR-MCNC: 104 MG/DL (ref 70–99)
GLUCOSE BLDC GLUCOMTR-MCNC: 125 MG/DL (ref 70–99)
GLUCOSE BLDC GLUCOMTR-MCNC: 173 MG/DL (ref 70–99)
GLUCOSE BLDC GLUCOMTR-MCNC: 207 MG/DL (ref 70–99)
GLUCOSE BLDC GLUCOMTR-MCNC: 235 MG/DL (ref 70–99)
GLUCOSE BLDC GLUCOMTR-MCNC: 254 MG/DL (ref 70–99)
GLUCOSE BLDC GLUCOMTR-MCNC: 329 MG/DL (ref 70–99)
GLUCOSE BLDC GLUCOMTR-MCNC: 365 MG/DL (ref 70–99)
GLUCOSE BLDC GLUCOMTR-MCNC: 404 MG/DL (ref 70–99)
HCT VFR BLD AUTO: 36.8 % (ref 35–47)
HGB BLD-MCNC: 11.7 G/DL (ref 11.7–15.7)
IMM GRANULOCYTES # BLD: 0.1 10E3/UL
IMM GRANULOCYTES NFR BLD: 1 %
LDH SERPL L TO P-CCNC: 204 U/L (ref 81–234)
LYMPHOCYTES # BLD AUTO: 2.2 10E3/UL (ref 0.8–5.3)
LYMPHOCYTES NFR BLD AUTO: 24 %
MCH RBC QN AUTO: 33.3 PG (ref 26.5–33)
MCHC RBC AUTO-ENTMCNC: 31.8 G/DL (ref 31.5–36.5)
MCV RBC AUTO: 105 FL (ref 78–100)
MONOCYTES # BLD AUTO: 0.7 10E3/UL (ref 0–1.3)
MONOCYTES NFR BLD AUTO: 8 %
NEUTROPHILS # BLD AUTO: 5.9 10E3/UL (ref 1.6–8.3)
NEUTROPHILS NFR BLD AUTO: 66 %
NRBC # BLD AUTO: 0 10E3/UL
NRBC BLD AUTO-RTO: 0 /100
PLATELET # BLD AUTO: 93 10E3/UL (ref 150–450)
POTASSIUM BLD-SCNC: 4 MMOL/L (ref 3.4–5.3)
RBC # BLD AUTO: 3.51 10E6/UL (ref 3.8–5.2)
RETICS # AUTO: 0.07 10E6/UL (ref 0.03–0.1)
RETICS/RBC NFR AUTO: 2.1 % (ref 0.5–2)
SODIUM SERPL-SCNC: 139 MMOL/L (ref 133–144)
WBC # BLD AUTO: 9 10E3/UL (ref 4–11)

## 2022-02-20 PROCEDURE — 250N000011 HC RX IP 250 OP 636: Performed by: STUDENT IN AN ORGANIZED HEALTH CARE EDUCATION/TRAINING PROGRAM

## 2022-02-20 PROCEDURE — 250N000012 HC RX MED GY IP 250 OP 636 PS 637: Performed by: STUDENT IN AN ORGANIZED HEALTH CARE EDUCATION/TRAINING PROGRAM

## 2022-02-20 PROCEDURE — 99233 SBSQ HOSP IP/OBS HIGH 50: CPT | Performed by: STUDENT IN AN ORGANIZED HEALTH CARE EDUCATION/TRAINING PROGRAM

## 2022-02-20 PROCEDURE — 250N000013 HC RX MED GY IP 250 OP 250 PS 637: Performed by: STUDENT IN AN ORGANIZED HEALTH CARE EDUCATION/TRAINING PROGRAM

## 2022-02-20 PROCEDURE — 250N000009 HC RX 250: Performed by: STUDENT IN AN ORGANIZED HEALTH CARE EDUCATION/TRAINING PROGRAM

## 2022-02-20 PROCEDURE — 85025 COMPLETE CBC W/AUTO DIFF WBC: CPT | Performed by: INTERNAL MEDICINE

## 2022-02-20 PROCEDURE — 87493 C DIFF AMPLIFIED PROBE: CPT | Performed by: PHYSICIAN ASSISTANT

## 2022-02-20 PROCEDURE — 258N000003 HC RX IP 258 OP 636: Performed by: EMERGENCY MEDICINE

## 2022-02-20 PROCEDURE — 120N000005 HC R&B MS OVERFLOW UMMC

## 2022-02-20 PROCEDURE — 36415 COLL VENOUS BLD VENIPUNCTURE: CPT | Performed by: INTERNAL MEDICINE

## 2022-02-20 PROCEDURE — 83615 LACTATE (LD) (LDH) ENZYME: CPT | Performed by: INTERNAL MEDICINE

## 2022-02-20 PROCEDURE — 80048 BASIC METABOLIC PNL TOTAL CA: CPT | Performed by: STUDENT IN AN ORGANIZED HEALTH CARE EDUCATION/TRAINING PROGRAM

## 2022-02-20 PROCEDURE — 85045 AUTOMATED RETICULOCYTE COUNT: CPT | Performed by: INTERNAL MEDICINE

## 2022-02-20 RX ORDER — ACETAMINOPHEN 500 MG
500-1000 TABLET ORAL EVERY 6 HOURS PRN
Status: DISCONTINUED | OUTPATIENT
Start: 2022-02-20 | End: 2022-02-21 | Stop reason: HOSPADM

## 2022-02-20 RX ORDER — LIDOCAINE HYDROCHLORIDE 20 MG/ML
SOLUTION OROPHARYNGEAL ONCE
Status: DISCONTINUED | OUTPATIENT
Start: 2022-02-20 | End: 2022-02-21 | Stop reason: HOSPADM

## 2022-02-20 RX ADMIN — PANTOPRAZOLE SODIUM 40 MG: 40 TABLET, DELAYED RELEASE ORAL at 07:54

## 2022-02-20 RX ADMIN — PREDNISONE 50 MG: 50 TABLET ORAL at 07:54

## 2022-02-20 RX ADMIN — INSULIN HUMAN 15 UNITS: 100 INJECTION, SUSPENSION SUBCUTANEOUS at 08:45

## 2022-02-20 RX ADMIN — SODIUM CHLORIDE: 9 INJECTION, SOLUTION INTRAVENOUS at 04:33

## 2022-02-20 RX ADMIN — AMPICILLIN SODIUM AND SULBACTAM SODIUM 3 G: 2; 1 INJECTION, POWDER, FOR SOLUTION INTRAMUSCULAR; INTRAVENOUS at 12:17

## 2022-02-20 RX ADMIN — AMPICILLIN SODIUM AND SULBACTAM SODIUM 3 G: 2; 1 INJECTION, POWDER, FOR SOLUTION INTRAMUSCULAR; INTRAVENOUS at 23:42

## 2022-02-20 RX ADMIN — ACETAMINOPHEN 1000 MG: 500 TABLET, FILM COATED ORAL at 04:48

## 2022-02-20 RX ADMIN — POTASSIUM CHLORIDE 20 MEQ: 750 TABLET, EXTENDED RELEASE ORAL at 19:46

## 2022-02-20 RX ADMIN — ANTICOAGULANT CITRATE DEXTROSE SOLUTION FORMULA A 3 ML: 12.25; 11; 3.65 SOLUTION INTRAVENOUS at 15:24

## 2022-02-20 RX ADMIN — FUROSEMIDE 20 MG: 20 TABLET ORAL at 07:54

## 2022-02-20 RX ADMIN — AMPICILLIN SODIUM AND SULBACTAM SODIUM 3 G: 2; 1 INJECTION, POWDER, FOR SOLUTION INTRAMUSCULAR; INTRAVENOUS at 06:27

## 2022-02-20 RX ADMIN — AMPICILLIN SODIUM AND SULBACTAM SODIUM 3 G: 2; 1 INJECTION, POWDER, FOR SOLUTION INTRAMUSCULAR; INTRAVENOUS at 18:02

## 2022-02-20 RX ADMIN — POTASSIUM CHLORIDE 20 MEQ: 750 TABLET, EXTENDED RELEASE ORAL at 07:54

## 2022-02-20 RX ADMIN — INSULIN ASPART 3 UNITS: 100 INJECTION, SOLUTION INTRAVENOUS; SUBCUTANEOUS at 17:26

## 2022-02-20 RX ADMIN — CHLORHEXIDINE GLUCONATE 15 ML: 1.2 SOLUTION ORAL at 07:55

## 2022-02-20 RX ADMIN — FUROSEMIDE 20 MG: 20 TABLET ORAL at 19:46

## 2022-02-20 RX ADMIN — CHLORHEXIDINE GLUCONATE 15 ML: 1.2 SOLUTION ORAL at 19:46

## 2022-02-20 RX ADMIN — INSULIN ASPART 5 UNITS: 100 INJECTION, SOLUTION INTRAVENOUS; SUBCUTANEOUS at 14:15

## 2022-02-20 ASSESSMENT — ACTIVITIES OF DAILY LIVING (ADL)
ADLS_ACUITY_SCORE: 3
ADLS_ACUITY_SCORE: 5
ADLS_ACUITY_SCORE: 5
ADLS_ACUITY_SCORE: 3
ADLS_ACUITY_SCORE: 3
ADLS_ACUITY_SCORE: 5
ADLS_ACUITY_SCORE: 3
ADLS_ACUITY_SCORE: 5
ADLS_ACUITY_SCORE: 3
ADLS_ACUITY_SCORE: 5
ADLS_ACUITY_SCORE: 3
ADLS_ACUITY_SCORE: 3
ADLS_ACUITY_SCORE: 5
ADLS_ACUITY_SCORE: 3
ADLS_ACUITY_SCORE: 5
ADLS_ACUITY_SCORE: 3
ADLS_ACUITY_SCORE: 3
ADLS_ACUITY_SCORE: 5

## 2022-02-20 NOTE — PROGRESS NOTES
[facility]    Medicine Progress Note - Hospitalist Service, GOLD TEAM 8    Date of Admission:  2/18/2022    Assessment & Plan        Ivon Gamble is a 31 year old female with pmh of TTP,morbid obesity admitted on 2/18/2022. Presents for worsening TTP secondary to smoldering dental infection presents for further workup and care.     Today's plan   -Spoke at length about CT soft tissue from 2/19 that did not show abscess formation,   -Spoke to Dr Ganesh Loyd from dental team that explained to the patient that patient will get her tooth removed in the dental clinic after discharge and not on this hospitalization   -Spoke at length with Dr Srinivasa santos and dental team, and there is plan for patient to have procedure at 1 pm at 2/21/2022 in dental clinic as outpatient and if labs are not concerning will plan for early am discharge   IV drips and antimicrobials   Unasyn 2/19          #Dental infection  Smoldering infection ongoing for weeks . Tooth extraction on hold given low platelet  counts   - awaiting extraction either  Inpatient or outpatient need platelet count above 50,000     #TTP   - heme consult  - continue pred 50 (per patient, plan to go to 25 on 2/21)  - consideration of Rituximab as deemed necessary by hematology team   -Treatment with rituximab was initiated on February 9, but she had symptoms concerning for anaphylaxis shortly after the infusion started so it was aborted.  This has not been reattempted.    #steroid induced hyperglycemia  - Started insulin and fingerstick regiment     #morbid obesity   Consider outpatient bariatric consultation           Diet: Regular Diet Adult    DVT Prophylaxis: Pneumatic Compression Devices  Siddiqui Catheter: Not present  Central Lines: PRESENT  CVC Double Lumen Right Internal jugular Tunneled-Site Assessment: WDL  Cardiac Monitoring: None  Code Status: Full Code      Disposition Plan   Expected Discharge: 02/21/2022    "  Anticipated discharge location:  Awaiting care coordination huddle  Delays:            The patient's care was discussed with the Bedside Nurse and Patient.    Juan M Gamboa MD  Hospitalist Service, [address]  Securely message with the Vocera Web Console (learn more here)  Text page via [facility] Paging/Directory   Please see signed in provider for up to date coverage information      Clinically Significant Risk Factors Present on Admission             # Severe Obesity: Estimated body mass index is 43.42 kg/m  as calculated from the following:    Height as of this encounter: 1.65 m (5' 4.96\").    Weight as of this encounter: 118.2 kg (260 lb 9.6 oz).      ______________________________________________________________________    Interval History   Patient had no overnight events, She does report dental pain that is controlled, No new abdominal pain, difficulty urinating, fevers or malaise.     Data reviewed today: I reviewed all medications, new labs and imaging results over the last 24 hours. I personally reviewed no images or EKG's today.    Physical Exam   Vital Signs: Temp: 98  F (36.7  C) Temp src: Oral BP: 114/65 Pulse: 65   Resp: 16 SpO2: 98 % O2 Device: None (Room air)    Weight: 260 lbs 9.6 oz  Constitutional: awake, alert, cooperative, no apparent distress, and appears stated age  Eyes: Lids and lashes normal, pupils equal, round and reactive to light, extra ocular muscles intact, sclera clear, conjunctiva normal  ENT: Normocephalic, without obvious abnormality, atraumatic, sinuses nontender on palpation, external ears without lesions, oral pharynx with moist mucous membranes, tonsils without erythema or exudates, gums normal and good dentition.  Hematologic / Lymphatic: no cervical lymphadenopathy  Respiratory: No increased work of breathing, good air exchange, clear to auscultation bilaterally, no crackles or wheezing  Cardiovascular: Normal apical " impulse, regular rate and rhythm, normal S1 and S2, no S3 or S4, and no murmur noted  GI: No scars, normal bowel sounds, soft, non-distended, non-tender, no masses palpated, no hepatosplenomegally  Genitounirinary:   Skin: no bruising or bleeding  Musculoskeletal: There is no redness, warmth, or swelling of the joints.  Full range of motion noted.  Motor strength is 5 out of 5 all extremities bilaterally.  Tone is normal.  Neurologic: Awake, alert, oriented to name, place and time.  Cranial nerves II-XII are grossly intact.  Motor is 5 out of 5 bilaterally.  Cerebellar finger to nose, heel to shin intact.  Sensory is intact.  Babinski down going, Romberg negative, and gait is normal.  Neuropsychiatric: General: normal, calm and normal eye contact    Data   Recent Labs   Lab 02/20/22  0727 02/20/22  0452 02/20/22  0443 02/19/22  1604 02/19/22  1455 02/19/22  0602 02/18/22  2040 02/18/22  1415 02/17/22  1328 02/15/22  1458   WBC  --   --  9.0  --  13.4*  --  15.7*   < > 10.0 12.8*   HGB  --   --  11.7  --  12.9  --  14.9   < > 13.7 14.6   MCV  --   --  105*  --  103*  --  103*   < > 105* 104*   PLT  --   --  93*  --  111*  --  131*   < > 112* 138*   INR  --   --   --   --   --   --   --   --  0.95 0.97   NA  --   --  139  --  137  --  134   < > 138 131*   POTASSIUM  --   --  4.0  --  3.9  --  4.5   < > 3.8 5.4*   CHLORIDE  --   --  109  --  107  --  100   < > 104 99   CO2  --   --  27  --  26  --  26   < > 30 26   BUN  --   --  16  --  20  --  24   < > 23 28   CR  --   --  0.69  --  0.65  --  0.79   < > 0.67 0.84   ANIONGAP  --   --  3  --  4  --  8   < > 4 6   JOSELUIS  --   --  8.2*  --  8.0*  --  9.2   < > 8.7 9.6   * 125* 128*   < > 262*   < > 476*   < > 155* 409*   ALBUMIN  --   --   --   --  3.2*  --   --   --  3.7 4.0   PROTTOTAL  --   --   --   --  6.0*  --   --   --  6.8 8.0   BILITOTAL  --   --   --   --  0.7  --   --   --  0.6 0.5   ALKPHOS  --   --   --   --  56  --   --   --  66 78   ALT  --   --   --    --  53*  --   --   --  53* 55*   AST  --   --   --   --  30  --   --   --  15 32    < > = values in this interval not displayed.     No results found for this or any previous visit (from the past 24 hour(s)).  Medications     sodium chloride 125 mL/hr at 02/20/22 0757       ampicillin-sulbactam  3 g Intravenous Q6H     anticoagulant citrate  3 mL Intravenous Q8H     chlorhexidine  15 mL Swish & Spit BID     furosemide  20 mg Oral BID     insulin aspart  1-7 Units Subcutaneous TID AC     insulin aspart  1-5 Units Subcutaneous At Bedtime     insulin NPH  15 Units Subcutaneous QAM     insulin NPH  5 Units Subcutaneous At Bedtime     pantoprazole  40 mg Oral QAM AC     potassium chloride ER  20 mEq Oral BID     predniSONE  50 mg Oral Daily     sodium chloride (PF)  3 mL Intracatheter Q8H

## 2022-02-20 NOTE — CONSULTS
HEMATOLOGY CONSULT    Reason for consult: TTP    Chart and labs reviewed.  Patient seen and examined.  Case discussed in detail with outpatient hematologist, Dr. Ashley Nguyen.    Ivon is a 31-year-old woman who recently relocated from [place] to [place].  She presented in mid January 2022 with a new diagnosis of TTP.  This was confirmed by laboratory testing with an ADAMTS 13 level of less than 5% and a documented inhibitor antibody.  She was initially hospitalized at [facility], received plasma exchange daily for 5 days, with normalization of her platelet count.  Plasma exchange was therefore stopped.  She was seen in clinic about a week later and found to have a platelet count of approximately 80,000.  Daily plasma exchange was reinitiated and after 1 week she transitioned to intermittent plasma exchange as an outpatient.  With this treatment, her platelet count improved but did not return to normal.  Over the last 2 to 3 weeks her platelet count has been in the 100,000-130,000 range.  She has been on prednisone 50 mg daily (approximately 0.5 mg/kg).  Treatment with rituximab was initiated on February 9, but she had symptoms concerning for anaphylaxis shortly after the infusion started so it was aborted.  This has not been reattempted.    Complicating all this is the presence of a dental infection which she has been dealing with for over a month.  She has been on and off antibiotics.  Her symptoms of pain and swelling have increased quickly soon after stopping antibiotics.  Apparently numerous attempts have been made to get her into see a dentist or oral surgeon to have the tooth pulled, without success.    She is admitted now again with worsening symptoms of dental infection.  She was instructed to report to our emergency room after her outpatient plasma exchange treatment yesterday.  Other than pain and swelling over her left lower jaw, she denies any other symptoms.  Has not had fevers.  She denies  any new neurologic symptoms.  No bruising, nosebleeds, dental bleeding, or blood in the urine or stool.    Physical exam: She is sitting up in a chair, working on a laptop computer.  She appears to be in no acute distress.  She has been afebrile, with normal vital signs and normal oxygen saturation on room air.  There does not appear to be any sign of neurologic impairment.    Labs are remarkable for a white count of 13.4, hemoglobin 12.9, platelets 111,000.  Creatinine is 0.65.  Total bilirubin 0.7.  Overall her labs have remained stable over the last 2 to 3 weeks.      ASSESSMENT / PLAN:  1.  TTP  2.  Dental infection    Ivon presented approximately 6 weeks ago with a new diagnosis of TTP associated with a dental infection.  She has a confirmed diagnosis with an ADAMTS 13 level of less than 5% and a documented inhibitor antibody.  She has responded to plasma exchange and steroids, but over the last 2 to 3 weeks her platelet count has not normalized.  She likely has ongoing TTP activity driven by the underlying dental infection.  Until this infection is addressed, it is unlikely her TTP is going to resolve and she remains at risk for more serious relapses.    Thus, it is imperative that she be seen by the dental service and a plan to address this dental infection soon as possible is made.  In the meantime, agree with continued IV antibiotics.  We will continue her current prednisone dose for now.  Monitor CBC, reticulocyte count, and LDH daily.  Given that she has been receiving intermittent plasma exchange treatments as an outpatient, with her last treatment yesterday, and her overall clinical and laboratory stability, she likely will not need plasma exchange again over the weekend, unless there is sudden change in her clinical status.    We will continue to follow.  Please call with questions.      Total time 110 minutes.      Srinivasa Toro MD  Professor of Medicine  Division of Hematology, Oncology, and  Transplantation  Director, [facility]

## 2022-02-20 NOTE — PLAN OF CARE
Pt with left lower jaw tooth pain/swelling - tylenol 1000 mg given x 2 with adequate relief. Dental surgeon to decide when surgery is to be (hopefully soon).  IV fluids at 125/hr; received scheduled lasix.  Refused her KCL due to higher level.  Refused her last insulin (blood sugar 253) due to some misconceptions about how high her blood sugar should be.  She will need some diabetic teaching from an MD (she did not trust my information).  Did not touch central line as it is unknown whether apheresis wants nursing to flush/draw or infuse through it.  Will need to call them in am.  Needs stool for c diff.  Pt now stated she had blood in her urine (was not saving urine) and that her left jaw is more swollen causing her to cough.  Now, will saving urine and it is difficult to tell if there is more swelling.  Wants nurse to check in on her frequently tonight.    Problem: Plan of Care - These are the overarching goals to be used throughout the patient stay.    Goal: Plan of Care Review/Shift Note  Description: The Plan of Care Review/Shift note should be completed every shift.  The Outcome Evaluation is a brief statement about your assessment that the patient is improving, declining, or no change.  This information will be displayed automatically on your shift note.  Outcome: Ongoing, Progressing   Goal Outcome Evaluation:

## 2022-02-20 NOTE — PROGRESS NOTES
Spoke with charge nurse Cheryl at [facility] and she states that patient is inpatient and will not be coming to [facility] tomorrow for outpatient Rituximab.

## 2022-02-20 NOTE — PROGRESS NOTES
Patient was scheduled to get outpatient rituximab at [facility] on 2/21. Text paged Timothy Gamboa MD. Patient  will not be discharging tomorrow. Will address outpatient rituximab at time of discharge per MD.

## 2022-02-20 NOTE — PLAN OF CARE
"/65 (BP Location: Right arm)   Pulse 65   Temp 98  F (36.7  C) (Oral)   Resp 16   Ht 1.65 m (5' 4.96\")   Wt 118.2 kg (260 lb 9.6 oz)   LMP 01/31/2022 (Approximate)   SpO2 98%   BMI 43.42 kg/m      0730 - 1930    Goal Outcome Evaluation:      Neuro: A&Ox4.   Cardiac: SR. VSS.   Respiratory: Sating 98% on RA.  GI/: Adequate urine output. BM X1  Diet/appetite: Tolerating regular diet. Eating well.  Activity: Independent up to chair and bathroom.  Pain: At acceptable level on current regimen.   Skin: No new deficits noted.  LDA's:PIV and Apheresis line.  New this shift: Bs 404(had eaten lunch, provider was informed. Writer was told to give coverage as ordered and recheck bs in an hour. Message sent 1240, provider returned call at 1400. Will continue to monitor).Writer encouraged pt to please call before eating.  Stool sample sent for CDiff and it came back negative.    Plan: Continue with POC. Notify primary team with changes.      "

## 2022-02-20 NOTE — PROGRESS NOTES
Brief Medicine Cross Cover Note    Contacted by Pharmacy regarding patient w/ two NPH orders placed for AM of 2/20/22. 15 units of NPH and 5 units of NPH currently in MAR. Will hold 5 unit NPH order and have primary team address in AM.     Zina Griffin PA-C  Internal Medicine Hospitalist Service  [facility]  Pager: [phone]

## 2022-02-21 ENCOUNTER — TELEPHONE (OUTPATIENT)
Dept: ONCOLOGY | Facility: CLINIC | Age: 32
End: 2022-02-21
Payer: COMMERCIAL

## 2022-02-21 ENCOUNTER — PATIENT OUTREACH (OUTPATIENT)
Dept: ONCOLOGY | Facility: CLINIC | Age: 32
End: 2022-02-21
Payer: COMMERCIAL

## 2022-02-21 VITALS
SYSTOLIC BLOOD PRESSURE: 123 MMHG | TEMPERATURE: 97.5 F | RESPIRATION RATE: 18 BRPM | HEART RATE: 68 BPM | BODY MASS INDEX: 43.42 KG/M2 | OXYGEN SATURATION: 98 % | DIASTOLIC BLOOD PRESSURE: 79 MMHG | HEIGHT: 65 IN | WEIGHT: 260.6 LBS

## 2022-02-21 LAB
ALBUMIN SERPL-MCNC: 2.8 G/DL (ref 3.4–5)
ALP SERPL-CCNC: 47 U/L (ref 40–150)
ALT SERPL W P-5'-P-CCNC: 36 U/L (ref 0–50)
ANION GAP SERPL CALCULATED.3IONS-SCNC: 8 MMOL/L (ref 3–14)
AST SERPL W P-5'-P-CCNC: 16 U/L (ref 0–45)
BILIRUB SERPL-MCNC: 0.3 MG/DL (ref 0.2–1.3)
BUN SERPL-MCNC: 14 MG/DL (ref 7–30)
CALCIUM SERPL-MCNC: 8.4 MG/DL (ref 8.5–10.1)
CHLORIDE BLD-SCNC: 107 MMOL/L (ref 94–109)
CO2 SERPL-SCNC: 25 MMOL/L (ref 20–32)
CREAT SERPL-MCNC: 0.67 MG/DL (ref 0.52–1.04)
ERYTHROCYTE [DISTWIDTH] IN BLOOD BY AUTOMATED COUNT: 13.4 % (ref 10–15)
GFR SERPL CREATININE-BSD FRML MDRD: >90 ML/MIN/1.73M2
GLUCOSE BLD-MCNC: 130 MG/DL (ref 70–99)
GLUCOSE BLDC GLUCOMTR-MCNC: 123 MG/DL (ref 70–99)
GLUCOSE BLDC GLUCOMTR-MCNC: 124 MG/DL (ref 70–99)
HCT VFR BLD AUTO: 34.8 % (ref 35–47)
HGB BLD-MCNC: 11.4 G/DL (ref 11.7–15.7)
LDH SERPL L TO P-CCNC: 225 U/L (ref 81–234)
MCH RBC QN AUTO: 33.6 PG (ref 26.5–33)
MCHC RBC AUTO-ENTMCNC: 32.8 G/DL (ref 31.5–36.5)
MCV RBC AUTO: 103 FL (ref 78–100)
PLATELET # BLD AUTO: 91 10E3/UL (ref 150–450)
POTASSIUM BLD-SCNC: 3.4 MMOL/L (ref 3.4–5.3)
PROT SERPL-MCNC: 5.5 G/DL (ref 6.8–8.8)
RBC # BLD AUTO: 3.39 10E6/UL (ref 3.8–5.2)
RETICS # AUTO: 0.06 10E6/UL (ref 0.03–0.1)
RETICS/RBC NFR AUTO: 1.8 % (ref 0.5–2)
SODIUM SERPL-SCNC: 140 MMOL/L (ref 133–144)
WBC # BLD AUTO: 7.9 10E3/UL (ref 4–11)

## 2022-02-21 PROCEDURE — 250N000009 HC RX 250: Performed by: STUDENT IN AN ORGANIZED HEALTH CARE EDUCATION/TRAINING PROGRAM

## 2022-02-21 PROCEDURE — 250N000012 HC RX MED GY IP 250 OP 636 PS 637: Performed by: STUDENT IN AN ORGANIZED HEALTH CARE EDUCATION/TRAINING PROGRAM

## 2022-02-21 PROCEDURE — 85045 AUTOMATED RETICULOCYTE COUNT: CPT | Performed by: STUDENT IN AN ORGANIZED HEALTH CARE EDUCATION/TRAINING PROGRAM

## 2022-02-21 PROCEDURE — 99239 HOSP IP/OBS DSCHRG MGMT >30: CPT | Performed by: STUDENT IN AN ORGANIZED HEALTH CARE EDUCATION/TRAINING PROGRAM

## 2022-02-21 PROCEDURE — 36415 COLL VENOUS BLD VENIPUNCTURE: CPT | Performed by: HOSPITALIST

## 2022-02-21 PROCEDURE — 85027 COMPLETE CBC AUTOMATED: CPT | Performed by: HOSPITALIST

## 2022-02-21 PROCEDURE — 83615 LACTATE (LD) (LDH) ENZYME: CPT | Performed by: STUDENT IN AN ORGANIZED HEALTH CARE EDUCATION/TRAINING PROGRAM

## 2022-02-21 PROCEDURE — 250N000013 HC RX MED GY IP 250 OP 250 PS 637: Performed by: STUDENT IN AN ORGANIZED HEALTH CARE EDUCATION/TRAINING PROGRAM

## 2022-02-21 PROCEDURE — 250N000011 HC RX IP 250 OP 636: Performed by: STUDENT IN AN ORGANIZED HEALTH CARE EDUCATION/TRAINING PROGRAM

## 2022-02-21 PROCEDURE — 80053 COMPREHEN METABOLIC PANEL: CPT | Performed by: STUDENT IN AN ORGANIZED HEALTH CARE EDUCATION/TRAINING PROGRAM

## 2022-02-21 RX ORDER — DIPHENHYDRAMINE HYDROCHLORIDE 50 MG/ML
50 INJECTION INTRAMUSCULAR; INTRAVENOUS
Status: CANCELLED | OUTPATIENT
Start: 2022-02-23

## 2022-02-21 RX ORDER — CALCIUM GLUCONATE 94 MG/ML
1 INJECTION, SOLUTION INTRAVENOUS
Status: CANCELLED | OUTPATIENT
Start: 2022-02-23

## 2022-02-21 RX ORDER — DIPHENHYDRAMINE HYDROCHLORIDE 50 MG/ML
50 INJECTION INTRAMUSCULAR; INTRAVENOUS ONCE
Status: CANCELLED | OUTPATIENT
Start: 2022-02-23

## 2022-02-21 RX ORDER — ACETAMINOPHEN 325 MG/1
325 TABLET ORAL ONCE
Status: CANCELLED | OUTPATIENT
Start: 2022-02-23

## 2022-02-21 RX ADMIN — INSULIN HUMAN 15 UNITS: 100 INJECTION, SUSPENSION SUBCUTANEOUS at 10:13

## 2022-02-21 RX ADMIN — FUROSEMIDE 20 MG: 20 TABLET ORAL at 08:55

## 2022-02-21 RX ADMIN — ANTICOAGULANT CITRATE DEXTROSE SOLUTION FORMULA A 3 ML: 12.25; 11; 3.65 SOLUTION INTRAVENOUS at 10:14

## 2022-02-21 RX ADMIN — AMPICILLIN SODIUM AND SULBACTAM SODIUM 3 G: 2; 1 INJECTION, POWDER, FOR SOLUTION INTRAMUSCULAR; INTRAVENOUS at 06:32

## 2022-02-21 RX ADMIN — PANTOPRAZOLE SODIUM 40 MG: 40 TABLET, DELAYED RELEASE ORAL at 08:55

## 2022-02-21 RX ADMIN — PREDNISONE 50 MG: 50 TABLET ORAL at 08:55

## 2022-02-21 RX ADMIN — POTASSIUM CHLORIDE 20 MEQ: 750 TABLET, EXTENDED RELEASE ORAL at 08:55

## 2022-02-21 RX ADMIN — CHLORHEXIDINE GLUCONATE 15 ML: 1.2 SOLUTION ORAL at 08:55

## 2022-02-21 RX ADMIN — ACETAMINOPHEN 1000 MG: 500 TABLET, FILM COATED ORAL at 08:55

## 2022-02-21 ASSESSMENT — ACTIVITIES OF DAILY LIVING (ADL)
ADLS_ACUITY_SCORE: 3

## 2022-02-21 NOTE — PROGRESS NOTES
Care Management Follow Up    Length of Stay (days): 2    Expected Discharge Date: 02/21/2022     Concerns to be Addressed:     Advanced Care Directive  Patient plan of care discussed at interdisciplinary rounds: Yes    Anticipated Discharge Disposition:  Home with OP follow up      Patient/family educated on Medicare website which has current facility and service quality ratings:  yes  Education Provided on the Discharge Plan:  yes  Patient/Family in Agreement with the Plan:  yes    Referrals Placed by CM/SW:  [facility]  Private pay costs discussed: Not applicable    Additional Information:  SW received consult for Advanced Care Directive. SW reviewed chart and met with pt at bedside. Pt was A&Ox4. Pt was provided with [employer] [employer] short form and provided with verbal instructions for completion. SW assisted pt in completing parts of the form; pt was instructed on proper completion with Notary or non-staff witnesses and pt verbalized understanding.     SW provided pt with active listening and support. Pt denied any additional needs or concerns at this time. Pt will be discharged and no additional SW needs identified.     Intervention/note completed by JASEN Roberts, precepted by JASEN Bazan.    JASEN Bazan, MSW  Social Work Services, 6C support   [facility]  Direct: [phone] Pager: [phone]        JASEN Davalos

## 2022-02-21 NOTE — PROVIDER NOTIFICATION
Pt does not have an order for a platelet lab draw for this morning. Would you like one drawn prior to discharge?

## 2022-02-21 NOTE — DISCHARGE SUMMARY
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 (with [facility] or [facility]   provider or service). Call [phone] if you haven't heard regarding   these appointments within 7 days of discharge.         {Additional follow-up instructions/to-do's for PCP    :cbc     Unresulted Labs Ordered in the Past 30 Days of this Admission     Date and Time Order Name Status Description    2/18/2022 11:05 PM Blood Culture Hand, Left Preliminary     2/18/2022 11:05 PM Blood Culture Line, arterial Preliminary     2/1/2022  3:53 PM Prepare plasma (unit) Preliminary       These results will be followed up by Dental team     Discharge Disposition   Discharged to home  Condition at discharge: Stable      Hospital Course   Ivon Gamble is a 31 year old female with pmh of TTP,morbid obesity admitted on 2/18/2022. Presents for  TTP secondary to smoldering dental infection .     She has been on antibiotics for weeks prior to presenting to the hospital for the same reasons, Initial infection noted on 1/13/2022 treated with combination of antibiotics, and now presented to the ED on 2/18 for worsening pain and inability to get outpatient dental appointment for tooth removal.    Her admission labs were significant for elevated white count and ct soft tissue from 2/19 showing inflammatory changes surrounding left molar tooth , She was started on IV Unasyn, additionally blood cultures were observed for 48 hours to rule out  bacteremia.      Per dental team patient was discharged with same day follow-up in dental clinic to have her tooth extracted given it cannot be arranged inpatient per dental team. Patient was discharged with a 7 day course of Augmentin with outpatient follow-up with Dental and hem-oncology team.       Plan     #Dental infection  7 day Augmentin course with Dental follow-up      #TTP   - heme outpatient follow-up   - continue pred 50 (per patient, plan to go to 25 on 2/21)  - consideration of Rituximab as deemed necessary by hematology team   -Treatment with rituximab was initiated on February 9, but she had symptoms concerning for anaphylaxis shortly after the infusion started so it was aborted.  This has not been reattempted.    #steroid induced hyperglycemia  - continue insulin and fingerstick regiment     #morbid obesity   Consider outpatient bariatric consultation          Consultations This Hospital Stay   HEMATOLOGY ADULT IP CONSULT  SOCIAL WORK IP CONSULT    Code Status   Prior    Time Spent on this Encounter   IJuan M MD, personally saw the patient today and spent greater than 30 minutes discharging this patient.       Juan M Gamboa MD  [facility]  [address] [place] [id number]  Phone: [phone]  Fax: [phone]  ______________________________________________________________________    Physical Exam   Vital Signs: Temp: 97.5  F (36.4  C) Temp src: Oral BP: 123/79 Pulse: 68   Resp: 18 SpO2: 98 % O2 Device: None (Room air)    Weight: 260 lbs 9.6 oz  Constitutional: awake, alert, cooperative, no apparent distress, and appears stated age  Eyes: Lids and lashes normal, pupils equal, round and reactive to light, extra ocular muscles intact, sclera clear, conjunctiva normal  ENT: Normocephalic, without obvious abnormality, atraumatic, sinuses nontender on palpation, external ears without lesions, oral pharynx with moist mucous membranes, tonsils without  erythema or exudates, gums normal and good dentition.  Hematologic / Lymphatic: no cervical lymphadenopathy  Respiratory: No increased work of breathing, good air exchange, clear to auscultation bilaterally, no crackles or wheezing  Cardiovascular: Normal apical impulse, regular rate and rhythm, normal S1 and S2, no S3 or S4, and no murmur noted  GI: No scars, normal bowel sounds, soft, non-distended, non-tender, no masses palpated, no hepatosplenomegally  Genitounirinary:   Skin: no bruising or bleeding  Musculoskeletal: There is no redness, warmth, or swelling of the joints.  Full range of motion noted.  Motor strength is 5 out of 5 all extremities bilaterally.  Tone is normal.  Neurologic: Awake, alert, oriented to name, place and time.  Cranial nerves II-XII are grossly intact.  Motor is 5 out of 5 bilaterally.  Cerebellar finger to nose, heel to shin intact.  Sensory is intact.  Babinski down going, Romberg negative, and gait is normal.  Neuropsychiatric: General: normal, calm and normal eye contact       Primary Care Physician   Ashley Nguyen    Discharge Orders      Dental Referral      Activity    Your activity upon discharge: activity as tolerated     Reason for your hospital stay    Dear Ivon Gamble,    Your were hospitalized at [facility] with Dental infection  and treated with antibiotics.  Over your hospitalization your condition improved and today you are ready to be discharged.  You should continue to improve but if you develop fever, shortness of breath, nausea, vomiting or abdominal pain please seek medical attention.    We are suggesting the following medication changes:  Start taking Augmentin twice daily for 7 days to treat your dental infection     Please get the following tests done:  NA    Please set up an appointment with:  Primary care within a month   Dentist within 2 weeks  Hematology within a week     It was a pleasure meeting with you today. Thank you for  allowing me and my team the privilege of caring for you during your hospitalization. You are the reason we are here, and I truly hope we provided you with the excellent service you deserve. Please let us know if there is anything else we can do for you so that we can be sure you are leaving completely satisfied with your care experience.    Your hospital unit at the time of discharge is 5C so if you have any questions please call the hospital at [phone] and ask to talk to a nurse on 5C.     Sincerely,    Juan M aGmboa MD  Internal Medicine Hospitalist  [facility]     Adult [facility] Follow-up and recommended labs and tests    Primary care within a month   Dentist within 2 weeks   Hematology within a week     Appointments on [place] and/or [facility] (with [facility] or [facility] provider or service). Call [phone] if you haven't heard regarding these appointments within 7 days of discharge.     Diet    Follow this diet upon discharge: Orders Placed This Encounter      Regular Diet Adult       Significant Results and Procedures   Most Recent 3 CBC's:Recent Labs   Lab Test 02/21/22  0722 02/20/22  0443 02/19/22  1455   WBC 7.9 9.0 13.4*   HGB 11.4* 11.7 12.9   * 105* 103*   PLT 91* 93* 111*     Most Recent 3 BMP's:Recent Labs   Lab Test 02/21/22  0949 02/21/22  0722 02/21/22  0638 02/20/22  0452 02/20/22  0443 02/19/22  1604 02/19/22  1455   NA  --  140  --   --  139  --  137   POTASSIUM  --  3.4  --   --  4.0  --  3.9   CHLORIDE  --  107  --   --  109  --  107   CO2  --  25  --   --  27  --  26   BUN  --  14  --   --  16  --  20   CR  --  0.67  --   --  0.69  --  0.65   ANIONGAP  --  8  --   --  3  --  4   JOSELUIS  --  8.4*  --   --  8.2*  --  8.0*   * 130* 123*   < > 128*   < > 262*    < > = values in this interval not displayed.     Most Recent 2 LFT's:Recent Labs   Lab Test 02/21/22  0722 02/19/22  1455   AST 16 30   ALT 36 53*   ALKPHOS 47 56   BILITOTAL 0.3 0.7   ,   Results for orders  placed or performed during the hospital encounter of 02/18/22   Soft tissue neck CT w contrast    Narrative    EXAM: CT SOFT TISSUE NECK W CONTRAST  LOCATION: [facility]  DATE/TIME: 2/19/2022 1:11 AM    INDICATION: Left lower molar pain.  COMPARISON: None.  CONTRAST: iopamidol (ISOVUE 370) solution 100 mL     TECHNIQUE: Routine CT Soft Tissue Neck with IV contrast. Multiplanar reformats. Dose reduction techniques were used.    FINDINGS:     MUCOSAL SPACES/SOFT TISSUES: There is a periapical lucency involving the root of the left third mandibular molar with associated cortical erosion of the adjacent outer table of the mandible. Along the outer table of the mandible in this region, there is   a low-density overlying fluid collection measuring approximately 1.0 x 0.5 cm in greatest axial dimensions (image 34 series 2) and approximately 0.9 cm in craniocaudal dimensions. There are adjacent left facial soft tissue inflammatory changes noted   which extend into the left submental space. There is adjacent thickening of the platysma. This finding likely reflects a small subperiosteal abscess with adjacent overlying soft tissue cellulitis. Along the medial aspect of the mandible adjacent to the   left third molar, there is a small additional lucency with thinning of the mandibular cortex. Normal mucosal spaces of the upper aerodigestive tract. No mucosal mass identified. Normal vocal cords and infraglottic trachea. Normal parapharyngeal spaces.   Normal  spaces and floor of mouth structures.    LYMPH NODES: Mildly prominent left-sided submandibular lymph nodes are noted, likely reactive. No necrotic or suppurative lymph nodes identified.     SALIVARY GLANDS: Normal parotid and submandibular glands.    THYROID: Normal.     VESSELS: Vascular structures of the neck are patent.    VISUALIZED INTRACRANIAL/ORBITS/SINUSES: No abnormality of the visualized intracranial  "compartment or orbits. Visualized paranasal sinuses and mastoid air cells are clear.    OTHER: No destructive osseous lesion. The included lung apices are clear.      Impression    IMPRESSION:   1.  Periapical lucency involving the left third mandibular molar root with associated cortical breakthrough of the outer table of the left mandible. There is an adjacent overlapping subperiosteal abscess measuring up to 1.0 cm. Additionally, there is   surrounding left facial soft tissue cellulitis.  2.  Additional findings above.       Discharge Medications   Discharge Medication List as of 2/21/2022 10:59 AM      CONTINUE these medications which have CHANGED    Details   amoxicillin-clavulanate (AUGMENTIN) 875-125 MG tablet Take 1 tablet by mouth every 12 hours, Disp-14 tablet, R-0, E-PrescribePlease take daily for 7 days or as directed by your dentist         CONTINUE these medications which have NOT CHANGED    Details   acetaminophen (TYLENOL) 500 MG tablet Take 500-1,000 mg by mouth every 6 hours as needed for mild pain, Historical      albuterol (PROAIR HFA/PROVENTIL HFA/VENTOLIN HFA) 108 (90 Base) MCG/ACT inhaler INHALE ONE PUFF BY MOUTH EVERY 4 HOURS AS NEEDED, Historical      blood glucose (ACCU-CHEK GUIDE) test strip Use to test blood sugar 1-2 times daily or as directed., Disp-100 strip, R-3, E-Prescribe      blood glucose monitoring (SOFTCLIX) lancets Use to test blood sugar 1-2 times daily or as directed., Disp-100 each, R-3, E-Prescribe      chlorhexidine (PERIDEX) 0.12 % solution Swish and spit 15 mLs in mouth 2 times daily, Disp-473 mL, R-0, E-Prescribe      insulin NPH (HUMULIN N KWIKPEN) 100 UNIT/ML injection Inject 15 Units Subcutaneous every morning, Disp-15 mL, R-1, E-Prescribe      insulin  UNIT/ML vial 5 units sub Q in morning or as directed, Disp-10 mL, R-2, E-Prescribe      insulin syringe-needle U-100 (31G X 5/16\" 0.5 ML) 31G X 5/16\" 0.5 ML miscellaneous Use one syringes daily or as " directed.Disp-50 each, [id number]      pantoprazole (PROTONIX) 40 MG EC tablet Take 1 tablet (40 mg) by mouth every morning (before breakfast) While on high dose steroids., Disp-30 tablet, R-0, E-Prescribe      polyethylene glycol (MIRALAX) 17 GM/Dose powder Take 17 g by mouth 2 times daily as needed for constipation, Disp-510 g, R-0, E-Prescribe      predniSONE (DELTASONE) 50 MG tablet Take 50 mg by mouth daily, Historical      sennosides (SENOKOT) 8.6 MG tablet Take 1-2 tablets by mouth 2 times daily as needed for constipation, Disp-60 tablet, R-0, E-Prescribe         STOP taking these medications       EPINEPHrine (ANY BX GENERIC EQUIV) 0.3 MG/0.3ML injection 2-pack Comments:   Reason for Stopping:         furosemide (LASIX) 20 MG tablet Comments:   Reason for Stopping:         potassium chloride ER (KLOR-CON M) 20 MEQ CR tablet Comments:   Reason for Stopping:             Allergies   Allergies   Allergen Reactions     Rituximab Anaphylaxis

## 2022-02-21 NOTE — PROGRESS NOTES
"Hematology Progress Note    Date of Service 02/21/2022  Admit Date 2/18/2022   Initial Consult 02/21/22   Hospital Day: 4     Reason for consult: TTP  Consult requested by: Dr. Richardson-Pass    Interval Events  No acute events noted in chart.    Physical Exam  /79 (BP Location: Right arm)   Pulse 68   Temp 97.5  F (36.4  C) (Oral)   Resp 18   Ht 1.65 m (5' 4.96\")   Wt 118.2 kg (260 lb 9.6 oz)   LMP 01/31/2022 (Approximate)   SpO2 98%   BMI 43.42 kg/m       Patient discharged prior to our rounds      Recent Labs   Lab 02/21/22 0722   POTASSIUM 3.4   CR 0.67   JOSELUIS 8.4*        Recent Labs   Lab 02/21/22  0722 02/19/22  1455 02/17/22  1328 02/15/22  1458   AST 16 30 15 32   ALT 36 53* 53* 55*   ALKPHOS 47 56 66 78   BILITOTAL 0.3 0.7 0.6 0.5       Recent Labs   Lab 02/21/22  0722 02/20/22  0443 02/19/22  1455   WBC 7.9 9.0 13.4*   HGB 11.4* 11.7 12.9   HCT 34.8* 36.8 40.4   * 105* 103*   PLT 91* 93* 111*     Recent Labs   Lab 02/21/22  0722 02/20/22  0443   RETICABSCT 0.059 0.073   RETP 1.8 2.1*         Impression:  1. TTP, exacerbated by dental infection  2. Dental infection    Ivon presented approximately 6 weeks ago with a new diagnosis of TTP associated with a dental infection.  She has a confirmed diagnosis with an ADAMTS 13 level of less than 5% and a documented inhibitor antibody.  She has responded to plasma exchange and steroids, but over the last 2 to 3 weeks her platelet count has not normalized.  She likely has ongoing TTP activity driven by the underlying dental infection.  Until this infection is addressed, it is unlikely her TTP is going to resolve and she remains at risk for more serious relapses.    Recommendations:  - OK for discharge today  - Dental clinic today to address the dental infection  - Change to oral antibiotics  - Maintain current dose of prednisone  - Will monitor for plasma exchange needs as outpatient  - Will see Dr. Honeycutt on Wednesday at 10:30am    Thank you " for allowing us to participate in this patient's care. Please do not hesitate to contact us if there are any further questions or concerns.     Discussed with staff Dr. Toro.    Zhou Zuniga MD  Hematology/Oncology Fellow

## 2022-02-21 NOTE — TELEPHONE ENCOUNTER
Left message for Nurse to call back about setting Ivon up for pheresis on Wed 2/23/22 after noon so not to interfere with her appointment with Dr. Honeycutt at the [facility] which is at 1030 am.

## 2022-02-21 NOTE — PLAN OF CARE
"Goal Outcome Evaluation:  Afebrile. VSS on RA. No BM overnight reported. Voiding ok per pt report; pt not saving urine. Pt allowed 2200 glucose check, but refused insulin. Checked at 0600, . Pt offers no complaints. Pt slept between cares. PIV is CDI. Plan is to discharge today to get dental procedure done in outpatient clinic. Labs yet to be collected, scheduled at 0600.    Problem: Plan of Care - These are the overarching goals to be used throughout the patient stay.    Goal: Plan of Care Review/Shift Note  Description: The Plan of Care Review/Shift note should be completed every shift.  The Outcome Evaluation is a brief statement about your assessment that the patient is improving, declining, or no change.  This information will be displayed automatically on your shift note.  Outcome: Ongoing, Progressing  Goal: Patient-Specific Goal (Individualized)  Description: You can add care plan individualizations to a care plan. Examples of Individualization might be:  \"Parent requests to be called daily at 9am for status\", \"I have a hard time hearing out of my right ear\", or \"Do not touch me to wake me up as it startles me\".  Outcome: Ongoing, Progressing  Goal: Absence of Hospital-Acquired Illness or Injury  Outcome: Ongoing, Progressing  Intervention: Identify and Manage Fall Risk  Recent Flowsheet Documentation  Taken 2/20/2022 2000 by Socorro Neff, RN  Safety Promotion/Fall Prevention:    assistive device/personal items within reach    clutter free environment maintained    fall prevention program maintained    nonskid shoes/slippers when out of bed    patient and family education  Intervention: Prevent Skin Injury  Recent Flowsheet Documentation  Taken 2/20/2022 2000 by Socorro Neff, RN  Body Position: position changed independently  Intervention: Prevent and Manage VTE (Venous Thromboembolism) Risk  Recent Flowsheet Documentation  Taken 2/20/2022 2000 by Socorro Neff, RN  Activity " Management:    activity adjusted per tolerance    up ad doug  Goal: Optimal Comfort and Wellbeing  Outcome: Ongoing, Progressing  Goal: Readiness for Transition of Care  Outcome: Ongoing, Progressing     Problem: Pain Acute  Goal: Acceptable Pain Control and Functional Ability  Outcome: Ongoing, Progressing  Intervention: Prevent or Manage Pain  Recent Flowsheet Documentation  Taken 2/20/2022 2000 by Socorro Neff, RN  Medication Review/Management: medications reviewed

## 2022-02-21 NOTE — PLAN OF CARE
"/79 (BP Location: Right arm)   Pulse 68   Temp 97.5  F (36.4  C) (Oral)   Resp 18   Ht 1.65 m (5' 4.96\")   Wt 118.2 kg (260 lb 9.6 oz)   LMP 01/31/2022 (Approximate)   SpO2 98%   BMI 43.42 kg/m      Afebrile; VSS on RA. Tooth pain controlled with PRN Tylenol x1. Pt stable and adequate for discharge. Plan to follow-up with OP dental clinic this afternoon for dental extraction. PIV removed. Pt provided with AVS and discharge summary; meds sent with pt. All questions answered.     Pt discharged to: Home.   Via: Private car.   Accompanied by: Significant other.   Belongings: With pt.   Teaching: Per unit protocol.   Clinic appointment: Apheresis appts scheduled for later this week.     Problem: Plan of Care - These are the overarching goals to be used throughout the patient stay.    Goal: Plan of Care Review/Shift Note  Description: The Plan of Care Review/Shift note should be completed every shift.  The Outcome Evaluation is a brief statement about your assessment that the patient is improving, declining, or no change.  This information will be displayed automatically on your shift note.  Outcome: Adequate for Care Transition  Flowsheets (Taken 2/21/2022 1400)  Plan of Care Reviewed With:   patient   significant other  Goal: Patient-Specific Goal (Individualized)  Description: You can add care plan individualizations to a care plan. Examples of Individualization might be:  \"Parent requests to be called daily at 9am for status\", \"I have a hard time hearing out of my right ear\", or \"Do not touch me to wake me up as it startles me\".  Outcome: Adequate for Care Transition  Goal: Absence of Hospital-Acquired Illness or Injury  Outcome: Adequate for Care Transition  Intervention: Identify and Manage Fall Risk  Recent Flowsheet Documentation  Taken 2/21/2022 1000 by Ani Zeng RN  Safety Promotion/Fall Prevention:   assistive device/personal items within reach   clutter free environment maintained   fall " prevention program maintained   lighting adjusted   nonskid shoes/slippers when out of bed   patient and family education  Intervention: Prevent Skin Injury  Recent Flowsheet Documentation  Taken 2/21/2022 1000 by Ani Zeng RN  Body Position: position changed independently  Intervention: Prevent and Manage VTE (Venous Thromboembolism) Risk  Recent Flowsheet Documentation  Taken 2/21/2022 1000 by Ani Zeng RN  Activity Management: activity adjusted per tolerance  Goal: Optimal Comfort and Wellbeing  Outcome: Adequate for Care Transition  Goal: Readiness for Transition of Care  Outcome: Adequate for Care Transition     Problem: Pain Acute  Goal: Acceptable Pain Control and Functional Ability  Outcome: Adequate for Care Transition  Intervention: Prevent or Manage Pain  Recent Flowsheet Documentation  Taken 2/21/2022 1000 by Ani Zeng RN  Medication Review/Management: medications reviewed

## 2022-02-21 NOTE — PROGRESS NOTES
Oncology Distress Screening Follow-up  Clinical Social Work  [facility]    Identified Concern and Score From Distress Screenin. How concerned are you about your ability to eat?  10 Abnormal        2. How concerned are you about unintended weight loss or your current weight?  0       3. How concerned are you about feeling depressed or very sad?  0       4. How concerned are you about feeling anxious or very scared?  5       5. Do you struggle with the loss of meaning and sandee in your life?  Not at all       6. How concerned are you about work and home life issues that may be affected by your cancer?  8 Abnormal        7. How concerned are you about knowing what resources are available to help you?  2       8. Do you currently have what you would describe as Congregation or spiritual struggles?             Not at all         Date of Distress Screenin22    Intervention:   Ivon is a 31-year-old woman with a diagnosis of acquired TTP who is presently admitted with dental pain. This clinician called Ivon today with goal of following up on elevated distress screen from time of visit with Dr. Nguyen 22.     Ivon reported that she was discharged from the [facility] and will be getting her tooth extracted today at 12:45pm. Ivon reported that she is confused about next steps in care, having understood from Dr. Nguyen that she should have apheresis [facility], but only sees it scheduled for Friday. Ivon reported some confusion about whether she needs to come back and scheduled with team at [facility], or if all of her care is being handled at [facility]. SUSANNA updated pt that I will have [facility] [place] call to clarify plan regarding apheresis and ongoing team management.     Ivon appreciative and reported that if [place] is not able to reach her that she should leave detailed voicemail and send MyChart with plan.     Ivon denied social work need at time of call, aware of how to reach out in future as needed.     Education Provided:   Onc  SW contact information    Follow-up Required:   SW will continue to be available as needed for ongoing psychosocial support. No social work outreach planned at present time.     GUILLE Olivera, LICSW  Phone: [phone]  [facility]: M, Thu  *every other Tue, 8am-4:30pm  [facility]: W, F, *every other Tue, 8am-4:30pm

## 2022-02-21 NOTE — TELEPHONE ENCOUNTER
Informed charge nurse on station 88 at [facility] that Ivon is inpatient at the [facility] and will no longer need any of the [facility] inpatient appointments we had set up for her.  Ivon will continue her care at the [facility].

## 2022-02-21 NOTE — DISCHARGE INSTRUCTIONS
Please check in for dental appt. 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

## 2022-02-23 ENCOUNTER — LAB (OUTPATIENT)
Dept: LAB | Facility: CLINIC | Age: 32
End: 2022-02-23
Payer: MEDICAID

## 2022-02-23 ENCOUNTER — HOSPITAL ENCOUNTER (OUTPATIENT)
Dept: LAB | Facility: CLINIC | Age: 32
End: 2022-02-23
Payer: MEDICAID

## 2022-02-23 ENCOUNTER — TELEPHONE (OUTPATIENT)
Dept: HEMATOLOGY | Facility: CLINIC | Age: 32
End: 2022-02-23
Payer: COMMERCIAL

## 2022-02-23 ENCOUNTER — HOSPITAL ENCOUNTER (OUTPATIENT)
Facility: CLINIC | Age: 32
Discharge: HOME | End: 2022-02-23
Admitting: INTERNAL MEDICINE
Payer: MEDICAID

## 2022-02-23 ENCOUNTER — VIRTUAL VISIT (OUTPATIENT)
Dept: HEMATOLOGY | Facility: CLINIC | Age: 32
End: 2022-02-23
Attending: INTERNAL MEDICINE
Payer: MEDICAID

## 2022-02-23 ENCOUNTER — TELEPHONE (OUTPATIENT)
Dept: HEMATOLOGY | Facility: CLINIC | Age: 32
End: 2022-02-23

## 2022-02-23 VITALS
HEIGHT: 65 IN | HEART RATE: 68 BPM | SYSTOLIC BLOOD PRESSURE: 129 MMHG | WEIGHT: 260.58 LBS | TEMPERATURE: 98.1 F | RESPIRATION RATE: 18 BRPM | BODY MASS INDEX: 43.42 KG/M2 | DIASTOLIC BLOOD PRESSURE: 81 MMHG

## 2022-02-23 DIAGNOSIS — M31.19 ACQUIRED TTP (H): Primary | ICD-10-CM

## 2022-02-23 DIAGNOSIS — M31.19 TTP (THROMBOTIC THROMBOCYTOPENIC PURPURA) (H): Primary | ICD-10-CM

## 2022-02-23 DIAGNOSIS — D69.3 ACUTE IDIOPATHIC THROMBOCYTOPENIC PURPURA (H): ICD-10-CM

## 2022-02-23 DIAGNOSIS — M31.19 TTP (THROMBOTIC THROMBOCYTOPENIC PURPURA) (H): ICD-10-CM

## 2022-02-23 LAB
APTT PPP: 27 SECONDS (ref 22–38)
BASOPHILS # BLD AUTO: 0 10E3/UL (ref 0–0.2)
BASOPHILS NFR BLD AUTO: 0 %
BLD PROD TYP BPU: NORMAL
BLOOD COMPONENT TYPE: NORMAL
CODING SYSTEM: NORMAL
EOSINOPHIL # BLD AUTO: 0 10E3/UL (ref 0–0.7)
EOSINOPHIL NFR BLD AUTO: 0 %
ERYTHROCYTE [DISTWIDTH] IN BLOOD BY AUTOMATED COUNT: 13.2 % (ref 10–15)
HCT VFR BLD AUTO: 40.8 % (ref 35–47)
HGB BLD-MCNC: 13.3 G/DL (ref 11.7–15.7)
IMM GRANULOCYTES # BLD: 0.1 10E3/UL
IMM GRANULOCYTES NFR BLD: 1 %
INR PPP: 0.94 (ref 0.85–1.15)
ISSUE DATE AND TIME: NORMAL
LDH SERPL L TO P-CCNC: 315 U/L (ref 81–234)
LYMPHOCYTES # BLD AUTO: 1 10E3/UL (ref 0.8–5.3)
LYMPHOCYTES NFR BLD AUTO: 9 %
MCH RBC QN AUTO: 33.5 PG (ref 26.5–33)
MCHC RBC AUTO-ENTMCNC: 32.6 G/DL (ref 31.5–36.5)
MCV RBC AUTO: 103 FL (ref 78–100)
MONOCYTES # BLD AUTO: 0.3 10E3/UL (ref 0–1.3)
MONOCYTES NFR BLD AUTO: 3 %
NEUTROPHILS # BLD AUTO: 9.1 10E3/UL (ref 1.6–8.3)
NEUTROPHILS NFR BLD AUTO: 87 %
NRBC # BLD AUTO: 0 10E3/UL
NRBC BLD AUTO-RTO: 0 /100
PLATELET # BLD AUTO: 144 10E3/UL (ref 150–450)
RBC # BLD AUTO: 3.97 10E6/UL (ref 3.8–5.2)
RETICS # AUTO: 0.07 10E6/UL (ref 0.03–0.1)
RETICS/RBC NFR AUTO: 1.8 % (ref 0.5–2)
UNIT ABO/RH: NORMAL
UNIT NUMBER: NORMAL
UNIT STATUS: NORMAL
UNIT TYPE ISBT: 6200
WBC # BLD AUTO: 10.6 10E3/UL (ref 4–11)

## 2022-02-23 PROCEDURE — 83615 LACTATE (LD) (LDH) ENZYME: CPT

## 2022-02-23 PROCEDURE — 85610 PROTHROMBIN TIME: CPT

## 2022-02-23 PROCEDURE — 250N000013 HC RX MED GY IP 250 OP 250 PS 637: Performed by: INTERNAL MEDICINE

## 2022-02-23 PROCEDURE — 83010 ASSAY OF HAPTOGLOBIN QUANT: CPT

## 2022-02-23 PROCEDURE — 258N000003 HC RX IP 258 OP 636: Performed by: INTERNAL MEDICINE

## 2022-02-23 PROCEDURE — 99215 OFFICE O/P EST HI 40 MIN: CPT | Mod: 95 | Performed by: INTERNAL MEDICINE

## 2022-02-23 PROCEDURE — 85730 THROMBOPLASTIN TIME PARTIAL: CPT

## 2022-02-23 PROCEDURE — 250N000009 HC RX 250: Performed by: INTERNAL MEDICINE

## 2022-02-23 PROCEDURE — 85025 COMPLETE CBC W/AUTO DIFF WBC: CPT

## 2022-02-23 PROCEDURE — 36514 APHERESIS PLASMA: CPT

## 2022-02-23 PROCEDURE — 85045 AUTOMATED RETICULOCYTE COUNT: CPT

## 2022-02-23 PROCEDURE — 250N000011 HC RX IP 250 OP 636: Performed by: INTERNAL MEDICINE

## 2022-02-23 PROCEDURE — P9059 PLASMA, FRZ BETWEEN 8-24HOUR: HCPCS | Performed by: INTERNAL MEDICINE

## 2022-02-23 PROCEDURE — 999N000154 HC STATISTIC RADIOLOGY XRAY, US, CT, MAR, NM

## 2022-02-23 PROCEDURE — 999N000013 HC STATISTIC APHERESIS

## 2022-02-23 PROCEDURE — 36415 COLL VENOUS BLD VENIPUNCTURE: CPT

## 2022-02-23 RX ORDER — CALCIUM GLUCONATE 94 MG/ML
1 INJECTION, SOLUTION INTRAVENOUS
Status: DISCONTINUED | OUTPATIENT
Start: 2022-02-23 | End: 2022-02-23 | Stop reason: HOSPADM

## 2022-02-23 RX ORDER — ACETAMINOPHEN 325 MG/1
325 TABLET ORAL ONCE
Status: COMPLETED | OUTPATIENT
Start: 2022-02-23 | End: 2022-02-23

## 2022-02-23 RX ORDER — DIPHENHYDRAMINE HYDROCHLORIDE 50 MG/ML
50 INJECTION INTRAMUSCULAR; INTRAVENOUS
Status: DISCONTINUED | OUTPATIENT
Start: 2022-02-23 | End: 2022-02-23 | Stop reason: HOSPADM

## 2022-02-23 RX ORDER — DIPHENHYDRAMINE HYDROCHLORIDE 50 MG/ML
50 INJECTION INTRAMUSCULAR; INTRAVENOUS ONCE
Status: COMPLETED | OUTPATIENT
Start: 2022-02-23 | End: 2022-02-23

## 2022-02-23 RX ADMIN — ACETAMINOPHEN 325 MG: 325 TABLET, FILM COATED ORAL at 13:20

## 2022-02-23 RX ADMIN — ANTICOAGULANT CITRATE DEXTROSE SOLUTION FORMULA A 1000 ML: 12.25; 11; 3.65 SOLUTION INTRAVENOUS at 13:20

## 2022-02-23 RX ADMIN — DIPHENHYDRAMINE HYDROCHLORIDE 50 MG: 50 INJECTION, SOLUTION INTRAMUSCULAR; INTRAVENOUS at 13:20

## 2022-02-23 RX ADMIN — CALCIUM GLUCONATE 4 G: 98 INJECTION, SOLUTION INTRAVENOUS at 13:21

## 2022-02-23 RX ADMIN — HYDROCORTISONE SODIUM SUCCINATE 100 MG: 100 INJECTION, POWDER, FOR SOLUTION INTRAMUSCULAR; INTRAVENOUS at 13:18

## 2022-02-23 NOTE — CONFIDENTIAL NOTE
RN called Ivon about her upcoming appointment. She needs labs prior to determine the status of her TTP.

## 2022-02-23 NOTE — CONFIDENTIAL NOTE
Ivon is a 31-year-old female who is followed by Dr. Honeycutt at [facility] for her TTP. She is currently being maintained on plasma exchange. She was seen this AM for labs and a clinic visit. After the visit it was determined that she can cancel her Friday plasma exchange. She should have labs drawn on Monday, February 28th- if platelet count is steady she can have her CVC pulled.    RN called to discuss this with Ivon- she did not answer and her voicemail box is full. RN will try again this afternoon.     Reina Quigley  MSN, RN, PHN  [facility]  Office: [phone]  Fax: [phone]

## 2022-02-23 NOTE — PROGRESS NOTES
Apheresis Note    Dx. TTP    Now followed by Dr. Honeycutt at [facility]    1 PV    Replacement with FFP  Electrolyte replacement: Calcium Gluconate 3.5grams   Premedications: Benadryl, Solu Cortef, Acetaminophen    Component      Latest Ref Rng & Units 2/23/2022   WBC      4.0 - 11.0 10e3/uL 10.6   RBC Count      3.80 - 5.20 10e6/uL 3.97   Hemoglobin      11.7 - 15.7 g/dL 13.3   Hematocrit      35.0 - 47.0 % 40.8   MCV      78 - 100 fL 103 (H)   MCH      26.5 - 33.0 pg 33.5 (H)   MCHC      31.5 - 36.5 g/dL 32.6   RDW      10.0 - 15.0 % 13.2   Platelet Count      150 - 450 10e3/uL 144 (L)   % Neutrophils      % 87   % Lymphocytes      % 9   % Monocytes      % 3   % Eosinophils      % 0   % Basophils      % 0   % Immature Granulocytes      % 1   NRBCs per 100 WBC      <1 /100 0   Absolute Neutrophils      1.6 - 8.3 10e3/uL 9.1 (H)   Absolute Lymphocytes      0.8 - 5.3 10e3/uL 1.0   Absolute Monocytes      0.0 - 1.3 10e3/uL 0.3   Absolute Eosinophils      0.0 - 0.7 10e3/uL 0.0   Absolute Basophils      0.0 - 0.2 10e3/uL 0.0   Absolute Immature Granulocytes      <=0.4 10e3/uL 0.1   Absolute NRBCs      10e3/uL 0.0   % Retic      0.5 - 2.0 % 1.8   Absolute Retic      0.025 - 0.095 10e6/uL 0.071   INR      0.85 - 1.15 0.94   PTT      22 - 38 Seconds 27   Lactate Dehydrogenase      81 - 234 U/L 315 (H)     The latest note from Dr. Honeycutt's clinic:    Ivon is a 31-year-old female who is followed by Dr. Honeycutt at [facility] for her TTP. She is currently being maintained on plasma exchange. She was seen this AM for labs and a clinic visit. After the visit it was determined that she can cancel her Friday plasma exchange. She should have labs drawn on Monday, February 28th- if platelet count is steady she can have her CVC pulled.     RN called to discuss this with Ivon    Plan:    No more scheduled PLEX treatments at this time.      Negrito Perales MD

## 2022-02-23 NOTE — PROGRESS NOTES
Due to the ongoing COVID-19 outbreak, this visit was conducted by video, with the patient's approval.        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    Assessment: Ivon Gamble is a 31 year old woman with newly diagnosed iTTP with an exacerbation secondary to dental infection.  Now that her dental infection is treated, it now appears she is approaching both clinical and [id number] remission.  Assuming her clinical remission persists over the next week, I'll plan to remove her catheter, taper prednisone and given next doses of rituximab with the goal of a long remission.    Recommendations:  1. I counseled the patient about my assessment of causes for persistent TTP including infection vs partial [id number] remission  2. Assuming platelets hold, remove catheter and taper pred to 15 mg next week  3. Check CBC and AWKTTH60 .  4. If platelets fall and LMSRMO43 has not recovered, consider TPE vs caplacizumab, which we discussed today as a temporizing measure until WKUJQU60 activity returns.  We will be glad to teach her how to administer the injections and expected side effects including injection site reactions and mucocutaneous bleeding due to iatrogenic von Willebrand factor deficiency  5. We will plan for rituximab at slow infusion to complete 4 doses of 375 mg/m .  If she cannot tolerate this, then we could consider Ofatumumab as a substitute.  We will plan for next dose next week when she is clearly in remission.    60 minutes spent on the date of the encounter doing chart review, review of test results, interpretation of tests, patient visit, documentation and discussion with other provider(s)      Don Honeycutt MD   of Medicine, Division of Hematology, Oncology and Transplantation  [facility]     -------------------------------  History: Ivon Gamble is a 31 year old woman with newly diagnosed immune mediated TTP.  She reports she was in her usual well state of health when she presented January 12, 2022 with abdominal pain and bruising.  Her CBC revealed a platelet count of 11, undetectable haptoglobin and schistocytes on the blood smear.  She was given the provisional diagnosis of TTP, and started on urgent treatment with plasma exchange.  OBUEBF24 activity was less than 5%.  Inhibitor and antibody levels from the day of admission are not available.      She responded briskly to present exchange, with a rise in platelet count over the course of about 5 days.  Platelet count on January 18, was 208 and so positive change was stopped.  At this point she was having complaints of ongoing tooth pain.  She was seen by maxillofacial surgery who deemed it necessary to extract a molar following I&D of an abscess that was done inpatient. She was discharged on prednisone 50 mg daily.    She returned for routine follow-up on January 27, 2022 unfortunately then found of a platelet count of 82.  She then went on to have outpatient apheresis performed with some slow improvement in the platelet count from  over the course of about 2 weeks.  Then, further complicating her course, she had an acute infusion reaction to rituximab on February 9, 2022.  No further rituximab has been given as a result of the severe reaction.    Unfortunately despite 3 weeks of intermittent a pheresis, her platelets never returned to normal. In addition, her acute pain continue to worsen as she had never had her extraction.  She was admitted to [facility] on 2/19/22, where her platelet count had fallen to 93.  She was started on IV antibiotics.  She was seen by dentistry and recommended tooth  extraction which was performed as an outpatient on Monday, February 21, 2022.  She remains on 25 mg of prednisone daily - this change was made on her 21st prior to discharge.    PETQUL07 activity:  1/12/22 (diagnosis) < 5%, no inhibitor or antibody tested  1/28/22 (exacerbation, likely from dental infection) - 7%, no inhibitor, antibody >90  2/2/22 - 71% (on TPE)  2/9/22 - 61% (on TPE)    Today, she reports she is feeling well.  Her tooth pain is greatly reduced after having her tooth extracted a couple days ago.  She notes no problems with headaches or memory.  She had a blood on this morning's we reviewed her lab results that show elevated platelet count following the tooth extraction we went on to discuss further management of TTP over the next couple weeks here.    Physical Exam:  Exam:  There is no height or weight on file to calculate BMI.   Constitutional: Appears well, no distress  Eyes: no discharge, injection or icterus  Respiratory: no cough or labored breathing  Skin: no rashes or petechiae  Neurological: no deficits appreciated, speech is fluent  Psych: affect is normal    Labs:  Results for CONOR FELIZ (MRN [id number]) as of 2/23/2022 12:56   Ref. Range 2/23/2022 09:52   WBC Latest Ref Range: 4.0 - 11.0 10e3/uL 10.6   Hemoglobin Latest Ref Range: 11.7 - 15.7 g/dL 13.3   Hematocrit Latest Ref Range: 35.0 - 47.0 % 40.8   Platelet Count Latest Ref Range: 150 - 450 10e3/uL 144 (L)       Imaging:  none    Video-Visit Details:    Type of service:  Video Visit  Video Start Time: 1045 AM  Video End Time (time video stopped): 1126 AM    Originating Location (pt. Location): Home    Distant Location (provider location):  [facility]     Mode of Communication:  Video Conference via [employer]

## 2022-02-23 NOTE — PROGRESS NOTES
Treatment in room CS-OF using Optia apheresis machine. Consent verified.     Height: 163cm  Weight: 117kg  HCT: 41%  Inlet speed: 59  ACDA ratio: 10:1  Fluid balance: 100  Access: R CVC     Replacement product: FFP  Electrolyte replacement: Ca Gluconate 4grams in NS - total 95mls, piggybacked into return lines, infused over time of treatment.    Premedications: benadryl, solucortef, tylenol     Treatment Notes: uneventful     Treatment completed as ordered, VSS, see EPIC flowsheet for run data.     Next treatment: only if deemed necessary    Timmy Siddiqui RN

## 2022-02-23 NOTE — PROGRESS NOTES
PATIENT/VISITOR WELLNESS SCREENING    Step 1 Patient Screening    1. In the last month, have you been in contact with someone who was confirmed or suspected to have Coronavirus/COVID-19? No    2. Do you have the following symptoms?  Fever/Chills? No   Cough? No   Shortness of breath? No   New loss of taste or smell? No  Sore throat? No  Muscle or body aches? No  Headaches? No  Fatigue? No  Vomiting or diarrhea? No      NO SYMPTOM(S)    ACTIONS:  1. Standard rooming process  2. Provider to assess per normal protocol  3. Implement precautions as needed and per guidelines     POSITIVE SYMPTOM(S)  If positive for ANY of the following symptoms: fever, cough, shortness of breath, rash    ACTION:  1. Continue to have the patient wear a mask   2. Room patient as soon as possible  3. Don appropriate PPE when entering room  4. Provider evaluation    Care Suites Admission Nursing Note    Patient Information  Name: Ivon Gamble  Age: 31 year old  Reason for admission: Apheresis  Care Suites arrival time: 1300    Patient Admission/Assessment   Pre-procedure assessment complete: Yes  If abnormal assessment/labs, provider notified: N/A  NPO: Yes  Medications held per instructions/orders: N/A  Consent: per Apheresis  Patient oriented to room: Yes  Education/questions answered: Yes  Plan/other: Apheresis RN in charge of all cares and assessment     Discharge Planning  Discharge name/phone number: [place]  Discharge location: home    Melisa Membreno RN

## 2022-02-24 ENCOUNTER — MYC MEDICAL ADVICE (OUTPATIENT)
Dept: HEMATOLOGY | Facility: CLINIC | Age: 32
End: 2022-02-24
Payer: COMMERCIAL

## 2022-02-24 LAB
BACTERIA BLD CULT: NO GROWTH
BACTERIA BLD CULT: NO GROWTH
HAPTOGLOB SERPL-MCNC: 165 MG/DL (ref 32–197)

## 2022-02-25 ENCOUNTER — LAB (OUTPATIENT)
Dept: LAB | Facility: CLINIC | Age: 32
End: 2022-02-25
Attending: INTERNAL MEDICINE
Payer: MEDICAID

## 2022-02-25 DIAGNOSIS — M31.19 TTP (THROMBOTIC THROMBOCYTOPENIC PURPURA) (H): ICD-10-CM

## 2022-02-25 PROCEDURE — U0003 INFECTIOUS AGENT DETECTION BY NUCLEIC ACID (DNA OR RNA); SEVERE ACUTE RESPIRATORY SYNDROME CORONAVIRUS 2 (SARS-COV-2) (CORONAVIRUS DISEASE [COVID-19]), AMPLIFIED PROBE TECHNIQUE, MAKING USE OF HIGH THROUGHPUT TECHNOLOGIES AS DESCRIBED BY CMS-2020-01-R: HCPCS

## 2022-02-25 PROCEDURE — U0005 INFEC AGEN DETEC AMPLI PROBE: HCPCS

## 2022-02-26 ENCOUNTER — HOSPITAL ENCOUNTER (EMERGENCY)
Facility: CLINIC | Age: 32
Discharge: HOME | End: 2022-02-26
Attending: EMERGENCY MEDICINE | Admitting: EMERGENCY MEDICINE
Payer: MEDICAID

## 2022-02-26 VITALS
HEART RATE: 88 BPM | SYSTOLIC BLOOD PRESSURE: 140 MMHG | RESPIRATION RATE: 20 BRPM | TEMPERATURE: 97.6 F | DIASTOLIC BLOOD PRESSURE: 95 MMHG | BODY MASS INDEX: 41.65 KG/M2 | WEIGHT: 250 LBS | HEIGHT: 65 IN | OXYGEN SATURATION: 100 %

## 2022-02-26 DIAGNOSIS — Z48.00 ENCOUNTER FOR CHANGE OF DRESSING: ICD-10-CM

## 2022-02-26 LAB — SARS-COV-2 RNA RESP QL NAA+PROBE: NEGATIVE

## 2022-02-26 PROCEDURE — 99282 EMERGENCY DEPT VISIT SF MDM: CPT

## 2022-02-26 ASSESSMENT — ENCOUNTER SYMPTOMS: WOUND: 1

## 2022-02-26 NOTE — ED PROVIDER NOTES
"  History   Chief Complaint:  Dressing Change    The history is provided by the patient.      Ivon Gamble is a 31 year old female with history of TTP who presents with concern over exposed central line and need for dressing change. The patient has current right upper chest port, which she has been treating at home with sterile swab and gauze. She ran out of the material to clean her port as she does daily, so presented today for dressing change and supplies. Her port is scheduled to come out in 2 days, on 2/28.    Review of Systems   Skin: Positive for wound.   All other systems reviewed and are negative.      Allergies:  Rituximab    Medications:  Albuterol  Augmentin  Chlorhexidine  Insulin NPH  Pantoprazole  Polyethylene glycol  Prednisone  Sennosides  Furosemide    Past Medical History:     TTP  Hyperbilirubinemia  Normocytotic anemia  Thrombocytopenia  Hematuria  Facial cellulitis  Sepsis  Asthma    Past Surgical History:    CVC non tunnel placement    Family History:    Liver disease  Diabetes mellitus    Social History:  The patient presented with a friend.  The patient arrived in a private vehicle.    Physical Exam     Patient Vitals for the past 24 hrs:   BP Temp Temp src Pulse Resp SpO2 Height Weight   02/26/22 0817 (!) 140/95 97.6  F (36.4  C) Oral 88 20 100 % 1.651 m (5' 5\") 113.4 kg (250 lb)     Physical Exam  Constitutional:       Appearance: She is well-developed.   Cardiovascular:      Rate and Rhythm: Normal rate and regular rhythm.      Heart sounds: Normal heart sounds. No murmur heard.    No friction rub. No gallop.   Pulmonary:      Effort: Pulmonary effort is normal. No respiratory distress.      Breath sounds: Normal breath sounds. No wheezing or rales.      Comments: R Subclavian central catheter in place without signs of infection or drainage. Skin is clear and normal in color. Dressing in place.  Skin:     Findings: No rash.   Neurological:      Mental Status: She is alert. "         Emergency Department Course     Emergency Department Course:     Reviewed:  I reviewed nursing notes, vitals, past medical history, Care Everywhere and [facility].    Assessments:  0826 I obtained history and examined the patient as noted above. I believe that they are safe for discharge.    Disposition:  The patient was discharged to home.     Impression & Plan     Medical Decision Making:  Patient presents for concerns regarding dressing change for her subclavian catheter. The catheter dressing in place currently appeared normal, but given patient concern over the weekend, we changed the dressing under sterile condition. We also gave her an extra set of dressing to use at home prior to her getting the catheter discontinued on 2/28. Return precautions provided. Patient discharged after dressing change.      Diagnosis:    ICD-10-CM    1. Encounter for change of dressing  Z48.00      Scribe Disclosure:  I, uHi Lai, am serving as a scribe at 8:21 AM on 2/26/2022 to document services personally performed by Gracie Abarca MD based on my observations and the provider's statements to me.     Gracie Abarca MD  02/26/22 0951

## 2022-02-26 NOTE — ED NOTES
Dressing over right chest tunnel cath changed; new biopatch placed. Skin around the cath at insertion site is red, but dry and intact.  Pt provided with supplies to change dressing if needed before her treatment on Monday.

## 2022-02-26 NOTE — ED TRIAGE NOTES
A&O x4.  ABC's intact.      Pt arrives with needing dressing change on right upper chest port.  No longer has supplies for dressing change at home.

## 2022-02-27 ENCOUNTER — HEALTH MAINTENANCE LETTER (OUTPATIENT)
Age: 32
End: 2022-02-27

## 2022-02-28 ENCOUNTER — MYC MEDICAL ADVICE (OUTPATIENT)
Dept: HEMATOLOGY | Facility: CLINIC | Age: 32
End: 2022-02-28

## 2022-02-28 DIAGNOSIS — M31.19 ACQUIRED TTP (H): Primary | ICD-10-CM

## 2022-03-01 ENCOUNTER — HOSPITAL ENCOUNTER (OUTPATIENT)
Facility: CLINIC | Age: 32
Discharge: HOME | End: 2022-03-01
Attending: RADIOLOGY | Admitting: RADIOLOGY
Payer: MEDICAID

## 2022-03-01 ENCOUNTER — APPOINTMENT (OUTPATIENT)
Dept: INTERVENTIONAL RADIOLOGY/VASCULAR | Facility: CLINIC | Age: 32
End: 2022-03-01
Attending: INTERNAL MEDICINE
Payer: MEDICAID

## 2022-03-01 VITALS
RESPIRATION RATE: 18 BRPM | TEMPERATURE: 97 F | SYSTOLIC BLOOD PRESSURE: 107 MMHG | OXYGEN SATURATION: 100 % | DIASTOLIC BLOOD PRESSURE: 60 MMHG | HEART RATE: 78 BPM

## 2022-03-01 DIAGNOSIS — M31.19 ACQUIRED TTP (H): ICD-10-CM

## 2022-03-01 PROCEDURE — 999N000154 HC STATISTIC RADIOLOGY XRAY, US, CT, MAR, NM

## 2022-03-01 RX ORDER — ACETAMINOPHEN 325 MG/1
325 TABLET ORAL
Status: DISCONTINUED | OUTPATIENT
Start: 2022-03-01 | End: 2022-03-01 | Stop reason: HOSPADM

## 2022-03-01 NOTE — PROGRESS NOTES
Interventional Radiology - Progress Note  Inpatient - [facility]  3/1/2022    Interventional Radiology Brief Post Procedure Note    Procedure: Tunneled dialysis catheter removal    Proceduralist: David WONG    Assistant: None    Time Out:  Immediately prior to starting the procedure I conducted the Time Out and re-confirmed the patient s name, procedure, and site/side.     Sedation: None    Findings: Rt Chest catheter tunneled to RIJ. Catheter removed with gentle traction after signle suture removed under sterile field while pressure held at two points along tunnel until hemostasis obtained.    Estimated Blood Loss: Minimal    SPECIMENS: None    Complications:  None     Condition: Stable    Plan: Discharge home in 30m if no complications    Comments: See dictated procedure note for full details.    Juan Hernandez PA-C

## 2022-03-01 NOTE — PROGRESS NOTES
Care Suites Discharge Nursing Note    Patient Information  Name: Ivon Gamble  Age: 31 year old    Discharge Education:  Discharge instructions reviewed: Yes  Additional education/resources provided: NA  Patient/patient representative verbalizes understanding: Yes  Patient discharging on new medications: No  Medication education completed: N/A    Discharge Plans:   Discharge location: home  Discharge ride contacted: Yes  Approximate discharge time: 1100    Discharge Criteria:  Discharge criteria met and vital signs stable: Yes.     Patient Belongs:  Patient belongings returned to patient: Yes    Altehea Mcgowan RN

## 2022-03-01 NOTE — DISCHARGE INSTRUCTIONS
Tunnel Cath Removal Discharge Instructions     After you go home:      You may resume your normal diet    Care of Puncture Site:      Keep the dressing clean & dry for 3 days    You may use a bandaid on the site after 3 days until the wound has healed    No tub baths, whirlpools or swimming until your puncture site has fully healed     Activity       You may go back to normal activity in 24 hours     Avoid heavy lifting (greater than 10 pounds), strenuous activity or the overuse of your shoulder for 3 days    Bleeding:      If you start bleeding from the incision site in your chest or have swelling in your neck, sit down and press on the site for 5-10 minutes.     If bleeding has not stopped after 10 minutes, call your provider.        Call 911 right away if you have heavy bleeding or bleeding that does not stop.      Medicines:      You may resume all your medicines today    For minor pain, you may take Acetaminophen (Tylenol) or Ibuprofen (Advil)                 Call the provider who ordered this procedure if:      The site is red, swollen, hot or tender or there is drainage at the site    You have chills or a fever greater than 100 F (37.8 C)    Swelling in your neck    Any questions or concerns      If you have questions call:          [facility] Radiology Dept @ [phone]        The provider who performed your procedure was Gavin REYES.

## 2022-03-01 NOTE — PROGRESS NOTES
Care Suites Admission Nursing Note    Patient Information  Name: Ivon Gamble  Age: 31 year old  Reason for admission: Tunnel Cath removal  Care Suites arrival time: 0940    Patient Admission/Assessment   Pre-procedure assessment complete: Yes  If abnormal assessment/labs, provider notified: N/A  NPO: N/A  Medications held per instructions/orders: N/A  Consent: deferred  If applicable, pregnancy test status: deferred  Patient oriented to room: Yes  Education/questions answered: Yes  Plan/other: Proceed as scheduled.    Discharge Planning  Discharge name/phone number: [place] [phone]  Overnight post sedation caregiver: NA  Discharge location: home    Alethea Mcgowan RN     1007  AVS/Discharge instructions given and reviewed.  All questions and concerns addressed at this time with verbal understanding received.

## 2022-03-02 DIAGNOSIS — D69.3 ACUTE IDIOPATHIC THROMBOCYTOPENIC PURPURA (H): Primary | ICD-10-CM

## 2022-03-04 ENCOUNTER — PATIENT OUTREACH (OUTPATIENT)
Dept: NURSING | Facility: CLINIC | Age: 32
End: 2022-03-04
Payer: MEDICAID

## 2022-03-04 ASSESSMENT — ACTIVITIES OF DAILY LIVING (ADL): DEPENDENT_IADLS:: INDEPENDENT

## 2022-03-04 NOTE — PROGRESS NOTES
Clinic Care Coordination Contact    Follow Up Progress Note      Assessment: RN CC called and spoke with patient. Ivon states that she is doing well, just had port removed and is tapering down her prednisone dose. Denies any current concerns or complaints. Declines further primary care coordination stating that she has all the support and resources she needs at this time.       Care Gaps:    Health Maintenance Due   Topic Date Due     PREVENTIVE CARE VISIT  Never done     LIPID  Never done     MICROALBUMIN  Never done     DIABETIC FOOT EXAM  Never done     ADVANCE CARE PLANNING  Never done     EYE EXAM  Never done     Pneumococcal Vaccine: Pediatrics (0 to 5 Years) and At-Risk Patients (6 to 64 Years) (1 of 2 - PPSV23) Never done     DTAP/TDAP/TD IMMUNIZATION (3 - Tdap) 08/09/2002     PAP  Never done     INFLUENZA VACCINE (1) 09/01/2021     PHQ-2  Never done     COVID-19 Vaccine (3 - Booster for Pfizer series) 02/25/2022       Scheduled to be addressed at on going PCP follow up appointments      Goals addressed this encounter:   Goals Addressed    None         Intervention/Education provided during outreach: Chronic disease management and support.          Plan: Patient was provided with RN CC's contact information and encouraged to call with questions, concerns, support needs. RN CC will remain available as needed. No further care coordination outreaches will be made at this time.     Itzel Snow RN Care Coordinator  [facility]  Email: [email or web address]  Phone: [phone]

## 2022-03-07 ENCOUNTER — LAB (OUTPATIENT)
Dept: LAB | Facility: CLINIC | Age: 32
End: 2022-03-07
Payer: MEDICAID

## 2022-03-07 DIAGNOSIS — D69.3 ACUTE IDIOPATHIC THROMBOCYTOPENIC PURPURA (H): ICD-10-CM

## 2022-03-07 LAB
BASOPHILS # BLD AUTO: 0.1 10E3/UL (ref 0–0.2)
BASOPHILS NFR BLD AUTO: 1 %
EOSINOPHIL # BLD AUTO: 0 10E3/UL (ref 0–0.7)
EOSINOPHIL NFR BLD AUTO: 0 %
ERYTHROCYTE [DISTWIDTH] IN BLOOD BY AUTOMATED COUNT: 12.1 % (ref 10–15)
HCT VFR BLD AUTO: 42.5 % (ref 35–47)
HGB BLD-MCNC: 14.8 G/DL (ref 11.7–15.7)
LYMPHOCYTES # BLD AUTO: 1.1 10E3/UL (ref 0.8–5.3)
LYMPHOCYTES NFR BLD AUTO: 11 %
MCH RBC QN AUTO: 34.1 PG (ref 26.5–33)
MCHC RBC AUTO-ENTMCNC: 34.8 G/DL (ref 31.5–36.5)
MCV RBC AUTO: 98 FL (ref 78–100)
MONOCYTES # BLD AUTO: 0.5 10E3/UL (ref 0–1.3)
MONOCYTES NFR BLD AUTO: 5 %
NEUTROPHILS # BLD AUTO: 8.9 10E3/UL (ref 1.6–8.3)
NEUTROPHILS NFR BLD AUTO: 84 %
PLATELET # BLD AUTO: 182 10E3/UL (ref 150–450)
RBC # BLD AUTO: 4.34 10E6/UL (ref 3.8–5.2)
WBC # BLD AUTO: 10.5 10E3/UL (ref 4–11)

## 2022-03-07 PROCEDURE — 36415 COLL VENOUS BLD VENIPUNCTURE: CPT

## 2022-03-07 PROCEDURE — 85025 COMPLETE CBC W/AUTO DIFF WBC: CPT

## 2022-03-10 ENCOUNTER — MYC MEDICAL ADVICE (OUTPATIENT)
Dept: HEMATOLOGY | Facility: CLINIC | Age: 32
End: 2022-03-10
Payer: MEDICAID

## 2022-03-14 ENCOUNTER — LAB (OUTPATIENT)
Dept: LAB | Facility: CLINIC | Age: 32
End: 2022-03-14
Payer: MEDICAID

## 2022-03-14 DIAGNOSIS — D69.3 ACUTE IDIOPATHIC THROMBOCYTOPENIC PURPURA (H): ICD-10-CM

## 2022-03-14 LAB
BASOPHILS # BLD AUTO: 0 10E3/UL (ref 0–0.2)
BASOPHILS NFR BLD AUTO: 0 %
EOSINOPHIL # BLD AUTO: 0.1 10E3/UL (ref 0–0.7)
EOSINOPHIL NFR BLD AUTO: 1 %
ERYTHROCYTE [DISTWIDTH] IN BLOOD BY AUTOMATED COUNT: 12.6 % (ref 10–15)
HCT VFR BLD AUTO: 45.2 % (ref 35–47)
HGB BLD-MCNC: 15 G/DL (ref 11.7–15.7)
LYMPHOCYTES # BLD AUTO: 2 10E3/UL (ref 0.8–5.3)
LYMPHOCYTES NFR BLD AUTO: 28 %
MCH RBC QN AUTO: 32.8 PG (ref 26.5–33)
MCHC RBC AUTO-ENTMCNC: 33.2 G/DL (ref 31.5–36.5)
MCV RBC AUTO: 99 FL (ref 78–100)
MONOCYTES # BLD AUTO: 0.9 10E3/UL (ref 0–1.3)
MONOCYTES NFR BLD AUTO: 12 %
NEUTROPHILS # BLD AUTO: 4.2 10E3/UL (ref 1.6–8.3)
NEUTROPHILS NFR BLD AUTO: 58 %
PLATELET # BLD AUTO: 176 10E3/UL (ref 150–450)
RBC # BLD AUTO: 4.57 10E6/UL (ref 3.8–5.2)
WBC # BLD AUTO: 7.3 10E3/UL (ref 4–11)

## 2022-03-14 PROCEDURE — 36415 COLL VENOUS BLD VENIPUNCTURE: CPT

## 2022-03-14 PROCEDURE — 85025 COMPLETE CBC W/AUTO DIFF WBC: CPT

## 2022-03-28 ENCOUNTER — INFUSION THERAPY VISIT (OUTPATIENT)
Dept: INFUSION THERAPY | Facility: CLINIC | Age: 32
End: 2022-03-28
Attending: INTERNAL MEDICINE
Payer: MEDICAID

## 2022-03-28 VITALS
WEIGHT: 263.8 LBS | OXYGEN SATURATION: 97 % | SYSTOLIC BLOOD PRESSURE: 100 MMHG | TEMPERATURE: 98.1 F | DIASTOLIC BLOOD PRESSURE: 65 MMHG | RESPIRATION RATE: 16 BRPM | BODY MASS INDEX: 43.9 KG/M2 | HEART RATE: 81 BPM

## 2022-03-28 DIAGNOSIS — D69.3 ACUTE IDIOPATHIC THROMBOCYTOPENIC PURPURA (H): Primary | ICD-10-CM

## 2022-03-28 PROCEDURE — 96361 HYDRATE IV INFUSION ADD-ON: CPT

## 2022-03-28 PROCEDURE — 96413 CHEMO IV INFUSION 1 HR: CPT

## 2022-03-28 PROCEDURE — 96375 TX/PRO/DX INJ NEW DRUG ADDON: CPT

## 2022-03-28 PROCEDURE — 250N000013 HC RX MED GY IP 250 OP 250 PS 637: Performed by: INTERNAL MEDICINE

## 2022-03-28 PROCEDURE — 94640 AIRWAY INHALATION TREATMENT: CPT

## 2022-03-28 PROCEDURE — 96372 THER/PROPH/DIAG INJ SC/IM: CPT | Performed by: INTERNAL MEDICINE

## 2022-03-28 PROCEDURE — 96376 TX/PRO/DX INJ SAME DRUG ADON: CPT

## 2022-03-28 PROCEDURE — 258N000003 HC RX IP 258 OP 636: Performed by: INTERNAL MEDICINE

## 2022-03-28 PROCEDURE — 250N000011 HC RX IP 250 OP 636: Performed by: INTERNAL MEDICINE

## 2022-03-28 PROCEDURE — 96415 CHEMO IV INFUSION ADDL HR: CPT

## 2022-03-28 RX ORDER — METHYLPREDNISOLONE SODIUM SUCCINATE 125 MG/2ML
125 INJECTION, POWDER, LYOPHILIZED, FOR SOLUTION INTRAMUSCULAR; INTRAVENOUS
Status: DISCONTINUED | OUTPATIENT
Start: 2022-03-28 | End: 2022-03-28 | Stop reason: HOSPADM

## 2022-03-28 RX ORDER — METHYLPREDNISOLONE SODIUM SUCCINATE 125 MG/2ML
125 INJECTION, POWDER, LYOPHILIZED, FOR SOLUTION INTRAMUSCULAR; INTRAVENOUS ONCE
Status: COMPLETED | OUTPATIENT
Start: 2022-03-28 | End: 2022-03-28

## 2022-03-28 RX ORDER — ALBUTEROL SULFATE 0.83 MG/ML
2.5 SOLUTION RESPIRATORY (INHALATION)
Status: DISCONTINUED | OUTPATIENT
Start: 2022-03-28 | End: 2022-03-28 | Stop reason: HOSPADM

## 2022-03-28 RX ORDER — ACETAMINOPHEN 325 MG/1
650 TABLET ORAL ONCE
Status: COMPLETED | OUTPATIENT
Start: 2022-03-28 | End: 2022-03-28

## 2022-03-28 RX ORDER — ALBUTEROL SULFATE 90 UG/1
1-2 AEROSOL, METERED RESPIRATORY (INHALATION)
Status: COMPLETED | OUTPATIENT
Start: 2022-03-28 | End: 2022-03-28

## 2022-03-28 RX ORDER — NALOXONE HYDROCHLORIDE 0.4 MG/ML
0.2 INJECTION, SOLUTION INTRAMUSCULAR; INTRAVENOUS; SUBCUTANEOUS
Status: DISCONTINUED | OUTPATIENT
Start: 2022-03-28 | End: 2022-03-28 | Stop reason: CLARIF

## 2022-03-28 RX ORDER — DIPHENHYDRAMINE HYDROCHLORIDE 50 MG/ML
50 INJECTION INTRAMUSCULAR; INTRAVENOUS
Status: COMPLETED | OUTPATIENT
Start: 2022-03-28 | End: 2022-03-28

## 2022-03-28 RX ORDER — EPINEPHRINE 1 MG/ML
0.3 INJECTION, SOLUTION INTRAMUSCULAR; SUBCUTANEOUS EVERY 5 MIN PRN
Status: DISCONTINUED | OUTPATIENT
Start: 2022-03-28 | End: 2022-03-28 | Stop reason: HOSPADM

## 2022-03-28 RX ORDER — MEPERIDINE HYDROCHLORIDE 25 MG/ML
25 INJECTION INTRAMUSCULAR; INTRAVENOUS; SUBCUTANEOUS EVERY 30 MIN PRN
Status: DISCONTINUED | OUTPATIENT
Start: 2022-03-28 | End: 2022-03-28 | Stop reason: CLARIF

## 2022-03-28 RX ADMIN — ALBUTEROL SULFATE 2 PUFF: 108 INHALANT RESPIRATORY (INHALATION) at 12:00

## 2022-03-28 RX ADMIN — METHYLPREDNISOLONE SODIUM SUCCINATE 125 MG: 125 INJECTION, POWDER, FOR SOLUTION INTRAMUSCULAR; INTRAVENOUS at 11:59

## 2022-03-28 RX ADMIN — SODIUM CHLORIDE 250 ML: 9 INJECTION, SOLUTION INTRAVENOUS at 09:59

## 2022-03-28 RX ADMIN — DIPHENHYDRAMINE HYDROCHLORIDE 50 MG: 50 INJECTION INTRAMUSCULAR; INTRAVENOUS at 12:00

## 2022-03-28 RX ADMIN — SODIUM CHLORIDE 1000 ML: 9 INJECTION, SOLUTION INTRAVENOUS at 12:05

## 2022-03-28 RX ADMIN — ACETAMINOPHEN 650 MG: 325 TABLET, FILM COATED ORAL at 10:00

## 2022-03-28 RX ADMIN — RITUXIMAB 400 MG: 10 INJECTION, SOLUTION INTRAVENOUS at 10:40

## 2022-03-28 RX ADMIN — EPINEPHRINE 0.3 MG: 1 INJECTION INTRAMUSCULAR; INTRAVENOUS; SUBCUTANEOUS at 12:03

## 2022-03-28 RX ADMIN — DIPHENHYDRAMINE HYDROCHLORIDE 50 MG: 50 INJECTION, SOLUTION INTRAMUSCULAR; INTRAVENOUS at 09:58

## 2022-03-28 RX ADMIN — METHYLPREDNISOLONE SODIUM SUCCINATE 125 MG: 125 INJECTION, POWDER, FOR SOLUTION INTRAMUSCULAR; INTRAVENOUS at 10:12

## 2022-03-28 ASSESSMENT — PAIN SCALES - GENERAL: PAINLEVEL: NO PAIN (0)

## 2022-03-28 NOTE — PROGRESS NOTES
"Infusion Nursing Note:  Iovn Gamble presents today for Rituxan.    Patient seen by provider today: No   present during visit today: Not Applicable.    Note: 1hr 15min into infusion pt reported thick tongue feeling with sensation of throat being \"blocked\" shortly after that pt reported wheezing which was audible. vss throughout reaction. Infusion stopped, rescue meds given, MD notified. Antony Franco NP saw pt in infusion area. Both NP and Dr Honeycutt ordered to restart infusion in 1 hour when symptoms resolved. .      Intravenous Access:  Peripheral IV placed.    Treatment Conditions:  Not Applicable.                                       Post Infusion Assessment:  Patient tolerated infusion poorly due to : Hypersensitivity: Did patient have a hypersensitivity reaction? : Yes  Drug or Product name: Rituxan  Were pre-meds administered?: Yes  What pre-meds were administered?: Acetaminophen (Tylenol);Diphenhydramine (Benadryl);Methylprednisolone  First or Subsequent treatment: Subsequent infusion  Rate of infusion when patient had hypersensitivity reaction: 150  Time the hypersensitivity reaction was first recognized: 1155  Symptoms observed or reported (select all that apply): Other: (Comment) (\"tongue swelling\", wheezing)  Interventions/treatment following reaction: Infusion stopped;Hypersensitivity medications administered  What hypersensitivity medications were administered?: Albuterol inhaler;DiphendydrAMINE (benadryl);Epinephrine;Methylprednisolone  Name of provider notified: Dr Honeycutt (and Antony Franco NP at 1205)  Time provider notified: 1238     Was the patient re-challenged today?: Yes - tolerated well  Access discontinued per protocol.       Discharge Plan:   Discharge instructions reviewed with: Patient.  Patient and/or family verbalized understanding of discharge instructions and all questions answered.  Copy of AVS reviewed with patient and/or family.  Patient will return as scheduled for next " appointment.  Patient discharged in stable condition accompanied by: self.  Departure Mode: Ambulatory.      Judy Dejesus RN

## 2022-03-31 ENCOUNTER — VIRTUAL VISIT (OUTPATIENT)
Dept: HEMATOLOGY | Facility: CLINIC | Age: 32
End: 2022-03-31
Attending: INTERNAL MEDICINE
Payer: MEDICAID

## 2022-03-31 DIAGNOSIS — R11.0 NAUSEA: Primary | ICD-10-CM

## 2022-03-31 PROCEDURE — 99215 OFFICE O/P EST HI 40 MIN: CPT | Mod: 95 | Performed by: INTERNAL MEDICINE

## 2022-03-31 RX ORDER — ONDANSETRON 4 MG/1
4 TABLET, FILM COATED ORAL EVERY 8 HOURS PRN
Qty: 10 TABLET | Refills: 1 | Status: SHIPPED | OUTPATIENT
Start: 2022-03-31 | End: 2023-08-03

## 2022-03-31 NOTE — PROGRESS NOTES
Due to the ongoing COVID-19 outbreak, this visit was conducted by video, with the patient's approval.    Due to the ongoing COVID-19 outbreak, this visit was conducted by video, with the patient's approval.        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  I encouraged her to complete as much rituximab as possible to try to reduce her risk of relapse in the future.  Retrospective data indicates that in White patients, treatment with prednisone will still result and recurrence rate of about 60% at 5 years but this decreases to about 20% with the addition of rituximab.  Therefore we will plan to continue rituximab therapy and monitor her [id number] activity.    Recommendations:  1. She will finish at least 4 of her 50% doses of rituximab and then we will meet again to discuss whether she wants to finish all 8 doses or not  2. She has finished prednisone so continue to hold   3. Check CBC and [id number] each rituximab visit.  4. Visits every 3 months for the first year for monitoring    45 minutes spent on the date of the encounter doing chart review, review of test results, interpretation of tests, patient visit, documentation and discussion with other provider(s)     Don Honeycutt MD   of Medicine, Division of Hematology, Oncology and Transplantation  [employer]     -------------------------------  Forward History:    - Presented 2022 with abdominal pain and bruising.  Her CBC revealed a platelet count of 11, undetectable haptoglobin and schistocytes on the blood smear.  She was given the provisional diagnosis of TTP, and  started on urgent treatment with plasma exchange.  IMHYVC31 activity was less than 5%.  Inhibitor and antibody levels from the day of admission are not available.      She responded briskly to present exchange, with a rise in platelet count over the course of about 5 days.  Platelet count on January 18, 2022 was 208 and so positive change was stopped.  At this point she was having complaints of ongoing tooth pain.      Exacerbation: January 27, 2022 unfortunately then found of a platelet count of 82.  She then went on to have outpatient apheresis performed with some slow improvement in the platelet count from  over the course of about 2 weeks.  Then, further complicating her course, she had an acute infusion reaction to rituximab on February 9, 2022.     Unfortunately despite 3 weeks of intermittent a pheresis, her platelets never returned to normal. In addition, her acute pain continue to worsen as she had never had her extraction.  She was admitted to [facility] on 2/19/22, where her platelet count had fallen to 93.  She was started on IV antibiotics.  She was seen by dentistry and recommended tooth extraction which was performed as an outpatient on Monday, February 21, 2022.      Rituximab dose #1 3/28/22 (50%)    QTHOZO31 activity:  1/12/22 (diagnosis) < 5%, no inhibitor or antibody tested  1/28/22 (exacerbation, likely from dental infection) - 7%, no inhibitor, antibody >90  2/2/22 - 71% (on TPE)  2/9/22 - 61% (on TPE)  2/28/22: 148%    Visit history  Ivon Gamble is a 31 year old woman with newly diagnosed immune mediated TTP. She notes no problems with headaches or memory.  She recently had her first dose of rituximab and reports that she felt dizzy when she got home and then when she woke up in the middle of the night was nauseated for several hours.  Today, she has been feeling well.  She did not receive the other 50% dose so she would like to discuss a plan for divided dosing rituximab today.  She is  completely off her prednisone taper currently.    Physical Exam:  Exam:  There is no height or weight on file to calculate BMI.   Constitutional: Appears well, no distress  Eyes: no discharge, injection or icterus  Respiratory: no cough or labored breathing  Skin: no rashes or petechiae  Neurological: no deficits appreciated, speech is fluent  Psych: affect is normal    Labs:  Results for CONOR FELIZ (MRN [id number]) as of 3/31/2022 13:29   Ref. Range 3/7/2022 10:41 3/14/2022 13:33   WBC Latest Ref Range: 4.0 - 11.0 10e3/uL 10.5 7.3   Hemoglobin Latest Ref Range: 11.7 - 15.7 g/dL 14.8 15.0   Hematocrit Latest Ref Range: 35.0 - 47.0 % 42.5 45.2   Platelet Count Latest Ref Range: 150 - 450 10e3/uL 182 176     Imaging:  none    Video-Visit Details:    Type of service:  Video Visit  Video Start Time: 131 PM  Video End Time (time video stopped): 147 PM    Originating Location (pt. Location): Home    Distant Location (provider location):  [facility]     Mode of Communication:  Video Conference via [employer]

## 2022-04-03 ENCOUNTER — HOSPITAL ENCOUNTER (EMERGENCY)
Facility: CLINIC | Age: 32
Discharge: HOME | End: 2022-04-03
Attending: PHYSICIAN ASSISTANT | Admitting: PHYSICIAN ASSISTANT
Payer: COMMERCIAL

## 2022-04-03 ENCOUNTER — NURSE TRIAGE (OUTPATIENT)
Dept: NURSING | Facility: CLINIC | Age: 32
End: 2022-04-03
Payer: COMMERCIAL

## 2022-04-03 VITALS
SYSTOLIC BLOOD PRESSURE: 124 MMHG | OXYGEN SATURATION: 100 % | DIASTOLIC BLOOD PRESSURE: 86 MMHG | WEIGHT: 263.45 LBS | TEMPERATURE: 97.8 F | BODY MASS INDEX: 43.84 KG/M2 | HEART RATE: 108 BPM | RESPIRATION RATE: 14 BRPM

## 2022-04-03 DIAGNOSIS — L50.9 HIVES: ICD-10-CM

## 2022-04-03 DIAGNOSIS — R73.9 HYPERGLYCEMIA: ICD-10-CM

## 2022-04-03 LAB
ALBUMIN SERPL-MCNC: 3.7 G/DL (ref 3.4–5)
ALP SERPL-CCNC: 74 U/L (ref 40–150)
ALT SERPL W P-5'-P-CCNC: 25 U/L (ref 0–50)
ANION GAP SERPL CALCULATED.3IONS-SCNC: 8 MMOL/L (ref 3–14)
AST SERPL W P-5'-P-CCNC: 20 U/L (ref 0–45)
BASOPHILS # BLD AUTO: 0 10E3/UL (ref 0–0.2)
BASOPHILS NFR BLD AUTO: 0 %
BILIRUB SERPL-MCNC: 0.4 MG/DL (ref 0.2–1.3)
BUN SERPL-MCNC: 20 MG/DL (ref 7–30)
CALCIUM SERPL-MCNC: 9 MG/DL (ref 8.5–10.1)
CHLORIDE BLD-SCNC: 104 MMOL/L (ref 94–109)
CO2 SERPL-SCNC: 23 MMOL/L (ref 20–32)
CREAT SERPL-MCNC: 0.96 MG/DL (ref 0.52–1.04)
EOSINOPHIL # BLD AUTO: 0 10E3/UL (ref 0–0.7)
EOSINOPHIL NFR BLD AUTO: 0 %
ERYTHROCYTE [DISTWIDTH] IN BLOOD BY AUTOMATED COUNT: 11.8 % (ref 10–15)
GFR SERPL CREATININE-BSD FRML MDRD: 81 ML/MIN/1.73M2
GLUCOSE BLD-MCNC: 213 MG/DL (ref 70–99)
HCT VFR BLD AUTO: 53 % (ref 35–47)
HGB BLD-MCNC: 17.7 G/DL (ref 11.7–15.7)
HOLD SPECIMEN: NORMAL
IMM GRANULOCYTES # BLD: 0.1 10E3/UL
IMM GRANULOCYTES NFR BLD: 0 %
LYMPHOCYTES # BLD AUTO: 2.4 10E3/UL (ref 0.8–5.3)
LYMPHOCYTES NFR BLD AUTO: 17 %
MCH RBC QN AUTO: 32.9 PG (ref 26.5–33)
MCHC RBC AUTO-ENTMCNC: 33.4 G/DL (ref 31.5–36.5)
MCV RBC AUTO: 99 FL (ref 78–100)
MONOCYTES # BLD AUTO: 0.6 10E3/UL (ref 0–1.3)
MONOCYTES NFR BLD AUTO: 4 %
NEUTROPHILS # BLD AUTO: 10.9 10E3/UL (ref 1.6–8.3)
NEUTROPHILS NFR BLD AUTO: 79 %
NRBC # BLD AUTO: 0 10E3/UL
NRBC BLD AUTO-RTO: 0 /100
PLATELET # BLD AUTO: 260 10E3/UL (ref 150–450)
POTASSIUM BLD-SCNC: 4 MMOL/L (ref 3.4–5.3)
PROT SERPL-MCNC: 7.1 G/DL (ref 6.8–8.8)
RBC # BLD AUTO: 5.38 10E6/UL (ref 3.8–5.2)
SODIUM SERPL-SCNC: 135 MMOL/L (ref 133–144)
WBC # BLD AUTO: 14 10E3/UL (ref 4–11)

## 2022-04-03 PROCEDURE — 85025 COMPLETE CBC W/AUTO DIFF WBC: CPT | Performed by: PHYSICIAN ASSISTANT

## 2022-04-03 PROCEDURE — 96375 TX/PRO/DX INJ NEW DRUG ADDON: CPT

## 2022-04-03 PROCEDURE — 250N000009 HC RX 250: Performed by: PHYSICIAN ASSISTANT

## 2022-04-03 PROCEDURE — 99284 EMERGENCY DEPT VISIT MOD MDM: CPT | Mod: 25

## 2022-04-03 PROCEDURE — 36415 COLL VENOUS BLD VENIPUNCTURE: CPT | Performed by: PHYSICIAN ASSISTANT

## 2022-04-03 PROCEDURE — 80053 COMPREHEN METABOLIC PANEL: CPT | Performed by: PHYSICIAN ASSISTANT

## 2022-04-03 PROCEDURE — 96361 HYDRATE IV INFUSION ADD-ON: CPT

## 2022-04-03 PROCEDURE — 250N000011 HC RX IP 250 OP 636: Performed by: PHYSICIAN ASSISTANT

## 2022-04-03 PROCEDURE — 96374 THER/PROPH/DIAG INJ IV PUSH: CPT

## 2022-04-03 PROCEDURE — 258N000003 HC RX IP 258 OP 636: Performed by: PHYSICIAN ASSISTANT

## 2022-04-03 RX ORDER — DEXAMETHASONE SODIUM PHOSPHATE 10 MG/ML
10 INJECTION, SOLUTION INTRAMUSCULAR; INTRAVENOUS ONCE
Status: COMPLETED | OUTPATIENT
Start: 2022-04-03 | End: 2022-04-03

## 2022-04-03 RX ORDER — DIPHENHYDRAMINE HYDROCHLORIDE 50 MG/ML
50 INJECTION INTRAMUSCULAR; INTRAVENOUS ONCE
Status: COMPLETED | OUTPATIENT
Start: 2022-04-03 | End: 2022-04-03

## 2022-04-03 RX ADMIN — DIPHENHYDRAMINE HYDROCHLORIDE 50 MG: 50 INJECTION, SOLUTION INTRAMUSCULAR; INTRAVENOUS at 21:16

## 2022-04-03 RX ADMIN — FAMOTIDINE 20 MG: 10 INJECTION, SOLUTION INTRAVENOUS at 21:16

## 2022-04-03 RX ADMIN — SODIUM CHLORIDE 1000 ML: 9 INJECTION, SOLUTION INTRAVENOUS at 21:16

## 2022-04-03 RX ADMIN — DEXAMETHASONE SODIUM PHOSPHATE 10 MG: 10 INJECTION, SOLUTION INTRAMUSCULAR; INTRAVENOUS at 21:16

## 2022-04-03 ASSESSMENT — ENCOUNTER SYMPTOMS
SHORTNESS OF BREATH: 1
FEVER: 0
ROS SKIN COMMENTS: PRURITIS

## 2022-04-03 NOTE — LETTER
April 3, 2022      To Whom It May Concern:      Ivon Gamble was seen in our Emergency Department today, 04/03/22 for hives and allergic reaction. She may return to work when improved.    Sincerely,        Oh Urrutia PA-C

## 2022-04-04 ENCOUNTER — HOSPITAL ENCOUNTER (EMERGENCY)
Facility: CLINIC | Age: 32
Discharge: HOME | End: 2022-04-04
Attending: EMERGENCY MEDICINE | Admitting: EMERGENCY MEDICINE
Payer: COMMERCIAL

## 2022-04-04 ENCOUNTER — NURSE TRIAGE (OUTPATIENT)
Dept: NURSING | Facility: CLINIC | Age: 32
End: 2022-04-04
Payer: COMMERCIAL

## 2022-04-04 ENCOUNTER — INFUSION THERAPY VISIT (OUTPATIENT)
Dept: INFUSION THERAPY | Facility: CLINIC | Age: 32
End: 2022-04-04
Attending: INTERNAL MEDICINE
Payer: COMMERCIAL

## 2022-04-04 VITALS
TEMPERATURE: 98.7 F | WEIGHT: 264 LBS | RESPIRATION RATE: 24 BRPM | HEART RATE: 105 BPM | SYSTOLIC BLOOD PRESSURE: 105 MMHG | OXYGEN SATURATION: 97 % | BODY MASS INDEX: 45.07 KG/M2 | HEIGHT: 64 IN | DIASTOLIC BLOOD PRESSURE: 66 MMHG

## 2022-04-04 DIAGNOSIS — M31.19 TTP (THROMBOTIC THROMBOPENIC PURPURA) (H): Primary | ICD-10-CM

## 2022-04-04 DIAGNOSIS — T78.2XXA ANAPHYLAXIS, INITIAL ENCOUNTER: ICD-10-CM

## 2022-04-04 PROCEDURE — 96375 TX/PRO/DX INJ NEW DRUG ADDON: CPT

## 2022-04-04 PROCEDURE — 93005 ELECTROCARDIOGRAM TRACING: CPT

## 2022-04-04 PROCEDURE — 250N000009 HC RX 250: Performed by: EMERGENCY MEDICINE

## 2022-04-04 PROCEDURE — 96361 HYDRATE IV INFUSION ADD-ON: CPT

## 2022-04-04 PROCEDURE — 96372 THER/PROPH/DIAG INJ SC/IM: CPT | Mod: 59

## 2022-04-04 PROCEDURE — 258N000003 HC RX IP 258 OP 636: Performed by: EMERGENCY MEDICINE

## 2022-04-04 PROCEDURE — 250N000011 HC RX IP 250 OP 636: Performed by: EMERGENCY MEDICINE

## 2022-04-04 PROCEDURE — 96374 THER/PROPH/DIAG INJ IV PUSH: CPT

## 2022-04-04 PROCEDURE — 99284 EMERGENCY DEPT VISIT MOD MDM: CPT | Mod: 25

## 2022-04-04 RX ORDER — DIPHENHYDRAMINE HYDROCHLORIDE 50 MG/ML
50 INJECTION INTRAMUSCULAR; INTRAVENOUS ONCE
Status: COMPLETED | OUTPATIENT
Start: 2022-04-04 | End: 2022-04-04

## 2022-04-04 RX ORDER — METHYLPREDNISOLONE SODIUM SUCCINATE 125 MG/2ML
125 INJECTION, POWDER, LYOPHILIZED, FOR SOLUTION INTRAMUSCULAR; INTRAVENOUS ONCE
Status: COMPLETED | OUTPATIENT
Start: 2022-04-04 | End: 2022-04-04

## 2022-04-04 RX ORDER — DIPHENHYDRAMINE HCL 25 MG
50 TABLET ORAL EVERY 6 HOURS PRN
Qty: 60 TABLET | Refills: 0 | Status: SHIPPED | OUTPATIENT
Start: 2022-04-04

## 2022-04-04 RX ORDER — PREDNISONE 20 MG/1
40 TABLET ORAL DAILY
Qty: 10 TABLET | Refills: 0 | Status: SHIPPED | OUTPATIENT
Start: 2022-04-04 | End: 2022-04-09

## 2022-04-04 RX ADMIN — METHYLPREDNISOLONE SODIUM SUCCINATE 125 MG: 125 INJECTION, POWDER, FOR SOLUTION INTRAMUSCULAR; INTRAVENOUS at 20:46

## 2022-04-04 RX ADMIN — FAMOTIDINE 20 MG: 10 INJECTION, SOLUTION INTRAVENOUS at 20:47

## 2022-04-04 RX ADMIN — EPINEPHRINE 0.5 MG: 1 INJECTION INTRAMUSCULAR; INTRAVENOUS; SUBCUTANEOUS at 20:40

## 2022-04-04 RX ADMIN — DIPHENHYDRAMINE HYDROCHLORIDE 50 MG: 50 INJECTION INTRAMUSCULAR; INTRAVENOUS at 20:46

## 2022-04-04 RX ADMIN — SODIUM CHLORIDE 1000 ML: 9 INJECTION, SOLUTION INTRAVENOUS at 20:47

## 2022-04-04 ASSESSMENT — ENCOUNTER SYMPTOMS
SHORTNESS OF BREATH: 1
CHEST TIGHTNESS: 1

## 2022-04-04 NOTE — ED TRIAGE NOTES
Pt arrives to the ED due to generalized itching throughout body that began yesterday. States noticing hives on bilateral legs. Pt states some mild SOB. Denies any throat tightness or lip swelling. Denies any known allergies or exposure to new products. Last benadryl this AM.

## 2022-04-04 NOTE — TELEPHONE ENCOUNTER
For the past 24 hours,  Been internally itching.  Tonight it is so bad and she is starting to swell up.  Patient denies any breathing problems unless she is climbing stairs.  Patient has been taking benadryl, putting on cream and nothing is helping.    Patient is taking ritumiximab.  She stated she has had allergic reactions to this medication.  She is concerned it might have something to do with the medication.    Patient plans to go to [facility] ED for evaluation.  Patients friend will drive her.    Charlee Wolf RN   04/03/22 8:26 PM  [facility] Nurse Advisor    Reason for Disposition    Patient sounds very sick or weak to the triager    Additional Information    Negative: [1] Life-threatening reaction (anaphylaxis) in the past to similar substance (e.g., food, insect bite/sting, chemical, etc.) AND [2] < 2 hours since exposure    Negative: Difficulty breathing or wheezing    Negative: [1] Difficulty swallowing or slurred speech AND [2] sudden onset    Negative: Sounds like a life-threatening emergency to the triager    Negative: Could be severe allergic reaction    Negative: Insect bites suspected    Negative: Looks like hives (pink bumps with pale centers)    Negative: Yellowish color of the skin AND sclera (white of the eye)    Negative: Itching in just one area or spot    Negative: Widespread rash also present    Protocols used: ITCHING - WIDESPREAD-A-

## 2022-04-04 NOTE — PROGRESS NOTES
Patient arrives to clinic 2 hours early (7:30am) for her Rituxan infusion, with complaints of internal itchiness all over her body, swollen eyelids and lips.  Patient states she was in the ED last night with an allergic reaction of unknown etiology and symptoms have not fully resolved yet.  Patient waiting in the lobby.  Dr. Honeycutt paged at 8:00am.  Per Dr. Honeycutt, HOLD Rituxan today and resume next Monday as scheduled.  Patient advised to go back to the ED if symptoms worsen.  Pt verbalizes understanding and agrees with plan.     Orders also clarified with Dr. Honeycutt.  Patient's current Rituxan orders are for 2 half doses 2 days in a row.  Patient missed her second dose last week.  Per Dr. Honeycutt, ok for patient to only have 1 weekly half dose if that is her preference.

## 2022-04-04 NOTE — DISCHARGE INSTRUCTIONS
Discharge Instructions  Hives or Urticaria    Hives are a rash with raised, swollen, red, itchy spots on the skin. They may look like mosquito bites. Hives change in size, shape and location over time.  Hives can be caused by an infection (usually a virus) or an allergic reaction (to medicine, food, insect stings, or many other things). They can also come because of your own body s reaction to things like body temperature changes or pressure on the skin. Sometimes hives are caused by an inherited problem. Hives are not contagious.    Generally, every Emergency Department visit should have a follow-up clinic visit with either a primary or a specialty clinic/provider. Please follow-up as instructed by your emergency provider today.    Return to the Emergency Department if:  You have trouble breathing.  Your lips or tongue swell up, or you have swelling or tightness in the throat.  You have stomach pain or vomiting (throwing up).  You pass out.    What can I do to help myself?  Fill any prescriptions the provider gave you and take them right away.  Antihistamines (H1 blockers) are the primary treatment for hives. You may be given a prescription for an antihistamine, or your provider may tell you to use one available without a prescription, such as Benadryl  (diphenhydramine). These medicines relieve the itching and can help make the hives go away.  You may be given a prescription for a steroid. Used long-term, these can have side effects, but are in the short-term. You may notice restlessness or increased appetite. Be sure to take all of the medicine as prescribed.   Sometimes your provider will recommend an H2 antihistamine, such as Zantac  (ranitidine) or Pepcid  (famotidine). These are usually used for stomach problems, but also can help with hives.   Avoid scratching! This makes the hives get worse. You may want to put socks on your hands (or on your child s hands) when sleeping.  Avoid tight clothes, or clothes  that rub on your skin.  If the itching is too bad, try cool compresses or cool baths. Avoid hot baths and showers, because they can make hives worse.  If your hives were felt to be related to a serious allergic reaction, you may be given an epinephrine auto-injector (such as EpiPen ) to use at home. Use this if you have a serious allergic reaction and call an ambulance right away. This medicine is used to buy time to get to a hospital, but not to replace hospital care.   If you were given a prescription for medicine here today, be sure to read all of the information (including the package insert) that comes with your prescription.  This will include important information about the medicine, its side effects, and any warnings that you need to know about.  The pharmacist who fills the prescription can provide more information and answer questions you may have about the medicine.  If you have questions or concerns that the pharmacist cannot address, please call or return to the Emergency Department.   Remember that you can always come back to the Emergency Department if you are not able to see your regular provider in the amount of time listed above, if you get any new symptoms, or if there is anything that worries you.  Discharge Instructions  Hyperglycemia, High Blood Sugar    Today we found your blood sugar (glucose) was high. This may mean that you have developed diabetes, or if you already know that you have diabetes, it may mean that your diabetes is not as well controlled as it should be. Sometimes blood sugar can be high temporarily and it is not diabetes. Signs of elevated blood sugar include increased thirst, frequent urination (peeing), blurred vision, fatigue, unexplained weight loss, poor wound healing, and frequent infections.    We sometimes give medicine in the Emergency Department to lower the blood sugar. We may also prescribe medicine for you to use at home, or increase the medicine that you already  take. While we do not like to see your blood sugar high, it is much more dangerous to let your blood sugar get too low, so it is reasonable to take time to bring it down, or to wait and watch to see if it comes down on its own.    Generally, every Emergency Department visit should have a follow-up clinic visit with either a primary or a specialty clinic/provider. Please follow-up as instructed by your emergency provider today.     Return to the Emergency Department if you develop:  Vomiting (throwing up).  Confusion, disorientation, or being unable to wake up.  Severe weakness or illness.  Abdominal (belly) pain.    What can I do to help myself?  Check your blood sugar as instructed by your provider.  Take medications prescribed by your provider.  Follow a diabetic diet (low fat, low concentrated sweets, high fiber).  Exercise regularly.  Moderate or eliminate alcohol use.  Stop smoking.    Diabetes: Diabetes mellitus is a disease in which the body cannot regulate the amount of sugar (glucose) in the blood. Insulin allows glucose to move out of the blood into cells throughout the body where it is used for fuel. People with diabetes do not produce enough insulin (type 1 diabetes), or cannot use insulin properly (type 2 diabetes), or both. This starves the cells that need the glucose for fuel, and also harms certain organs and tissues exposed to the high glucose levels.  Over a long period of time, uncontrolled diabetes can lead to heart and blood vessel disease, blindness, kidney failure, foot ulcers and many other problems.          About 17 million Americans (6.2% of adults) have diabetes. We think that about one third of adults with diabetes do not know they have diabetes.  The incidence of diabetes is increasing rapidly. This increase is due to many factors, but the most significant are our increasing weight and decreased activity levels.     Diabetes can be a very serious and life-threatening illness if not  treated.  If you were given a prescription for medicine here today, be sure to read all of the information (including the package insert) that comes with your prescription.  This will include important information about the medicine, its side effects, and any warnings that you need to know about.  The pharmacist who fills the prescription can provide more information and answer questions you may have about the medicine.  If you have questions or concerns that the pharmacist cannot address, please call or return to the Emergency Department.   Remember that you can always come back to the Emergency Department if you are not able to see your regular provider in the amount of time listed above, if you get any new symptoms, or if there is anything that worries you.

## 2022-04-04 NOTE — ED PROVIDER NOTES
"  History   Chief Complaint:  Pruritis       HPI   Ivon Gamble is a 31 year old female with history of TTP who presents with rash and itchiness which started last night. The patient states that she was at a shift of which she saw a resident with cats who she is somewhat allergic too and a resident with mold in the room. After that she developed itchiness \"from head to toe\". She also has some mild SOB but feels anxious. She received rituximab for her TTP six days ago, and vomited after this, but denies any other vomiting. She has not taken any new medications or food recently. She denies a fever. No dizziness. Did previously have anaphylactic reaction back in February immediately following rituximab but this occurred within the first hour or so and because of this she has been followed closely receiving only half doses of this under supervision.    Review of Systems   Constitutional: Negative for fever.   Respiratory: Positive for shortness of breath.    Skin:        Pruritis   All other systems reviewed and are negative.      Allergies:  Rituximab  Cats    Medications:  acetaminophen   albuterol  amoxicillin-clavulanate  insulin NPH   ondansetron  pantoprazole   sennosides     Past Medical History:     TTP  Hyperbilirubinemia  Anemia  Hematuria  Acute idiopathic thrombocytopenic purpura  Facial cellulitis  Sepsis    Past Surgical History:    IR CVC tunnel placement  IR CVC tunnel removal       Family History:    Mother-diabetes  Father-Liver disease    Social History:  The patient presents to the ED with her .    Physical Exam     Patient Vitals for the past 24 hrs:   BP Temp Temp src Pulse Resp SpO2 Weight   04/03/22 2156 124/86 -- -- 108 14 100 % --   04/03/22 2050 -- -- -- -- 22 -- --   04/03/22 2049 116/71 97.8  F (36.6  C) Temporal (!) 138 -- 99 % 119.5 kg (263 lb 7.2 oz)       Physical Exam  General: Awake, alert, pleasant, non-toxic.  Mildly anxious appearing.  Itching frantically.  Head:  Scalp is " NC/AT  Eyes:  Conjunctiva normal, PERRL  ENT:  The external nose and ears are normal.     Oropharynx clear.  No swelling of the lips tongue uvula or posterior oropharynx.  Neck:  Normal range of motion without rigidity.  CV:  Tachycardic but regular.    No pathologic murmur, rubs, or gallops.  Resp:  Breath sounds are clear bilaterally    Non-labored, no retractions or accessory muscle use  Abdomen: Abdomen is soft, no distension, no tenderness  MS:  No lower extremity edema or asymmetric calf swelling. Normal ROM in all joints without effusions.  Skin:  Urticarial lesions to the back and legs.  No petechial or purpuric lesions.  No blistering vesicles or other rashes.  Neuro: Alert and oriented x3.  GCS 15 No gross motor deficits.  No facial asymmetry.  Psych: Awake. Alert. Normal affect. Appropriate interactions.    Emergency Department Course     Laboratory:  Labs Ordered and Resulted from Time of ED Arrival to Time of ED Departure   COMPREHENSIVE METABOLIC PANEL - Abnormal       Result Value    Sodium 135      Potassium 4.0      Chloride 104      Carbon Dioxide (CO2) 23      Anion Gap 8      Urea Nitrogen 20      Creatinine 0.96      Calcium 9.0      Glucose 213 (*)     Alkaline Phosphatase 74      AST 20      ALT 25      Protein Total 7.1      Albumin 3.7      Bilirubin Total 0.4      GFR Estimate 81     CBC WITH PLATELETS AND DIFFERENTIAL - Abnormal    WBC Count 14.0 (*)     RBC Count 5.38 (*)     Hemoglobin 17.7 (*)     Hematocrit 53.0 (*)     MCV 99      MCH 32.9      MCHC 33.4      RDW 11.8      Platelet Count 260      % Neutrophils 79      % Lymphocytes 17      % Monocytes 4      % Eosinophils 0      % Basophils 0      % Immature Granulocytes 0      NRBCs per 100 WBC 0      Absolute Neutrophils 10.9 (*)     Absolute Lymphocytes 2.4      Absolute Monocytes 0.6      Absolute Eosinophils 0.0      Absolute Basophils 0.0      Absolute Immature Granulocytes 0.1      Absolute NRBCs 0.0        Emergency Department  Course:  Reviewed:  I reviewed nursing notes, vitals, past medical history and Care Everywhere    Assessments:   I obtained history and examined the patient as noted above.    I rechecked the patient and explained findings.    I rechecked the patient and explained findings.  She feels much better.  Hives resolved.    Interventions:   NS 1000mL IV   Benadryl 50mg IV   Pepcid 20mg IV   Decadron 1mL IV    Disposition:  The patient was discharged to home.     Impression & Plan     Medical Decision Makin-year-old female with history of TTP presents for itching and rash.  This is consistent with hives.  There is no evidence of anaphylaxis or indication for epinephrine at this time.  Does have a prior history of anaphylaxis to rituximab however last infusion was 6 days ago and given the amount of time that is progressed not consistent with this.  Given past history blood work was obtained which showed normal platelets white blood cell count and hemoglobin mildly elevated however no fever or signs of serious bacterial infection.  Her kidney and liver function are normal.  No other concerning rashes.    Patient felt much better and had nearly complete relief of symptoms following fluids and symptomatic treatment here.  She was initially fairly tachycardic however was anxious appearing and itching frantically and much improved on recheck after symptom control.  Her blood work does demonstrate mild hyperglycemia however on further review of her records it seems that this has been an issue in the past for her and she is being followed for it.  For this reason I will not prescribe additional steroids for home but will have her use Zyrtec and Pepcid to help with itching.  She will call tomorrow to discuss with her doctor whether or not to go ahead with the rituximab injection tomorrow and will also follow-up with PCP regarding her symptoms.  She will return for new or worsening symptoms shortness  of breath lip or throat swelling dizziness vomiting or other concerns.    Diagnosis:    ICD-10-CM    1. Hives  L50.9    2. Hyperglycemia  R73.9      Scribe Disclosure:  I, Leonardo Tenorio () and Antonio Hilton (trainee), am serving as a scribe at 8:55 PM on 4/3/2022 to document services personally performed by Oh Urrutia PA-C based on my observations and the provider's statements to me.          Oh Urrutia PA-C  04/03/22 [id number]

## 2022-04-04 NOTE — TELEPHONE ENCOUNTER
Last night seen in ER. Treated for allergic reaction. Sent home after IV benadryl and Steroids. Left eye is almost completely swollen shut. Lips are swollen. Has benadryl at home but hasn't taken any because it didn't help before.. Still itching like crazy. Has chemo this morning. Said they give her benadryl and steroids then.  Should she be seen in ER again? She denies difficulty breathing, tongue swelling or hoarse cough. She will go in if it worsens. She said she will wait for her 9 a.m. appointment and call back if it worsens. Declined clinic appointment. I told her that if she waits for the 9 a.m. appointment, they may not give her the chemo and may not treat her with steroids and benadryl based on how she is doing, they may send her elsewhere.  Meena Castano RN  [facility]      Reason for Disposition    Swelling is painful to touch    Additional Information    Negative: [1] Life-threatening reaction (anaphylaxis) in the past to similar substance (e.g., food, insect bite/sting, chemical, etc.) AND [2] < 2 hours since exposure    Negative: Unresponsive, passed out or very weak    Negative: Swollen tongue    Negative: Difficulty breathing or wheezing    Negative: Sounds like a life-threatening emergency to the triager    Negative: Followed a face injury    Negative: [1] Bee sting AND [2] within last 24 hours    Negative: Insect bite suspected    Negative: Swelling mainly of lip(s)    Negative: Swelling mainly around the eyes    Negative: [1] SEVERE swelling of entire face AND [2] < 2 hours since exposure to high-risk allergen (e.g., peanuts, tree nuts, fish, shellfish or 1st dose of drug) AND [3] no serious symptoms AND [4] no serious allergic reaction in the past    Negative: Fever    Negative: Taking an ACE Inhibitor medication  (e.g., benazepril/LOTENSIN, captopril/CAPOTEN, enalapril/VASOTEC, lisinopril/ZESTRIL)    Negative: Patient sounds very sick or weak to the triager    Negative: Pregnant  "> 20 weeks    Negative: Postpartum (i.e. < 1 month since delivery)    Negative: SEVERE swelling of the entire face    Negative: [1] Swelling is red AND [2] very painful to touch    Negative: Swelling began after taking a drug    Negative: [1] Looks infected AND [2] large red area (> 2 in. or 5 cm)    Negative: [1] Painful rash AND [2] multiple small blisters grouped together (i.e., dermatomal distribution or \"band\" or \"stripe\")    Negative: Toothache    Negative: Swelling of both lower legs (i.e., bilateral pedal edema)    Negative: Widespread rash on body    Protocols used: FACE SWELLING-A-AH      "

## 2022-04-05 LAB
ATRIAL RATE - MUSE: 119 BPM
DIASTOLIC BLOOD PRESSURE - MUSE: NORMAL MMHG
INTERPRETATION ECG - MUSE: NORMAL
P AXIS - MUSE: 38 DEGREES
PR INTERVAL - MUSE: 138 MS
QRS DURATION - MUSE: 72 MS
QT - MUSE: 322 MS
QTC - MUSE: 452 MS
R AXIS - MUSE: -50 DEGREES
SYSTOLIC BLOOD PRESSURE - MUSE: NORMAL MMHG
T AXIS - MUSE: 20 DEGREES
VENTRICULAR RATE- MUSE: 119 BPM

## 2022-04-05 NOTE — ED PROVIDER NOTES
"  History   Chief Complaint:  Rash and Allergic Reaction       HPI   Ivon Gamble is a 31 year old female with a history of thrombotic thrombocytopenic purpura, 1 week s/p rituxmiab infusion, who presents with an allergic reaction. The patient was seen in this emergency department yesterday due to rash and itchiness, at which time she was given benadryl, Pepcid, and decadron and discharged without any home medications. Since then she states that she felt somewhat better after discharge yesterday. However, this morning she woke with swollen eyes and lips. Patient states that she took cetirizine at 0930 this morning, as advised while in the emergency department, and felt some transient relief for a short while. Currently, she reports chest tightness, some chest pain with associated shortness of breath, and states that she is \"itchy from head to toe\" with some patchy visible rashes.     Review of Systems   Respiratory: Positive for chest tightness and shortness of breath.    Cardiovascular: Positive for chest pain.   Skin: Positive for rash.   All other systems reviewed and are negative.      Allergies:  Rituximab     Medications:  Acetaminophen   Albuterol  Amoxicillin-clavulanate  Insulin NPH   Ondansetron  Pantoprazole   Sennosides      Past Medical History:     TTP  Hyperbilirubinemia  Anemia  Hematuria  Acute idiopathic thrombocytopenic purpura  Facial cellulitis  Sepsis     Past Surgical History:    IR CVC tunnel placement  IR CVC tunnel removal     Family History:    Mother-diabetes  Father-Liver disease    Social History:  The patient presents to the emergency department with a friend.  PCP: Ashley Nguyen     Physical Exam     Patient Vitals for the past 24 hrs:   BP Temp Temp src Pulse Resp SpO2 Height Weight   04/04/22 2023 132/83 98.7  F (37.1  C) Temporal 116 24 98 % 1.626 m (5' 4\") 119.7 kg (264 lb)       Physical Exam  General/Appearance: appears stated age, well-groomed, appears comfortable  Eyes: " EOMI, no scleral injection, no icterus  ENT: MMM  Neck: supple, nl ROM, no stiffness  Cardiovascular: tachy but regular, nl S1S2, no m/r/g, 2+ pulses in all 4 extremities, cap refill <2sec  Respiratory: CTAB, good air movement throughout, no wheezes/rhonchi/rales, no increased WOB, no retractions  Back: no lesions  GI: abd soft, non-distended, nttp,  no HSM, no rebound, no guarding, nl BS  MSK: BALDERAS, good tone, no bony abnormality  Skin: warm and well-perfused, erythematous urticaria to upper torso, no edema, no ecchymosis, nl turgor  Neuro: GCS 15, alert and oriented, no gross focal neuro deficits  Psych: interacts appropriately  Heme: no petechia, no purpura, no active bleeding    Emergency Department Course   ECG  ECG taken at 2036, ECG read at 2044  Sinus tachycardia. Left anterior fascicular block. Abnormal ECG.  Rate 119 bpm. SD interval 138 ms. QRS duration 72 ms. QT/QTc 322/452 ms. P-R-T axes 38 -50 20.     Emergency Department Course:  Reviewed:  I reviewed the patient's nursing notes, vitals, past medical records, and Care Everywhere.     Assessments:  2032 I performed an exam of the patient and obtained history, as documented above.     2054 I rechecked the patient, who feels that her breathing has improved.     2145 I rechecked the patient, who is feeling generally improved following interventions.     2235 I rechecked the patient. She is comfortable with discharge to home at this time. Return precautions discussed prior to discharge.     Interventions:  2040 Epinephrine 0.5 mg IM    2046 Solu-Medrol 125 mg IV   Diphenhydramine 50 mg IV    2047 NS 1 L IV Bolus   Pepcid 20 mg IV      Disposition:  The patient was discharged to home.     Impression & Plan     Medical Decision Making:  This patient is a pleasant 31-year-old female with history of TTP 1 week status post rituximab infusion who presents with likely allergic reaction.  She was seen yesterday with rash, urticaria, itchiness and was given Pepcid,  Benadryl, Decadron.  She states she never fully felt well and symptoms progressed today including eyes swollen shut with swollen lips.  She also tonight reported some chest tightness with a little bit of shortness of breath.  Here she had diffuse urticaria to her upper trunk and face.  She had good breath sounds diffusely and did not have audible wheeze.  Still with her sense of chest tightness at that we should escalating of IM epinephrine in addition to Solu-Medrol, Benadryl, famotidine.  With this medication combination she feels back to baseline.  She no longer has any of her symptoms.  I think it safe that we watched her now for 2 hours for her to be discharged but I am going to send her home with a prescription for Benadryl and prednisone to be taken for the next several days.    Diagnosis:  No diagnosis found.    Discharge Medications:  New Prescriptions    No medications on file       Scribe Disclosure:  I, Ania Santos, am serving as a scribe at 8:32 PM on 4/4/2022 to document services personally performed by Freya Kaba MD based on my observations and the provider's statements to me.      Freya Kaba MD  04/04/22 1549

## 2022-04-05 NOTE — ED TRIAGE NOTES
Pt. Presents to ED with complaints of worsening itchy rash across her body and some SOB from allergic reaction from rituximab that she was given 1 week ago for TTP. Pt. Reports she was seen here yesterday for the same thing and was given benadryl and steroids with minimal improvement. Pt. Tachycardic in 115-120s, OVSS on RA. Pt. Reports she was scheduled to have treatment today but was cancelled because of the reaction. Took zyrtec. Pt. Reports chest tightness. No swelling noted to lips, tongue or face.

## 2022-04-21 ENCOUNTER — VIRTUAL VISIT (OUTPATIENT)
Dept: HEMATOLOGY | Facility: CLINIC | Age: 32
End: 2022-04-21
Attending: INTERNAL MEDICINE
Payer: COMMERCIAL

## 2022-04-21 VITALS — WEIGHT: 265 LBS | BODY MASS INDEX: 45.49 KG/M2

## 2022-04-21 DIAGNOSIS — D69.3 ACUTE IDIOPATHIC THROMBOCYTOPENIC PURPURA (H): Primary | ICD-10-CM

## 2022-04-21 DIAGNOSIS — E66.01 MORBID OBESITY (H): ICD-10-CM

## 2022-04-21 PROCEDURE — 99214 OFFICE O/P EST MOD 30 MIN: CPT | Mod: 95 | Performed by: INTERNAL MEDICINE

## 2022-04-21 NOTE — PROGRESS NOTES
Spoke with pt. Verified medications and allergies. Thanked pt for her time to do this.    Eunice Israel, [facility]    [facility]  [phone]

## 2022-04-21 NOTE — PROGRESS NOTES
Due to the ongoing COVID-19 outbreak, this visit was conducted by video, with the patient's approval.          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 Unfortunately she only received a partial dose of rituximab due to allergic reactions but hopefully will be sufficient to keep her in a durable remission.    Recommendations:  1. Check CBC and [id number] each rituximab visit.  2. Visits every 3 months for the first year for monitoring, next in July  3. She has our contact information with any concerns about new symptoms  4. She is interested in our TTP registry so will send her a consent form in the mail.    20 minutes spent on the date of the encounter doing chart review, review of test results, interpretation of tests, patient visit, documentation and discussion with other provider(s)     Don Honeycutt MD   of Medicine, Division of Hematology, Oncology and Transplantation  [employer]     -------------------------------  Forward History:    - Presented 2022 with abdominal pain and bruising.  Her CBC revealed a platelet count of 11, undetectable haptoglobin and schistocytes on the blood smear.  She was given the provisional diagnosis of TTP, and started on urgent treatment with plasma exchange.  [id number] activity was less than 5%.  Inhibitor and antibody levels from the day of admission are not available.      She responded briskly to present exchange, with a rise in platelet count over the course of about 5 days.  Platelet count on 2022 was 208 and so positive change was stopped.  At this point she was  having complaints of ongoing tooth pain.      Exacerbation: January 27, 2022 unfortunately then found of a platelet count of 82.  She then went on to have outpatient apheresis performed with some slow improvement in the platelet count from  over the course of about 2 weeks.  Then, further complicating her course, she had an acute infusion reaction to rituximab on February 9, 2022.     Unfortunately despite 3 weeks of intermittent a pheresis, her platelets never returned to normal. In addition, her acute pain continue to worsen as she had never had her extraction.  She was [facility] to [facility] on 2/[facility]/22, where her platelet count had fallen to 93.  She was started on IV antibiotics.  She was seen by dentistry and recommended tooth extraction which was performed as an outpatient on Monday, February 21, 2022.      Rituximab dose #1 3/28/22 (50%) but had persistent and severe allergic reaction so not further doses given    [id number] activity:  1/12/22 (diagnosis) < 5%, no inhibitor or antibody tested  1/28/22 (exacerbation, likely from dental infection) - 7%, no inhibitor, antibody >90  2/2/22 - 71% (on TPE)  2/9/22 - 61% (on TPE)  2/28/22: 148%    Visit history  Ivon Gamble is a 31 year old woman with newly diagnosed immune mediated TTP. She notes no problems with headaches or memory.  She recently had her first dose of rituximab and reports that she felt dizzy when she got home and then when she woke up in the middle of the night was nauseated for several hours.  Today, she has been feeling well.  She did not receive the other 50% dose so she would like to discuss a plan for divided dosing rituximab today.  She is completely off her prednisone taper currently.    Physical Exam:  Exam:  Body mass index is 45.49 kg/m .   Constitutional: Appears well, no distress  Eyes: no discharge, injection or icterus  Respiratory: no cough or labored breathing  Skin: no rashes or petechiae  Neurological: no deficits appreciated,  speech is fluent  Psych: affect is normal    Labs:  PLT from 4/3/22 were normal    Imaging:  none    Video-Visit Details:    Type of service:  Video Visit  Video Start Time: 1138 AM  Video End Time (time video stopped): 1150 AM    Originating Location (pt. Location): Home    Distant Location (provider location):  [facility]     Mode of Communication:  Video Conference via [facility]

## 2022-06-19 ENCOUNTER — HEALTH MAINTENANCE LETTER (OUTPATIENT)
Age: 32
End: 2022-06-19

## 2022-11-07 NOTE — PLAN OF CARE
DATE & TIME: 1/14/2022 [id number]    Cognitive Concerns/ Orientation : A&O x4. Neuro intact.   BEHAVIOR & AGGRESSION TOOL COLOR: green   CIWA SCORE: na    ABNL VS/O2: vss, on RA.  MOBILITY: IND  PAIN MANAGMENT: HA and abd pain, PRN tylenol not effective, IV dilaudid once. Pt stated abd was not as bloated this afternoon. Monitor.   DIET: upgraded to low fat, tolerating well.   BOWEL/BLADDER: abd distended this am, improved in the afternoon. No loose stools reported. +BS.   ABNL LAB/BG: Plt 39 (previous 11). Phos 2.4--had not been eating, now advanced to low fat, will recheck in am, and replace if needed per nephrology.   DRAIN/DEVICES: PIV on right arm, SL. Non tunneled CVC on right upper chest.   TELEMETRY RHYTHM: NSR with Pacs.   SKIN: pale, petechiae on arms and legs.   TESTS/PROCEDURES: Plasmapheresis daily, tolerating well.   D/C DATE: pending.   Discharge Barriers: financial burden-no insurance and dose not qualify for medical assistance.  to follow up over the weekend. Pt had already spoke with financial counselor. Pt stated needing help with [employer] Application. Per Dr. Austin, [facility] may be able to provide free care.   OTHER IMPORTANT INFO:   MD/RN ROUNDING SIGNED OFF D/E SHIFT:   COMMIT TO SIT DONE AND SIGNED OFF   IS THE PATIENT ON REMDESIVIR? DATE OF LAST SCHEDULED DOSE      Discharge Summary   Patient ID:   Tarsha Cho   [id number]   72 year old   1950   Admit date: 11/2/2022   Discharge date: 11/7/2022   Admitting Physician: Kory Rodríguez MD   Discharge Physician: Kory Rodríguez MD     Primary Diagnoses:   Principal Problem:    Generalized weakness  Active Problems:    Malignant neoplasm of lower-inner quadrant of breast in female, estrogen receptor positive (CMS/HCC)    Essential hypertension, benign    Mild intermittent asthma without complication    Fibromyalgia    Intracranial aneurysm    Hyperlipidemia, mixed    Generalized anxiety disorder    Type 2 diabetes mellitus with morbid obesity (CMS/HCC)    Hypomagnesemia    Rib pain on right side    Pancytopenia (CMS/HCC)    Failure to thrive in adult  Resolved Problems:    * No resolved hospital problems. 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 Tuttle.   • Fibromyalgia    • History of colon polyps    • Immune thrombocytopenic purpura (CMS/HCC)    • Intracranial aneurysm     s/p coiling   • Obesity    • Patellar tendinitis    • PONV (postoperative nausea and vomiting)    • Steroid-induced psychosis    • Type II or unspecified type diabetes mellitus without mention of complication, not stated as uncontrolled    • Vitamin D insufficiency         Hospital Course By Problem List (see H&P for details of admission):   Tarsha Cho is a 72 year old female who presents to the ED with complaints of progressive weakness for the past several weeks.  Patient is undergoing chemotherapy for breast cancer, and just overall had not been doing well at home and was subsequently admitted with failure to thrive.  Her course is outlined below.    Generalized weakness, multifactorial, adult failure to thrive:  Suspect main  culprit is her ongoing chemotherapy  Was given supportive measures here  PT/OT were consulted  Initially recommended placement, but with time patient improved  [place] safe to discharge home with home care  This was arranged on discharge  Meet with oncology, here  Will hold off on further treatments until she has recovered from all of this  Will follow up as outpatient     Hypomagnesemia   Mild, Supplemented, now resolved     Malignant neoplasm of lower-in the quadrant of breast, estrogen receptor positive.    Currently undergoing chemotherapy with Taxol/Herceptin.  Oncology aware of admission did see here, plan as above     Pancytopenia, chemotherapy-induced   Mild, stable.     Diabetes mellitus type 2 without long-term use of insulin with hyperglycemia here  Her glypizide was held to prevent any hypoglycemia  She was placed on lantus here and blood sugars did improve  I suspect she can resume her oral agent but may need additional treatment if blood sugars remain elevated  Will defer this to outpatient     Essential hypertension   BP improved with home meds     Generalized anxiety disorder/fibromyalgia   Chronic and stable.  Continue PTA medication regimen     Mild intermittent asthma, chronic, stable   Continue PTA medication regimen.       Consults      Procedures performed XR RIBS RIGHT W PA CHEST    Result Date: 11/2/2022  EXAM: XR RIBS RIGHT W PA CHEST CLINICAL INDICATION: right rib tenderness, no trauma, breast cancer hx on chemo COMPARISON: 7/8/2022 FINDINGS: There is no acute infiltrate or suspicious mass or edema. The heart and mediastinum and left jugular catheter are stable. There is no acute bony abnormality. There are old right rib fractures without acute bony abnormality. There is lower cervical fusion.     IMPRESSION: No acute pulmonary findings. Old right rib fractures without acute bony abnormality.    CT Chest PE Imaging    Result Date: 11/2/2022  EXAM: CT ANGIOGRAM CHEST PE IMAGING- 3D CLINICAL  INDICATION: PE suspected, high prob COMPARISON: 1/19/2017 TECHNIQUE: Multiple transaxial images of the chest were obtained after intravenous administration of 75 mL Omnipaque 350 mg/mL contrast material. 3D MIP reconstructed images were obtained. FINDINGS: There are no abnormal pulmonary artery filling defects to indicate the presence of pulmonary embolism. Lung parenchyma:  There are stable scattered minute nodularities. There is minor lung base atelectasis and/or scar. Thoracic aorta:  There is mild calcification. Heart and pericardium:  There is a left jugular catheter. Hilum and mediastinum:  Normal. Axilla and supraclavicular regions:  There is no lymphadenopathy. Upper abdomen:  There is moderate diffuse splenomegaly. There is chronic celiac lymphadenopathy. Osseous structures:  There is no acute bony abnormality. Subcutaneous tissues:  There are findings from right breast lumpectomy.     IMPRESSION: No evidence of pulmonary embolism. No acute pulmonary parenchymal finding. Diffuse splenomegaly, celiac region lymphadenopathy.    CT HEAD WO CONTRAST    Result Date: 11/2/2022  EXAM: CT HEAD WO CONTRAST CLINICAL INDICATION: progressive word finding difficulty / expressive aphasia over 6 + weeks, on chemo for breast cancer COMPARISON: None. TECHNIQUE: Multiple transaxial images of the head were obtained without intravenous contrast material. FINDINGS: Ventricles and extra-axial spaces: There is mild prominence indicating atrophy. Hemorrhage: None. Cerebral parenchyma: No evidence of acute cortical infarct. There are mild periventricular and subcortical white matter hypodensities thought to indicate white matter small vessel disease. Midline shift: None. No mass. Cerebellum: Normal. Brainstem: Normal. Vascular system: There is a cerebral artery aneurysm coil. Skull and facial bones: Normal.     IMPRESSION: Atrophy and white matter small vessel disease without acute intracranial finding.        Discharge Exam    Blood pressure 133/63, pulse 67, temperature 96.9 °F (36.1 °C), temperature source Temporal, resp. rate 18, height 5' 2\" (1.575 m), weight 91.7 kg (202 lb 2.6 oz), SpO2 97 %.   General: A&O x3, in NAD Resting in bed   Lungs: Clear to auscultation   Heart: Normal S1, S2     Activity: No new restrictions     Diet: As tolerated     Code Status: Full Resuscitation     Pending issues to be followed up by PCP   May need to initiate lantus or further titrate diabetic meds  Routine follow up    Discharge Medication List:       What to Do with Your Medications      CONTINUE taking these medications which have NOT CHANGED      Details   Accu-Chek FastClix Lancets Misc      1 each by Other route 2 times daily (before meals). Dx Code 250.02  Authorizing Provider: Mega Barbosa DO     albuterol 108 (90 Base) MCG/ACT inhaler      Inhale 2 puffs into the lungs every 4 hours as needed for Shortness of Breath or Wheezing.  Authorizing Provider: Sergio Cosby MD     Blood Glucose Meter Kit      Dispense any meter covered by patient's insurance plan  Authorizing Provider: Sergio Cosby MD     * blood glucose test strip      Dispense blood glucose strips that work with the glucometer covered by patient's insurance for testing 1x daily  Authorizing Provider: Sergio Cosby MD     * Accu-Chek SmartView test strip      Generic drug: blood glucose  Test blood sugar up to at least 3 times a day before meals. Dx E11.9.  Authorizing Provider: Sergio Cosby MD     escitalopram 10 MG tablet  Commonly known as: LEXAPRO      Take 1 tablet by mouth daily.  Authorizing Provider: Sergio Cosby MD     glipiZIDE 5 MG 24 hr tablet  Commonly known as: GLUCOTROL XL      Take 4 tablets by mouth every morning.  Authorizing Provider: Sergio Cosby MD  Comment: 6/23/21: Note dose increase.   Place on hold.     GLUCOSE PO      Take by mouth daily. Glucose Metabolic Support     Lancets Misc.  Kit      Dispense any lancets that work with the glucometer covered by patient's insurance plan for testing 1x daily  Authorizing Provider: Sergio Cosby MD     lisinopril 20 MG tablet  Commonly known as: ZESTRIL      Take 1 tablet by mouth daily.  Authorizing Provider: Sergio Cosby MD     lisinopril-hydroCHLOROthiazide 20-25 MG per tablet  Commonly known as: ZESTORETIC      Take 1 tablet by mouth daily.  Authorizing Provider: Sergio Cosby MD     prochlorperazine 10 MG tablet  Commonly known as: COMPAZINE      Take 1 tablet by mouth every 6 hours as needed for Nausea or Vomiting.  Authorizing Provider: Ruddy Esquivel MD  Comment: Day Supply = 30     triamcinolone 0.1 % cream  Commonly known as: ARISTOCORT      Apply to rash twice daily as needed (eczema on hand)  Authorizing Provider: Sergio Cosby MD     vitamin D3 125 mcg (5,000 units) capsule      Taking 2 caps daily                      * This list has 2 medication(s) that are the same as other medications prescribed for you. Read the directions carefully, and ask your doctor or other care provider to review them with you.                Disposition: patient is being discharged to home with home care     Follow-up with:   No follow-up provider specified.     Time spent on discharge was greater than 30 minutes   Signed:   Kory Rodríguez MD   2022   9:58 AM     HOME HEALTH FACE TO FACE DOCUMENTATION AND CERTIFICATION  CMS Rules: Certifying Patients for the Medicare Home Health Benefit    Home Health Eligibility Summary    Name: Tarsha Cho  : 1950  MRN: [id number]    Service to Home Care order content:  SERVICE TO HOME CARE   Ordered at: 22 0957     Additional home health services needed:    Occupational therapy      Nursing needs:    Comprehensive nursing assessment/medication management      Physical therapy needs:    Eval and Treat      Select home health services needed:    Nursing     Physical  therapy      Home care service needed:    Home Health      Provider to follow patients home care:    HECTOR REYNA      Face to Face encounter completed on:    11/7/2022      Occupational therapy needs:    Eval and Treat      Reason for home bound status (please select all that apply):    Instability with dyspnea and fatigue with or without exertion      Certification of Face to Face:    I certify this patient had a Face to Face encounter performed on the date specified above by a physician or Medicare allowed non-physician practitioner, related to the primary reason the patient requires home health services.      Certification of Home Bound Status:    I certify, based on my clinical findings,  this patient is confined to his/her home due to the homebound reason(s) identified. Leaving the home is a significant and challenging effort for the patient.      Provider Authorization:    I have authorized the services on this initial plan of care which will be further developed by the Provider to follow who will oversee the home health services.      Has a face to face encounter been completed?    Yes      Patient Preference:    [place] [facility]        I certify this patient is under my care and  I, or a nurse practitioner, clinical nurse specialist or physician assistant working with me, had a face-to-face encounter with this patient on the date listed.    Medical Condition Related to Home Health Services  The encounter with the patient was in whole, or in part, for the following medical condition(s), which are the reasons for home health care:   1. Pancytopenia (CMS/HCC)    2. Malignant neoplasm of right breast in female, estrogen receptor positive, unspecified site of breast (CMS/HCC)    3. Type 2 diabetes mellitus without complication, without long-term current use of insulin (CMS/HCC)    4. Hypomagnesemia    5. Failure to thrive in adult    6. Malignant neoplasm of lower-inner quadrant of left breast in  female, estrogen receptor positive (CMS/[facility])    7. Type 2 diabetes mellitus with morbid obesity (CMS/[facility])           Certification of Medical Necessity(Certification is required for select Home Health beneficiaries only)  I certify based on my clinical findings, the services ordered in the referral are medically necessary home health services.    Homebound Status and Living Situation  I certify my clinical findings support this patient is homebound due to the homebound reason(s) listed in the Service to Home Care order content link above.  If reason for homebound status is not specified, this document does not certify the patient as homebound.    I have authorized these skilled home health services and the \"Physician to follow\" listed above will accept and assume care for this patient and sign the Home Health Plan of Care.    Date of completion of form: 11/7/2022

## 2022-11-19 ENCOUNTER — HEALTH MAINTENANCE LETTER (OUTPATIENT)
Age: 32
End: 2022-11-19

## 2022-12-28 ENCOUNTER — HOSPITAL ENCOUNTER (EMERGENCY)
Facility: CLINIC | Age: 32
Discharge: LEFT BEFORE DISCHARGE | End: 2022-12-28
Attending: EMERGENCY MEDICINE | Admitting: EMERGENCY MEDICINE
Payer: COMMERCIAL

## 2022-12-28 VITALS
TEMPERATURE: 98.1 F | WEIGHT: 270 LBS | DIASTOLIC BLOOD PRESSURE: 93 MMHG | BODY MASS INDEX: 46.1 KG/M2 | HEIGHT: 64 IN | SYSTOLIC BLOOD PRESSURE: 123 MMHG | OXYGEN SATURATION: 96 % | HEART RATE: 105 BPM | RESPIRATION RATE: 18 BRPM

## 2022-12-28 DIAGNOSIS — Z53.21 PATIENT LEFT WITHOUT BEING SEEN: ICD-10-CM

## 2022-12-28 DIAGNOSIS — J10.1 INFLUENZA A: ICD-10-CM

## 2022-12-28 LAB
DEPRECATED S PYO AG THROAT QL EIA: NEGATIVE
FLUAV RNA SPEC QL NAA+PROBE: POSITIVE
FLUBV RNA RESP QL NAA+PROBE: NEGATIVE
GROUP A STREP BY PCR: NOT DETECTED
RSV RNA SPEC NAA+PROBE: NEGATIVE
SARS-COV-2 RNA RESP QL NAA+PROBE: NEGATIVE

## 2022-12-28 PROCEDURE — 87637 SARSCOV2&INF A&B&RSV AMP PRB: CPT | Performed by: EMERGENCY MEDICINE

## 2022-12-28 PROCEDURE — 999N000104 HC STATISTIC NO CHARGE

## 2022-12-28 PROCEDURE — 87651 STREP A DNA AMP PROBE: CPT | Performed by: EMERGENCY MEDICINE

## 2022-12-28 PROCEDURE — C9803 HOPD COVID-19 SPEC COLLECT: HCPCS

## 2022-12-29 NOTE — ED PROVIDER NOTES
I signed up for this patient but did not see her as she had left without being seen after triage.     Dasha Armendariz MD  12/28/22 3570

## 2022-12-29 NOTE — ED TRIAGE NOTES
Here for n/v/d, coughing, fever, body aches, headache, sore throat, and chills ongoing for 3 days and getting worse. Stated had a negative covid test about 2 days ago. ABCs intact.      Triage Assessment     Row Name 12/28/22 2046       Triage Assessment (Adult)    Airway WDL WDL       Respiratory WDL    Respiratory WDL WDL       Cardiac WDL    Cardiac WDL WDL

## 2023-04-09 ENCOUNTER — HEALTH MAINTENANCE LETTER (OUTPATIENT)
Age: 33
End: 2023-04-09

## 2023-04-30 ENCOUNTER — HOSPITAL ENCOUNTER (EMERGENCY)
Facility: CLINIC | Age: 33
Discharge: HOME | End: 2023-04-30
Attending: PHYSICIAN ASSISTANT | Admitting: PHYSICIAN ASSISTANT
Payer: COMMERCIAL

## 2023-04-30 VITALS
DIASTOLIC BLOOD PRESSURE: 80 MMHG | SYSTOLIC BLOOD PRESSURE: 130 MMHG | TEMPERATURE: 96.6 F | OXYGEN SATURATION: 98 % | WEIGHT: 272 LBS | RESPIRATION RATE: 18 BRPM | HEIGHT: 60 IN | BODY MASS INDEX: 53.4 KG/M2 | HEART RATE: 83 BPM

## 2023-04-30 DIAGNOSIS — K08.89 PAIN, DENTAL: ICD-10-CM

## 2023-04-30 LAB
BASOPHILS # BLD AUTO: 0.1 10E3/UL (ref 0–0.2)
BASOPHILS NFR BLD AUTO: 1 %
EOSINOPHIL # BLD AUTO: 0.1 10E3/UL (ref 0–0.7)
EOSINOPHIL NFR BLD AUTO: 2 %
ERYTHROCYTE [DISTWIDTH] IN BLOOD BY AUTOMATED COUNT: 12.2 % (ref 10–15)
HCT VFR BLD AUTO: 42.2 % (ref 35–47)
HGB BLD-MCNC: 14.4 G/DL (ref 11.7–15.7)
HOLD SPECIMEN: NORMAL
IMM GRANULOCYTES # BLD: 0 10E3/UL
IMM GRANULOCYTES NFR BLD: 0 %
LYMPHOCYTES # BLD AUTO: 2.4 10E3/UL (ref 0.8–5.3)
LYMPHOCYTES NFR BLD AUTO: 34 %
MCH RBC QN AUTO: 31.2 PG (ref 26.5–33)
MCHC RBC AUTO-ENTMCNC: 34.1 G/DL (ref 31.5–36.5)
MCV RBC AUTO: 92 FL (ref 78–100)
MONOCYTES # BLD AUTO: 0.5 10E3/UL (ref 0–1.3)
MONOCYTES NFR BLD AUTO: 7 %
NEUTROPHILS # BLD AUTO: 4 10E3/UL (ref 1.6–8.3)
NEUTROPHILS NFR BLD AUTO: 56 %
NRBC # BLD AUTO: 0 10E3/UL
NRBC BLD AUTO-RTO: 0 /100
PLATELET # BLD AUTO: 216 10E3/UL (ref 150–450)
RBC # BLD AUTO: 4.61 10E6/UL (ref 3.8–5.2)
WBC # BLD AUTO: 7.2 10E3/UL (ref 4–11)

## 2023-04-30 PROCEDURE — 85025 COMPLETE CBC W/AUTO DIFF WBC: CPT | Performed by: PHYSICIAN ASSISTANT

## 2023-04-30 PROCEDURE — 36415 COLL VENOUS BLD VENIPUNCTURE: CPT | Performed by: PHYSICIAN ASSISTANT

## 2023-04-30 PROCEDURE — 250N000013 HC RX MED GY IP 250 OP 250 PS 637: Performed by: PHYSICIAN ASSISTANT

## 2023-04-30 PROCEDURE — 99283 EMERGENCY DEPT VISIT LOW MDM: CPT

## 2023-04-30 RX ADMIN — AMOXICILLIN AND CLAVULANATE POTASSIUM 1 TABLET: 875; 125 TABLET, FILM COATED ORAL at 21:03

## 2023-04-30 ASSESSMENT — ACTIVITIES OF DAILY LIVING (ADL): ADLS_ACUITY_SCORE: 33

## 2023-05-01 NOTE — ED PROVIDER NOTES
"  History     Chief Complaint:  Dental Pain       HPI     Ivon is a 32-year-old female with a medical history of TTP who presents for cute onset of right posterior molar pain today.  She notes that her tooth is chipped and eroded.  She has not seen a dentist for over a year.  No fevers or swelling.  No trouble swallowing.  She is utilizing Tylenol and Motrin which she feels like she wants to keep this.  She does not want any narcotics.  She is looking to see if she can get an antibiotic.  No antibiotic allergies.  She is coming by her significant other today.  Also requested a CBC to check on her platelet count.      Independent Historian:   None - Patient Only    Review of External Notes:      ROS:  Review of Systems  Per HPI    Allergies:  Rituximab  Morphine     Medications:    amoxicillin-clavulanate (AUGMENTIN) 875-125 MG tablet  acetaminophen (TYLENOL) 500 MG tablet  albuterol (PROAIR HFA/PROVENTIL HFA/VENTOLIN HFA) 108 (90 Base) MCG/ACT inhaler  blood glucose (ACCU-CHEK GUIDE) test strip  blood glucose monitoring (SOFTCLIX) lancets  chlorhexidine (PERIDEX) 0.12 % solution  diphenhydrAMINE (BENADRYL) 25 MG tablet  insulin NPH (HUMULIN N KWIKPEN) 100 UNIT/ML injection  insulin syringe-needle U-100 (31G X 5/16\" 0.5 ML) 31G X 5/16\" 0.5 ML miscellaneous  ondansetron (ZOFRAN) 4 MG tablet  pantoprazole (PROTONIX) 40 MG EC tablet  polyethylene glycol (MIRALAX) 17 GM/Dose powder  sennosides (SENOKOT) 8.6 MG tablet        Past Medical History:    Past Medical History:   Diagnosis Date     TTP (thrombotic thrombocytopenic purpura)        Past Surgical History:    Past Surgical History:   Procedure Laterality Date     IR CVC NON TUNNEL LINE REMOVAL  3/1/2022     IR CVC NON TUNNEL PLACEMENT  1/13/2022     IR CVC TUNNEL PLACEMENT > 5 YRS OF AGE  1/28/2022        Family History:    family history includes Diabetes in her mother; Liver Disease in her father.    Social History:   reports that she quit smoking about 15 months " ago. Her smoking use included cigarettes. She has never used smokeless tobacco. She reports current alcohol use. She reports that she does not use drugs.  PCP: Ashley Nguyen     Physical Exam     Patient Vitals for the past 24 hrs:   BP Temp Temp src Pulse Resp SpO2 Height Weight   04/30/23 2140 130/80 -- -- 83 18 98 % -- --   04/30/23 2027 134/85 -- -- -- -- -- -- --   04/30/23 2026 -- (!) 96.6  F (35.9  C) Temporal 85 20 97 % 1.524 m (5') 123.4 kg (272 lb)        Physical Exam  Vitals and nursing note reviewed.   Constitutional:       Appearance: She is not diaphoretic.   HENT:      Head:      Jaw: No trismus, tenderness, swelling or pain on movement.      Salivary Glands: Right salivary gland is not diffusely enlarged or tender.      Right Ear: Tympanic membrane, ear canal and external ear normal.      Left Ear: Tympanic membrane, ear canal and external ear normal.      Nose: Nose normal.      Mouth/Throat:      Lips: Pink.      Dentition: Abnormal dentition. Dental tenderness and dental caries present. No gingival swelling or dental abscesses.      Pharynx: Oropharynx is clear. Uvula midline. No oropharyngeal exudate, posterior oropharyngeal erythema or uvula swelling.      Tonsils: No tonsillar abscesses.        Comments: No  Elevated tounge.  Eyes:      General: No scleral icterus.     Extraocular Movements: Extraocular movements intact.      Conjunctiva/sclera: Conjunctivae normal.   Cardiovascular:      Rate and Rhythm: Normal rate and regular rhythm.      Pulses: Normal pulses.      Heart sounds: Normal heart sounds. No murmur heard.     No friction rub. No gallop.   Pulmonary:      Effort: Pulmonary effort is normal. No respiratory distress.      Breath sounds: Normal breath sounds. No stridor. No wheezing, rhonchi or rales.   Abdominal:      General: There is no distension.      Palpations: Abdomen is soft.      Tenderness: There is no abdominal tenderness. There is no guarding.           Emergency  Department Course     Imaging:  No orders to display          Laboratory:  Labs Ordered and Resulted from Time of ED Arrival to Time of ED Departure   CBC WITH PLATELETS AND DIFFERENTIAL       Result Value    WBC Count 7.2      RBC Count 4.61      Hemoglobin 14.4      Hematocrit 42.2      MCV 92      MCH 31.2      MCHC 34.1      RDW 12.2      Platelet Count 216      % Neutrophils 56      % Lymphocytes 34      % Monocytes 7      % Eosinophils 2      % Basophils 1      % Immature Granulocytes 0      NRBCs per 100 WBC 0      Absolute Neutrophils 4.0      Absolute Lymphocytes 2.4      Absolute Monocytes 0.5      Absolute Eosinophils 0.1      Absolute Basophils 0.1      Absolute Immature Granulocytes 0.0      Absolute NRBCs 0.0          Procedures     Emergency Department Course & Assessments:    Interventions:  Medications   amoxicillin-clavulanate (AUGMENTIN) 875-125 MG per tablet 1 tablet (1 tablet Oral $Given 4/30/23 2100)          Independent Interpretation (X-rays, CTs, rhythm strip):  None    Consultations/Discussion of Management or Tests:  None        Social Determinants of Health affecting care:   None    Disposition:  The patient was discharged to home.     Impression & Plan    CMS Diagnoses: None      Medical Decision Making:    This is a very pleasant 32 year old patient who presented with left upper dental pain.  Tooth #2 seems to be eroded and painful.  She has multiple caries in her mouth.  No evidence of drainable abscess on clinical exam.  There is no clinical evidence of  peritonsillar abscess, retropharyngeal abscess, Lemierre's Syndrome, epiglottis, or Gómez's angina.  I have recommended treatment with Augmentin and analgesics.  Offered dental block patient declined.  Patient also does not want any narcotics I think is reasonable.  Return if increasing pain, change in voice, neck pain, vomiting, fever, or shortness of breath.  Patient plans to have close follow-up this week with her  dentist.    Diagnosis:    ICD-10-CM    1. Pain, dental  K08.89            Discharge Medications:  Discharge Medication List as of 4/30/2023  9:06 PM         4/30/2023   Cody Pryor, Cody Fleming PA-C  04/30/23 2348

## 2023-05-01 NOTE — ED TRIAGE NOTES
Pt has hx of TTP. Pt report her right molar is hurting and is concerned for infection. Pt wants a CBC done to check platelet count

## 2023-05-30 ENCOUNTER — APPOINTMENT (OUTPATIENT)
Dept: ULTRASOUND IMAGING | Facility: CLINIC | Age: 33
End: 2023-05-30
Attending: EMERGENCY MEDICINE
Payer: COMMERCIAL

## 2023-05-30 ENCOUNTER — HOSPITAL ENCOUNTER (EMERGENCY)
Facility: CLINIC | Age: 33
Discharge: HOME | End: 2023-05-30
Attending: EMERGENCY MEDICINE | Admitting: EMERGENCY MEDICINE
Payer: COMMERCIAL

## 2023-05-30 VITALS
HEART RATE: 76 BPM | SYSTOLIC BLOOD PRESSURE: 100 MMHG | DIASTOLIC BLOOD PRESSURE: 70 MMHG | TEMPERATURE: 97.6 F | RESPIRATION RATE: 16 BRPM | OXYGEN SATURATION: 100 %

## 2023-05-30 DIAGNOSIS — N93.8 DYSFUNCTIONAL UTERINE BLEEDING: ICD-10-CM

## 2023-05-30 LAB
ALBUMIN UR-MCNC: 10 MG/DL
ANION GAP SERPL CALCULATED.3IONS-SCNC: 10 MMOL/L (ref 7–15)
APPEARANCE UR: CLEAR
BACTERIA #/AREA URNS HPF: ABNORMAL /HPF
BASOPHILS # BLD AUTO: 0.1 10E3/UL (ref 0–0.2)
BASOPHILS NFR BLD AUTO: 1 %
BILIRUB UR QL STRIP: NEGATIVE
BUN SERPL-MCNC: 13.8 MG/DL (ref 6–20)
CALCIUM SERPL-MCNC: 9.1 MG/DL (ref 8.6–10)
CHLORIDE SERPL-SCNC: 106 MMOL/L (ref 98–107)
COLOR UR AUTO: YELLOW
CREAT SERPL-MCNC: 0.64 MG/DL (ref 0.51–0.95)
DEPRECATED HCO3 PLAS-SCNC: 24 MMOL/L (ref 22–29)
EOSINOPHIL # BLD AUTO: 0.1 10E3/UL (ref 0–0.7)
EOSINOPHIL NFR BLD AUTO: 2 %
ERYTHROCYTE [DISTWIDTH] IN BLOOD BY AUTOMATED COUNT: 12.8 % (ref 10–15)
GFR SERPL CREATININE-BSD FRML MDRD: >90 ML/MIN/1.73M2
GLUCOSE SERPL-MCNC: 120 MG/DL (ref 70–99)
GLUCOSE UR STRIP-MCNC: NEGATIVE MG/DL
HCG SERPL QL: NEGATIVE
HCT VFR BLD AUTO: 43.7 % (ref 35–47)
HGB BLD-MCNC: 14.3 G/DL (ref 11.7–15.7)
HGB UR QL STRIP: ABNORMAL
IMM GRANULOCYTES # BLD: 0 10E3/UL
IMM GRANULOCYTES NFR BLD: 0 %
KETONES UR STRIP-MCNC: NEGATIVE MG/DL
LEUKOCYTE ESTERASE UR QL STRIP: NEGATIVE
LYMPHOCYTES # BLD AUTO: 1.5 10E3/UL (ref 0.8–5.3)
LYMPHOCYTES NFR BLD AUTO: 25 %
MCH RBC QN AUTO: 31 PG (ref 26.5–33)
MCHC RBC AUTO-ENTMCNC: 32.7 G/DL (ref 31.5–36.5)
MCV RBC AUTO: 95 FL (ref 78–100)
MONOCYTES # BLD AUTO: 0.6 10E3/UL (ref 0–1.3)
MONOCYTES NFR BLD AUTO: 9 %
MUCOUS THREADS #/AREA URNS LPF: PRESENT /LPF
NEUTROPHILS # BLD AUTO: 3.7 10E3/UL (ref 1.6–8.3)
NEUTROPHILS NFR BLD AUTO: 63 %
NITRATE UR QL: NEGATIVE
NRBC # BLD AUTO: 0 10E3/UL
NRBC BLD AUTO-RTO: 0 /100
PH UR STRIP: 6.5 [PH] (ref 5–7)
PLATELET # BLD AUTO: 206 10E3/UL (ref 150–450)
POTASSIUM SERPL-SCNC: 4 MMOL/L (ref 3.4–5.3)
RBC # BLD AUTO: 4.61 10E6/UL (ref 3.8–5.2)
RBC URINE: 9 /HPF
SODIUM SERPL-SCNC: 140 MMOL/L (ref 136–145)
SP GR UR STRIP: 1.03 (ref 1–1.03)
SQUAMOUS EPITHELIAL: 1 /HPF
UROBILINOGEN UR STRIP-MCNC: NORMAL MG/DL
WBC # BLD AUTO: 5.9 10E3/UL (ref 4–11)
WBC URINE: <1 /HPF

## 2023-05-30 PROCEDURE — 36415 COLL VENOUS BLD VENIPUNCTURE: CPT | Performed by: EMERGENCY MEDICINE

## 2023-05-30 PROCEDURE — 99284 EMERGENCY DEPT VISIT MOD MDM: CPT | Mod: 25

## 2023-05-30 PROCEDURE — 76856 US EXAM PELVIC COMPLETE: CPT

## 2023-05-30 PROCEDURE — 82310 ASSAY OF CALCIUM: CPT | Performed by: EMERGENCY MEDICINE

## 2023-05-30 PROCEDURE — 85025 COMPLETE CBC W/AUTO DIFF WBC: CPT | Performed by: EMERGENCY MEDICINE

## 2023-05-30 PROCEDURE — 250N000013 HC RX MED GY IP 250 OP 250 PS 637: Performed by: EMERGENCY MEDICINE

## 2023-05-30 PROCEDURE — 81001 URINALYSIS AUTO W/SCOPE: CPT | Performed by: EMERGENCY MEDICINE

## 2023-05-30 PROCEDURE — 84703 CHORIONIC GONADOTROPIN ASSAY: CPT | Performed by: EMERGENCY MEDICINE

## 2023-05-30 PROCEDURE — 87086 URINE CULTURE/COLONY COUNT: CPT | Performed by: EMERGENCY MEDICINE

## 2023-05-30 RX ORDER — HYDROCODONE BITARTRATE AND ACETAMINOPHEN 5; 325 MG/1; MG/1
2 TABLET ORAL ONCE
Status: COMPLETED | OUTPATIENT
Start: 2023-05-30 | End: 2023-05-30

## 2023-05-30 RX ADMIN — HYDROCODONE BITARTRATE AND ACETAMINOPHEN 1 TABLET: 5; 325 TABLET ORAL at 10:20

## 2023-05-30 ASSESSMENT — ACTIVITIES OF DAILY LIVING (ADL): ADLS_ACUITY_SCORE: 35

## 2023-05-30 NOTE — DISCHARGE INSTRUCTIONS
Return to the ER for increased bleeding, dizziness or loss of consciousness, or for any new concerns.    Please keep your upcoming gynecology appointment as arranged.

## 2023-05-30 NOTE — ED TRIAGE NOTES
Vaginal bleeding x 2 weeks with heavy flow.  Pt endorses lumbar pain and abdominal discomfort. Hx PCOS and TTP.  C/O hot/cold flashes and intermittent dizziness.  VSS on RA.

## 2023-05-30 NOTE — ED PROVIDER NOTES
History     Chief Complaint:  Abdominal Pain and Vaginal Bleeding     The history is provided by the patient.      Ivon Gamble is a 32 year old female with a history of TTP, PCOS, and bladder infection who presents with increased pain in pelvis and lower back, hot flashes, and cold flashes which started this morning. She notes she has had vaginal bleeding intermittently for approximately 1.5 months since April 11th when she started her period. Yesterday she notes her vaginal bleeding increased and she went through a pad and a tampon. She denies clotting. 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 She notes she has urinary frequency. She notes she has a history of a bladder infection. She adds she has PCOS. She notes she took ibuprofen this morning.     Review of External Notes:   Hematology note reviewed from April 21, 2022 and the patient was seen in follow-up of TTP.      Medications:    Albuterol inhaler  Insulin NPH injection  Ondansetron   Pantoprazole   Polyethylene glycol  Sennosides     Past Medical History:    TTP  Asthma  Anaphylaxis  Thrombocytopenia  Dental abscess  Normocytic anemia  Hyperbilirubinemia  Anxiety disorder    Past Surgical History:    IR CVC non tunnel line removal  IR CVC on tunnel placement  IR CVC tunnel placement > 5 years of age     Physical Exam     Patient Vitals for the past 24 hrs:   BP Temp Temp src Pulse Resp SpO2   05/30/23 1100 -- -- -- -- -- 97 %   05/30/23 1030 99/74 -- -- 70 -- 97 %   05/30/23 1025 114/66 -- -- 75 -- 98 %   05/30/23 0920 113/81 -- -- 81 -- 99 %   05/30/23 0911 (!) 132/92 97.6  F (36.4  C) Temporal 91 16 99 %        Physical Exam  Constitutional:       General: She is not in acute distress.     Appearance: Normal appearance. She is not diaphoretic.   HENT:      Head: Atraumatic.      Mouth/Throat:      Mouth: Mucous membranes are moist.   Eyes:      General: No scleral icterus.     Conjunctiva/sclera:  Conjunctivae normal.   Cardiovascular:      Rate and Rhythm: Normal rate and regular rhythm.      Heart sounds: Normal heart sounds.   Pulmonary:      Effort: No respiratory distress.      Breath sounds: Normal breath sounds.   Abdominal:      General: Abdomen is flat. There is no distension.      Tenderness: There is no abdominal tenderness. There is no guarding.   Musculoskeletal:      Cervical back: Neck supple.      Right lower leg: No edema.      Left lower leg: No edema.   Skin:     General: Skin is warm.      Findings: No rash.   Neurological:      General: No focal deficit present.      Mental Status: She is alert and oriented to person, place, and time.      Comments: Speech is fluent.  Gait is normal.   Psychiatric:         Mood and Affect: Mood normal.         Behavior: Behavior normal.           Emergency Department Course     Imaging:  US Pelvis Cmplt w Transvag & Doppler LmtPel Duplex Limited   Preliminary Result   IMPRESSION:   1.  The endometrium is mildly heterogeneous, with areas of associated   color Doppler blood flow noted, but no focal endometrial lesions are   seen.   2.  Otherwise unremarkable pelvic ultrasound with Doppler. No   convincing sonographic evidence for ovarian torsion.         Report per radiology    Laboratory:  Labs Ordered and Resulted from Time of ED Arrival to Time of ED Departure   BASIC METABOLIC PANEL - Abnormal       Result Value    Sodium 140      Potassium 4.0      Chloride 106      Carbon Dioxide (CO2) 24      Anion Gap 10      Urea Nitrogen 13.8      Creatinine 0.64      Calcium 9.1      Glucose 120 (*)     GFR Estimate >90     ROUTINE UA WITH MICROSCOPIC REFLEX TO CULTURE - Abnormal    Color Urine Yellow      Appearance Urine Clear      Glucose Urine Negative      Bilirubin Urine Negative      Ketones Urine Negative      Specific Gravity Urine 1.031      Blood Urine Large (*)     pH Urine 6.5      Protein Albumin Urine 10 (*)     Urobilinogen Urine Normal       Nitrite Urine Negative      Leukocyte Esterase Urine Negative      Bacteria Urine Few (*)     Mucus Urine Present (*)     RBC Urine 9 (*)     WBC Urine <1      Squamous Epithelials Urine 1     HCG QUALITATIVE PREGNANCY - Normal    hCG Serum Qualitative Negative     CBC WITH PLATELETS AND DIFFERENTIAL    WBC Count 5.9      RBC Count 4.61      Hemoglobin 14.3      Hematocrit 43.7      MCV 95      MCH 31.0      MCHC 32.7      RDW 12.8      Platelet Count 206      % Neutrophils 63      % Lymphocytes 25      % Monocytes 9      % Eosinophils 2      % Basophils 1      % Immature Granulocytes 0      NRBCs per 100 WBC 0      Absolute Neutrophils 3.7      Absolute Lymphocytes 1.5      Absolute Monocytes 0.6      Absolute Eosinophils 0.1      Absolute Basophils 0.1      Absolute Immature Granulocytes 0.0      Absolute NRBCs 0.0     URINE CULTURE        Emergency Department Course & Assessments:    Interventions:  Medications   HYDROcodone-acetaminophen (NORCO) 5-325 MG per tablet 2 tablet (1 tablet Oral $Given 5/30/23 1020)      Assessments/Consultations/Discussion of Management or Tests:  ED Course as of 05/30/23 1103   Tue May 30, 2023   0917 I obtained the patient's history and examined as noted above.    1054 I rechecked the patient and explained findings.        Disposition:  The patient was discharged to home.     Impression & Plan      Medical Decision Making:  This patient is a 32-year-old woman who presents to the emergency department for evaluation of dysfunctional uterine bleeding.  She says she has had irregular menses over the last couple of months.  She has phases of increased and decreased bleeding which waxes and wanes.  Today she had some abdominal cramping in the low midline as well as some back pain although she notes that she does work as a CNA and has been lifting heavy patients that this may be contributing to the back pain.    Ultrasound does not demonstrate evidence of ovarian torsion.  There is a  heterogeneous endometrium consistent with her intermittent bleeding.    The patient's main concern was related to her prior history of TTP.  Fortunately though her hemoglobin is nearly unchanged from 1 month ago and is 14.3 which is very good.  Platelet count is over 200.    The patient was given reassurance.  She already has an upcoming gynecology appointment for further evaluation of her intermittent bleeding.  We discussed return precautions.      Diagnosis:    ICD-10-CM    1. Dysfunctional uterine bleeding  N93.8           Scribe Disclosure:  I, Cami Garcia, am serving as a scribe at 9:11 AM on 5/30/2023 to document services personally performed by Vasile Jacobs MD based on my observations and the provider's statements to me.     5/30/2023   Vasile Jacobs MD McRoberts, Sean Edward, MD  05/30/23 1106

## 2023-05-31 LAB — BACTERIA UR CULT: NORMAL

## 2023-06-06 ENCOUNTER — LAB (OUTPATIENT)
Dept: LAB | Facility: CLINIC | Age: 33
End: 2023-06-06
Payer: COMMERCIAL

## 2023-06-06 DIAGNOSIS — R63.5 WEIGHT GAIN: Primary | ICD-10-CM

## 2023-06-06 LAB
HOLD SPECIMEN: NORMAL
T4 FREE SERPL-MCNC: 0.69 NG/DL (ref 0.9–1.7)
TSH SERPL DL<=0.005 MIU/L-ACNC: 14.28 UIU/ML (ref 0.3–4.2)

## 2023-06-06 PROCEDURE — 84443 ASSAY THYROID STIM HORMONE: CPT

## 2023-06-06 PROCEDURE — 36415 COLL VENOUS BLD VENIPUNCTURE: CPT

## 2023-06-06 PROCEDURE — 84439 ASSAY OF FREE THYROXINE: CPT

## 2023-06-06 NOTE — ED TRIAGE NOTES
32yo female comes into the ED for on going dental pain.   Pt has been seen by a dentist and PCP.   Due to hx of TTP & pericoronitis, pt needs to be seen by oral surgery to remove infected wisdom tooth.   Denies any fever    Alert and oriented to person,  no apparent risk to self or others.

## 2023-08-02 ASSESSMENT — ENCOUNTER SYMPTOMS
DIZZINESS: 1
HEMATURIA: 0
HEADACHES: 1
FREQUENCY: 1
ABDOMINAL PAIN: 0
CONSTIPATION: 0
EYE PAIN: 1
SHORTNESS OF BREATH: 1
SORE THROAT: 0
WEAKNESS: 1
JOINT SWELLING: 1
FEVER: 1
COUGH: 0
DIARRHEA: 0
BREAST MASS: 0
HEARTBURN: 1
ARTHRALGIAS: 1
MYALGIAS: 1
PALPITATIONS: 0
HEMATOCHEZIA: 0
DYSURIA: 0
NAUSEA: 1
PARESTHESIAS: 1
NERVOUS/ANXIOUS: 1
CHILLS: 0

## 2023-08-02 ASSESSMENT — PATIENT HEALTH QUESTIONNAIRE - PHQ9
10. IF YOU CHECKED OFF ANY PROBLEMS, HOW DIFFICULT HAVE THESE PROBLEMS MADE IT FOR YOU TO DO YOUR WORK, TAKE CARE OF THINGS AT HOME, OR GET ALONG WITH OTHER PEOPLE: SOMEWHAT DIFFICULT
SUM OF ALL RESPONSES TO PHQ QUESTIONS 1-9: 12
SUM OF ALL RESPONSES TO PHQ QUESTIONS 1-9: 12

## 2023-08-03 ENCOUNTER — MYC MEDICAL ADVICE (OUTPATIENT)
Dept: INTERNAL MEDICINE | Facility: CLINIC | Age: 33
End: 2023-08-03

## 2023-08-03 ENCOUNTER — OFFICE VISIT (OUTPATIENT)
Dept: INTERNAL MEDICINE | Facility: CLINIC | Age: 33
End: 2023-08-03
Payer: COMMERCIAL

## 2023-08-03 VITALS
OXYGEN SATURATION: 96 % | HEIGHT: 65 IN | WEIGHT: 278.3 LBS | RESPIRATION RATE: 18 BRPM | HEART RATE: 107 BPM | SYSTOLIC BLOOD PRESSURE: 100 MMHG | TEMPERATURE: 98.7 F | BODY MASS INDEX: 46.37 KG/M2 | DIASTOLIC BLOOD PRESSURE: 64 MMHG

## 2023-08-03 DIAGNOSIS — R73.03 PREDIABETES: ICD-10-CM

## 2023-08-03 DIAGNOSIS — R35.0 URINARY FREQUENCY: ICD-10-CM

## 2023-08-03 DIAGNOSIS — Z00.00 ROUTINE GENERAL MEDICAL EXAMINATION AT A HEALTH CARE FACILITY: Primary | ICD-10-CM

## 2023-08-03 DIAGNOSIS — E28.2 PCOS (POLYCYSTIC OVARIAN SYNDROME): ICD-10-CM

## 2023-08-03 DIAGNOSIS — Z13.220 LIPID SCREENING: ICD-10-CM

## 2023-08-03 DIAGNOSIS — J45.20 MILD INTERMITTENT ASTHMA WITHOUT COMPLICATION: ICD-10-CM

## 2023-08-03 DIAGNOSIS — Z11.1 SCREENING EXAMINATION FOR PULMONARY TUBERCULOSIS: ICD-10-CM

## 2023-08-03 DIAGNOSIS — M31.19 TTP (THROMBOTIC THROMBOPENIC PURPURA) (H): ICD-10-CM

## 2023-08-03 DIAGNOSIS — E66.01 MORBID OBESITY (H): ICD-10-CM

## 2023-08-03 LAB
ALBUMIN SERPL BCG-MCNC: 4.3 G/DL (ref 3.5–5.2)
ALBUMIN UR-MCNC: NEGATIVE MG/DL
ALP SERPL-CCNC: 75 U/L (ref 35–104)
ALT SERPL W P-5'-P-CCNC: 15 U/L (ref 0–50)
ANION GAP SERPL CALCULATED.3IONS-SCNC: 10 MMOL/L (ref 7–15)
APPEARANCE UR: CLEAR
AST SERPL W P-5'-P-CCNC: 21 U/L (ref 0–45)
BACTERIA #/AREA URNS HPF: ABNORMAL /HPF
BILIRUB SERPL-MCNC: 0.2 MG/DL
BILIRUB UR QL STRIP: NEGATIVE
BUN SERPL-MCNC: 15.5 MG/DL (ref 6–20)
CALCIUM SERPL-MCNC: 9.2 MG/DL (ref 8.6–10)
CHLORIDE SERPL-SCNC: 105 MMOL/L (ref 98–107)
CHOLEST SERPL-MCNC: 191 MG/DL
COLOR UR AUTO: YELLOW
CREAT SERPL-MCNC: 0.71 MG/DL (ref 0.51–0.95)
CREAT UR-MCNC: 154 MG/DL
DEPRECATED HCO3 PLAS-SCNC: 24 MMOL/L (ref 22–29)
ERYTHROCYTE [DISTWIDTH] IN BLOOD BY AUTOMATED COUNT: 12.6 % (ref 10–15)
GFR SERPL CREATININE-BSD FRML MDRD: >90 ML/MIN/1.73M2
GLUCOSE SERPL-MCNC: 114 MG/DL (ref 70–99)
GLUCOSE UR STRIP-MCNC: NEGATIVE MG/DL
HBA1C MFR BLD: 5.9 % (ref 0–5.6)
HCT VFR BLD AUTO: 42.5 % (ref 35–47)
HDLC SERPL-MCNC: 34 MG/DL
HGB BLD-MCNC: 14.5 G/DL (ref 11.7–15.7)
HGB UR QL STRIP: NEGATIVE
KETONES UR STRIP-MCNC: NEGATIVE MG/DL
LDLC SERPL CALC-MCNC: 135 MG/DL
LEUKOCYTE ESTERASE UR QL STRIP: NEGATIVE
MCH RBC QN AUTO: 31.4 PG (ref 26.5–33)
MCHC RBC AUTO-ENTMCNC: 34.1 G/DL (ref 31.5–36.5)
MCV RBC AUTO: 92 FL (ref 78–100)
MICROALBUMIN UR-MCNC: <12 MG/L
MICROALBUMIN/CREAT UR: NORMAL MG/G{CREAT}
NITRATE UR QL: NEGATIVE
NONHDLC SERPL-MCNC: 157 MG/DL
PH UR STRIP: 5.5 [PH] (ref 5–7)
PLATELET # BLD AUTO: 213 10E3/UL (ref 150–450)
POTASSIUM SERPL-SCNC: 4.2 MMOL/L (ref 3.4–5.3)
PROT SERPL-MCNC: 7 G/DL (ref 6.4–8.3)
RBC # BLD AUTO: 4.62 10E6/UL (ref 3.8–5.2)
RBC #/AREA URNS AUTO: ABNORMAL /HPF
SODIUM SERPL-SCNC: 139 MMOL/L (ref 136–145)
SP GR UR STRIP: >=1.03 (ref 1–1.03)
SQUAMOUS #/AREA URNS AUTO: ABNORMAL /LPF
TRIGL SERPL-MCNC: 112 MG/DL
TSH SERPL DL<=0.005 MIU/L-ACNC: 4.15 UIU/ML (ref 0.3–4.2)
UROBILINOGEN UR STRIP-ACNC: 0.2 E.U./DL
WBC # BLD AUTO: 7 10E3/UL (ref 4–11)
WBC #/AREA URNS AUTO: ABNORMAL /HPF

## 2023-08-03 PROCEDURE — 99395 PREV VISIT EST AGE 18-39: CPT

## 2023-08-03 PROCEDURE — 84443 ASSAY THYROID STIM HORMONE: CPT

## 2023-08-03 PROCEDURE — 86481 TB AG RESPONSE T-CELL SUSP: CPT

## 2023-08-03 PROCEDURE — 83036 HEMOGLOBIN GLYCOSYLATED A1C: CPT

## 2023-08-03 PROCEDURE — 82570 ASSAY OF URINE CREATININE: CPT

## 2023-08-03 PROCEDURE — 80053 COMPREHEN METABOLIC PANEL: CPT

## 2023-08-03 PROCEDURE — 99214 OFFICE O/P EST MOD 30 MIN: CPT | Mod: 25

## 2023-08-03 PROCEDURE — 85027 COMPLETE CBC AUTOMATED: CPT

## 2023-08-03 PROCEDURE — 82043 UR ALBUMIN QUANTITATIVE: CPT

## 2023-08-03 PROCEDURE — 36415 COLL VENOUS BLD VENIPUNCTURE: CPT

## 2023-08-03 PROCEDURE — 80061 LIPID PANEL: CPT

## 2023-08-03 PROCEDURE — 81001 URINALYSIS AUTO W/SCOPE: CPT

## 2023-08-03 RX ORDER — LEVOTHYROXINE SODIUM 125 UG/1
1 TABLET ORAL
COMMUNITY
Start: 2023-07-02 | End: 2023-08-23

## 2023-08-03 RX ORDER — ALBUTEROL SULFATE 90 UG/1
2 AEROSOL, METERED RESPIRATORY (INHALATION) EVERY 6 HOURS PRN
Qty: 18 G | Refills: 3 | Status: SHIPPED | OUTPATIENT
Start: 2023-08-03 | End: 2024-05-08

## 2023-08-03 RX ORDER — BUDESONIDE AND FORMOTEROL FUMARATE DIHYDRATE 80; 4.5 UG/1; UG/1
2 AEROSOL RESPIRATORY (INHALATION) 2 TIMES DAILY
Qty: 10.2 G | Refills: 1 | Status: SHIPPED | OUTPATIENT
Start: 2023-08-03 | End: 2024-05-08

## 2023-08-03 ASSESSMENT — ENCOUNTER SYMPTOMS
FREQUENCY: 1
SHORTNESS OF BREATH: 1
CONSTIPATION: 0
ARTHRALGIAS: 1
ABDOMINAL PAIN: 0
SORE THROAT: 0
HEMATURIA: 0
DYSURIA: 0
HEMATOCHEZIA: 0
MYALGIAS: 1
EYE PAIN: 1
JOINT SWELLING: 1
DIZZINESS: 1
HEARTBURN: 1
DIARRHEA: 0
NERVOUS/ANXIOUS: 1
PALPITATIONS: 0
WEAKNESS: 1
COUGH: 0
NAUSEA: 1
FEVER: 1
CHILLS: 0
HEADACHES: 1
PARESTHESIAS: 1
BREAST MASS: 0

## 2023-08-03 NOTE — TELEPHONE ENCOUNTER
Please see patient's most recent mychart message below.    Reports the dose for Levothyroxine is 125mcg.    Please advise, thanks.

## 2023-08-03 NOTE — PROGRESS NOTES
SUBJECTIVE:   CC: Ivon is an 32 year old who presents for preventive health visit.       8/3/2023     7:56 AM   Additional Questions   Roomed by Mai Alvarado MA   Accompanied by Her Boyfriend Joviat       Healthy Habits:     Getting at least 3 servings of Calcium per day:  Yes    Bi-annual eye exam:  NO    Dental care twice a year:  Yes    Sleep apnea or symptoms of sleep apnea:  Daytime drowsiness and Excessive snoring    Diet:  Low salt, Low fat/cholesterol, Carbohydrate counting, Gluten-free/reduced and Other    Frequency of exercise:  2-3 days/week    Duration of exercise:  15-30 minutes    Taking medications regularly:  Yes    Additional concerns today:  Yes        Today's PHQ-9 Score:       2023     4:48 PM   PHQ-9 SCORE   PHQ-9 Total Score MyChart 12 (Moderate depression)   PHQ-9 Total Score 12       Have you ever done Advance Care Planning? (For example, a Health Directive, POLST, or a discussion with a medical provider or your loved ones about your wishes): No, advance care planning information given to patient to review.  Advanced care planning was discussed at today's visit.    Social History     Tobacco Use    Smoking status: Former     Types: Cigarettes     Quit date: 2022     Years since quittin.5    Smokeless tobacco: Never   Substance Use Topics    Alcohol use: Yes     Comment: occassionally            2023     4:46 PM   Alcohol Use   Prescreen: >3 drinks/day or >7 drinks/week? No          No data to display              Reviewed orders with patient.  Reviewed health maintenance and updated orders accordingly - Yes  Lab work is in process    Breast Cancer Screening:    FHS-7:        No data to display              click delete button to remove this line now  Patient under 40 years of age: Routine Mammogram Screening not recommended.   Pertinent mammograms are reviewed under the imaging tab.    History of abnormal Pap smear: NO - age 30- 65 PAP every 3 years recommended    "  Reviewed and updated as needed this visit by clinical staff   Tobacco   Meds              Reviewed and updated as needed this visit by Provider      Review of Systems   Constitutional:  Positive for fever. Negative for chills.   HENT:  Positive for congestion. Negative for ear pain, hearing loss and sore throat.    Eyes:  Positive for pain and visual disturbance.   Respiratory:  Positive for shortness of breath. Negative for cough.    Cardiovascular:  Positive for peripheral edema. Negative for chest pain and palpitations.   Gastrointestinal:  Positive for heartburn and nausea. Negative for abdominal pain, constipation, diarrhea and hematochezia.   Breasts:  Negative for tenderness, breast mass and discharge.   Genitourinary:  Positive for frequency and urgency. Negative for dysuria, genital sores, hematuria, pelvic pain, vaginal bleeding and vaginal discharge.   Musculoskeletal:  Positive for arthralgias, joint swelling and myalgias.   Skin:  Negative for rash.   Neurological:  Positive for dizziness, weakness, headaches and paresthesias.   Psychiatric/Behavioral:  Positive for mood changes. The patient is nervous/anxious.      Has been on current dose of levothyroxine for 3 months    Mostly concerned about weight and hormones    Finds that she vomits after taking synthroid periodically. Happens about once per week she believes its from synthroid.    No pain with urination- feels that synthroid makes this worse     Dcelines sleep study     OBJECTIVE:   /64 (BP Location: Left arm, Patient Position: Sitting, Cuff Size: Adult Large)   Pulse 107   Temp 98.7  F (37.1  C) (Oral)   Resp 18   Ht 1.651 m (5' 5\")   Wt 126.2 kg (278 lb 4.8 oz)   LMP 07/02/2023 (Exact Date)   SpO2 96%   BMI 46.31 kg/m    Physical Exam  GENERAL: alert and no distress  EYES: Eyes grossly normal to inspection  HENT: ear canals and TM's normal, nose and mouth without ulcers or lesions  NECK: no adenopathy, no asymmetry, masses, " or scars and thyroid normal to palpation  RESP: lungs clear to auscultation - no rales, rhonchi or wheezes  CV: regular rate and rhythm, normal S1 S2, no S3 or S4, no murmur, click or rub, no peripheral edema and peripheral pulses strong  ABDOMEN: soft, nontender, no hepatosplenomegaly, no masses and bowel sounds normal  MS: no gross musculoskeletal defects noted, no edema  SKIN: no suspicious lesions or rashes  NEURO: Normal strength and tone, mentation intact and speech normal  PSYCH: mentation appears normal, affect normal/bright    Diagnostic Test Results:  Labs reviewed in Epic    ASSESSMENT/PLAN:   (Z00.00) Routine general medical examination at a health care facility  (primary encounter diagnosis)  Comment: multiple concerns. A few things were addressed today but patient was asked to schedule follow up to discuss other concerns  Plan: TSH with free T4 reflex, Comprehensive         metabolic panel (BMP + Alb, Alk Phos, ALT, AST,        Total. Bili, TP), CBC with platelets            (E66.01) Morbid obesity (H)  Comment: weight management referral. A1c is 5.8- will not use medication at this time  Plan: HEMOGLOBIN A1C, Adult Comprehensive Weight         Management  Referral            (Z13.220) Lipid screening  Comment:   Plan: Lipid panel reflex to direct LDL Non-fasting,         CANCELED: Lipid panel reflex to direct LDL         Fasting            (J45.20) Mild intermittent asthma without complication  Comment: ACT score 15. Will add symbicort to regimen  Plan: albuterol (PROAIR HFA/PROVENTIL HFA/VENTOLIN         HFA) 108 (90 Base) MCG/ACT inhaler,         budesonide-formoterol (SYMBICORT) 80-4.5         MCG/ACT Inhaler            (M31.19) TTP (thrombotic thrombopenic purpura) (H)  Comment: lost to follow up with hematology. New referral placed  Plan: Adult Oncology/Hematology  Referral            (Z11.1) Screening examination for pulmonary tuberculosis  Comment: needed for patients  "job  Plan: Quantiferon TB Gold Plus            (E28.2) PCOS (polycystic ovarian syndrome)  Comment: PCOS along with dysmenorrhea. Patient is wondering about options for contraceptive but is concerned with her hormone levels. Will refer to OB  Plan: Ob/Gyn Referral            (R35.0) Urinary frequency  Comment: UA negative for infection  Plan: UA with Microscopic reflex to Culture - lab         collect, UA Microscopic with Reflex to Culture          Prediabetes- A1c is 5.9    Patient has reflux which I think could be contributing to nausea/vomiting on occasion. Recommend lifestyle changes and Pepcid       Depression Screening Follow Up        8/2/2023     4:48 PM   PHQ   PHQ-9 Total Score 12   Q9: Thoughts of better off dead/self-harm past 2 weeks Not at all     COUNSELING:  Reviewed preventive health counseling, as reflected in patient instructions      BMI:   Estimated body mass index is 46.31 kg/m  as calculated from the following:    Height as of this encounter: 1.651 m (5' 5\").    Weight as of this encounter: 126.2 kg (278 lb 4.8 oz).         She reports that she quit smoking about 18 months ago. Her smoking use included cigarettes. She has never used smokeless tobacco.    Da Kapoor NP  [facility] submitted by the patient for this visit:  Patient Health Questionnaire (Submitted on 8/2/2023)  If you checked off any problems, how difficult have these problems made it for you to do your work, take care of things at home, or get along with other people?: Somewhat difficult  PHQ9 TOTAL SCORE: 12    "

## 2023-08-03 NOTE — PATIENT INSTRUCTIONS
Use Symbicort inhaler daily. Make sure to wash mouth out afterwards.      Schedule pap with female provider     Try metamucil to regulate stools     avoiding chocolate, coffee, peppermint, fruit juices, tomatoes,NSAID's, greasy and spicy foods. Encouraged moderation of alcoholic beverages, and avoidance of smoking.    Try taking pepcid for reflux as well    Message me if synthroid dose is different than 125 mcg      Preventive Health Recommendations  Female Ages 26 - 39  Yearly exam:   See your health care provider every year in order to  Review health changes.   Discuss preventive care.    Review your medicines if you your doctor has prescribed any.    Until age 30: Get a Pap test every three years (more often if you have had an abnormal result).    After age 30: Talk to your doctor about whether you should have a Pap test every 3 years or have a Pap test with HPV screening every 5 years.   You do not need a Pap test if your uterus was removed (hysterectomy) and you have not had cancer.  You should be tested each year for STDs (sexually transmitted diseases), if you're at risk.   Talk to your provider about how often to have your cholesterol checked.  If you are at risk for diabetes, you should have a diabetes test (fasting glucose).  Shots: Get a flu shot each year. Get a tetanus shot every 10 years.   Nutrition:   Eat at least 5 servings of fruits and vegetables each day.  Eat whole-grain bread, whole-wheat pasta and brown rice instead of white grains and rice.  Get adequate Calcium and Vitamin D.     Lifestyle  Exercise at least 150 minutes a week (30 minutes a day, 5 days of the week). This will help you control your weight and prevent disease.  Limit alcohol to one drink per day.  No smoking.   Wear sunscreen to prevent skin cancer.  See your dentist every six months for an exam and cleaning.

## 2023-08-04 LAB
GAMMA INTERFERON BACKGROUND BLD IA-ACNC: 0.01 IU/ML
M TB IFN-G BLD-IMP: NEGATIVE
M TB IFN-G CD4+ BCKGRND COR BLD-ACNC: 9.99 IU/ML
MITOGEN IGNF BCKGRD COR BLD-ACNC: 0.01 IU/ML
MITOGEN IGNF BCKGRD COR BLD-ACNC: 0.02 IU/ML
QUANTIFERON MITOGEN: 10 IU/ML
QUANTIFERON NIL TUBE: 0.01 IU/ML
QUANTIFERON TB1 TUBE: 0.03 IU/ML
QUANTIFERON TB2 TUBE: 0.02

## 2023-08-23 ENCOUNTER — MYC MEDICAL ADVICE (OUTPATIENT)
Dept: INTERNAL MEDICINE | Facility: CLINIC | Age: 33
End: 2023-08-23
Payer: COMMERCIAL

## 2023-08-23 DIAGNOSIS — E03.9 HYPOTHYROIDISM, UNSPECIFIED TYPE: Primary | ICD-10-CM

## 2023-08-23 RX ORDER — LEVOTHYROXINE SODIUM 125 UG/1
125 TABLET ORAL
Qty: 90 TABLET | Refills: 1 | Status: SHIPPED | OUTPATIENT
Start: 2023-08-23 | End: 2024-05-12 | Stop reason: DRUGHIGH

## 2023-08-23 NOTE — TELEPHONE ENCOUNTER
Please see patient's mychart message below.  Asking for a refill on Levothyroxine 125mcg.    Routing refill request to provider for review/approval because:  Medication is reported/historical    Please advise, thanks.

## 2023-09-08 ENCOUNTER — OFFICE VISIT (OUTPATIENT)
Dept: OBGYN | Facility: CLINIC | Age: 33
End: 2023-09-08
Payer: COMMERCIAL

## 2023-09-08 VITALS — SYSTOLIC BLOOD PRESSURE: 112 MMHG | DIASTOLIC BLOOD PRESSURE: 76 MMHG | BODY MASS INDEX: 46.43 KG/M2 | WEIGHT: 279 LBS

## 2023-09-08 DIAGNOSIS — Z12.4 PAP SMEAR FOR CERVICAL CANCER SCREENING: Primary | ICD-10-CM

## 2023-09-08 LAB
PROLACTIN SERPL 3RD IS-MCNC: 26 NG/ML (ref 5–23)
T4 FREE SERPL-MCNC: 1.13 NG/DL (ref 0.9–1.7)
TSH SERPL DL<=0.005 MIU/L-ACNC: 4.84 UIU/ML (ref 0.3–4.2)

## 2023-09-08 PROCEDURE — G0145 SCR C/V CYTO,THINLAYER,RESCR: HCPCS | Performed by: OBSTETRICS & GYNECOLOGY

## 2023-09-08 PROCEDURE — 84403 ASSAY OF TOTAL TESTOSTERONE: CPT | Performed by: OBSTETRICS & GYNECOLOGY

## 2023-09-08 PROCEDURE — 84443 ASSAY THYROID STIM HORMONE: CPT | Performed by: OBSTETRICS & GYNECOLOGY

## 2023-09-08 PROCEDURE — 84146 ASSAY OF PROLACTIN: CPT | Performed by: OBSTETRICS & GYNECOLOGY

## 2023-09-08 PROCEDURE — 36415 COLL VENOUS BLD VENIPUNCTURE: CPT | Performed by: OBSTETRICS & GYNECOLOGY

## 2023-09-08 PROCEDURE — 99385 PREV VISIT NEW AGE 18-39: CPT | Performed by: OBSTETRICS & GYNECOLOGY

## 2023-09-08 PROCEDURE — 84439 ASSAY OF FREE THYROXINE: CPT | Performed by: OBSTETRICS & GYNECOLOGY

## 2023-09-08 PROCEDURE — 87624 HPV HI-RISK TYP POOLED RSLT: CPT | Performed by: OBSTETRICS & GYNECOLOGY

## 2023-09-08 NOTE — PROGRESS NOTES
"SUBJECTIVE:                                                      Ivon is a 32 year old  female who presents for annual exam.   She is due for a Pap smear.  She was told she had PCOS when she was 15 and desires an ultrasound to follow this up.  She does not indicate an interest in conception at this time.  Her partner is with her and his male but she indicates that there for the most part abstinent.  She has had 3 pregnancies all ending and spontaneous miscarriages.    She states that her cycles are fairly regular now that she is started levothyroxine but historically were somewhat irregular.  She does endorse some increased facial hair and acne and also has had retinitis in the thigh and vulvar region.    Patient's last menstrual period was 2023 (exact date).. Menses are irregular and irregular lasting variable days.  Using none for contraception.  She is not currently considering pregnancy.  Declines contraception  Besides routine health maintenance,  she would like to discuss her \"diagnosis\" of PCOS.  GYNECOLOGIC HISTORY:    Ivon is not sexually active presently  History sexually transmitted infections:No STD history    History of abnormal Pap smear: NO - age 30-65 PAP every 5 years with negative HPV co-testing recommended  Family history of breast CA: No  Family history of uterine/ovarian CA: No    Family history of colon CA: No      HISTORY:  OB History    Para Term  AB Living   3 0 0 0 3 0   SAB IAB Ectopic Multiple Live Births   3 0 0 0 0      # Outcome Date GA Lbr Carmine/2nd Weight Sex Delivery Anes PTL Lv   3 SAB            2 SAB            1 SAB              Past Medical History:   Diagnosis Date    Asthma     Hypothyroidism     PCOS (polycystic ovarian syndrome)     TTP (thrombotic thrombocytopenic purpura) (H)      Past Surgical History:   Procedure Laterality Date    IR CVC NON TUNNEL LINE REMOVAL  3/1/2022    IR CVC NON TUNNEL PLACEMENT > 5 YRS  2022    IR CVC TUNNEL " PLACEMENT > 5 YRS OF AGE  2022     Family History   Problem Relation Age of Onset    Diabetes Mother     Asthma Mother     Thyroid Disease Mother     Obesity Mother     Liver Disease Father         hepatitis    Asthma Father     Obesity Father     Depression Sister     Anxiety Disorder Sister      Social History     Socioeconomic History    Marital status: Single     Spouse name: None    Number of children: None    Years of education: None    Highest education level: None   Tobacco Use    Smoking status: Former     Packs/day: 1.00     Years: 8.00     Pack years: 8.00     Types: Cigarettes     Quit date: 2022     Years since quittin.6    Smokeless tobacco: Never   Vaping Use    Vaping Use: Never used   Substance and Sexual Activity    Alcohol use: Not Currently     Comment: occassionally     Drug use: Yes     Types: Marijuana    Sexual activity: Yes     Partners: Male     Birth control/protection: None     Comment: interested in birth control if conflict doesnt occor   Other Topics Concern    Parent/sibling w/ CABG, MI or angioplasty before 65F 55M? Yes     Comment: Father   Social History Narrative    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     Social Determinants of Health     Intimate Partner Violence: Not At Risk (2022)    Humiliation, Afraid, Rape, and Kick questionnaire     Fear of Current or Ex-Partner: No     Emotionally Abused: No     Physically Abused: No     Sexually Abused: No       Current Outpatient Medications:     acetaminophen (TYLENOL) 500 MG tablet, Take 500-1,000 mg by mouth every 6 hours as needed for mild pain, Disp: , Rfl:     albuterol (PROAIR HFA/PROVENTIL HFA/VENTOLIN HFA) 108 (90 Base) MCG/ACT inhaler, Inhale 2 puffs into the lungs every 6 hours as needed for shortness of breath, wheezing or cough, Disp: 18 g, Rfl: 3    budesonide-formoterol (SYMBICORT) 80-4.5 MCG/ACT Inhaler, Inhale 2 puffs into the lungs 2 times daily, Disp: 10.2 g, Rfl: 1     diphenhydrAMINE (BENADRYL) 25 MG tablet, Take 2 tablets (50 mg) by mouth every 6 hours as needed for itching or allergies, Disp: 60 tablet, Rfl: 0    levothyroxine (SYNTHROID/LEVOTHROID) 125 MCG tablet, Take 1 tablet (125 mcg) by mouth daily at 2 pm, Disp: 90 tablet, Rfl: 1  No current facility-administered medications for this visit.    Facility-Administered Medications Ordered in Other Visits:     0.9% sodium chloride BOLUS, 250 mL, Intravenous, BID PRN, Negrito Perales MD    acetaminophen (TYLENOL) tablet 650 mg, 650 mg, Oral, Once, Negrito Perales MD    Anticoagulant Citrate Dextrose Formula A , 500-2,000 mL, Apheresis, Once, Negrito Perales MD    anticoagulant citrate flush 3 mL, 3 mL, Intravenous, Once PRN, Negrito Perales MD    calcium gluconate 3.5 g in sodium chloride 0.9 % 50 mL, 3.5 g, Intravenous, Once, Negrito Perales MD    diphenhydrAMINE (BENADRYL) injection 50 mg, 50 mg, Intravenous, Once, Negrito Perales MD    diphenhydrAMINE (BENADRYL) injection 50 mg, 50 mg, Intravenous, Once PRN, Negrito Perales MD    hydrocortisone sodium succinate PF (solu-CORTEF) injection 100 mg, 100 mg, Intravenous, Once, Negrito Perales MD     Allergies   Allergen Reactions    Rituximab Anaphylaxis    Morphine        Past medical, surgical, social and family history were reviewed and updated in EPIC.    ROS:   12 point review of systems negative other than symptoms noted below or in the HPI.  12 point review of systems negative other than symptoms noted below.      OBJECTIVE:                                                      EXAM:  /76   Wt 126.6 kg (279 lb)   LMP 08/04/2023 (Exact Date)   BMI 46.43 kg/m     BMI: Body mass index is 46.43 kg/m .  General: Alert and oriented, no distress.  Psychiatric: Mood and affect within normal limits.  Abdomen: Soft, obese, nontender, no hepatosplenomegaly, no rebound or guarding, no masses, no hernias.   Vulva:  Hidradenitis scars, no actively infected lesions,  normal female hair distribution, no inguinal adenopathy.    Urethra:  Midline, non-tender, well supported, no discharge  Vagina:  Well-estrogenized, no abnormal discharge, no lesions  Cervix: no lesions, no discharge, nulliparous, and Pap obtained  Bimanual deferred given body habitus and planned U/S as well as patient inducating difficulty with exams    COUNSELING:   Reviewed preventive health counseling, as reflected in patient instructions   reports that she quit smoking about 20 months ago. Her smoking use included cigarettes. She has a 8.00 pack-year smoking history. She has never used smokeless tobacco.        ASSESSMENT/PLAN:                                                      32 year old female with satisfactory annual exam  (Z12.4) Pap smear for cervical cancer screening  (primary encounter diagnosis)  Comment: Normal exam  Plan: Pap screen with HPV - recommended age 30 - 65         years, US Pelvic Complete with Transvaginal,         Testosterone, total, Prolactin, TSH with free         T4 reflex        Discussed diagnostic criteria for PCOS.  Her clinical situation is suggestive but not diagnostic.  I did review a CT scan from 2022 which did not indicate ovarian enlargement.  We will do the above lab work and a pelvic ultrasound to help delineate her uterine and ovarian anatomy and determine if hyperandrogenism is present    Avila Duarte MD

## 2023-09-08 NOTE — NURSING NOTE
"Chief Complaint   Patient presents with    Gyn Exam     Pap, requesting US for PCOS       Initial /76   Wt 126.6 kg (279 lb)   LMP 2023 (Exact Date)   BMI 46.43 kg/m   Estimated body mass index is 46.43 kg/m  as calculated from the following:    Height as of 8/3/23: 1.651 m (5' 5\").    Weight as of this encounter: 126.6 kg (279 lb).  BP completed using cuff size: large    Questioned patient about current smoking habits.  Pt. quit smoking some time ago.          The following HM Due: pap smear    "

## 2023-09-12 LAB
BKR LAB AP GYN ADEQUACY: NORMAL
BKR LAB AP GYN INTERPRETATION: NORMAL
BKR LAB AP HPV REFLEX: NORMAL
BKR LAB AP LMP: NORMAL
BKR LAB AP PREVIOUS ABNORMAL: NORMAL
PATH REPORT.COMMENTS IMP SPEC: NORMAL
PATH REPORT.COMMENTS IMP SPEC: NORMAL
PATH REPORT.RELEVANT HX SPEC: NORMAL
TESTOST SERPL-MCNC: 27 NG/DL (ref 8–60)

## 2023-09-13 LAB
HUMAN PAPILLOMA VIRUS 16 DNA: NEGATIVE
HUMAN PAPILLOMA VIRUS 18 DNA: NEGATIVE
HUMAN PAPILLOMA VIRUS FINAL DIAGNOSIS: NORMAL
HUMAN PAPILLOMA VIRUS OTHER HR: NEGATIVE

## 2023-09-20 NOTE — PLAN OF CARE
A&Ox4. VSS on RA. Tele NSR. Neuros intact and unchanged. Neuros intact and unchanged CVC site with scant blood, unchanged. PIV infusing. Voiding adequately in BR. 2 small loose stools overnight. Abd and generalized pain managed with dilaudid x1 and tylenol. Nausea managed with zofran x1. Up SBA in room. Tolerating full liquid diet. Pitechiae to BLE and bilateral hands. Plan apheresis today and abdominal xray.    Patient reports migraines are intermittent varies from 3 times per month up to 8-10. Stress is a trigger. Pt reports nausea with some migraines, and increased pressure in right eye that can sometimes move to left eye. Pt is unsure of any additional triggers. Pt has used imitrex twice and she has not noticed significant change in headache. Pt reports ice-paks and Tylenol help headache.

## 2023-09-28 ENCOUNTER — ANCILLARY PROCEDURE (OUTPATIENT)
Dept: ULTRASOUND IMAGING | Facility: CLINIC | Age: 33
End: 2023-09-28
Attending: OBSTETRICS & GYNECOLOGY
Payer: COMMERCIAL

## 2023-09-28 DIAGNOSIS — Z12.4 PAP SMEAR FOR CERVICAL CANCER SCREENING: ICD-10-CM

## 2023-09-28 PROCEDURE — 76830 TRANSVAGINAL US NON-OB: CPT | Performed by: OBSTETRICS & GYNECOLOGY

## 2023-09-28 PROCEDURE — 76856 US EXAM PELVIC COMPLETE: CPT | Performed by: OBSTETRICS & GYNECOLOGY

## 2023-11-15 ENCOUNTER — NURSE TRIAGE (OUTPATIENT)
Dept: INTERNAL MEDICINE | Facility: CLINIC | Age: 33
End: 2023-11-15
Payer: COMMERCIAL

## 2023-11-15 NOTE — TELEPHONE ENCOUNTER
"Patient called, complaints of chest pain/discomfort on left side of the chest which started at 11am today. She described the pain as \"mild discomfort, steady, like a light pinpoint pain in her chest that doesn't go away.\" O2 saturation is 97%. States that she just quit smoking 48 hours ago and not sure if this could be related to the chest pain. Denies difficulty breathing, dizziness, N/V, fever, cough, or any other symptoms.     Advised to call 911 per protocol or go to ER. Patient also asked if she can go to Urgent Care instead but advised her that they might still call 911 if she presents to  with a chest pain. Patient did not state whether she will follow advise.    Reason for Disposition   Chest pain lasting longer than 5 minutes and ANY of the following:         Pain is crushing, pressure-like, or heavy         Over 44 years old          Over 30 years old and one cardiac risk factor (e.g diabetes, high blood pressure, high cholesterol, smoker, or family history of heart disease)         History of heart disease (e.g. angina, heart attack, heart failure, bypass surgery, takes nitroglycerin)    Additional Information   Negative: Shock suspected (e.g., cold/pale/clammy skin, too weak to stand, low BP, rapid pulse)   Negative: Passed out (i.e., lost consciousness, collapsed and was not responding)   Negative: SEVERE difficulty breathing (e.g., struggling for each breath, speaks in single words)   Negative: Difficult to awaken or acting confused (e.g., disoriented, slurred speech)    Answer Assessment - Initial Assessment Questions  1. LOCATION: \"Where does it hurt?\"        Left side of the chest.  2. RADIATION: \"Does the pain go anywhere else?\" (e.g., into neck, jaw, arms, back)      No.  3. ONSET: \"When did the chest pain begin?\" (Minutes, hours or days)       This morning at around 11am.  4. PATTERN: \"Does the pain come and go, or has it been constant since it started?\"  \"Does it get worse with exertion?\"      " " Constant.  5. DURATION: \"How long does it last\" (e.g., seconds, minutes, hours)      Consistent since it started.   6. SEVERITY: \"How bad is the pain?\"  (e.g., Scale 1-10; mild, moderate, or severe)     - MILD (1-3): doesn't interfere with normal activities      - MODERATE (4-7): interferes with normal activities or awakens from sleep     - SEVERE (8-10): excruciating pain, unable to do any normal activities        Mild.   7. CARDIAC RISK FACTORS: \"Do you have any history of heart problems or risk factors for heart disease?\" (e.g., angina, prior heart attack; diabetes, high blood pressure, high cholesterol, smoker, or strong family history of heart disease)      No.  8. PULMONARY RISK FACTORS: \"Do you have any history of lung disease?\"  (e.g., blood clots in lung, asthma, emphysema, birth control pills)      No.  9. CAUSE: \"What do you think is causing the chest pain?\"      Due to quitting smoking 2 days ago?  10. OTHER SYMPTOMS: \"Do you have any other symptoms?\" (e.g., dizziness, nausea, vomiting, sweating, fever, difficulty breathing, cough)        No.  11. PREGNANCY: \"Is there any chance you are pregnant?\" \"When was your last menstrual period?\"        NA    Protocols used: Chest Pain-A-[place]    "

## 2023-11-19 ENCOUNTER — HEALTH MAINTENANCE LETTER (OUTPATIENT)
Age: 33
End: 2023-11-19

## 2024-04-07 ENCOUNTER — HEALTH MAINTENANCE LETTER (OUTPATIENT)
Age: 34
End: 2024-04-07

## 2024-05-08 ENCOUNTER — VIRTUAL VISIT (OUTPATIENT)
Dept: INTERNAL MEDICINE | Facility: CLINIC | Age: 34
End: 2024-05-08
Payer: COMMERCIAL

## 2024-05-08 DIAGNOSIS — J45.20 MILD INTERMITTENT ASTHMA WITHOUT COMPLICATION: ICD-10-CM

## 2024-05-08 DIAGNOSIS — E03.9 HYPOTHYROIDISM, UNSPECIFIED TYPE: Primary | ICD-10-CM

## 2024-05-08 DIAGNOSIS — Z86.2 HISTORY OF THROMBOCYTOPENIA: ICD-10-CM

## 2024-05-08 DIAGNOSIS — R73.03 PREDIABETES: ICD-10-CM

## 2024-05-08 PROCEDURE — 99214 OFFICE O/P EST MOD 30 MIN: CPT | Mod: 95 | Performed by: NURSE PRACTITIONER

## 2024-05-08 RX ORDER — ALBUTEROL SULFATE 90 UG/1
2 AEROSOL, METERED RESPIRATORY (INHALATION) EVERY 6 HOURS PRN
Qty: 18 G | Refills: 11 | Status: SHIPPED | OUTPATIENT
Start: 2024-05-08

## 2024-05-08 RX ORDER — BUDESONIDE AND FORMOTEROL FUMARATE DIHYDRATE 80; 4.5 UG/1; UG/1
2 AEROSOL RESPIRATORY (INHALATION) 2 TIMES DAILY
Qty: 10.2 G | Refills: 11 | Status: SHIPPED | OUTPATIENT
Start: 2024-05-08

## 2024-05-08 RX ORDER — NICOTINE 21 MG/24HR
1 PATCH, TRANSDERMAL 24 HOURS TRANSDERMAL EVERY 24 HOURS
COMMUNITY

## 2024-05-08 ASSESSMENT — ASTHMA QUESTIONNAIRES
QUESTION_1 LAST FOUR WEEKS HOW MUCH OF THE TIME DID YOUR ASTHMA KEEP YOU FROM GETTING AS MUCH DONE AT WORK, SCHOOL OR AT HOME: A LITTLE OF THE TIME
ACT_TOTALSCORE: 16
QUESTION_4 LAST FOUR WEEKS HOW OFTEN HAVE YOU USED YOUR RESCUE INHALER OR NEBULIZER MEDICATION (SUCH AS ALBUTEROL): TWO OR THREE TIMES PER WEEK
QUESTION_3 LAST FOUR WEEKS HOW OFTEN DID YOUR ASTHMA SYMPTOMS (WHEEZING, COUGHING, SHORTNESS OF BREATH, CHEST TIGHTNESS OR PAIN) WAKE YOU UP AT NIGHT OR EARLIER THAN USUAL IN THE MORNING: ONCE A WEEK
QUESTION_2 LAST FOUR WEEKS HOW OFTEN HAVE YOU HAD SHORTNESS OF BREATH: ONCE A DAY
QUESTION_5 LAST FOUR WEEKS HOW WOULD YOU RATE YOUR ASTHMA CONTROL: WELL CONTROLLED
ACT_TOTALSCORE: 16

## 2024-05-08 NOTE — PROGRESS NOTES
"Ivon is a 33 year old who is being evaluated via a billable video visit.    How would you like to obtain your AVS? MyChart  If the video visit is dropped, the invitation should be resent by: Text to cell phone: [phone]  Will anyone else be joining your video visit? No      Assessment & Plan     Hypothyroidism, unspecified type  Needs refill on medication  Feels current dose is working well-we will fill after labs back  - TSH; Future  - T4, free; Future    Prediabetes  Recheck  - Hemoglobin A1c; Future  - Basic metabolic panel  (Ca, Cl, CO2, Creat, Gluc, K, Na, BUN); Future    History of thrombocytopenia  She has a history of Hodges 13 thrombocytopenia  She did see hematology in the past but last in 2022  We will check her CBC differential and platelets, and if it is abnormal she will see hematology again  I do not see a reason to do an Hodges 13 test unless her platelet test is off.  This is often only done by hematology  - CBC with platelets and differential; Future    Mild intermittent asthma without complication  Discussed being more consistent with her Symbicort to help her asthma control.  She says she maybe takes it 2 or 3 times a week  - budesonide-formoterol (SYMBICORT) 80-4.5 MCG/ACT Inhaler; Inhale 2 puffs into the lungs 2 times daily  - albuterol (PROAIR HFA/PROVENTIL HFA/VENTOLIN HFA) 108 (90 Base) MCG/ACT inhaler; Inhale 2 puffs into the lungs every 6 hours as needed for shortness of breath, wheezing or cough      16 minutes spent by me on the date of the encounter doing chart review, history and exam, documentation and further activities per the note      BMI  Estimated body mass index is 46.43 kg/m  as calculated from the following:    Height as of 8/3/23: 1.651 m (5' 5\").    Weight as of 9/8/23: 126.6 kg (279 lb).         Patient Instructions   Labs ordered    We will fill thyroid after labs are back    If your blood count is abnormal, we will have you see hematology again    Subjective   Ivon " is a 33 year old, presenting for the following health issues:  Chief Complaint   Patient presents with    Recheck Medication     Thyroid med and labs. Pt will go online to complete ACT questions.         5/8/2024    10:12 AM   ACT Total Scores   ACT TOTAL SCORE (Goal Greater than or Equal to 20) 16   In the past 12 months, how many times did you visit the emergency room for your asthma without being admitted to the hospital? 0   In the past 12 months, how many times were you hospitalized overnight because of your asthma? 0           5/8/2024     9:52 AM   Additional Questions   Roomed by Elisha HUTCHINS       Video Start Time: 11:14 AM    HPI     Asthma     Thyroid         Review of Systems  Constitutional, neuro, ENT, endocrine, pulmonary, cardiac, gastrointestinal, genitourinary, musculoskeletal, integument and psychiatric systems are negative, except as otherwise noted.      Objective    Vitals - Patient Reported  Systolic (Patient Reported): 124  Diastolic (Patient Reported): 76  Weight (Patient Reported): 83.3 kg (183 lb 9.6 oz)      Vitals:  No vitals were obtained today due to virtual visit.    Physical Exam   GENERAL: alert and no distress  EYES: Eyes grossly normal to inspection.  No discharge or erythema, or obvious scleral/conjunctival abnormalities.  RESP: No audible wheeze, cough, or visible cyanosis.    SKIN: Visible skin clear. No significant rash, abnormal pigmentation or lesions.  NEURO: Cranial nerves grossly intact.  Mentation and speech appropriate for age.  PSYCH: Appropriate affect, tone, and pace of words    Lab       Video-Visit Details    Type of service:  Video Visit   Video End Time:11:27 AM  Originating Location (pt. Location): Home    Distant Location (provider location):  On-site  Platform used for Video Visit: Carola  Signed Electronically by: ZAY Blake CNP

## 2024-05-08 NOTE — PATIENT INSTRUCTIONS
Labs ordered    We will fill thyroid after labs are back    If your blood count is abnormal, we will have you see hematology again

## 2024-05-10 ENCOUNTER — LAB (OUTPATIENT)
Dept: LAB | Facility: CLINIC | Age: 34
End: 2024-05-10
Payer: COMMERCIAL

## 2024-05-10 DIAGNOSIS — Z86.2 HISTORY OF THROMBOCYTOPENIA: ICD-10-CM

## 2024-05-10 DIAGNOSIS — E03.9 HYPOTHYROIDISM, UNSPECIFIED TYPE: ICD-10-CM

## 2024-05-10 DIAGNOSIS — R73.03 PREDIABETES: ICD-10-CM

## 2024-05-10 LAB
BASOPHILS # BLD AUTO: 0 10E3/UL (ref 0–0.2)
BASOPHILS NFR BLD AUTO: 1 %
EOSINOPHIL # BLD AUTO: 0.1 10E3/UL (ref 0–0.7)
EOSINOPHIL NFR BLD AUTO: 1 %
ERYTHROCYTE [DISTWIDTH] IN BLOOD BY AUTOMATED COUNT: 12.6 % (ref 10–15)
HBA1C MFR BLD: 6.2 % (ref 0–5.6)
HCT VFR BLD AUTO: 44.4 % (ref 35–47)
HGB BLD-MCNC: 14.6 G/DL (ref 11.7–15.7)
IMM GRANULOCYTES # BLD: 0 10E3/UL
IMM GRANULOCYTES NFR BLD: 0 %
LYMPHOCYTES # BLD AUTO: 2.2 10E3/UL (ref 0.8–5.3)
LYMPHOCYTES NFR BLD AUTO: 33 %
MCH RBC QN AUTO: 30.4 PG (ref 26.5–33)
MCHC RBC AUTO-ENTMCNC: 32.9 G/DL (ref 31.5–36.5)
MCV RBC AUTO: 93 FL (ref 78–100)
MONOCYTES # BLD AUTO: 0.5 10E3/UL (ref 0–1.3)
MONOCYTES NFR BLD AUTO: 7 %
NEUTROPHILS # BLD AUTO: 3.8 10E3/UL (ref 1.6–8.3)
NEUTROPHILS NFR BLD AUTO: 58 %
PLATELET # BLD AUTO: 231 10E3/UL (ref 150–450)
RBC # BLD AUTO: 4.8 10E6/UL (ref 3.8–5.2)
WBC # BLD AUTO: 6.5 10E3/UL (ref 4–11)

## 2024-05-10 PROCEDURE — 85025 COMPLETE CBC W/AUTO DIFF WBC: CPT

## 2024-05-10 PROCEDURE — 84439 ASSAY OF FREE THYROXINE: CPT

## 2024-05-10 PROCEDURE — 36415 COLL VENOUS BLD VENIPUNCTURE: CPT

## 2024-05-10 PROCEDURE — 83036 HEMOGLOBIN GLYCOSYLATED A1C: CPT

## 2024-05-10 PROCEDURE — 84443 ASSAY THYROID STIM HORMONE: CPT

## 2024-05-10 PROCEDURE — 80048 BASIC METABOLIC PNL TOTAL CA: CPT

## 2024-05-11 LAB
ANION GAP SERPL CALCULATED.3IONS-SCNC: 8 MMOL/L (ref 7–15)
BUN SERPL-MCNC: 13.8 MG/DL (ref 6–20)
CALCIUM SERPL-MCNC: 9.1 MG/DL (ref 8.6–10)
CHLORIDE SERPL-SCNC: 104 MMOL/L (ref 98–107)
CREAT SERPL-MCNC: 0.71 MG/DL (ref 0.51–0.95)
DEPRECATED HCO3 PLAS-SCNC: 26 MMOL/L (ref 22–29)
EGFRCR SERPLBLD CKD-EPI 2021: >90 ML/MIN/1.73M2
GLUCOSE SERPL-MCNC: 111 MG/DL (ref 70–99)
POTASSIUM SERPL-SCNC: 4.2 MMOL/L (ref 3.4–5.3)
SODIUM SERPL-SCNC: 138 MMOL/L (ref 135–145)
T4 FREE SERPL-MCNC: 1.04 NG/DL (ref 0.9–1.7)
TSH SERPL DL<=0.005 MIU/L-ACNC: 13.2 UIU/ML (ref 0.3–4.2)

## 2024-05-12 DIAGNOSIS — E03.9 ACQUIRED HYPOTHYROIDISM: Primary | ICD-10-CM

## 2024-05-12 RX ORDER — LEVOTHYROXINE SODIUM 137 UG/1
137 TABLET ORAL DAILY
Qty: 90 TABLET | Refills: 3 | Status: SHIPPED | OUTPATIENT
Start: 2024-05-12

## 2024-06-05 ENCOUNTER — PATIENT OUTREACH (OUTPATIENT)
Dept: INTERNAL MEDICINE | Facility: CLINIC | Age: 34
End: 2024-06-05
Payer: COMMERCIAL

## 2024-06-05 NOTE — TELEPHONE ENCOUNTER
Patient Quality Outreach    Patient is due for the following:   Diabetes -  Eye Exam and Foot Exam  Asthma  -      Next Steps:   No follow up needed at this time.    Type of outreach:    Chart review performed, no outreach needed.      Questions for provider review:    None           Elisha Pettit LPN  Chart routed to none.

## 2024-07-08 NOTE — PLAN OF CARE
Pt is A&Ox4, VSS, on RA. CVC cath to R neck heparin locked; Dsg changed x1 this shift d/t pt c/o tightness and discomfort. Neuro's intact. LS clear, BS+ active, flatus+. ABD rounded/distended, BM- . +1 edema to bilateral feet. Pt is up independently in room, on low fat diet-denies N/V. Tele: SR with occasional PACs. PRN tylenol for pain management. PRN dilaudid given x1- pt requesting to have different pain med that is stronger than tylenol but not as strong as Dilaudid. Sticky note left in chart to physicians. Plan for apheresis today. Platelets trending up, was 85 this AM.   Received bedside report from NI Nunez to assume care of pt.  Pt. Resting on gurney with even, non-labored respirations visible.  Eyes closed.  No acute distress noted.

## 2024-08-25 ENCOUNTER — HEALTH MAINTENANCE LETTER (OUTPATIENT)
Age: 34
End: 2024-08-25

## 2024-10-23 ENCOUNTER — MYC MEDICAL ADVICE (OUTPATIENT)
Dept: INTERNAL MEDICINE | Facility: CLINIC | Age: 34
End: 2024-10-23
Payer: COMMERCIAL

## 2024-11-03 ENCOUNTER — HEALTH MAINTENANCE LETTER (OUTPATIENT)
Age: 34
End: 2024-11-03

## 2024-12-29 ENCOUNTER — HEALTH MAINTENANCE LETTER (OUTPATIENT)
Age: 34
End: 2024-12-29

## 2025-01-21 ENCOUNTER — PATIENT OUTREACH (OUTPATIENT)
Dept: HEALTH INFORMATION MANAGEMENT | Facility: OTHER | Age: 35
End: 2025-01-21
Payer: COMMERCIAL

## 2025-01-21 NOTE — TELEPHONE ENCOUNTER
Patient Quality Outreach    Patient is due for the following:   Asthma  -  ACT needed  Physical Preventive Adult Physical      Topic Date Due    Diptheria Tetanus Pertussis (DTAP/TDAP/TD) Vaccine (3 - Tdap) 08/09/2002    Pneumococcal Vaccine (1 of 2 - PCV) Never done    Flu Vaccine (1) 09/01/2024    COVID-19 Vaccine (3 - 2024-25 season) 09/01/2024       Action(s) Taken:   Schedule a office visit for asthma & an Adult Preventative    Type of outreach:    Phone, left message for patient/parent to call back.    Questions for provider review:    None           Evelia Fraser LPN  Chart routed to none.

## 2025-01-23 ENCOUNTER — MYC MEDICAL ADVICE (OUTPATIENT)
Dept: INTERNAL MEDICINE | Facility: CLINIC | Age: 35
End: 2025-01-23
Payer: COMMERCIAL

## (undated) RX ORDER — DIPHENHYDRAMINE HYDROCHLORIDE 50 MG/ML
INJECTION INTRAMUSCULAR; INTRAVENOUS
Status: DISPENSED
Start: 2022-01-13

## (undated) RX ORDER — FENTANYL CITRATE 50 UG/ML
INJECTION, SOLUTION INTRAMUSCULAR; INTRAVENOUS
Status: DISPENSED
Start: 2022-01-28

## (undated) RX ORDER — HEPARIN SODIUM 200 [USP'U]/100ML
INJECTION, SOLUTION INTRAVENOUS
Status: DISPENSED
Start: 2022-01-28

## (undated) RX ORDER — METHYLPREDNISOLONE SODIUM SUCCINATE 125 MG/2ML
INJECTION, POWDER, LYOPHILIZED, FOR SOLUTION INTRAMUSCULAR; INTRAVENOUS
Status: DISPENSED
Start: 2022-02-11

## (undated) RX ORDER — ACETAMINOPHEN 325 MG/1
TABLET ORAL
Status: DISPENSED
Start: 2022-01-28

## (undated) RX ORDER — ACETAMINOPHEN 325 MG/1
TABLET ORAL
Status: DISPENSED
Start: 2022-02-23

## (undated) RX ORDER — DIPHENHYDRAMINE HYDROCHLORIDE 50 MG/ML
INJECTION INTRAMUSCULAR; INTRAVENOUS
Status: DISPENSED
Start: 2022-01-28

## (undated) RX ORDER — ACETAMINOPHEN 325 MG/1
TABLET ORAL
Status: DISPENSED
Start: 2022-02-07

## (undated) RX ORDER — DIPHENHYDRAMINE HYDROCHLORIDE 50 MG/ML
INJECTION INTRAMUSCULAR; INTRAVENOUS
Status: DISPENSED
Start: 2022-02-01

## (undated) RX ORDER — DIPHENHYDRAMINE HYDROCHLORIDE 50 MG/ML
INJECTION INTRAMUSCULAR; INTRAVENOUS
Status: DISPENSED
Start: 2022-02-04

## (undated) RX ORDER — HYDROMORPHONE HYDROCHLORIDE 1 MG/ML
INJECTION, SOLUTION INTRAMUSCULAR; INTRAVENOUS; SUBCUTANEOUS
Status: DISPENSED
Start: 2022-01-13

## (undated) RX ORDER — ACETAMINOPHEN 325 MG/1
TABLET ORAL
Status: DISPENSED
Start: 2022-02-02

## (undated) RX ORDER — ACETAMINOPHEN 325 MG/1
TABLET ORAL
Status: DISPENSED
Start: 2022-02-03

## (undated) RX ORDER — HEPARIN SODIUM 1000 [USP'U]/ML
INJECTION, SOLUTION INTRAVENOUS; SUBCUTANEOUS
Status: DISPENSED
Start: 2022-01-13

## (undated) RX ORDER — DIPHENHYDRAMINE HYDROCHLORIDE 50 MG/ML
INJECTION INTRAMUSCULAR; INTRAVENOUS
Status: DISPENSED
Start: 2022-02-23

## (undated) RX ORDER — LIDOCAINE HYDROCHLORIDE 10 MG/ML
INJECTION, SOLUTION INFILTRATION; PERINEURAL
Status: DISPENSED
Start: 2022-01-13

## (undated) RX ORDER — ACETAMINOPHEN 325 MG/1
TABLET ORAL
Status: DISPENSED
Start: 2022-01-13

## (undated) RX ORDER — DIPHENHYDRAMINE HYDROCHLORIDE 50 MG/ML
INJECTION INTRAMUSCULAR; INTRAVENOUS
Status: DISPENSED
Start: 2022-02-02

## (undated) RX ORDER — ACETAMINOPHEN 325 MG/1
TABLET ORAL
Status: DISPENSED
Start: 2022-02-11

## (undated) RX ORDER — ACETAMINOPHEN 325 MG/1
TABLET ORAL
Status: DISPENSED
Start: 2022-02-04

## (undated) RX ORDER — HEPARIN SODIUM 1000 [USP'U]/ML
INJECTION, SOLUTION INTRAVENOUS; SUBCUTANEOUS
Status: DISPENSED
Start: 2022-01-28

## (undated) RX ORDER — LIDOCAINE HYDROCHLORIDE 10 MG/ML
INJECTION, SOLUTION INFILTRATION; PERINEURAL
Status: DISPENSED
Start: 2022-01-28

## (undated) RX ORDER — ACETAMINOPHEN 325 MG/1
TABLET ORAL
Status: DISPENSED
Start: 2022-02-18

## (undated) RX ORDER — DIPHENHYDRAMINE HYDROCHLORIDE 50 MG/ML
INJECTION INTRAMUSCULAR; INTRAVENOUS
Status: DISPENSED
Start: 2022-02-18

## (undated) RX ORDER — DIPHENHYDRAMINE HYDROCHLORIDE 50 MG/ML
INJECTION INTRAMUSCULAR; INTRAVENOUS
Status: DISPENSED
Start: 2022-02-03

## (undated) RX ORDER — DIPHENHYDRAMINE HYDROCHLORIDE 50 MG/ML
INJECTION INTRAMUSCULAR; INTRAVENOUS
Status: DISPENSED
Start: 2022-02-07

## (undated) RX ORDER — DIPHENHYDRAMINE HYDROCHLORIDE 50 MG/ML
INJECTION INTRAMUSCULAR; INTRAVENOUS
Status: DISPENSED
Start: 2022-02-11

## (undated) RX ORDER — ACETAMINOPHEN 325 MG/1
TABLET ORAL
Status: DISPENSED
Start: 2022-02-01